# Patient Record
Sex: FEMALE | Race: WHITE | NOT HISPANIC OR LATINO | Employment: OTHER | URBAN - METROPOLITAN AREA
[De-identification: names, ages, dates, MRNs, and addresses within clinical notes are randomized per-mention and may not be internally consistent; named-entity substitution may affect disease eponyms.]

---

## 2017-01-09 RX ORDER — AMLODIPINE BESYLATE 2.5 MG/1
TABLET ORAL
Qty: 90 TABLET | Refills: 2 | Status: SHIPPED | OUTPATIENT
Start: 2017-01-09 | End: 2017-01-19 | Stop reason: SDUPTHER

## 2017-01-19 RX ORDER — AMLODIPINE BESYLATE 2.5 MG/1
TABLET ORAL
Qty: 90 TABLET | Refills: 2 | Status: SHIPPED | OUTPATIENT
Start: 2017-01-19 | End: 2017-03-24 | Stop reason: SDUPTHER

## 2017-02-14 DIAGNOSIS — E78.5 HYPERLIPIDEMIA, UNSPECIFIED HYPERLIPIDEMIA TYPE: Primary | ICD-10-CM

## 2017-02-14 RX ORDER — ATORVASTATIN CALCIUM 40 MG/1
TABLET, FILM COATED ORAL
Qty: 180 TABLET | Refills: 0 | Status: SHIPPED | OUTPATIENT
Start: 2017-02-14 | End: 2017-03-24 | Stop reason: SDUPTHER

## 2017-02-14 NOTE — TELEPHONE ENCOUNTER
Due for lipid panel this month, order in  Also due for prevnar 13, Td, and flu vaccine if not done already

## 2017-02-17 NOTE — TELEPHONE ENCOUNTER
Patient stated she had a lot of lab done by Dr Quiñonez recently including lipid panel. I will request the results.   She did not want the prevnar 13, flu or tetanus shot.

## 2017-02-20 NOTE — TELEPHONE ENCOUNTER
Received labs from Dr Quiñonez.   Cholesterol Total: 105  Trig 62  HDL 51  LDL 44   Report sent to scanning

## 2017-02-27 ENCOUNTER — DOCUMENTATION ONLY (OUTPATIENT)
Dept: FAMILY MEDICINE | Facility: CLINIC | Age: 77
End: 2017-02-27

## 2017-02-27 NOTE — PROGRESS NOTES
Pre-Visit Chart Review  For Appointment Scheduled on 3-24-17    Health Maintenance Due   Topic Date Due    TETANUS VACCINE  03/15/1958    Pneumococcal (65+) (1 of 2 - PCV13) 03/15/2005    Influenza Vaccine  08/01/2016

## 2017-03-21 DIAGNOSIS — M25.562 LEFT KNEE PAIN, UNSPECIFIED CHRONICITY: Primary | ICD-10-CM

## 2017-03-23 ENCOUNTER — HOSPITAL ENCOUNTER (OUTPATIENT)
Dept: RADIOLOGY | Facility: HOSPITAL | Age: 77
Discharge: HOME | End: 2017-03-23
Attending: ORTHOPAEDIC SURGERY
Payer: MEDICARE

## 2017-03-23 ENCOUNTER — OFFICE VISIT (OUTPATIENT)
Dept: ORTHOPEDICS | Facility: CLINIC | Age: 77
End: 2017-03-23
Payer: MEDICARE

## 2017-03-23 VITALS
DIASTOLIC BLOOD PRESSURE: 75 MMHG | SYSTOLIC BLOOD PRESSURE: 164 MMHG | HEART RATE: 74 BPM | WEIGHT: 201 LBS | HEIGHT: 63 IN | BODY MASS INDEX: 35.61 KG/M2

## 2017-03-23 DIAGNOSIS — M25.562 LEFT KNEE PAIN, UNSPECIFIED CHRONICITY: ICD-10-CM

## 2017-03-23 DIAGNOSIS — M17.12 PRIMARY OSTEOARTHRITIS OF LEFT KNEE: Primary | ICD-10-CM

## 2017-03-23 PROCEDURE — 73562 X-RAY EXAM OF KNEE 3: CPT | Mod: 26,59,RT, | Performed by: RADIOLOGY

## 2017-03-23 PROCEDURE — 3078F DIAST BP <80 MM HG: CPT | Mod: S$GLB,,, | Performed by: ORTHOPAEDIC SURGERY

## 2017-03-23 PROCEDURE — 1125F AMNT PAIN NOTED PAIN PRSNT: CPT | Mod: S$GLB,,, | Performed by: ORTHOPAEDIC SURGERY

## 2017-03-23 PROCEDURE — 20610 DRAIN/INJ JOINT/BURSA W/O US: CPT | Mod: LT,S$GLB,, | Performed by: ORTHOPAEDIC SURGERY

## 2017-03-23 PROCEDURE — 1157F ADVNC CARE PLAN IN RCRD: CPT | Mod: S$GLB,,, | Performed by: ORTHOPAEDIC SURGERY

## 2017-03-23 PROCEDURE — 73564 X-RAY EXAM KNEE 4 OR MORE: CPT | Mod: 26,LT,, | Performed by: RADIOLOGY

## 2017-03-23 PROCEDURE — 3077F SYST BP >= 140 MM HG: CPT | Mod: S$GLB,,, | Performed by: ORTHOPAEDIC SURGERY

## 2017-03-23 PROCEDURE — 1160F RVW MEDS BY RX/DR IN RCRD: CPT | Mod: S$GLB,,, | Performed by: ORTHOPAEDIC SURGERY

## 2017-03-23 PROCEDURE — 1159F MED LIST DOCD IN RCRD: CPT | Mod: S$GLB,,, | Performed by: ORTHOPAEDIC SURGERY

## 2017-03-23 PROCEDURE — 99999 PR PBB SHADOW E&M-EST. PATIENT-LVL III: CPT | Mod: PBBFAC,,, | Performed by: ORTHOPAEDIC SURGERY

## 2017-03-23 PROCEDURE — 99213 OFFICE O/P EST LOW 20 MIN: CPT | Mod: 25,S$GLB,, | Performed by: ORTHOPAEDIC SURGERY

## 2017-03-23 PROCEDURE — 73564 X-RAY EXAM KNEE 4 OR MORE: CPT | Mod: TC,PN,LT

## 2017-03-23 RX ORDER — TRIAMCINOLONE ACETONIDE 40 MG/ML
40 INJECTION, SUSPENSION INTRA-ARTICULAR; INTRAMUSCULAR
Status: DISCONTINUED | OUTPATIENT
Start: 2017-03-23 | End: 2017-03-23 | Stop reason: HOSPADM

## 2017-03-23 RX ADMIN — TRIAMCINOLONE ACETONIDE 40 MG: 40 INJECTION, SUSPENSION INTRA-ARTICULAR; INTRAMUSCULAR at 02:03

## 2017-03-23 NOTE — PROCEDURES
Large Joint Aspiration/Injection  Date/Time: 3/23/2017 2:53 PM  Performed by: COLIN HOLGUIN  Authorized by: COLIN HOLGUIN     Consent Done?:  Yes (Verbal)  Indications:  Pain  Procedure site marked: Yes    Timeout: Prior to procedure the correct patient, procedure, and site was verified      Location:  Knee  Site:  L knee  Prep: Patient was prepped and draped in usual sterile fashion    Needle size:  20 G  Approach:  Anterolateral  Medications:  40 mg triamcinolone acetonide 40 mg/mL  Patient tolerance:  Patient tolerated the procedure well with no immediate complications

## 2017-03-23 NOTE — PROGRESS NOTES
Past Medical History:   Diagnosis Date    Blood transfusion     GERD (gastroesophageal reflux disease)     Hepatitis C     treated six months by Dr. Gaspar with interferon    History of hepatitis C     Hyperlipidemia     Hypertension     Hypothyroidism     Obesity     Primary osteoarthritis of left knee 2015    Sleep apnea     Stroke     TIA     TIA (transient ischemic attack)        Past Surgical History:   Procedure Laterality Date    ABDOMINAL SURGERY      SBO from lap band wraping around small bowel, lap untwisted    ADENOIDECTOMY      BREAST BIOPSY  2014    needle biopsy     CATARACT EXTRACTION, BILATERAL Bilateral      SECTION      CHOLECYSTECTOMY      COLONOSCOPY  2011    Dr. Alvares, 5 year recheck    EYE SURGERY      bilateral cataract Dr Carrillo     HEMORRHOID SURGERY      HYSTERECTOMY      INCONTINENCE SURGERY      sling    LAPAROSCOPIC GASTRIC BANDING      LAPAROSCOPIC NISSEN FUNDOPLICATION      Dr. Rutherford    TONSILLECTOMY         Current Outpatient Prescriptions   Medication Sig    amlodipine (NORVASC) 2.5 MG tablet 1 TABLET ONCE DAILY    aspirin (ECOTRIN) 81 MG EC tablet Take 162 mg by mouth once daily. 2 tablets daily    atorvastatin (LIPITOR) 40 MG tablet TAKE 2 TABLETS BY MOUTH EVERY DAY    CALCIUM CARB/VIT D3/MINERALS (CALTRATE PLUS ORAL) Take 1 tablet by mouth 2 (two) times daily.    CALCIUM CARBONATE (TUMS ORAL) Take 4 each by mouth once daily.    levothyroxine (SYNTHROID) 125 MCG tablet TAKE 1 TABLET BY MOUTH EVERY MORNING BEFORE BREAKFAST ON AN EMPTY STOMACH    multivitamin (ONE DAILY MULTIVITAMIN) per tablet Take 1 tablet by mouth once daily.    VESICARE 10 mg tablet TK 1 T PO QD    VIT A/VIT C/VIT E/ZINC/COPPER (PRESERVISION AREDS ORAL) Take 1 capsule by mouth 2 (two) times daily.    hyoscyamine (ANASPAZ,LEVSIN) 0.125 mg Tab Take 1 tablet (125 mcg total) by mouth every 4 (four) hours as needed (bladder spasm).      No current facility-administered medications for this visit.        Review of patient's allergies indicates:   Allergen Reactions    Ace inhibitors Other (See Comments)     cough    Morphine Itching    Keflex [cephalexin] Itching and Rash       Family History   Problem Relation Age of Onset    Diabetes Mother     Arthritis Mother      RA    Heart disease Mother      MI at 79     Diabetes Sister     Diverticulitis Sister     Emphysema Maternal Aunt     Heart disease Maternal Aunt     Cancer Maternal Uncle     Early death Maternal Grandfather 47    Heart disease Maternal Grandfather     Arthritis Paternal Grandmother     Heart disease Paternal Grandfather     Cancer Paternal Grandfather      lung heavy smoker     No Known Problems Father     No Known Problems Son     No Known Problems Paternal Uncle     No Known Problems Maternal Grandmother     No Known Problems Brother     Arthritis Daughter        Social History     Social History    Marital status:      Spouse name: N/A    Number of children: N/A    Years of education: N/A     Occupational History    Not on file.     Social History Main Topics    Smoking status: Former Smoker     Packs/day: 0.50     Years: 5.00    Smokeless tobacco: Never Used    Alcohol use Yes      Comment: occasionally at holidays    Drug use: No    Sexual activity: Yes     Partners: Male     Other Topics Concern    Not on file     Social History Narrative       Chief Complaint:   Chief Complaint   Patient presents with    Knee Pain     left knee-wants injection        History : This is a 77-year-old female seen for left knee pain.  Patient states that started at the beginning of 2015 when he fell out of a truck.  She's had pain since then.  Pain is with walking and bending.  Pain is located above and below the kneecap as well as posteriorly in the knee.  Also has pain along the medial joint line.      Present: Patient comes back in for recurrent left  knee pain.  I injected her back in October.  Injection lasted several months.  Pain hurts with walking.  Pain over the last month.  Pain a 5 out of 10.      Review of Systems:    Constitution: Negative for chills, fever, and sweats.  Negative for unexplained weight loss.    HENT:  Negative for headaches and blurry vision.    Cardiovascular:Negative for chest pain or irregular heart beat. Negative for hypertension.    Respiratory:  Negative for cough and shortness of breath.    Gastrointestinal: Negative for abdominal pain, heartburn, melena, nausea, and vomitting.    Genitourinary:  Negative bladder incontinence and dysuria.  Positive for frequency and urgency.    Musculoskeletal:  See HPI    Neurological: Negative for numbness.    Psychiatric/Behavioral: Negative for depression.  The patient is not nervous/anxious.      Endocrine: Negative for polyuria    Hematologic/Lymphatic: Negative for bleeding problem.  Does not bruise/bleed easily.    Skin: Negative for poor would healing and rash      Physical Examination:    Vital Signs:    Vitals:    03/23/17 1434   BP: (!) 164/75   Pulse: 74       Body mass index is 35.61 kg/(m^2).    This a well-developed, well nourished patient in no acute distress.  They are alert and oriented and cooperative to examination.  Pt. walks without an antalgic gait.      Examination of the left knee shows no rashes or erythema. There are no masses ecchymosis or effusion. Patient has full range of motion from 0-130°. Patient is nontender to palpation over lateral joint line and mildly tender to palpation over the medial joint line. Knee is stable to varus and valgus stress. 5 out of 5 motor strength. Palpable distal pulses. Intact light touch sensation. Negative Patellofemoral crepitus      X-rays: X-rays left knee are ordered and reviewed which show severe medial joint space narrowing of both knees with the left leg slightly worse than the right.     Assessment:: Left varus knee  arthritis    Plan:  I reviewed the findings with the patient today.  We discussed treatment options.  Patient elected for another cortisone injection.  Patient will follow up if the cortisone injections stop working as well.

## 2017-03-24 ENCOUNTER — OFFICE VISIT (OUTPATIENT)
Dept: FAMILY MEDICINE | Facility: CLINIC | Age: 77
End: 2017-03-24
Payer: MEDICARE

## 2017-03-24 VITALS
TEMPERATURE: 98 F | BODY MASS INDEX: 36.35 KG/M2 | HEART RATE: 61 BPM | RESPIRATION RATE: 24 BRPM | DIASTOLIC BLOOD PRESSURE: 68 MMHG | HEIGHT: 62 IN | WEIGHT: 197.56 LBS | SYSTOLIC BLOOD PRESSURE: 146 MMHG | OXYGEN SATURATION: 97 %

## 2017-03-24 DIAGNOSIS — Z00.00 ANNUAL PHYSICAL EXAM: Primary | ICD-10-CM

## 2017-03-24 DIAGNOSIS — K21.9 GASTROESOPHAGEAL REFLUX DISEASE, ESOPHAGITIS PRESENCE NOT SPECIFIED: ICD-10-CM

## 2017-03-24 DIAGNOSIS — E03.9 HYPOTHYROIDISM, UNSPECIFIED TYPE: ICD-10-CM

## 2017-03-24 DIAGNOSIS — Z86.73 HISTORY OF CVA (CEREBROVASCULAR ACCIDENT): ICD-10-CM

## 2017-03-24 DIAGNOSIS — G47.33 OSA (OBSTRUCTIVE SLEEP APNEA): ICD-10-CM

## 2017-03-24 DIAGNOSIS — M17.0 PRIMARY OSTEOARTHRITIS OF BOTH KNEES: ICD-10-CM

## 2017-03-24 DIAGNOSIS — E78.5 HYPERLIPIDEMIA, UNSPECIFIED HYPERLIPIDEMIA TYPE: ICD-10-CM

## 2017-03-24 DIAGNOSIS — I10 HYPERTENSION, POOR CONTROL: ICD-10-CM

## 2017-03-24 PROCEDURE — 3077F SYST BP >= 140 MM HG: CPT | Mod: S$GLB,,, | Performed by: FAMILY MEDICINE

## 2017-03-24 PROCEDURE — 99999 PR PBB SHADOW E&M-EST. PATIENT-LVL III: CPT | Mod: PBBFAC,,, | Performed by: FAMILY MEDICINE

## 2017-03-24 PROCEDURE — 99499 UNLISTED E&M SERVICE: CPT | Mod: S$GLB,,, | Performed by: FAMILY MEDICINE

## 2017-03-24 PROCEDURE — 3078F DIAST BP <80 MM HG: CPT | Mod: S$GLB,,, | Performed by: FAMILY MEDICINE

## 2017-03-24 PROCEDURE — 99397 PER PM REEVAL EST PAT 65+ YR: CPT | Mod: S$GLB,,, | Performed by: FAMILY MEDICINE

## 2017-03-24 RX ORDER — ATORVASTATIN CALCIUM 40 MG/1
80 TABLET, FILM COATED ORAL DAILY
Qty: 180 TABLET | Refills: 3 | Status: SHIPPED | OUTPATIENT
Start: 2017-03-24 | End: 2017-07-07 | Stop reason: SDUPTHER

## 2017-03-24 RX ORDER — ATORVASTATIN CALCIUM 40 MG/1
TABLET, FILM COATED ORAL
Qty: 180 TABLET | Refills: 3 | OUTPATIENT
Start: 2017-03-24

## 2017-03-24 RX ORDER — AMLODIPINE BESYLATE 2.5 MG/1
5 TABLET ORAL DAILY
Qty: 90 TABLET | Refills: 2
Start: 2017-03-24 | End: 2017-04-07 | Stop reason: DRUGHIGH

## 2017-03-24 RX ORDER — OMEGA-3 FATTY ACIDS 1000 MG
1 CAPSULE ORAL 2 TIMES DAILY
COMMUNITY
End: 2017-08-17

## 2017-03-24 NOTE — PROGRESS NOTES
Patient, Brandi Flynn (MRN #[id number]), presented with a recorded BMI of 36.13 kg/m^2 and a documented comorbidity(s):  - Obstructive Sleep Apnea  - Hypertension  - Hyperlipidemia  to which the severe obesity is a contributing factor. This is consistent with the definition of severe obesity (BMI 35.0-35.9) with comorbidity (ICD-10 E66.01, Z68.35). The patient's severe obesity was monitored, evaluated, addressed and/or treated. This addendum to the medical record is made on 03/24/2017.

## 2017-03-24 NOTE — PATIENT INSTRUCTIONS
Weight Management: Getting Started  Healthy bodies come in all shapes and sizes. Not all bodies are made to be thin. For some people, a healthy weight is higher than the average weight listed on weight charts. Your healthcare provider can help you decide on a healthy weight for you.    Reasons to lose weight  Losing weight can help with some health problems, such as high blood pressure, heart disease, diabetes, sleep apnea, and arthritis. You may also feel more energy.  Set your long-term goal  Your goal doesn't even have to be a specific weight. You may decide on a fitness goal (such as being able to walk 10 miles a week), or a health goal (such as lowering your blood pressure). Choose a goal that is measurable and reasonable, so you know when you've reached it. A goal of reaching a BMI of less than 25 is not always reasonable (or possible).   Make an action plan  Habits dont change overnight. Setting your goals too high can leave you feeling discouraged if you cant reach them. Be realistic. Choose one or two small changes you can make now. Set an action plan for how you are going to make these changes. When you can stick to this plan, keep making a few more small changes. Taking small steps will help you stay on the path to success.  Track your progress  Write down your goals. Then, keep a daily record of your progress. Write down what you eat and how active you are. This record lets you look back on how much youve done. It may also help when youre feeling frustrated. Reward yourself for success. Even if you dont reach every goal, give yourself credit for what you do get done.  Get support  Encouragement from others can help make losing weight easier. Ask your family members and friends for support. They may even want to join you. Also look to your healthcare provider, registered dietitian, and  for help. Your local hospital can give you more information about nutrition, exercise, and  weight loss.  Date Last Reviewed: 1/31/2016 © 2000-2016 [employer] [address] [place] [address], [place] [address]. All rights reserved. This information is not intended as a substitute for professional medical care. Always follow your healthcare professional's instructions.        Walking for Fitness  Fitness walking has something for everyone, even people who are already fit. Walking is one of the safest ways to condition your body aerobically. It can boost energy, help you lose weight, and reduce stress.    Physical benefits  · Walking strengthens your heart and lungs, and tones your muscles.  · When walking, your feet land with less impact than in other sports. This reduces chances of muscle, bone, and joint injury.  · Regular walking improves your cholesterol levels and lowers your risk of heart disease. And it helps you control your blood sugar if you have diabetes.  · Walking is a weight-bearing activity, which helps maintain bone density. This can help prevent osteoporosis.  Personal rewards  · Taking walks can help you relax and manage stress. And fitness walking may make you feel better about yourself.  · Walking can help you sleep better at night and make you less likely to be depressed.  · Regular walking may help maintain your memory as you get older.  · Walking is a great way to spend extra time with friends and family members. Be sure to invite your dog along!  Q&A about fitness walking  Q: Will walking keep me fit?  A: Yes. Regular walking at the right pace gives you all the benefits of other aerobic activities, such as jogging and swimming.  Q: Will walking help me lose weight and keep it off?  A: Yes. Per mile, walking can burn as many calories as jogging. Your health care provider can help work walking into your weight-loss plan.  Q: Is walking safe for my health?  A: Yes. Walking is safe if you have high blood pressure, diabetes, heart disease, or other conditions. Talk to your health  care provider before you start.  Date Last Reviewed: 5/9/2015  © 7839-9165 [employer] [address] [place] [address], [place] [address]. All rights reserved. This information is not intended as a substitute for professional medical care. Always follow your healthcare professional's instructions.        Controlling High Blood Pressure  High blood pressure (hypertension) is often called the silent killer. This is because many people who have it dont know it. High blood pressure is defined as 140/90 mm Hg or higher. Know your blood pressure and remember to check it regularly. Doing so can save your life. Here are some things you can do to help control your blood pressure.    Choose heart-healthy foods  · Select low-salt, low-fat foods. Limit sodium intake to 2,400 mg per day or the amount suggested by your healthcare provider.  · Limit canned, dried, cured, packaged, and fast foods. These can contain a lot of salt.  · Eat 8 to 10 servings of fruits and vegetables every day.  · Choose lean meats, fish, or chicken.  · Eat whole-grain pasta, brown rice, and beans.  · Eat 2 to 3 servings of low-fat or fat-free dairy products.  · Ask your doctor about the DASH eating plan. This plan helps reduce blood pressure.  · When you go to a restaurant, ask that your meal be prepared with no added salt.  Maintain a healthy weight  · Ask your healthcare provider how many calories to eat a day. Then stick to that number.  · Ask your healthcare provider what weight range is healthiest for you. If you are overweight, a weight loss of only 3% to 5% of your body weight can help lower blood pressure. Generally, a good weight loss goal is to lose 10% of your body weight in a year.  · Limit snacks and sweets.  · Get regular exercise.  Get up and get active  · Choose activities you enjoy. Find ones you can do with friends or family. This includes bicycling, dancing, walking, and jogging.  · Park farther away from building  entrances.  · Use stairs instead of the elevator.  · When you can, walk or bike instead of driving.  · [place] leaves, garden, or do household repairs.  · Be active at a moderate to vigorous level of physical activity for at least 40 minutes for a minimum of 3 to 4 days a week.   Manage stress  · Make time to relax and enjoy life. Find time to laugh.  · Communicate your concerns with your loved ones and your healthcare provider.  · Visit with family and friends, and keep up with hobbies.  Limit alcohol and quit smoking  · Men should have no more than 2 drinks per day.  · Women should have no more than 1 drink per day.  · Talk with your healthcare provider about quitting smoking. Smoking significantly increases your risk for heart disease and stroke. Ask your healthcare provider about community smoking cessation programs and other options.  Medicines  If lifestyle changes arent enough, your healthcare provider may prescribe high blood pressure medicine. Take all medicines as prescribed. If you have any questions about your medicines, ask your healthcare provider before stopping or changing them.   Date Last Reviewed: 4/27/2016 © 2000-2016 [employer] [address] [place] [address], [place] [address]. All rights reserved. This information is not intended as a substitute for professional medical care. Always follow your healthcare professional's instructions.

## 2017-03-24 NOTE — PROGRESS NOTES
Subjective:       Patient ID: Brandi Flynn is a 77 y.o. female.    Chief Complaint: Annual Exam    HPI Comments: 77 year old female in for an annual exam.  She had a screening at [facility] with ultrasounds of aorta, carotids, abdomen and peripheral arteries and did well.  She also has lab from Dr. Quiñonez which is satisfactory including chem panel, blood count, thyroid, lipids and hormone levels.  She has no major problems but just had a steroid injection in the left knee yesterday.  She is getting them every 5-6 months.      Past Medical History:  1960: Blood transfusion  No date: GERD (gastroesophageal reflux disease)  No date: Hepatitis C      Comment: treated six months by Dr. Gaspar with                interferon  No date: History of hepatitis C  No date: Hyperlipidemia  No date: Hypertension  No date: Hypothyroidism  No date: Obesity  2015: Primary osteoarthritis of left knee  No date: Sleep apnea  No date: Stroke      Comment: TIA   No date: TIA (transient ischemic attack)    Past Surgical History:  2015: ABDOMINAL SURGERY      Comment: SBO from lap band wraping around small bowel,                lap untwisted  No date: ADENOIDECTOMY  2014: BREAST BIOPSY      Comment: needle biopsy   No date: CATARACT EXTRACTION, BILATERAL Bilateral  No date:  SECTION  2001: CHOLECYSTECTOMY  2011: COLONOSCOPY      Comment: Dr. Alvares, 5 year recheck  2014: EYE SURGERY      Comment: bilateral cataract Dr Carrillo   No date: HEMORRHOID SURGERY  1989: HYSTERECTOMY  2008: INCONTINENCE SURGERY      Comment: sling  2004: LAPAROSCOPIC GASTRIC BANDING  No date: LAPAROSCOPIC NISSEN FUNDOPLICATION      Comment: Dr. Rutherford  No date: TONSILLECTOMY    Review of patient's family history indicates:    Diabetes                       Mother                    Arthritis                      Mother                      Comment: RA    Heart disease                  Mother                      Comment: MI at 79     Diabetes                        Sister                    Diverticulitis                 Sister                    Emphysema                      Maternal Aunt             Heart disease                  Maternal Aunt             Cancer                         Maternal Uncle            Early death                    Maternal Grandfather      Heart disease                  Maternal Grandfather      Arthritis                      Paternal Grandmother      Heart disease                  Paternal Grandfather      Cancer                         Paternal Grandfather        Comment: lung heavy smoker     No Known Problems              Father                    No Known Problems              Son                       No Known Problems              Paternal Uncle            No Known Problems              Maternal Grandmother      No Known Problems              Brother                   Arthritis                      Daughter                  Social History    Marital status:              Spouse name:                       Years of education:                 Number of children:               Social History Main Topics    Smoking status: Former Smoker                                                                Packs/day: 0.50      Years: 5.00        Smokeless status: Never Used                        Alcohol use: Yes                Comment: occasionally at holidays    Drug use: No              Sexual activity: Yes               Partners with: Male        Current Outpatient Prescriptions:     amlodipine (NORVASC) 2.5 MG tablet, Take 2 tablets (5 mg total) by mouth once daily., Disp: 90 tablet, Rfl: 2    aspirin (ECOTRIN) 81 MG EC tablet, Take 162 mg by mouth once daily. 2 tablets daily, Disp: , Rfl:     atorvastatin (LIPITOR) 40 MG tablet, Take 2 tablets (80 mg total) by mouth once daily., Disp: 180 tablet, Rfl: 3    B INFANTIS/B ANI/B JODY/B BIFID (PROBIOTIC 4X ORAL), Take 2 capsules by mouth once daily., Disp: , Rfl:     CALCIUM  CARB/VIT D3/MINERALS (CALTRATE PLUS ORAL), Take 1 tablet by mouth 2 (two) times daily., Disp: , Rfl:     IODINE ORAL, Take 12.5 mg by mouth every Mon, Wed, Fri., Disp: , Rfl:     NATURE-THROID 130 mg Tab, Take 0.5 tablets by mouth once daily. , Disp: , Rfl: 3    omega-3 fatty acids 1,000 mg Cap, Take 1 capsule by mouth 2 (two) times daily., Disp: , Rfl:     UNABLE TO FIND, Take 1 capsule by mouth once daily. A-5,000 iu / D -3=10,000 iu /K 2 = 500 mcg, Disp: , Rfl:     UNABLE TO FIND, Take 1 capsule by mouth once daily. BioTE  mg diatary supplement, Disp: , Rfl:     VESICARE 10 mg tablet, TK 1 T PO QD, Disp: , Rfl: 3    VIT A/VIT C/VIT E/ZINC/COPPER (PRESERVISION AREDS ORAL), Take 1 capsule by mouth 2 (two) times daily., Disp: , Rfl:     hyoscyamine (ANASPAZ,LEVSIN) 0.125 mg Tab, Take 1 tablet (125 mcg total) by mouth every 4 (four) hours as needed (bladder spasm)., Disp: 60 tablet, Rfl: 2  No current facility-administered medications for this visit.     TETANUS VACCINE due on 03/15/1958-DECLINES  Zoster Vaccine due on 03/15/2000-DECLINES  Pneumococcal (65+)(1 of 2 - PCV13) due on 03/15/2005-DECLINES  Influenza Vaccine due on 08/01/2016-DECLINES      Review of Systems   Constitutional: Negative for chills, diaphoresis, fatigue, fever and unexpected weight change.   HENT: Negative for congestion, ear pain, hearing loss, postnasal drip and sinus pressure.    Eyes: Negative for itching and visual disturbance.   Respiratory: Negative for cough, chest tightness, shortness of breath and wheezing.    Cardiovascular: Negative for chest pain, palpitations and leg swelling.   Gastrointestinal: Negative for abdominal pain, blood in stool, constipation, diarrhea, nausea and vomiting.   Genitourinary: Negative for dysuria, frequency, hematuria, menstrual problem, pelvic pain, vaginal bleeding and vaginal discharge.   Musculoskeletal: Positive for arthralgias (knees). Negative for back pain, joint swelling and  myalgias.   Skin: Negative for color change, pallor and rash.   Neurological: Positive for dizziness (occasional, no pattern of activity or position change). Negative for headaches.   Hematological: Negative for adenopathy.   Psychiatric/Behavioral: Negative for sleep disturbance. The patient is not nervous/anxious.        Objective:      Physical Exam   Constitutional: She is oriented to person, place, and time. She appears well-developed. No distress.   Elevated systolic blood pressures  Patient is obese with bmi of 36.1.   Weight is decreased by 3.6lb since last visit of 10/13/16.     HENT:   Head: Normocephalic and atraumatic.   Right Ear: External ear normal.   Left Ear: External ear normal.   Nose: Nose normal.   Mouth/Throat: Oropharynx is clear and moist. No oropharyngeal exudate.   Eyes: Conjunctivae and EOM are normal. Pupils are equal, round, and reactive to light. Right eye exhibits no discharge. Left eye exhibits no discharge. No scleral icterus.   Neck: Normal range of motion. Neck supple. No JVD present. No tracheal deviation present. No thyromegaly present.   Cardiovascular: Normal rate, regular rhythm and normal heart sounds.  Exam reveals no gallop and no friction rub.    No murmur heard.  No bruit audible   Pulmonary/Chest: Effort normal and breath sounds normal. No respiratory distress. She has no wheezes. She has no rales. She exhibits no tenderness.   Abdominal: Soft. Bowel sounds are normal. She exhibits no distension and no mass. There is no tenderness. There is no rebound and no guarding. No hernia.   Musculoskeletal: She exhibits no edema or tenderness.        Right knee: She exhibits decreased range of motion, swelling and effusion.        Left knee: She exhibits decreased range of motion, swelling and effusion.   Lymphadenopathy:     She has no cervical adenopathy.   Neurological: She is alert and oriented to person, place, and time. She has normal reflexes. She displays normal reflexes.  No cranial nerve deficit. She exhibits normal muscle tone.   Skin: Skin is warm and dry. No rash noted. She is not diaphoretic. No erythema. No pallor.   Psychiatric: She has a normal mood and affect. Her behavior is normal. Judgment and thought content normal.   Nursing note and vitals reviewed.      Assessment:       1. Annual physical exam    2. Hypertension, poor control    3. Hyperlipidemia, unspecified hyperlipidemia type    4. Hypothyroidism, unspecified type    5. Primary osteoarthritis of both knees    6. REN (obstructive sleep apnea)    7. Gastroesophageal reflux disease, esophagitis presence not specified    8. History of CVA (cerebrovascular accident)    9. BMI 36.0-36.9,adult        Plan:       1. Annual physical exam    2. Hypertension, poor control  Increase amlodipine to two daily (5mg) and call with bp log after 1-2 weeks    3. Hyperlipidemia, unspecified hyperlipidemia type  Good results on Dr. Quiñonez lab-see in media section  - atorvastatin (LIPITOR) 40 MG tablet; Take 2 tablets (80 mg total) by mouth once daily.  Dispense: 180 tablet; Refill: 3    4. Hypothyroidism, unspecified type  controlled    5. Primary osteoarthritis of both knees  Discussed synvisc and other alternatives    6. REN (obstructive sleep apnea)    7. Gastroesophageal reflux disease, esophagitis presence not specified    8. History of CVA (cerebrovascular accident)    9. BMI 36.0-36.9,adult  Discussed aquatic exercise, weight loss benefits on her knee arthritis         Patient readiness: acceptance and barriers:none    During the course of the visit the patient was educated and counseled about the following:     Hypertension:   Medication: increase amlodipine to two daily (5mg) and call bp results to me after 1-2 weeks.  Dietary sodium restriction.  Regular aerobic exercise.  Obesity:   General weight loss/lifestyle modification strategies discussed (elicit support from others; identify saboteurs; non-food rewards,  etc).  Informal exercise measures discussed, e.g. taking stairs instead of elevator.  Regular aerobic exercise program discussed.    Goals: Hypertension: Reduce Blood Pressure and Obesity: Reduce calorie intake and BMI    Did patient meet goals/outcomes: No    The following self management tools provided: blood pressure log  excercise log    Patient Instructions (the written plan) was given to the patient/family.     Time spent with patient: 45 minutes

## 2017-04-07 ENCOUNTER — CLINICAL SUPPORT (OUTPATIENT)
Dept: FAMILY MEDICINE | Facility: CLINIC | Age: 77
End: 2017-04-07
Payer: MEDICARE

## 2017-04-07 VITALS — SYSTOLIC BLOOD PRESSURE: 137 MMHG | DIASTOLIC BLOOD PRESSURE: 70 MMHG | HEART RATE: 55 BPM

## 2017-04-07 DIAGNOSIS — I10 ESSENTIAL HYPERTENSION: Primary | ICD-10-CM

## 2017-04-07 PROCEDURE — 99499 UNLISTED E&M SERVICE: CPT | Mod: S$GLB,,, | Performed by: FAMILY MEDICINE

## 2017-04-07 RX ORDER — AMLODIPINE BESYLATE 5 MG/1
5 TABLET ORAL DAILY
Qty: 90 TABLET | Refills: 3 | Status: SHIPPED | OUTPATIENT
Start: 2017-04-07 | End: 2017-08-17 | Stop reason: DRUGHIGH

## 2017-04-07 NOTE — TELEPHONE ENCOUNTER
Patient came in for bp check - 137/70 p55-needs refill of Amlodipine- was taking 2.5mg two daily. Sheree

## 2017-05-11 DIAGNOSIS — E03.9 HYPOTHYROIDISM, UNSPECIFIED TYPE: Primary | ICD-10-CM

## 2017-05-12 RX ORDER — LEVOTHYROXINE SODIUM 125 UG/1
TABLET ORAL
Qty: 90 TABLET | Refills: 0 | OUTPATIENT
Start: 2017-05-12

## 2017-05-12 NOTE — TELEPHONE ENCOUNTER
Patient stated she is not on levothyroxine. D/C    She did go back to amlodipine 2.5 on her bp med. The 5mg was making her dizzy.

## 2017-06-27 RX ORDER — ATORVASTATIN CALCIUM 40 MG/1
TABLET, FILM COATED ORAL
Qty: 180 TABLET | Refills: 0 | OUTPATIENT
Start: 2017-06-27

## 2017-07-07 DIAGNOSIS — E78.5 HYPERLIPIDEMIA, UNSPECIFIED HYPERLIPIDEMIA TYPE: ICD-10-CM

## 2017-07-07 RX ORDER — ATORVASTATIN CALCIUM 40 MG/1
80 TABLET, FILM COATED ORAL DAILY
Qty: 180 TABLET | Refills: 3 | Status: SHIPPED | OUTPATIENT
Start: 2017-07-07 | End: 2018-07-09 | Stop reason: SDUPTHER

## 2017-07-07 NOTE — TELEPHONE ENCOUNTER
----- Message from Fritz Segovia sent at 7/7/2017  9:24 AM CDT -----  Contact: self  Patient need a refill on atorvastatin please send to [facility], any questions please call back at [phone] (home)     [facility] [address] [place] [address] & [address] [address]  Phone: [phone] Fax: [phone]

## 2017-07-17 ENCOUNTER — TELEPHONE (OUTPATIENT)
Dept: FAMILY MEDICINE | Facility: CLINIC | Age: 77
End: 2017-07-17

## 2017-07-17 NOTE — TELEPHONE ENCOUNTER
----- Message from Celsa Pardo sent at 7/13/2017 10:50 AM CDT -----  Patient calling back concerning request for refill of cholesterol medication: Atorvastatin 80 mg/takes two 40 mg tablets once daily/[facility] on [address] does not have/please call patient back at [phone] to advise. (Patient stated she has been without this medication for a week)

## 2017-08-14 ENCOUNTER — DOCUMENTATION ONLY (OUTPATIENT)
Dept: FAMILY MEDICINE | Facility: CLINIC | Age: 77
End: 2017-08-14

## 2017-08-14 NOTE — PROGRESS NOTES
Pre-Visit Chart Review  For Appointment Scheduled on 08/17/2017    Health Maintenance Due   Topic Date Due    TETANUS VACCINE  03[birth year]15/1958    Zoster Vaccine  03/15/2000    Pneumococcal (65+) (1 of 2 - PCV13) 03/15/2005    Influenza Vaccine  08/01/2017    DEXA SCAN  08/26/2017

## 2017-08-17 ENCOUNTER — OFFICE VISIT (OUTPATIENT)
Dept: FAMILY MEDICINE | Facility: CLINIC | Age: 77
End: 2017-08-17
Payer: MEDICARE

## 2017-08-17 VITALS
HEART RATE: 58 BPM | SYSTOLIC BLOOD PRESSURE: 133 MMHG | DIASTOLIC BLOOD PRESSURE: 70 MMHG | HEIGHT: 62 IN | TEMPERATURE: 98 F | WEIGHT: 207.44 LBS | BODY MASS INDEX: 38.17 KG/M2

## 2017-08-17 DIAGNOSIS — Z00.00 ENCOUNTER FOR PREVENTIVE HEALTH EXAMINATION: Primary | ICD-10-CM

## 2017-08-17 DIAGNOSIS — N32.81 OAB (OVERACTIVE BLADDER): ICD-10-CM

## 2017-08-17 DIAGNOSIS — Z86.19 HISTORY OF HEPATITIS C: ICD-10-CM

## 2017-08-17 DIAGNOSIS — E78.5 HYPERLIPIDEMIA, UNSPECIFIED HYPERLIPIDEMIA TYPE: Chronic | ICD-10-CM

## 2017-08-17 DIAGNOSIS — K21.9 GASTROESOPHAGEAL REFLUX DISEASE WITHOUT ESOPHAGITIS: Chronic | ICD-10-CM

## 2017-08-17 DIAGNOSIS — M17.12 PRIMARY OSTEOARTHRITIS OF LEFT KNEE: ICD-10-CM

## 2017-08-17 DIAGNOSIS — I10 GOOD HYPERTENSION CONTROL: Chronic | ICD-10-CM

## 2017-08-17 DIAGNOSIS — E03.9 HYPOTHYROIDISM, UNSPECIFIED TYPE: Chronic | ICD-10-CM

## 2017-08-17 DIAGNOSIS — I73.9 CLAUDICATION: ICD-10-CM

## 2017-08-17 DIAGNOSIS — M85.80 OSTEOPENIA, UNSPECIFIED LOCATION: Chronic | ICD-10-CM

## 2017-08-17 DIAGNOSIS — Z98.84 HX OF LAPAROSCOPIC GASTRIC BANDING: ICD-10-CM

## 2017-08-17 DIAGNOSIS — E66.9 OBESITY (BMI 35.0-39.9 WITHOUT COMORBIDITY): ICD-10-CM

## 2017-08-17 DIAGNOSIS — I77.9 CAROTID ARTERY DISEASE, UNSPECIFIED LATERALITY: ICD-10-CM

## 2017-08-17 DIAGNOSIS — Z86.73 HISTORY OF TIA (TRANSIENT ISCHEMIC ATTACK): ICD-10-CM

## 2017-08-17 PROCEDURE — 99499 UNLISTED E&M SERVICE: CPT | Mod: S$GLB,,, | Performed by: PHYSICIAN ASSISTANT

## 2017-08-17 PROCEDURE — 99999 PR PBB SHADOW E&M-EST. PATIENT-LVL IV: CPT | Mod: PBBFAC,,, | Performed by: PHYSICIAN ASSISTANT

## 2017-08-17 PROCEDURE — G0439 PPPS, SUBSEQ VISIT: HCPCS | Mod: S$GLB,,, | Performed by: PHYSICIAN ASSISTANT

## 2017-08-17 NOTE — PROGRESS NOTES
Subjective:       Patient ID: Brandi Flynn is here for   Chief Complaint   Patient presents with    Health Risk Assessment       Brandi Flynn was seen today in a face to face encounter for a comprehensive Health Risk Assessment. This visit included a review of her total medical record available at the time of this visit.        Past Medical, Family, and Surgical History:      This information was reviewed and reconciled today during this encounter. Medications were reviewed and reconciled today. The active problem list has been reviewed/updated and reconciled today. These active problems are listed in the diagnosis list below.    **See completed HRA forms/Questionnaires/Flowsheets for ROS information.    Physical Exam   Constitutional: She is oriented to person, place, and time. She appears well-developed.   Obese body habitus.   HENT:   Head: Normocephalic and atraumatic.   Right Ear: Hearing and external ear normal.   Left Ear: Hearing and external ear normal.   Eyes: Conjunctivae and EOM are normal. Pupils are equal, round, and reactive to light.   Cardiovascular: Normal rate, regular rhythm, normal heart sounds and intact distal pulses.    Pulmonary/Chest: Effort normal and breath sounds normal.   Neurological: She is alert and oriented to person, place, and time.   Skin: Skin is warm and dry.   No lower ext ulcers.   Psychiatric: She has a normal mood and affect. Her behavior is normal.   Vitals reviewed.            Diagnoses (identified today) and assoicated plan for each diagnosis:         Encounter for preventive health examination    History of TIA (transient ischemic attack)  Comments:  History of TIA, cholesterol controlled on lipitor 40 mg daily, continue ASA 81 mg daily    Hyperlipidemia, unspecified hyperlipidemia type  Comments:  Controlled, continue lipitor 40 mg daily per PCP    Good hypertension control  Comments:  Well controlled, continue norvasc 2.5 mg daily per PCP    OAB (overactive  bladder)-followed by urology Dr Greg Little   Comments:  Controlled, continue vesicare and follow up with Urology as scheduled    History of hepatitis C  Comments:  History of hep C, s/p treatment with Dr. Gaspar, continue to follow with PCP    Hx of laparoscopic gastric banding  Comments:  Hx of lap band, continue to follow with Dr. Yepez    Hypothyroidism, unspecified type  Comments:  Stable, on nature thyroid, continue to follow with PCP    Gastroesophageal reflux disease without esophagitis  Comments:  Stable, no currently on PPI, continue to follow with PCP    Primary osteoarthritis of left knee  Comments:  Chronic, uncontrolled, continue to follow with Ortho for IACS    Osteopenia, unspecified location  Comments:  Stable, repeat DEXA due 8/2017, continue calcium and Vit D supplementation and low impact weight bearing activity    Claudication  -     US Lower Extremity Arteries Bilateral; Future; Expected date: 08/17/2017    Obesity (BMI 35.0-39.9 without comorbidity)  Comments:  Uncontrolled, encouraged weight loss and increasing physical activity as tolerated    Carotid artery disease, unspecified laterality  Comments:  Mild-moderate disease seen on carotid US (outside study 2017), cholesterol and blood pressure well controlled, continue to follow with PCP    Patient readiness: acceptance and barriers:none    During the course of the visit the patient was educated and counseled about the following:     Hypertension:   Medication: no change.  Obesity:   Regular aerobic exercise program discussed.    Goals: Hypertension: Reduce Blood Pressure and Obesity: Reduce calorie intake and BMI    Did patient meet goals/outcomes: Yes to HTN, No to Obesity    The following self management tools provided: declined    Patient Instructions (the written plan) was given to the patient/family.     Time spent with patient: 55 minutes    With regards to health maintenance: She declines immunizations including influenza, tetanus,  shingles, and pneumonia. She is scheduled for colonoscopy, mammogram, and DEXA scan with outside providers.

## 2017-08-17 NOTE — PATIENT INSTRUCTIONS
Counseling and Referral of Other Preventative  (Italic type indicates deductible and co-insurance are waived)    Patient Name: Brandi Flynn  Today's Date: 8/17/2017      SERVICE LIMITATIONS RECOMMENDATION    Vaccines    · Pneumococcal (once after 65)    · Influenza (annually)    · Hepatitis B (if medium/high risk)    · Prevnar 13      Hepatitis B medium/high risk factors:       - End-stage renal disease       - Hemophiliacs who received Factor VII or         IX concentrates       - Clients of institutions for the mentally             retarded       - Persons who live in the same house as          a HepB carrier       - Homosexual men       - Illicit injectable drug abusers     Pneumococcal: Recommended to patient, declined     Influenza: Recommended to patient, declined     Hepatitis B: N/A     Prevnar 13: Recommended to patient, declined    Mammogram (biennial age 50-74)  Annually (age 40 or over)  Done this year, repeat every year    Pap (up to age 70 and after 70 if unknown history or abnormal study last 10 years)    N/A     The USPSTF recommends against screening for cervical cancer in women who have had a hysterectomy with removal of the cervix and who do not have a history of a high-grade precancerous lesion (cervical intraepithelial neoplasia [LV] grade 2 or 3) or cervical cancer.     Colorectal cancer screening (to age 75)    · Fecal occult blood test (annual)  · Flexible sigmoidoscopy (5y)  · Screening colonoscopy (10y)  · Barium enema   Last done 2011, recommend to repeat every 5  years. Scheduled for next month.    Diabetes self-management training (no USPSTF recommendations)  Requires referral by treating physician for patient with diabetes or renal disease. 10 hours of initial DSMT sessions of no less than 30 minutes each in a continuous 12-month period. 2 hours of follow-up DSMT in subsequent years.  N/A    Bone mass measurements (age 65 & older, biennial)  Requires diagnosis related to osteoporosis  or estrogen deficiency. Biennial benefit unless patient has history of long-term glucocorticoid  Last done 8/2014, recommend to repeat every 3  years    Glaucoma screening (no USPSTF recommendation)  Diabetes mellitus, family history   , age 50 or over    American, age 65 or over  Done this year, repeat every year    Medical nutrition therapy for diabetes or renal disease (no recommended schedule)  Requires referral by treating physician for patient with diabetes or renal disease or kidney transplant within the past 3 years.  Can be provided in same year as diabetes self-management training (DSMT), and CMS recommends medical nutrition therapy take place after DSMT. Up to 3 hours for initial year and 2 hours in subsequent years.  N/A    Cardiovascular screening blood tests (every 5 years)  · Fasting lipid panel  Order as a panel if possible  Done this year, repeat every year    Diabetes screening tests (at least every 3 years, Medicare covers annually or at 6-month intervals for prediabetic patients)  · Fasting blood sugar (FBS) or glucose tolerance test (GTT)  Patient must be diagnosed with one of the following:       - Hypertension       - Dyslipidemia       - Obesity (BMI 30kg/m2)       - Previous elevated impaired FBS or GTT       ... or any two of the following:       - Overweight (BMI 25 but <30)       - Family history of diabetes       - Age 65 or older       - History of gestational diabetes or birth of baby weighing more than 9 pounds  Done this year, repeat every year    HIV screening (annually for increased risk patients)  · HIV-1 and HIV-2 by EIA, or MAIA, rapid antibody test or oral mucosa transudate  Patients must be at increased risk for HIV infection per USPSTF guidelines or pregnant. Tests covered annually for patient at increased risk or as requested by the patient. Pregnant patients may receive up to 3 tests during pregnancy.  Risks discussed, screening is not recommended     Smoking cessation counseling (up to 8 sessions per year)  Patients must be asymptomatic of tobacco-related conditions to receive as a preventative service.  Non-smoker    Subsequent annual wellness visit  At least 12 months since last AWV  Return in one year     The following information is provided to all patients.  This information is to help you find resources for any of the problems found today that may be affecting your health:                Living healthy guide: www.[facility].[place].[facility]      Understanding Diabetes: www.diabetes.org      Eating healthy: www.cdc.gov/healthyweight      CDC home safety checklist: www.cdc.gov/steadi/patient.html      Agency on Aging: www.goea.[place].[facility]      Alcoholics anonymous (AA): www.aa.org      Physical Activity: www.reymundo.nih.gov/iv4abiw      Tobacco use: www.quitwithusla.org     Weight Management: Getting Started  Healthy bodies come in all shapes and sizes. Not all bodies are made to be thin. For some people, a healthy weight is higher than the average weight listed on weight charts. Your healthcare provider can help you decide on a healthy weight for you.    Reasons to lose weight  Losing weight can help with some health problems, such as high blood pressure, heart disease, diabetes, sleep apnea, and arthritis. You may also feel more energy.  Set your long-term goal  Your goal doesn't even have to be a specific weight. You may decide on a fitness goal (such as being able to walk 10 miles a week), or a health goal (such as lowering your blood pressure). Choose a goal that is measurable and reasonable, so you know when you've reached it. A goal of reaching a BMI of less than 25 is not always reasonable (or possible).   Make an action plan  Habits dont change overnight. Setting your goals too high can leave you feeling discouraged if you cant reach them. Be realistic. Choose one or two small changes you can make now. Set an action plan for how you are going to make these  changes. When you can stick to this plan, keep making a few more small changes. Taking small steps will help you stay on the path to success.  Track your progress  Write down your goals. Then, keep a daily record of your progress. Write down what you eat and how active you are. This record lets you look back on how much youve done. It may also help when youre feeling frustrated. Reward yourself for success. Even if you dont reach every goal, give yourself credit for what you do get done.  Get support  Encouragement from others can help make losing weight easier. Ask your family members and friends for support. They may even want to join you. Also look to your healthcare provider, registered dietitian, and  for help. Your local hospital can give you more information about nutrition, exercise, and weight loss.  Date Last Reviewed: 1/31/2016  © 9846-0000 [employer]. [address] [place] [address]. All rights reserved. This information is not intended as a substitute for professional medical care. Always follow your healthcare professional's instructions.        Walking for Fitness  Fitness walking has something for everyone, even people who are already fit. Walking is one of the safest ways to condition your body aerobically. It can boost energy, help you lose weight, and reduce stress.    Physical benefits  · Walking strengthens your heart and lungs, and tones your muscles.  · When walking, your feet land with less impact than in other sports. This reduces chances of muscle, bone, and joint injury.  · Regular walking improves your cholesterol levels and lowers your risk of heart disease. And it helps you control your blood sugar if you have diabetes.  · Walking is a weight-bearing activity, which helps maintain bone density. This can help prevent osteoporosis.  Personal rewards  · Taking walks can help you relax and manage stress. And fitness walking may make you feel  better about yourself.  · Walking can help you sleep better at night and make you less likely to be depressed.  · Regular walking may help maintain your memory as you get older.  · Walking is a great way to spend extra time with friends and family members. Be sure to invite your dog along!  Q&A about fitness walking  Q: Will walking keep me fit?  A: Yes. Regular walking at the right pace gives you all the benefits of other aerobic activities, such as jogging and swimming.  Q: Will walking help me lose weight and keep it off?  A: Yes. Per mile, walking can burn as many calories as jogging. Your health care provider can help work walking into your weight-loss plan.  Q: Is walking safe for my health?  A: Yes. Walking is safe if you have high blood pressure, diabetes, heart disease, or other conditions. Talk to your health care provider before you start.  Date Last Reviewed: 5/9/2015  © 9925-5947 [employer]. [address] [place] [address], [place] [address]. All rights reserved. This information is not intended as a substitute for professional medical care. Always follow your healthcare professional's instructions.

## 2017-08-17 NOTE — Clinical Note
Primary Care Providers: Marcial Kan MD, MD (General)  Your patient was seen today for a HRA visit. Gap(s) in care (HEDIS gaps) have been identified during this visit that require additional testing and possible follow up.  Orders Placed This Encounter     US Lower Extremity Arteries Bilateral         Standing Status: Future         Standing Expiration Date: 8/17/2018         Order Specific Question: Reason for Exam:         Answer: claudication, pt does not want ABIs completed   These orders were placed using [facility] approved protocol and any results will be forwarded to your office for appropriate follow up. I have included a copy of my visit note; please review the note and feel free to contact me with any questions.   Thank you for allowing me to participate in the care of your patients. Sabi Kapadia PA-C

## 2017-09-08 ENCOUNTER — HOSPITAL ENCOUNTER (OUTPATIENT)
Dept: RADIOLOGY | Facility: CLINIC | Age: 77
Discharge: HOME | End: 2017-09-08
Attending: PHYSICIAN ASSISTANT
Payer: MEDICARE

## 2017-09-08 DIAGNOSIS — I73.9 CLAUDICATION: ICD-10-CM

## 2017-09-08 PROCEDURE — 93925 LOWER EXTREMITY STUDY: CPT | Mod: TC,PO

## 2017-09-08 PROCEDURE — 93922 UPR/L XTREMITY ART 2 LEVELS: CPT | Mod: 26,,, | Performed by: RADIOLOGY

## 2017-09-08 PROCEDURE — 93925 LOWER EXTREMITY STUDY: CPT | Mod: 26,,, | Performed by: RADIOLOGY

## 2017-10-05 RX ORDER — AMLODIPINE BESYLATE 2.5 MG/1
TABLET ORAL
Qty: 90 TABLET | Refills: 3 | Status: SHIPPED | OUTPATIENT
Start: 2017-10-05 | End: 2018-09-30 | Stop reason: SDUPTHER

## 2017-11-03 ENCOUNTER — TELEPHONE (OUTPATIENT)
Dept: FAMILY MEDICINE | Facility: CLINIC | Age: 77
End: 2017-11-03

## 2017-11-03 NOTE — TELEPHONE ENCOUNTER
Appt made with Dr. Lucero 11/7/17 at 11:30. Saw Dr. 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 Dr. Eldridge wants patient cleared from cardiac standpoint and have carotid u/s.

## 2017-11-03 NOTE — TELEPHONE ENCOUNTER
----- Message from Inez Johnson sent at 11/3/2017 12:02 PM CDT -----  The patient was seen today by Dr. Lan Eldridge. She was told that she has a blood clot in her right eye and needs to get a referral to a cardiologist. There is a note from the Dr in your box. The note reads Central retinal Artery occlusion right eye. No plaque seen. Please check heart and carotids for source of possible retrobuldar emboli. Please call the patient as soon as possible. [phone].

## 2018-05-25 DIAGNOSIS — Z78.0 ASYMPTOMATIC MENOPAUSAL STATE: Primary | ICD-10-CM

## 2018-05-29 ENCOUNTER — OFFICE VISIT (OUTPATIENT)
Dept: FAMILY MEDICINE | Facility: CLINIC | Age: 78
End: 2018-05-29
Attending: FAMILY MEDICINE
Payer: MEDICARE

## 2018-05-29 VITALS
BODY MASS INDEX: 36.26 KG/M2 | WEIGHT: 197.06 LBS | OXYGEN SATURATION: 96 % | TEMPERATURE: 98 F | HEART RATE: 67 BPM | DIASTOLIC BLOOD PRESSURE: 58 MMHG | HEIGHT: 62 IN | RESPIRATION RATE: 16 BRPM | SYSTOLIC BLOOD PRESSURE: 118 MMHG

## 2018-05-29 DIAGNOSIS — E53.8 DEFICIENCY OF OTHER SPECIFIED B GROUP VITAMINS: ICD-10-CM

## 2018-05-29 DIAGNOSIS — Z00.00 ENCOUNTER FOR PREVENTIVE HEALTH EXAMINATION: Primary | ICD-10-CM

## 2018-05-29 DIAGNOSIS — N32.81 OAB (OVERACTIVE BLADDER): ICD-10-CM

## 2018-05-29 DIAGNOSIS — Z98.84 HISTORY OF BARIATRIC SURGERY: ICD-10-CM

## 2018-05-29 DIAGNOSIS — Z78.0 MENOPAUSE: ICD-10-CM

## 2018-05-29 DIAGNOSIS — E78.5 HYPERLIPIDEMIA, UNSPECIFIED HYPERLIPIDEMIA TYPE: Chronic | ICD-10-CM

## 2018-05-29 DIAGNOSIS — E66.01 SEVERE OBESITY (BMI 35.0-39.9) WITH COMORBIDITY: ICD-10-CM

## 2018-05-29 DIAGNOSIS — R53.83 FATIGUE, UNSPECIFIED TYPE: ICD-10-CM

## 2018-05-29 DIAGNOSIS — Z98.84 HX OF LAPAROSCOPIC GASTRIC BANDING: ICD-10-CM

## 2018-05-29 DIAGNOSIS — I10 GOOD HYPERTENSION CONTROL: Chronic | ICD-10-CM

## 2018-05-29 DIAGNOSIS — Z86.19 HISTORY OF HEPATITIS C: ICD-10-CM

## 2018-05-29 DIAGNOSIS — E03.9 ACQUIRED HYPOTHYROIDISM: ICD-10-CM

## 2018-05-29 PROCEDURE — 3074F SYST BP LT 130 MM HG: CPT | Mod: CPTII,S$GLB,, | Performed by: FAMILY MEDICINE

## 2018-05-29 PROCEDURE — 3078F DIAST BP <80 MM HG: CPT | Mod: CPTII,S$GLB,, | Performed by: FAMILY MEDICINE

## 2018-05-29 PROCEDURE — 99499 UNLISTED E&M SERVICE: CPT | Mod: S$GLB,,, | Performed by: FAMILY MEDICINE

## 2018-05-29 PROCEDURE — 99999 PR PBB SHADOW E&M-EST. PATIENT-LVL IV: CPT | Mod: PBBFAC,,, | Performed by: FAMILY MEDICINE

## 2018-05-29 PROCEDURE — 99397 PER PM REEVAL EST PAT 65+ YR: CPT | Mod: S$GLB,,, | Performed by: FAMILY MEDICINE

## 2018-05-29 NOTE — PROGRESS NOTES
Subjective:       Patient ID: Brandi Flynn is a 78 y.o. female.    Chief Complaint: Annual Exam    78 year old female in for annual exam and is not fasting for lab.  She complains of fatigue and thinks she is undermedicated for her hypothyroidism.  She takes 1/2 of a naturethroid 130mcg daily and has not taken any in the past two weeks.  She has a history of lab band for weight loss and was on CPAP for REN prior to weight loss.  Her  confirms she has no apnea spells currently.      Past Medical History:  : Blood transfusion  No date: GERD (gastroesophageal reflux disease)  No date: Hepatitis C      Comment: treated six months by Dr. Gaspar with                interferon  No date: History of hepatitis C  No date: Hyperlipidemia  No date: Hypertension  No date: Hypothyroidism  No date: Obesity  2015: Primary osteoarthritis of left knee  No date: Sleep apnea  No date: Stroke      Comment: TIA   No date: TIA (transient ischemic attack)    Past Surgical History:  2015: ABDOMINAL SURGERY      Comment: SBO from lap band wraping around small bowel,                lap untwisted  No date: ADENOIDECTOMY  2014: BREAST BIOPSY      Comment: needle biopsy   No date: CATARACT EXTRACTION, BILATERAL Bilateral  No date:  SECTION  2001: CHOLECYSTECTOMY  2011: COLONOSCOPY      Comment: Dr. Alvares, 5 year recheck  2014: EYE SURGERY      Comment: bilateral cataract Dr Carrillo   No date: HEMORRHOID SURGERY  : HYSTERECTOMY  2008: INCONTINENCE SURGERY      Comment: sling  2004: LAPAROSCOPIC GASTRIC BANDING  No date: LAPAROSCOPIC NISSEN FUNDOPLICATION      Comment: Dr. Rutherford  No date: TONSILLECTOMY    Review of patient's family history indicates:  Problem: Diabetes      Relation: Mother       Age of Onset: (Not Specified)   Problem: Arthritis      Relation: Mother       Age of Onset: (Not Specified)       Comment: RA  Problem: Heart disease      Relation: Mother       Age of Onset: (Not Specified)        Comment: MI at 79   Problem: Diabetes      Relation: Sister       Age of Onset: (Not Specified)   Problem: Diverticulitis      Relation: Sister       Age of Onset: (Not Specified)   Problem: No Known Problems      Relation: Daughter       Age of Onset: (Not Specified)   Problem: Emphysema      Relation: Maternal Aunt       Age of Onset: (Not Specified)   Problem: Heart disease      Relation: Maternal Aunt       Age of Onset: (Not Specified)   Problem: Cancer      Relation: Maternal Uncle       Age of Onset: (Not Specified)   Problem: Early death      Relation: Maternal Grandfather       Age of Onset: 47   Problem: Heart disease      Relation: Maternal Grandfather       Age of Onset: (Not Specified)   Problem: Arthritis      Relation: Paternal Grandmother       Age of Onset: (Not Specified)   Problem: Heart disease      Relation: Paternal Grandfather       Age of Onset: (Not Specified)   Problem: Cancer      Relation: Paternal Grandfather       Age of Onset: (Not Specified)       Comment: lung heavy smoker   Problem: No Known Problems      Relation: Father       Age of Onset: (Not Specified)   Problem: No Known Problems      Relation: Son       Age of Onset: (Not Specified)   Problem: No Known Problems      Relation: Paternal Uncle       Age of Onset: (Not Specified)   Problem: No Known Problems      Relation: Maternal Grandmother       Age of Onset: (Not Specified)   Problem: No Known Problems      Relation: Brother       Age of Onset: (Not Specified)   Problem: Arthritis      Relation: Daughter       Age of Onset: (Not Specified)     Social History    Marital status:              Spouse name:                       Years of education:                 Number of children:               Social History Main Topics    Smoking status: Former Smoker                                                                Packs/day: 0.50      Years: 5.00        Smokeless tobacco: Never Used                        Alcohol use:  No              Drug use: No              Sexual activity: Yes               Partners with: Male        Current Outpatient Prescriptions:     amlodipine (NORVASC) 2.5 MG tablet, 1 TABLET ONCE DAILY, Disp: 90 tablet, Rfl: 3    aspirin (ECOTRIN) 81 MG EC tablet, Take 162 mg by mouth once daily. 2 tablets daily, Disp: , Rfl:     atorvastatin (LIPITOR) 40 MG tablet, Take 2 tablets (80 mg total) by mouth once daily., Disp: 180 tablet, Rfl: 3    CALCIUM CARB/VIT D3/MINERALS (CALTRATE PLUS ORAL), Take 1 tablet by mouth 2 (two) times daily., Disp: , Rfl:     NATURE-THROID 130 mg Tab, Take 0.5 tablets by mouth once daily. , Disp: , Rfl: 3    VESICARE 10 mg tablet, TK 1 T PO QD, Disp: , Rfl: 3    VIT A/VIT C/VIT E/ZINC/COPPER (PRESERVISION AREDS ORAL), Take 1 capsule by mouth 2 (two) times daily., Disp: , Rfl:     hyoscyamine (ANASPAZ,LEVSIN) 0.125 mg Tab, Take 1 tablet (125 mcg total) by mouth every 4 (four) hours as needed (bladder spasm)., Disp: 60 tablet, Rfl: 2          Review of Systems   Constitutional: Positive for fatigue. Negative for chills, diaphoresis, fever and unexpected weight change.   HENT: Negative for congestion, ear pain, hearing loss, postnasal drip, sinus pressure, sneezing, sore throat, tinnitus and trouble swallowing.    Eyes: Negative for itching and visual disturbance.   Respiratory: Negative for cough, chest tightness, shortness of breath and wheezing.    Cardiovascular: Negative for chest pain, palpitations and leg swelling.   Gastrointestinal: Negative for abdominal pain, blood in stool, constipation, diarrhea, nausea and vomiting.   Endocrine: Negative for cold intolerance, heat intolerance, polydipsia and polyuria.   Genitourinary: Negative for dysuria, frequency, hematuria, menstrual problem, pelvic pain, vaginal bleeding and vaginal discharge.        Gets up 3-4 times each night to urinate   Musculoskeletal: Negative for arthralgias, back pain, joint swelling and myalgias.   Skin:  Negative for color change and rash.   Neurological: Negative for dizziness and headaches.   Hematological: Negative for adenopathy.   Psychiatric/Behavioral: Negative for sleep disturbance. The patient is not nervous/anxious.        Objective:      Physical Exam   Constitutional: She is oriented to person, place, and time. She appears well-developed. No distress.   Good blood pressure control  Obese with bmi of 36.1. She is down 0.4 lb from her last visit with me March 24, 20-17 but down 10lb from her last visit in August 2017.   HENT:   Head: Normocephalic and atraumatic.   Right Ear: External ear normal.   Left Ear: External ear normal.   Nose: Nose normal.   Mouth/Throat: Oropharynx is clear and moist. No oropharyngeal exudate.   Eyes: Conjunctivae are normal. Pupils are equal, round, and reactive to light. Right eye exhibits no discharge. Left eye exhibits no discharge. No scleral icterus.   Neck: Normal range of motion. Neck supple. No JVD present. No tracheal deviation present. No thyromegaly present.   Cardiovascular: Normal rate, regular rhythm and normal heart sounds.  Exam reveals no gallop and no friction rub.    No murmur heard.  Carotid pulses 2+ no bruit   Pulmonary/Chest: Effort normal and breath sounds normal. No respiratory distress. She has no wheezes. She has no rales. She exhibits no tenderness.   Abdominal: Soft. Bowel sounds are normal. She exhibits no distension and no mass. There is no tenderness. There is no rebound and no guarding.   Musculoskeletal: Normal range of motion. She exhibits no edema or tenderness.   Lymphadenopathy:     She has no cervical adenopathy.   Neurological: She is alert and oriented to person, place, and time. She has normal reflexes. She displays normal reflexes. No cranial nerve deficit or sensory deficit. She exhibits normal muscle tone.   Skin: Skin is warm and dry. No rash noted. She is not diaphoretic. No erythema.   Psychiatric: She has a normal mood and  affect. Her behavior is normal. Judgment and thought content normal.   Nursing note and vitals reviewed.      Assessment:       1. Encounter for preventive health examination    2. Acquired hypothyroidism    3. Fatigue, unspecified type    4. Good hypertension control    5. Hyperlipidemia, unspecified hyperlipidemia type    6. OAB (overactive bladder)-followed by urology Dr Greg Little     7. History of hepatitis C    8. Hx of laparoscopic gastric banding    9. Menopause    10. History of bariatric surgery    11. 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 Severe obesity (BMI 35.0-39.9) with comorbidity        Plan:       1. Encounter for preventive health examination  - Comprehensive metabolic panel; Future  - CBC auto differential; Future  - Lipid panel; Future    2. Acquired hypothyroidism  Most likely hypothyroid now, wants to change meds, await lab  - TSH; Future    3. Fatigue, unspecified type  - Comprehensive metabolic panel; Future  - TSH; Future  - CBC auto differential; Future  - Lipid panel; Future  - Vitamin D; Future  - Vitamin B12; Future    4. Good hypertension control  No changes needed    5. Hyperlipidemia, unspecified hyperlipidemia type  Await lab  - Lipid panel; Future    6. OAB (overactive bladder)-followed by urology Dr Greg Little     7. History of hepatitis C    8. Hx of laparoscopic gastric banding    9. Menopause  - DXA Bone Density Spine And Hip; Future    10. History of bariatric surgery  - Comprehensive metabolic panel; Future  - CBC auto differential; Future  - Lipid panel; Future  - Vitamin D; Future  - Vitamin B12; Future    11. 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 Severe obesity (BMI 35.0-39.9) with comorbidity           Patient readiness: acceptance and barriers:none    During the course of the visit the patient was educated and counseled about the following:     Hypertension:   Medication: no change.  Dietary sodium  restriction.  Regular aerobic exercise.  Obesity:   General weight loss/lifestyle modification strategies discussed (elicit support from others; identify saboteurs; non-food rewards, etc).  Informal exercise measures discussed, e.g. taking stairs instead of elevator.  Regular aerobic exercise program discussed.    Goals: Hypertension: Reduce Blood Pressure and Obesity: Reduce calorie intake and BMI    Did patient meet goals/outcomes: Yes    The following self management tools provided: blood pressure log  excercise log    Patient Instructions (the written plan) was given to the patient/family.     Time spent with patient: 45 minutes

## 2018-05-30 ENCOUNTER — PES CALL (OUTPATIENT)
Dept: ADMINISTRATIVE | Facility: CLINIC | Age: 78
End: 2018-05-30

## 2018-05-31 ENCOUNTER — HOSPITAL ENCOUNTER (OUTPATIENT)
Dept: RADIOLOGY | Facility: CLINIC | Age: 78
Discharge: HOME | End: 2018-05-31
Attending: FAMILY MEDICINE
Payer: MEDICARE

## 2018-05-31 DIAGNOSIS — Z78.0 MENOPAUSE: ICD-10-CM

## 2018-05-31 PROCEDURE — 77080 DXA BONE DENSITY AXIAL: CPT | Mod: TC,PO

## 2018-05-31 PROCEDURE — 77080 DXA BONE DENSITY AXIAL: CPT | Mod: 26,,, | Performed by: RADIOLOGY

## 2018-06-01 ENCOUNTER — TELEPHONE (OUTPATIENT)
Dept: FAMILY MEDICINE | Facility: CLINIC | Age: 78
End: 2018-06-01

## 2018-06-01 DIAGNOSIS — M81.0 OSTEOPOROSIS, UNSPECIFIED OSTEOPOROSIS TYPE, UNSPECIFIED PATHOLOGICAL FRACTURE PRESENCE: Primary | ICD-10-CM

## 2018-06-01 DIAGNOSIS — E03.9 HYPOTHYROIDISM, UNSPECIFIED TYPE: Primary | ICD-10-CM

## 2018-06-01 RX ORDER — LEVOTHYROXINE SODIUM 75 UG/1
75 TABLET ORAL DAILY
Qty: 30 TABLET | Refills: 11 | Status: SHIPPED | OUTPATIENT
Start: 2018-06-01 | End: 2019-01-11 | Stop reason: SDUPTHER

## 2018-06-01 NOTE — TELEPHONE ENCOUNTER
That is an unlikely but potential side effect, usually due to use longer than recommended.  She is free to decline and accept increased risk of fracture

## 2018-06-01 NOTE — TELEPHONE ENCOUNTER
Dexa scan results discussed with patient. Medication recommendations given. She is concerned about side effects of loss of bone to jaw. Friend took med and had to have a lot of dental work.

## 2018-06-01 NOTE — TELEPHONE ENCOUNTER
----- Message from Marcial Kan MD sent at 5/31/2018  7:27 PM CDT -----  Her labs look pretty good but her thyroid is low as expected.  She had been off of it for 2 weeks before taking the blood.  She felt she was undermedicated even before she stopped taking it.  She was taking the equivalent of 65 µg Synthroid daily.  I would suggest we start her on 75 µg and recheck the TSH in about 8 weeks.  We can adjust the dose from that point.  I sent copies of her results in a letter.

## 2018-06-01 NOTE — TELEPHONE ENCOUNTER
----- Message from Moises Jacome sent at 6/1/2018  1:46 PM CDT -----  Contact: self  Type:  Patient Returning Call    Who Called:  Patient   Who Left Message for Patient:  Lissa   Does the patient know what this is regarding?:  NA Best Call Back Number:  [phone]  Additional Information:  Patient returning the nurse phone call.

## 2018-06-01 NOTE — TELEPHONE ENCOUNTER
----- Message from Marcial Kan MD sent at 5/31/2018  7:30 PM CDT -----  The spine bone density is normal but the hip is osteopenic or borderline osteoporosis.  Her risk of a hip fracture is 4% in the next 10 years and it is recommended to go on a bisphosphonate if it's over 3%.  Actonel or Fosamax would be the options.  Boniva is not as helpful for hip.  Actonel is once a month, Fosamax is taken once a week.  Fosamax probably is a little more prone to side effect issues, mainly heartburn or indigestion.  It usually a little cheaper but they are all generic now.

## 2018-06-04 ENCOUNTER — PES CALL (OUTPATIENT)
Dept: ADMINISTRATIVE | Facility: CLINIC | Age: 78
End: 2018-06-04

## 2018-06-04 RX ORDER — ALENDRONATE SODIUM 70 MG/1
70 TABLET ORAL
Qty: 4 TABLET | Refills: 11 | Status: SHIPPED | OUTPATIENT
Start: 2018-06-04 | End: 2019-05-17 | Stop reason: ALTCHOICE

## 2018-07-09 DIAGNOSIS — E78.5 HYPERLIPIDEMIA, UNSPECIFIED HYPERLIPIDEMIA TYPE: ICD-10-CM

## 2018-07-09 RX ORDER — ATORVASTATIN CALCIUM 40 MG/1
TABLET, FILM COATED ORAL
Qty: 180 TABLET | Refills: 0 | Status: SHIPPED | OUTPATIENT
Start: 2018-07-09 | End: 2018-10-09 | Stop reason: SDUPTHER

## 2018-08-02 ENCOUNTER — LAB VISIT (OUTPATIENT)
Dept: LAB | Facility: HOSPITAL | Age: 78
End: 2018-08-02
Attending: FAMILY MEDICINE
Payer: MEDICARE

## 2018-08-02 DIAGNOSIS — E03.9 HYPOTHYROIDISM, UNSPECIFIED TYPE: ICD-10-CM

## 2018-08-02 LAB — TSH SERPL DL<=0.005 MIU/L-ACNC: 3.05 UIU/ML

## 2018-08-02 PROCEDURE — 36415 COLL VENOUS BLD VENIPUNCTURE: CPT | Mod: PO

## 2018-08-02 PROCEDURE — 84443 ASSAY THYROID STIM HORMONE: CPT

## 2018-08-31 ENCOUNTER — PES CALL (OUTPATIENT)
Dept: ADMINISTRATIVE | Facility: CLINIC | Age: 78
End: 2018-08-31

## 2018-10-01 RX ORDER — AMLODIPINE BESYLATE 2.5 MG/1
TABLET ORAL
Qty: 90 TABLET | Refills: 2 | Status: SHIPPED | OUTPATIENT
Start: 2018-10-01 | End: 2019-07-04 | Stop reason: SDUPTHER

## 2018-10-09 DIAGNOSIS — E78.5 HYPERLIPIDEMIA, UNSPECIFIED HYPERLIPIDEMIA TYPE: ICD-10-CM

## 2018-10-09 RX ORDER — ATORVASTATIN CALCIUM 40 MG/1
TABLET, FILM COATED ORAL
Qty: 180 TABLET | Refills: 1 | Status: SHIPPED | OUTPATIENT
Start: 2018-10-09 | End: 2019-05-07 | Stop reason: SDUPTHER

## 2018-11-13 ENCOUNTER — TELEPHONE (OUTPATIENT)
Dept: FAMILY MEDICINE | Facility: CLINIC | Age: 78
End: 2018-11-13

## 2018-11-13 NOTE — TELEPHONE ENCOUNTER
----- Message from Ryan Mcnair sent at 11/13/2018  1:00 PM CST -----            Name of Who is Calling: MICKI SANTOYO [[id number]]      What is the request in detail: Pt needs to speak with the nurse regarding her Atorvastatin medication. Please call to discuss.    Can the clinic reply by [facility]: no      What Number to [facility] Back [facility]: [phone]

## 2018-11-13 NOTE — TELEPHONE ENCOUNTER
Atorvastatin changed to 40mg 2 daily due to patient unable to swallow the 80mg pill. When patient went to [facility] they filled 80mg- called pharmacist and 40mg at 2 daily won't go through-  prior auth obtained - pharmacy and patient notified.

## 2018-12-17 ENCOUNTER — PES CALL (OUTPATIENT)
Dept: ADMINISTRATIVE | Facility: CLINIC | Age: 78
End: 2018-12-17

## 2019-01-11 DIAGNOSIS — E03.9 HYPOTHYROIDISM, UNSPECIFIED TYPE: ICD-10-CM

## 2019-01-11 RX ORDER — LEVOTHYROXINE SODIUM 75 UG/1
TABLET ORAL
Qty: 90 TABLET | Refills: 1 | Status: SHIPPED | OUTPATIENT
Start: 2019-01-11 | End: 2019-06-04 | Stop reason: SDUPTHER

## 2019-01-14 RX ORDER — SOLIFENACIN SUCCINATE 10 MG/1
TABLET, FILM COATED ORAL
Qty: 30 TABLET | Refills: 3 | Status: SHIPPED | OUTPATIENT
Start: 2019-01-14 | End: 2019-02-15 | Stop reason: CLARIF

## 2019-02-15 ENCOUNTER — TELEPHONE (OUTPATIENT)
Dept: FAMILY MEDICINE | Facility: CLINIC | Age: 79
End: 2019-02-15

## 2019-02-15 DIAGNOSIS — N32.81 OAB (OVERACTIVE BLADDER): Primary | ICD-10-CM

## 2019-02-15 RX ORDER — OXYBUTYNIN CHLORIDE 10 MG/1
10 TABLET, EXTENDED RELEASE ORAL DAILY
Qty: 30 TABLET | Refills: 5 | Status: SHIPPED | OUTPATIENT
Start: 2019-02-15 | End: 2020-05-19

## 2019-02-15 NOTE — TELEPHONE ENCOUNTER
----- Message from Alia Mahmood sent at 2/14/2019 12:14 PM CST -----  Contact: patient  Type: Needs Medical Advice    Who Called:  patient  Best Call Back Number: [phone] (home)   Additional Information: patients insurance said they will not pay for [facility] would like a call back need to be changed to something else

## 2019-03-06 DIAGNOSIS — E03.9 HYPOTHYROIDISM, UNSPECIFIED TYPE: ICD-10-CM

## 2019-03-06 RX ORDER — LEVOTHYROXINE SODIUM 75 UG/1
TABLET ORAL
Qty: 30 TABLET | Refills: 0 | OUTPATIENT
Start: 2019-03-06

## 2019-05-07 DIAGNOSIS — E78.5 HYPERLIPIDEMIA, UNSPECIFIED HYPERLIPIDEMIA TYPE: ICD-10-CM

## 2019-05-07 RX ORDER — ATORVASTATIN CALCIUM 40 MG/1
TABLET, FILM COATED ORAL
Qty: 180 TABLET | Refills: 0 | Status: SHIPPED | OUTPATIENT
Start: 2019-05-07 | End: 2019-08-03 | Stop reason: SDUPTHER

## 2019-05-08 ENCOUNTER — TELEPHONE (OUTPATIENT)
Dept: ENDOCRINOLOGY | Facility: CLINIC | Age: 79
End: 2019-05-08

## 2019-05-08 NOTE — TELEPHONE ENCOUNTER
----- Message from Juanis Kang sent at 5/8/2019  9:32 AM CDT -----  Contact: Patient  Type:  Patient Returning Call    Who Called:  Patient  Who Left Message for Patient:  na  Does the patient know what this is regarding?:  Prescription refill  Best Call Back Number:  [phone] (home)    Additional Information:  lauro

## 2019-05-17 ENCOUNTER — OFFICE VISIT (OUTPATIENT)
Dept: FAMILY MEDICINE | Facility: CLINIC | Age: 79
End: 2019-05-17
Payer: MEDICARE

## 2019-05-17 VITALS
SYSTOLIC BLOOD PRESSURE: 139 MMHG | TEMPERATURE: 98 F | HEART RATE: 72 BPM | WEIGHT: 205 LBS | BODY MASS INDEX: 37.73 KG/M2 | HEIGHT: 62 IN | DIASTOLIC BLOOD PRESSURE: 78 MMHG

## 2019-05-17 DIAGNOSIS — H34.231 RETINAL ARTERY BRANCH OCCLUSION OF RIGHT EYE: ICD-10-CM

## 2019-05-17 DIAGNOSIS — Z86.19 HISTORY OF HEPATITIS C: ICD-10-CM

## 2019-05-17 DIAGNOSIS — I65.22 STENOSIS OF LEFT CAROTID ARTERY: ICD-10-CM

## 2019-05-17 DIAGNOSIS — Z79.01 ANTICOAGULANT LONG-TERM USE: ICD-10-CM

## 2019-05-17 DIAGNOSIS — E66.01 SEVERE OBESITY (BMI 35.0-39.9) WITH COMORBIDITY: ICD-10-CM

## 2019-05-17 DIAGNOSIS — Z79.899 HIGH RISK MEDICATION USE: ICD-10-CM

## 2019-05-17 DIAGNOSIS — N32.81 OAB (OVERACTIVE BLADDER): ICD-10-CM

## 2019-05-17 DIAGNOSIS — Z98.84 HISTORY OF LAPAROSCOPIC ADJUSTABLE GASTRIC BANDING: ICD-10-CM

## 2019-05-17 DIAGNOSIS — I77.9 CAROTID ARTERY DISEASE, UNSPECIFIED LATERALITY, UNSPECIFIED TYPE: ICD-10-CM

## 2019-05-17 DIAGNOSIS — I10 GOOD HYPERTENSION CONTROL: Primary | Chronic | ICD-10-CM

## 2019-05-17 DIAGNOSIS — E78.2 MIXED HYPERLIPIDEMIA: Chronic | ICD-10-CM

## 2019-05-17 DIAGNOSIS — M85.89 OSTEOPENIA OF MULTIPLE SITES: Chronic | ICD-10-CM

## 2019-05-17 DIAGNOSIS — E03.9 ACQUIRED HYPOTHYROIDISM: Chronic | ICD-10-CM

## 2019-05-17 PROCEDURE — 1101F PT FALLS ASSESS-DOCD LE1/YR: CPT | Mod: HCNC,CPTII,S$GLB, | Performed by: NURSE PRACTITIONER

## 2019-05-17 PROCEDURE — 99999 PR PBB SHADOW E&M-EST. PATIENT-LVL IV: CPT | Mod: PBBFAC,HCNC,, | Performed by: NURSE PRACTITIONER

## 2019-05-17 PROCEDURE — 99499 UNLISTED E&M SERVICE: CPT | Mod: S$GLB,,, | Performed by: NURSE PRACTITIONER

## 2019-05-17 PROCEDURE — 99499 RISK ADDL DX/OHS AUDIT: ICD-10-PCS | Mod: S$GLB,,, | Performed by: NURSE PRACTITIONER

## 2019-05-17 PROCEDURE — 3075F PR MOST RECENT SYSTOLIC BLOOD PRESS GE 130-139MM HG: ICD-10-PCS | Mod: HCNC,CPTII,S$GLB, | Performed by: NURSE PRACTITIONER

## 2019-05-17 PROCEDURE — 3075F SYST BP GE 130 - 139MM HG: CPT | Mod: HCNC,CPTII,S$GLB, | Performed by: NURSE PRACTITIONER

## 2019-05-17 PROCEDURE — 99999 PR PBB SHADOW E&M-EST. PATIENT-LVL IV: ICD-10-PCS | Mod: PBBFAC,HCNC,, | Performed by: NURSE PRACTITIONER

## 2019-05-17 PROCEDURE — 3078F DIAST BP <80 MM HG: CPT | Mod: HCNC,CPTII,S$GLB, | Performed by: NURSE PRACTITIONER

## 2019-05-17 PROCEDURE — 3078F PR MOST RECENT DIASTOLIC BLOOD PRESSURE < 80 MM HG: ICD-10-PCS | Mod: HCNC,CPTII,S$GLB, | Performed by: NURSE PRACTITIONER

## 2019-05-17 PROCEDURE — 99214 PR OFFICE/OUTPT VISIT, EST, LEVL IV, 30-39 MIN: ICD-10-PCS | Mod: HCNC,S$GLB,, | Performed by: NURSE PRACTITIONER

## 2019-05-17 PROCEDURE — 99214 OFFICE O/P EST MOD 30 MIN: CPT | Mod: HCNC,S$GLB,, | Performed by: NURSE PRACTITIONER

## 2019-05-17 PROCEDURE — 1101F PR PT FALLS ASSESS DOC 0-1 FALLS W/OUT INJ PAST YR: ICD-10-PCS | Mod: HCNC,CPTII,S$GLB, | Performed by: NURSE PRACTITIONER

## 2019-05-17 RX ORDER — CLOPIDOGREL BISULFATE 75 MG/1
75 TABLET ORAL DAILY
COMMUNITY
End: 2021-08-04 | Stop reason: SDUPTHER

## 2019-05-17 NOTE — PATIENT INSTRUCTIONS
Prevention Guidelines, Women Ages 65 and Older  Screening tests and vaccines are an important part of managing your health. Health counseling is essential, too. Below are guidelines for these, for women ages 65 and older. Talk with your healthcare provider to make sure youre up to date on what you need.  Screening Who needs it How often   Type 2 diabetes or prediabetes All adults beginning at age 45 and adults without symptoms at any age who are overweight or obese and have 1 or more additional risk factors for diabetes At least every 3 years   Alcohol misuse All women in this age group At routine exams   Blood pressure All women in this age group Every 2 years if your blood pressure is less than 120/80 mm Hg; yearly if your systolic blood pressure is 120 to 139 mm Hg, or your diastolic blood pressure reading is 80 to 89 mm Hg   Breast cancer All women in this age group Yearly mammogram and clinical breast exam1   Cervical cancer Only women who had abnormal screening results before age 65 Talk with your healthcare provider   Chlamydia Women at increased risk for infection At routine exams   Colorectal cancer All women in this age group1 Flexible sigmoidoscopy every 5 years, or colonoscopy every 10 years, or double-contrast barium enema every 5 years; yearly fecal occult blood test or fecal immunochemical test; or a stool DNA test as often as your healthcare provider advises; talk with your healthcare provider about which tests are best for you   Depression All women in this age group At routine exams   Gonorrhea Sexually active women at increased risk for infection At routine exams   Hepatitis C Anyone at increased risk; 1 time for those born between 1945 and 1965 At routine exams   High cholesterol or triglycerides All women in this age group who are at risk for coronary artery disease At least every 5 years   HIV Women at increased risk for infection - talk with your healthcare provider At routine exams   Lung  cancer Adults age 55 to 80 who have smoked Yearly screening in smokers with 30 pack-year history of smoking or who quit within 15 years   Obesity All women in this age group At routine exams   Osteoporosis All women in this age group Bone density test at age 65, then follow-up as advised by your healthcare provider   Syphilis Women at increased risk for infection - talk with your healthcare provider At routine exams   Thyroid-Stimulating Hormone (TSH) All women in this age group Every 5 years   Tuberculosis Women at increased risk for infection - talk with your healthcare provider Ask your healthcare provider   Vision All women in this age group Every 1 to 2 years; if you have a chronic health condition, ask your healthcare provider if you need exams more often   Vaccine Who needs it How often   Chickenpox (varicella) All women in this age group who have no record of this infection or vaccine 2 doses; second dose should be given at least 4 weeks after the first dose   Hepatitis A Women at increased risk for infection - talk with your healthcare provider 2 doses given 6 months apart   Hepatitis B Women at increased risk for infection - talk with your healthcare provider 3 doses over 6 months; second dose should be given 1 month after the first dose; the third dose should be given at least 2 months after the second dose and at least 4 months after the first dose   Haemophilus influenza Type B (HIB) Women at increased risk for infection - talk with your healthcare provider 1 to 3 doses   Influenza (flu) All women in this age group Once a year   Pneumococcal conjugate vaccine (PCV13) and pneumococcal polysaccharide vaccine (PPSV23) All women in this age group 1 dose of each vaccine   Tetanus/diphtheria/pertussis (Td/Tdap) booster All women in this age group Td every 10 years, or a one-time dose of Tdap instead of a Td booster after age 18, then Td every 10 years   Zoster All women in this age group 1 dose   Counseling  Who needs it How often   Diet and exercise Women who are overweight or obese When diagnosed, and then at routine exams   Fall prevention (exercise and vitamin D supplements) All women in this age group At routine exams   Sexually transmitted infection prevention Women at increased risk for infection - talk with your healthcare provider At routine exams   Use of daily aspirin Women ages 55 and up in this age group who are at risk for cardiovascular health problems such as stroke When your risk is known   Use of tobacco and the health effects it can cause All women in this age group Every exam   1[employer]  Date Last Reviewed: 8/9/2015  © 3055-6050 [email or web address]. [address] [place] [address], [place] [address]. All rights reserved. This information is not intended as a substitute for professional medical care. Always follow your healthcare professional's instructions.

## 2019-05-17 NOTE — PROGRESS NOTES
Subjective:       Patient ID: Brandi Flynn is a 79 y.o. female.    Chief Complaint: Hypertension    Chief Complaint  Chief Complaint   Patient presents with    Hypertension       HPI  Brandi Flynn is a 79 y.o. female with medical diagnoses as listed in the medical history and problem list that presents for regular scheduled check up. Patient is regularly treated for hypertension, hypothyroidism, hyperlipidemia, over active bladder, osteopenia, and anticoagulant use. Patient has a history of hepatitis C treated with interferon in  with cure.  Patient has a history of TIA and blood clot to right eye and takes plavix daily. Patient sees Dr. Summer Wallace who orders mammograms done by [facility]. She is followed by urology Dr. Navarro for OAB and Dr. Lucero, cardiology. Blood pressure today is 139/78. BMI today is 37.49 and patient        PAST MEDICAL HISTORY:  Past Medical History:   Diagnosis Date    Blood transfusion     GERD (gastroesophageal reflux disease)     Hepatitis C     treated six months by Dr. Gaspar with interferon    History of hepatitis C     Hyperlipidemia     Hypertension     Hypothyroidism     Obesity     Primary osteoarthritis of left knee 2015    Sleep apnea     Stroke     TIA     TIA (transient ischemic attack)        PAST SURGICAL HISTORY:  Past Surgical History:   Procedure Laterality Date    ABDOMINAL SURGERY      SBO from lap band wraping around small bowel, lap untwisted    ADENOIDECTOMY      BREAST BIOPSY  2014    needle biopsy     CATARACT EXTRACTION, BILATERAL Bilateral      SECTION      CHOLECYSTECTOMY      COLONOSCOPY  2011    Dr. Alvares, 5 year recheck    EYE SURGERY  2014    bilateral cataract Dr Carrillo     HEMORRHOID SURGERY      HYSTERECTOMY  1989    INCONTINENCE SURGERY  2008    sling    LAPAROSCOPIC GASTRIC BANDING      LAPAROSCOPIC NISSEN FUNDOPLICATION      Dr. Rutherford    TONSILLECTOMY         SOCIAL  HISTORY:  Social History     Socioeconomic History    Marital status:      Spouse name: Not on file    Number of children: Not on file    Years of education: Not on file    Highest education level: Not on file   Occupational History    Not on file   Social Needs    Financial resource strain: Not on file    Food insecurity:     Worry: Not on file     Inability: Not on file    Transportation needs:     Medical: Not on file     Non-medical: Not on file   Tobacco Use    Smoking status: Former Smoker     Packs/day: 0.50     Years: 5.00     Pack years: 2.50    Smokeless tobacco: Never Used   Substance and Sexual Activity    Alcohol use: No    Drug use: No    Sexual activity: Yes     Partners: Male   Lifestyle    Physical activity:     Days per week: Not on file     Minutes per session: Not on file    Stress: Not on file   Relationships    Social connections:     Talks on phone: Not on file     Gets together: Not on file     Attends Buddhist service: Not on file     Active member of club or organization: Not on file     Attends meetings of clubs or organizations: Not on file     Relationship status: Not on file   Other Topics Concern    Not on file   Social History Narrative    Not on file       FAMILY HISTORY:  Family History   Problem Relation Age of Onset    Diabetes Mother     Arthritis Mother         RA    Heart disease Mother         MI at 79     Diabetes Sister     Diverticulitis Sister     No Known Problems Daughter     Emphysema Maternal Aunt     Heart disease Maternal Aunt     Cancer Maternal Uncle     Early death Maternal Grandfather 47    Heart disease Maternal Grandfather     Arthritis Paternal Grandmother     Heart disease Paternal Grandfather     Cancer Paternal Grandfather         lung heavy smoker     No Known Problems Father     No Known Problems Son     No Known Problems Paternal Uncle     No Known Problems Maternal Grandmother     No Known Problems Brother      Arthritis Daughter        ALLERGIES AND MEDICATIONS: updated and reviewed.  Review of patient's allergies indicates:   Allergen Reactions    Ace inhibitors Other (See Comments)     cough    Morphine Itching    Keflex [cephalexin] Itching and Rash     Current Outpatient Medications   Medication Sig Dispense Refill    amLODIPine (NORVASC) 2.5 MG tablet 1 TABLET ONCE DAILY 90 tablet 2    aspirin (ECOTRIN) 81 MG EC tablet Take 162 mg by mouth once daily. 2 tablets daily      atorvastatin (LIPITOR) 40 MG tablet TAKE 2 TABLETS BY MOUTH EVERY  tablet 0    CALCIUM CARB/VIT D3/MINERALS (CALTRATE PLUS ORAL) Take 1 tablet by mouth 2 (two) times daily.      clopidogrel (PLAVIX) 75 mg tablet Take 75 mg by mouth once daily.      levothyroxine (SYNTHROID) 75 MCG tablet TAKE 1 TABLET(75 MCG) BY MOUTH EVERY DAY 90 tablet 1    oxybutynin (DITROPAN-XL) 10 MG 24 hr tablet Take 1 tablet (10 mg total) by mouth once daily. 30 tablet 5    VIT A/VIT C/VIT E/ZINC/COPPER (PRESERVISION AREDS ORAL) Take 1 capsule by mouth 2 (two) times daily.       No current facility-administered medications for this visit.        I have reviewed the patient's medical, family, and social history in detail and updated the computerized patient record.    Review of Systems   Constitutional: Negative for activity change, appetite change, chills, fatigue and fever.        No regular exercise, some activity during the day.    HENT: Negative for congestion, ear pain, postnasal drip, rhinorrhea, sinus pressure, sinus pain, sneezing and sore throat.    Eyes: Positive for visual disturbance.        Blood clot to right eye effects vision, followed by Dr. Eldridge, left lower lid entropion.   Respiratory: Negative for cough and shortness of breath.    Cardiovascular: Positive for leg swelling. Negative for chest pain and palpitations.        Some swelling to ankles and feet if hanging down, resolves overnight.    Gastrointestinal: Positive for  "constipation. Negative for abdominal pain, diarrhea, nausea and vomiting.        Constipation and takes miralax at night with relief.    Genitourinary: Negative for difficulty urinating, dysuria and urgency.        Urge incontinence responds well to medication.   Musculoskeletal: Positive for arthralgias.        Left knee arthritis, followed by Dr. Man   Skin: Negative for rash and wound.   Neurological: Negative for dizziness, numbness and headaches.   Hematological: Bruises/bleeds easily.        Bruises easily with plavix.   Psychiatric/Behavioral: Negative for dysphoric mood and sleep disturbance. The patient is not nervous/anxious.          Objective:      Vitals:    05/17/19 1104   BP: 139/78   BP Location: Right arm   Patient Position: Sitting   BP Method: X-Large (Automatic)   Pulse: 72   Temp: 98.2 °F (36.8 °C)   TempSrc: Oral   Weight: 93 kg (205 lb)   Height: 5' 2" (1.575 m)     Physical Exam   Constitutional: She is oriented to person, place, and time. Vital signs are normal. She appears well-developed. No distress.   Blood pressure 139/78   HENT:   Head: Normocephalic and atraumatic.   Right Ear: Hearing, tympanic membrane, external ear and ear canal normal.   Left Ear: Hearing, tympanic membrane, external ear and ear canal normal.   Nose: Nose normal. No mucosal edema.   Mouth/Throat: Oropharynx is clear and moist and mucous membranes are normal. Normal dentition. No oropharyngeal exudate.   Eyes: Pupils are equal, round, and reactive to light. Conjunctivae, EOM and lids are normal. Right eye exhibits no discharge. Left eye exhibits no discharge. Right conjunctiva is not injected. Left conjunctiva is not injected.   Neck: Normal range of motion. Neck supple. Normal carotid pulses present. Carotid bruit is not present. Normal range of motion present. No thyromegaly present.   Cardiovascular: Normal rate, regular rhythm, S1 normal, S2 normal, normal heart sounds, intact distal pulses and normal " pulses.   No murmur heard.  Pulses:       Carotid pulses are 2+ on the right side, and 2+ on the left side.       Radial pulses are 2+ on the right side, and 2+ on the left side.        Posterior tibial pulses are 2+ on the right side, and 2+ on the left side.   No edema although lower legs are large.   Pulmonary/Chest: Effort normal and breath sounds normal. No respiratory distress. She has no decreased breath sounds. She has no wheezes. She has no rhonchi. She has no rales.   Abdominal: Soft. Normal appearance, normal aorta and bowel sounds are normal. She exhibits no distension, no abdominal bruit, no pulsatile midline mass and no mass. There is no hepatosplenomegaly. There is no tenderness. No hernia.       Musculoskeletal: Normal range of motion. She exhibits no edema, tenderness or deformity.   Lymphadenopathy:        Head (right side): No submental, no submandibular and no tonsillar adenopathy present.        Head (left side): No submental, no submandibular and no tonsillar adenopathy present.     She has no cervical adenopathy.        Right: No supraclavicular adenopathy present.        Left: No supraclavicular adenopathy present.   Neurological: She is alert and oriented to person, place, and time. She has normal strength. Gait normal.   Skin: Skin is warm, dry and intact. Ecchymosis noted. No rash noted. She is not diaphoretic. No erythema. No pallor.   Bruising noted to bilateral forearms.   Psychiatric: She has a normal mood and affect. Her speech is normal and behavior is normal. Judgment and thought content normal. Her mood appears not anxious. Cognition and memory are normal. She does not exhibit a depressed mood.   Nursing note and vitals reviewed.        Assessment:       1. Good hypertension control    2. Mixed hyperlipidemia    3. Carotid artery disease, unspecified laterality, unspecified type    4. Stenosis of left carotid artery    5. OAB (overactive bladder)-followed by urology Dr Garza  Sidney     6. History of hepatitis C    7. Acquired hypothyroidism    8. Osteopenia of multiple sites    9. Anticoagulant long-term use    10. 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    11. History of laparoscopic adjustable gastric banding    12. Severe obesity (BMI 35.0-39.9) with comorbidity    13. High risk medication use          Plan:       Brandi was seen today for hypertension.  Discussed healthy diet, regular exercise, necessary labs, age appropriate cancer screening, and routine vaccinations.  Patient declines pneumonia vaccine today.  Educational handouts provided.  Prevention guidelines women age 65 and over  Diagnoses and all orders for this visit:    Good hypertension control  -     CBC auto differential; Future  -     Comprehensive metabolic panel; Future  - Blood pressure controlled 139/78, continue current medication without change    Mixed hyperlipidemia  -     Lipid panel; Future  -   Lab Results   Component Value Date    CHOL 106 (L) 05/31/2018    CHOL 106 (L) 02/16/2016    CHOL 118 (L) 02/13/2015     Lab Results   Component Value Date    HDL 39 (L) 05/31/2018    HDL 40 02/16/2016    HDL 49 02/13/2015     Lab Results   Component Value Date    LDLCALC 54.8 (L) 05/31/2018    LDLCALC 54.4 (L) 02/16/2016    LDLCALC 59.8 (L) 02/13/2015     Lab Results   Component Value Date    TRIG 61 05/31/2018    TRIG 58 02/16/2016    TRIG 46 02/13/2015     Lab Results   Component Value Date    CHOLHDL 36.8 05/31/2018    CHOLHDL 37.7 02/16/2016    CHOLHDL 41.5 02/13/2015    continue atorvastatin 40 mg daily    Carotid artery disease, unspecified laterality, unspecified type   Followed by Cardiology, Dr. Lucero  Stenosis of left carotid artery  -     Comprehensive metabolic panel; Future  - Followed by Cardiology, Dr. Lucero    OAB (overactive bladder)-followed by urology Dr Greg Little    Patient to continue current medications, Ditropan 10 mg daily, and follow up with Urology as instructed.  History of hepatitis C  -      Comprehensive metabolic panel; Future  -     HEPATITIS C RNA, QUANTITATIVE, PCR; Future    Acquired hypothyroidism  -     TSH; Future  -   Lab Results   Component Value Date    TSH 3.054 08/02/2018   - to continue levothyroxine 75 mcg daily, dose will be adjusted if needed when new TSH results are available    Osteopenia of multiple sites   Dexa bone density scan 5/31/18:   The L1 to L4 vertebral bone mineral density is equal to 1.084 g/cm squared with a T score of 0.3 and a Z score of 2.9.    The left femoral neck bone mineral density is equal to 0.644 g/cm squared with a T score of -2.4 and a Z score of -0.5.      Impression       Osteopenia -- there is a 14% risk of a major osteoporotic fracture and a 4.0% risk of hip fracture in the next 10 years (FRAX).      Patient to continue Fosamax 70 mg weekly and will be due for repeat DEXA scan in one year   Continue over-the-counter calcium with vitamin-D supplement    Anticoagulant long-term use  -     CBC auto differential; Future  - stable on aspirin 81 mg daily and Plavix 75 mg daily, to continue as is    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   Continue current medications and follow up with retinal specialist, Dr. Eldridge, as instructed    History of laparoscopic adjustable gastric banding  -     CBC auto differential; Future  -     Comprehensive metabolic panel; Future    Severe obesity (BMI 35.0-39.9) with comorbidity   BMI is 37.49 and the patient has gained 8 lb since her last visit on 5/29/2018   Weight loss recommended with well balanced diet and portion controlled meals and increased activity.   High risk medication use  -     CBC auto differential; Future      Follow up in about 1 year (around 5/17/2020) for Dr. Kan, 40 minutes, schedule lab fasting.      Patient readiness: acceptance and barriers:none and readiness    During the course of the visit the patient was educated and counseled about the following:     Hypertension:   Medication: no  change.  Dietary sodium restriction.  Regular aerobic exercise.  Follow up: 1 year and as needed.  Obesity:   General weight loss/lifestyle modification strategies discussed (elicit support from others; identify saboteurs; non-food rewards, etc).  Diet interventions: qualitative changes (increase low-fat,  high-fiber foods).  Informal exercise measures discussed, e.g. taking stairs instead of elevator.  Regular aerobic exercise program discussed.    Goals: Hypertension: Reduce Blood Pressure and Obesity: Reduce calorie intake and BMI    Did patient meet goals/outcomes: Yes for hypertension, no for obesity    The following self management tools provided: declined    Patient Instructions (the written plan) was given to the patient/family.     Time spent with patient: 30 minutes    Barriers to medications present (no )    Adverse reactions to current medications (no)    Over the counter medications reviewed (Yes)

## 2019-05-19 PROBLEM — I77.9 CAROTID ARTERY DISEASE: Status: ACTIVE | Noted: 2019-05-19

## 2019-05-19 PROBLEM — I65.22 STENOSIS OF LEFT CAROTID ARTERY: Status: ACTIVE | Noted: 2017-08-17

## 2019-05-22 ENCOUNTER — LAB VISIT (OUTPATIENT)
Dept: LAB | Facility: HOSPITAL | Age: 79
End: 2019-05-22
Attending: NURSE PRACTITIONER
Payer: MEDICARE

## 2019-05-22 DIAGNOSIS — E03.9 ACQUIRED HYPOTHYROIDISM: Chronic | ICD-10-CM

## 2019-05-22 DIAGNOSIS — I10 GOOD HYPERTENSION CONTROL: Chronic | ICD-10-CM

## 2019-05-22 DIAGNOSIS — Z79.899 HIGH RISK MEDICATION USE: ICD-10-CM

## 2019-05-22 DIAGNOSIS — Z86.19 HISTORY OF HEPATITIS C: ICD-10-CM

## 2019-05-22 DIAGNOSIS — I65.22 STENOSIS OF LEFT CAROTID ARTERY: ICD-10-CM

## 2019-05-22 DIAGNOSIS — Z79.01 ANTICOAGULANT LONG-TERM USE: ICD-10-CM

## 2019-05-22 DIAGNOSIS — E78.2 MIXED HYPERLIPIDEMIA: Chronic | ICD-10-CM

## 2019-05-22 DIAGNOSIS — Z98.84 HISTORY OF LAPAROSCOPIC ADJUSTABLE GASTRIC BANDING: ICD-10-CM

## 2019-05-22 LAB
ALBUMIN SERPL BCP-MCNC: 3.6 G/DL (ref 3.5–5.2)
ALP SERPL-CCNC: 83 U/L (ref 55–135)
ALT SERPL W/O P-5'-P-CCNC: 12 U/L (ref 10–44)
ANION GAP SERPL CALC-SCNC: 6 MMOL/L (ref 8–16)
AST SERPL-CCNC: 21 U/L (ref 10–40)
BASOPHILS # BLD AUTO: 0.06 K/UL (ref 0–0.2)
BASOPHILS NFR BLD: 1.3 % (ref 0–1.9)
BILIRUB SERPL-MCNC: 0.7 MG/DL (ref 0.1–1)
BUN SERPL-MCNC: 16 MG/DL (ref 8–23)
CALCIUM SERPL-MCNC: 9.5 MG/DL (ref 8.7–10.5)
CHLORIDE SERPL-SCNC: 107 MMOL/L (ref 95–110)
CHOLEST SERPL-MCNC: 121 MG/DL (ref 120–199)
CHOLEST/HDLC SERPL: 2.5 {RATIO} (ref 2–5)
CO2 SERPL-SCNC: 29 MMOL/L (ref 23–29)
CREAT SERPL-MCNC: 0.7 MG/DL (ref 0.5–1.4)
DIFFERENTIAL METHOD: ABNORMAL
EOSINOPHIL # BLD AUTO: 0.1 K/UL (ref 0–0.5)
EOSINOPHIL NFR BLD: 2.5 % (ref 0–8)
ERYTHROCYTE [DISTWIDTH] IN BLOOD BY AUTOMATED COUNT: 13.6 % (ref 11.5–14.5)
EST. GFR  (AFRICAN AMERICAN): >60 ML/MIN/1.73 M^2
EST. GFR  (NON AFRICAN AMERICAN): >60 ML/MIN/1.73 M^2
GLUCOSE SERPL-MCNC: 104 MG/DL (ref 70–110)
HCT VFR BLD AUTO: 43.2 % (ref 37–48.5)
HDLC SERPL-MCNC: 49 MG/DL (ref 40–75)
HDLC SERPL: 40.5 % (ref 20–50)
HGB BLD-MCNC: 14.1 G/DL (ref 12–16)
IMM GRANULOCYTES # BLD AUTO: 0.01 K/UL (ref 0–0.04)
IMM GRANULOCYTES NFR BLD AUTO: 0.2 % (ref 0–0.5)
LDLC SERPL CALC-MCNC: 58.8 MG/DL (ref 63–159)
LYMPHOCYTES # BLD AUTO: 1.7 K/UL (ref 1–4.8)
LYMPHOCYTES NFR BLD: 35.1 % (ref 18–48)
MCH RBC QN AUTO: 31.1 PG (ref 27–31)
MCHC RBC AUTO-ENTMCNC: 32.6 G/DL (ref 32–36)
MCV RBC AUTO: 95 FL (ref 82–98)
MONOCYTES # BLD AUTO: 0.6 K/UL (ref 0.3–1)
MONOCYTES NFR BLD: 11.6 % (ref 4–15)
NEUTROPHILS # BLD AUTO: 2.4 K/UL (ref 1.8–7.7)
NEUTROPHILS NFR BLD: 49.3 % (ref 38–73)
NONHDLC SERPL-MCNC: 72 MG/DL
NRBC BLD-RTO: 0 /100 WBC
PLATELET # BLD AUTO: 155 K/UL (ref 150–350)
PMV BLD AUTO: 10.7 FL (ref 9.2–12.9)
POTASSIUM SERPL-SCNC: 3.8 MMOL/L (ref 3.5–5.1)
PROT SERPL-MCNC: 7 G/DL (ref 6–8.4)
RBC # BLD AUTO: 4.53 M/UL (ref 4–5.4)
SODIUM SERPL-SCNC: 142 MMOL/L (ref 136–145)
TRIGL SERPL-MCNC: 66 MG/DL (ref 30–150)
TSH SERPL DL<=0.005 MIU/L-ACNC: 1.96 UIU/ML (ref 0.4–4)
WBC # BLD AUTO: 4.76 K/UL (ref 3.9–12.7)

## 2019-05-22 PROCEDURE — 87522 HEPATITIS C REVRS TRNSCRPJ: CPT | Mod: HCNC

## 2019-05-22 PROCEDURE — 80061 LIPID PANEL: CPT | Mod: HCNC

## 2019-05-22 PROCEDURE — 36415 COLL VENOUS BLD VENIPUNCTURE: CPT | Mod: HCNC,PO

## 2019-05-22 PROCEDURE — 80053 COMPREHEN METABOLIC PANEL: CPT | Mod: HCNC

## 2019-05-22 PROCEDURE — 85025 COMPLETE CBC W/AUTO DIFF WBC: CPT | Mod: HCNC

## 2019-05-22 PROCEDURE — 84443 ASSAY THYROID STIM HORMONE: CPT | Mod: HCNC

## 2019-05-28 ENCOUNTER — TELEPHONE (OUTPATIENT)
Dept: FAMILY MEDICINE | Facility: CLINIC | Age: 79
End: 2019-05-28

## 2019-05-28 LAB
HCV RNA SERPL NAA+PROBE-LOG IU: <1.08 LOG (10) IU/ML
HCV RNA SERPL QL NAA+PROBE: NOT DETECTED IU/ML
HCV RNA SPEC NAA+PROBE-ACNC: <12 IU/ML

## 2019-05-28 NOTE — TELEPHONE ENCOUNTER
----- Message from Homero Peterson sent at 5/28/2019  2:23 PM CDT -----  Type:  Patient Call Back    Who Called:  pt  Who Left Message for Patient: nurse  Does the patient know what this is regarding?: returning the phone call  Best Call Back Number: [phone]  Additional Information:  Please call pt and leave a detailed message if there is no answer.

## 2019-06-04 DIAGNOSIS — E03.9 HYPOTHYROIDISM, UNSPECIFIED TYPE: ICD-10-CM

## 2019-06-04 RX ORDER — LEVOTHYROXINE SODIUM 75 UG/1
TABLET ORAL
Qty: 30 TABLET | Refills: 10 | Status: SHIPPED | OUTPATIENT
Start: 2019-06-04 | End: 2020-05-18

## 2019-06-07 DIAGNOSIS — M25.562 LEFT KNEE PAIN, UNSPECIFIED CHRONICITY: Primary | ICD-10-CM

## 2019-06-10 ENCOUNTER — HOSPITAL ENCOUNTER (OUTPATIENT)
Dept: RADIOLOGY | Facility: HOSPITAL | Age: 79
Discharge: HOME | End: 2019-06-10
Attending: ORTHOPAEDIC SURGERY
Payer: MEDICARE

## 2019-06-10 ENCOUNTER — OFFICE VISIT (OUTPATIENT)
Dept: ORTHOPEDICS | Facility: CLINIC | Age: 79
End: 2019-06-10
Payer: MEDICARE

## 2019-06-10 VITALS — HEIGHT: 62 IN | BODY MASS INDEX: 37.73 KG/M2 | WEIGHT: 205 LBS | RESPIRATION RATE: 19 BRPM

## 2019-06-10 DIAGNOSIS — M25.562 LEFT KNEE PAIN, UNSPECIFIED CHRONICITY: ICD-10-CM

## 2019-06-10 DIAGNOSIS — M17.12 PRIMARY OSTEOARTHRITIS OF LEFT KNEE: Primary | ICD-10-CM

## 2019-06-10 PROCEDURE — 20610 LARGE JOINT ASPIRATION/INJECTION: L KNEE: ICD-10-PCS | Mod: HCNC,LT,S$GLB, | Performed by: ORTHOPAEDIC SURGERY

## 2019-06-10 PROCEDURE — 73562 XR KNEE ORTHO LEFT WITH FLEXION: ICD-10-PCS | Mod: 26,HCNC,59,LT | Performed by: RADIOLOGY

## 2019-06-10 PROCEDURE — 99999 PR PBB SHADOW E&M-EST. PATIENT-LVL III: CPT | Mod: PBBFAC,HCNC,, | Performed by: ORTHOPAEDIC SURGERY

## 2019-06-10 PROCEDURE — 99999 PR PBB SHADOW E&M-EST. PATIENT-LVL III: ICD-10-PCS | Mod: PBBFAC,HCNC,, | Performed by: ORTHOPAEDIC SURGERY

## 2019-06-10 PROCEDURE — 1101F PR PT FALLS ASSESS DOC 0-1 FALLS W/OUT INJ PAST YR: ICD-10-PCS | Mod: HCNC,CPTII,S$GLB, | Performed by: ORTHOPAEDIC SURGERY

## 2019-06-10 PROCEDURE — 73564 XR KNEE ORTHO LEFT WITH FLEXION: ICD-10-PCS | Mod: 26,HCNC,LT, | Performed by: RADIOLOGY

## 2019-06-10 PROCEDURE — 1101F PT FALLS ASSESS-DOCD LE1/YR: CPT | Mod: HCNC,CPTII,S$GLB, | Performed by: ORTHOPAEDIC SURGERY

## 2019-06-10 PROCEDURE — 99213 OFFICE O/P EST LOW 20 MIN: CPT | Mod: 25,HCNC,S$GLB, | Performed by: ORTHOPAEDIC SURGERY

## 2019-06-10 PROCEDURE — 20610 DRAIN/INJ JOINT/BURSA W/O US: CPT | Mod: HCNC,LT,S$GLB, | Performed by: ORTHOPAEDIC SURGERY

## 2019-06-10 PROCEDURE — 73562 X-RAY EXAM OF KNEE 3: CPT | Mod: 26,HCNC,59,LT | Performed by: RADIOLOGY

## 2019-06-10 PROCEDURE — 99213 PR OFFICE/OUTPT VISIT, EST, LEVL III, 20-29 MIN: ICD-10-PCS | Mod: 25,HCNC,S$GLB, | Performed by: ORTHOPAEDIC SURGERY

## 2019-06-10 PROCEDURE — 73562 X-RAY EXAM OF KNEE 3: CPT | Mod: TC,HCNC,PN,59,RT

## 2019-06-10 PROCEDURE — 73564 X-RAY EXAM KNEE 4 OR MORE: CPT | Mod: 26,HCNC,LT, | Performed by: RADIOLOGY

## 2019-06-10 RX ORDER — TRIAMCINOLONE ACETONIDE 40 MG/ML
40 INJECTION, SUSPENSION INTRA-ARTICULAR; INTRAMUSCULAR
Status: DISCONTINUED | OUTPATIENT
Start: 2019-06-10 | End: 2019-06-10 | Stop reason: HOSPADM

## 2019-06-10 RX ADMIN — TRIAMCINOLONE ACETONIDE 40 MG: 40 INJECTION, SUSPENSION INTRA-ARTICULAR; INTRAMUSCULAR at 03:06

## 2019-06-10 NOTE — PROCEDURES
Large Joint Aspiration/Injection: L knee  Date/Time: 6/10/2019 3:14 PM  Performed by: Chetan Man MD  Authorized by: Chetan Man MD     Consent Done?:  Yes (Verbal)  Indications:  Pain  Procedure site marked: Yes    Timeout: Prior to procedure the correct patient, procedure, and site was verified      Location:  Knee  Site:  L knee  Prep: Patient was prepped and draped in usual sterile fashion    Needle size:  20 G  Approach:  Anterolateral  Medications:  40 mg triamcinolone acetonide 40 mg/mL  Patient tolerance:  Patient tolerated the procedure well with no immediate complications

## 2019-06-10 NOTE — PROGRESS NOTES
Past Medical History:   Diagnosis Date    Blood transfusion     GERD (gastroesophageal reflux disease)     Hepatitis C     treated six months by Dr. Gaspar with interferon    History of hepatitis C     Hyperlipidemia     Hypertension     Hypothyroidism     Obesity     Primary osteoarthritis of left knee 2015    Sleep apnea     Stroke     TIA     TIA (transient ischemic attack)        Past Surgical History:   Procedure Laterality Date    ABDOMINAL SURGERY      SBO from lap band wraping around small bowel, lap untwisted    ADENOIDECTOMY      BREAST BIOPSY      needle biopsy     CATARACT EXTRACTION, BILATERAL Bilateral      SECTION      CHOLECYSTECTOMY      COLONOSCOPY  2011    Dr. Alvares, 5 year recheck    EYE SURGERY      bilateral cataract Dr Carrillo     HEMORRHOID SURGERY      HYSTERECTOMY      INCONTINENCE SURGERY      sling    LAPAROSCOPIC GASTRIC BANDING      LAPAROSCOPIC NISSEN FUNDOPLICATION      Dr. Rutherford    TONSILLECTOMY         Current Outpatient Medications   Medication Sig    amLODIPine (NORVASC) 2.5 MG tablet 1 TABLET ONCE DAILY    aspirin (ECOTRIN) 81 MG EC tablet Take 162 mg by mouth once daily. 2 tablets daily    atorvastatin (LIPITOR) 40 MG tablet TAKE 2 TABLETS BY MOUTH EVERY DAY    CALCIUM CARB/VIT D3/MINERALS (CALTRATE PLUS ORAL) Take 1 tablet by mouth 2 (two) times daily.    clopidogrel (PLAVIX) 75 mg tablet Take 75 mg by mouth once daily.    levothyroxine (SYNTHROID) 75 MCG tablet TAKE 1 TABLET(75 MCG) BY MOUTH EVERY DAY    oxybutynin (DITROPAN-XL) 10 MG 24 hr tablet Take 1 tablet (10 mg total) by mouth once daily.    VIT A/VIT C/VIT E/ZINC/COPPER (PRESERVISION AREDS ORAL) Take 1 capsule by mouth 2 (two) times daily.     No current facility-administered medications for this visit.        Review of patient's allergies indicates:   Allergen Reactions    Ace inhibitors Other (See Comments)     cough    Morphine  Itching    Keflex [cephalexin] Itching and Rash       Family History   Problem Relation Age of Onset    Diabetes Mother     Arthritis Mother         RA    Heart disease Mother         MI at 79     Diabetes Sister     Diverticulitis Sister     No Known Problems Daughter     Emphysema Maternal Aunt     Heart disease Maternal Aunt     Cancer Maternal Uncle     Early death Maternal Grandfather 47    Heart disease Maternal Grandfather     Arthritis Paternal Grandmother     Heart disease Paternal Grandfather     Cancer Paternal Grandfather         lung heavy smoker     No Known Problems Father     No Known Problems Son     No Known Problems Paternal Uncle     No Known Problems Maternal Grandmother     No Known Problems Brother     Arthritis Daughter        Social History     Socioeconomic History    Marital status:      Spouse name: Not on file    Number of children: Not on file    Years of education: Not on file    Highest education level: Not on file   Occupational History    Not on file   Social Needs    Financial resource strain: Not on file    Food insecurity:     Worry: Not on file     Inability: Not on file    Transportation needs:     Medical: Not on file     Non-medical: Not on file   Tobacco Use    Smoking status: Former Smoker     Packs/day: 0.50     Years: 5.00     Pack years: 2.50    Smokeless tobacco: Never Used   Substance and Sexual Activity    Alcohol use: No    Drug use: No    Sexual activity: Yes     Partners: Male   Lifestyle    Physical activity:     Days per week: Not on file     Minutes per session: Not on file    Stress: Not on file   Relationships    Social connections:     Talks on phone: Not on file     Gets together: Not on file     Attends [employer] service: Not on file     Active member of club or organization: Not on file     Attends meetings of clubs or organizations: Not on file     Relationship status: Not on file   Other Topics Concern    Not  on file   Social History Narrative    Not on file       Chief Complaint:   Chief Complaint   Patient presents with    Knee Pain     left knee pain       History : This is a 79-year-old female seen for left knee pain.  Patient states that started at the beginning of 2015 when he fell out of a truck.  She's had pain since then.  Pain is with walking and bending.  Pain is located above and below the kneecap as well as posteriorly in the knee.  Also has pain along the medial joint line.      Present: Patient comes back in for recurrent left knee pain.  I injected her back in March of 2017.  Injection lasted several months.  Pain hurts with walking.  Pain over the last month.  Pain a 6 out of 10.      Review of Systems:    Constitution: Negative for chills, fever, and sweats.  Negative for unexplained weight loss.    HENT:  Negative for headaches and blurry vision.    Cardiovascular:Negative for chest pain or irregular heart beat. Negative for hypertension.    Respiratory:  Negative for cough and shortness of breath.    Gastrointestinal: Negative for abdominal pain, heartburn, melena, nausea, and vomitting.    Genitourinary:  Negative bladder incontinence and dysuria.  Positive for frequency and urgency.    Musculoskeletal:  See HPI    Neurological: Negative for numbness.    Psychiatric/Behavioral: Negative for depression.  The patient is not nervous/anxious.      Endocrine: Negative for polyuria    Hematologic/Lymphatic: Negative for bleeding problem.  Does not bruise/bleed easily.    Skin: Negative for poor would healing and rash      Physical Examination:    Vital Signs:    Vitals:    06/10/19 1447   Resp: 19       Body mass index is 37.49 kg/m².    This a well-developed, well nourished patient in no acute distress.  They are alert and oriented and cooperative to examination.  Pt. walks without an antalgic gait.      Examination of the left knee shows no rashes or erythema. There are no masses ecchymosis or  effusion. Patient has full range of motion from 0-130°. Patient is nontender to palpation over lateral joint line and mildly tender to palpation over the medial joint line. Knee is stable to varus and valgus stress. 5 out of 5 motor strength. Palpable distal pulses. Intact light touch sensation. Negative Patellofemoral crepitus      X-rays: X-rays left knee are ordered and reviewed which show severe medial joint space narrowing of both knees with the left leg slightly worse than the right.     Assessment:: Left varus knee arthritis    Plan:  I reviewed the findings with the patient today.  We discussed treatment options.  Patient elected for another cortisone injection.  Patient will follow up if the cortisone injections stop working as well.  Also filled out her temporary handicap parking pass.

## 2019-07-05 RX ORDER — AMLODIPINE BESYLATE 2.5 MG/1
TABLET ORAL
Qty: 90 TABLET | Refills: 3 | Status: SHIPPED | OUTPATIENT
Start: 2019-07-05 | End: 2020-04-27

## 2019-08-03 DIAGNOSIS — E78.5 HYPERLIPIDEMIA, UNSPECIFIED HYPERLIPIDEMIA TYPE: ICD-10-CM

## 2019-08-05 RX ORDER — ATORVASTATIN CALCIUM 40 MG/1
TABLET, FILM COATED ORAL
Qty: 180 TABLET | Refills: 1 | Status: SHIPPED | OUTPATIENT
Start: 2019-08-05 | End: 2020-02-27 | Stop reason: SDUPTHER

## 2019-12-23 ENCOUNTER — PES CALL (OUTPATIENT)
Dept: ADMINISTRATIVE | Facility: CLINIC | Age: 79
End: 2019-12-23

## 2020-02-27 DIAGNOSIS — E78.5 HYPERLIPIDEMIA, UNSPECIFIED HYPERLIPIDEMIA TYPE: ICD-10-CM

## 2020-02-27 RX ORDER — ATORVASTATIN CALCIUM 40 MG/1
80 TABLET, FILM COATED ORAL DAILY
Qty: 180 TABLET | Refills: 0 | Status: SHIPPED | OUTPATIENT
Start: 2020-02-27 | End: 2020-05-25

## 2020-02-27 NOTE — TELEPHONE ENCOUNTER
Called patient- she says [facility] [address] told her they lost our fax number and she had to call here to get us to send the Atorvastatin 40mg two daily refill

## 2020-02-27 NOTE — TELEPHONE ENCOUNTER
----- Message from Celsa Hawthorne sent at 2/27/2020 12:17 PM CST -----  Type:  RX Refill Request    Who Called:  Patient   Refill or New Rx:  refill  RX Name and Strength:  atorvastatin (LIPITOR) 40 MG tablet  How is the patient currently taking it? (ex. 1XDay):  1 x day  Is this a 30 day or 90 day RX:  90  Preferred Pharmacy with phone number:    [id number] [address] & [address] [address]  Phone: [phone] Fax: [phone]  Local or Mail Order:  local  Ordering Provider:  Marcial Haas Call Back Number:  [phone] ([place])   Additional Information:  Patient is requesting a return call

## 2020-04-27 RX ORDER — AMLODIPINE BESYLATE 2.5 MG/1
TABLET ORAL
Qty: 30 TABLET | Refills: 0 | Status: SHIPPED | OUTPATIENT
Start: 2020-04-27 | End: 2020-05-19 | Stop reason: SDUPTHER

## 2020-05-15 ENCOUNTER — DOCUMENTATION ONLY (OUTPATIENT)
Dept: FAMILY MEDICINE | Facility: CLINIC | Age: 80
End: 2020-05-15

## 2020-05-15 NOTE — PROGRESS NOTES
Pre-Visit Chart Review  For Appointment Scheduled on 5-19-20    Health Maintenance Due   Topic Date Due    TETANUS VACCINE  03/15/1958    Pneumococcal Vaccine (65+ Low/Medium Risk) (1 of 2 - PCV13) 03/15/2005

## 2020-05-18 DIAGNOSIS — E03.9 HYPOTHYROIDISM, UNSPECIFIED TYPE: ICD-10-CM

## 2020-05-18 RX ORDER — LEVOTHYROXINE SODIUM 75 UG/1
TABLET ORAL
Qty: 30 TABLET | Refills: 0 | Status: SHIPPED | OUTPATIENT
Start: 2020-05-18 | End: 2020-06-10

## 2020-05-18 RX ORDER — LEVOTHYROXINE SODIUM 75 UG/1
TABLET ORAL
Qty: 90 TABLET | OUTPATIENT
Start: 2020-05-18

## 2020-05-19 ENCOUNTER — OFFICE VISIT (OUTPATIENT)
Dept: FAMILY MEDICINE | Facility: CLINIC | Age: 80
End: 2020-05-19
Attending: FAMILY MEDICINE
Payer: MEDICARE

## 2020-05-19 VITALS
SYSTOLIC BLOOD PRESSURE: 122 MMHG | BODY MASS INDEX: 39.96 KG/M2 | HEART RATE: 82 BPM | OXYGEN SATURATION: 96 % | WEIGHT: 217.13 LBS | HEIGHT: 62 IN | RESPIRATION RATE: 28 BRPM | TEMPERATURE: 98 F | DIASTOLIC BLOOD PRESSURE: 60 MMHG

## 2020-05-19 DIAGNOSIS — Z00.00 ENCOUNTER FOR PREVENTIVE HEALTH EXAMINATION: Primary | ICD-10-CM

## 2020-05-19 DIAGNOSIS — N32.81 OAB (OVERACTIVE BLADDER): ICD-10-CM

## 2020-05-19 DIAGNOSIS — Z86.19 HISTORY OF HEPATITIS C: ICD-10-CM

## 2020-05-19 DIAGNOSIS — M17.12 PRIMARY OSTEOARTHRITIS OF LEFT KNEE: ICD-10-CM

## 2020-05-19 DIAGNOSIS — E78.2 MIXED HYPERLIPIDEMIA: Chronic | ICD-10-CM

## 2020-05-19 DIAGNOSIS — Z98.84 HISTORY OF LAPAROSCOPIC ADJUSTABLE GASTRIC BANDING: ICD-10-CM

## 2020-05-19 DIAGNOSIS — I77.9 CAROTID ARTERY DISEASE, UNSPECIFIED LATERALITY, UNSPECIFIED TYPE: ICD-10-CM

## 2020-05-19 DIAGNOSIS — E66.01 SEVERE OBESITY (BMI 35.0-39.9) WITH COMORBIDITY: ICD-10-CM

## 2020-05-19 DIAGNOSIS — E03.9 ACQUIRED HYPOTHYROIDISM: Chronic | ICD-10-CM

## 2020-05-19 DIAGNOSIS — K21.9 GASTROESOPHAGEAL REFLUX DISEASE WITHOUT ESOPHAGITIS: Chronic | ICD-10-CM

## 2020-05-19 DIAGNOSIS — I10 ESSENTIAL HYPERTENSION: ICD-10-CM

## 2020-05-19 PROCEDURE — 99499 RISK ADDL DX/OHS AUDIT: ICD-10-PCS | Mod: HCNC,S$GLB,, | Performed by: FAMILY MEDICINE

## 2020-05-19 PROCEDURE — 3074F PR MOST RECENT SYSTOLIC BLOOD PRESSURE < 130 MM HG: ICD-10-PCS | Mod: HCNC,CPTII,S$GLB, | Performed by: FAMILY MEDICINE

## 2020-05-19 PROCEDURE — 99999 PR PBB SHADOW E&M-EST. PATIENT-LVL IV: CPT | Mod: PBBFAC,HCNC,, | Performed by: FAMILY MEDICINE

## 2020-05-19 PROCEDURE — 99397 PR PREVENTIVE VISIT,EST,65 & OVER: ICD-10-PCS | Mod: HCNC,S$GLB,, | Performed by: FAMILY MEDICINE

## 2020-05-19 PROCEDURE — 3078F DIAST BP <80 MM HG: CPT | Mod: HCNC,CPTII,S$GLB, | Performed by: FAMILY MEDICINE

## 2020-05-19 PROCEDURE — 99499 UNLISTED E&M SERVICE: CPT | Mod: HCNC,S$GLB,, | Performed by: FAMILY MEDICINE

## 2020-05-19 PROCEDURE — 99999 PR PBB SHADOW E&M-EST. PATIENT-LVL IV: ICD-10-PCS | Mod: PBBFAC,HCNC,, | Performed by: FAMILY MEDICINE

## 2020-05-19 PROCEDURE — 3078F PR MOST RECENT DIASTOLIC BLOOD PRESSURE < 80 MM HG: ICD-10-PCS | Mod: HCNC,CPTII,S$GLB, | Performed by: FAMILY MEDICINE

## 2020-05-19 PROCEDURE — 99397 PER PM REEVAL EST PAT 65+ YR: CPT | Mod: HCNC,S$GLB,, | Performed by: FAMILY MEDICINE

## 2020-05-19 PROCEDURE — 3074F SYST BP LT 130 MM HG: CPT | Mod: HCNC,CPTII,S$GLB, | Performed by: FAMILY MEDICINE

## 2020-05-19 RX ORDER — OXYBUTYNIN CHLORIDE 15 MG/1
15 TABLET, EXTENDED RELEASE ORAL DAILY
COMMUNITY
Start: 2020-04-15 | End: 2021-08-04

## 2020-05-19 RX ORDER — AMLODIPINE BESYLATE 2.5 MG/1
TABLET ORAL
Qty: 90 TABLET | Refills: 4 | Status: SHIPPED | OUTPATIENT
Start: 2020-05-19 | End: 2020-05-25

## 2020-05-19 NOTE — PROGRESS NOTES
Subjective:       Patient ID: Brandi Flynn is a 80 y.o. female.    Chief Complaint: Annual Exam    80-year-old female coming in for annual exam.  She is not fasting for lab.  She has a history of TIA/CVA, hypertension, hyperlipidemia, hypothyroidism, hepatitis-C treated 20 years ago and still clear a virus, sleep apnea, osteoarthritis of the knee, reflux and osteoarthritis.  She is status post laparoscopic banding.  She has some recent epigastric distress relieved by antacids not relieved by meals not associated with any bowel changes or blood in stool.  She took some Pepcid AC and her symptoms have resolved.  She is due for a cholesterol recheck and a thyroid check.  She is concerned about potential for chronic kidney disease and is still using some anti inflammatories for her arthritis.    Past Medical History:  1960: Blood transfusion  No date: GERD (gastroesophageal reflux disease)  No date: Hepatitis C      Comment:  treated six months by Dr. 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 Alvares, 5 year recheck  2014: EYE SURGERY      Comment:  bilateral cataract Dr Carrillo   No date: HEMORRHOID SURGERY  1989: HYSTERECTOMY  2008: INCONTINENCE SURGERY      Comment:  sling  2004: LAPAROSCOPIC GASTRIC BANDING  No date: LAPAROSCOPIC  NISSEN FUNDOPLICATION      Comment:  Dr. Rutherford  No date: TONSILLECTOMY    Review of patient's family history indicates:  Problem: Diabetes      Relation: Mother          Age of Onset: (Not Specified)  Problem: Arthritis      Relation: Mother          Age of Onset: (Not Specified)          Comment: RA  Problem: Heart disease      Relation: Mother          Age of Onset: (Not Specified)          Comment: MI at 79   Problem: Diabetes      Relation: Sister          Age of Onset: (Not Specified)  Problem: Diverticulitis      Relation: Sister          Age of Onset: (Not Specified)  Problem: No Known Problems      Relation: Daughter          Age of Onset: (Not Specified)  Problem: Emphysema      Relation: Maternal Aunt          Age of Onset: (Not Specified)  Problem: Heart disease      Relation: Maternal Aunt          Age of Onset: (Not Specified)  Problem: Cancer      Relation: Maternal Uncle          Age of Onset: (Not Specified)  Problem: Early death      Relation: Maternal Grandfather          Age of Onset: 47  Problem: Heart disease      Relation: Maternal Grandfather          Age of Onset: (Not Specified)  Problem: Arthritis      Relation: Paternal Grandmother          Age of Onset: (Not Specified)  Problem: Heart disease      Relation: Paternal Grandfather          Age of Onset: (Not Specified)  Problem: Cancer      Relation: Paternal Grandfather          Age of Onset: (Not Specified)          Comment: lung heavy smoker   Problem: No Known Problems      Relation: Father          Age of Onset: (Not Specified)  Problem: No Known Problems      Relation: Son          Age of Onset: (Not Specified)  Problem: No Known Problems      Relation: Paternal Uncle          Age of Onset: (Not Specified)  Problem: No Known Problems      Relation: Maternal Grandmother          Age of Onset: (Not Specified)  Problem: No Known Problems      Relation: Brother          Age of Onset: (Not Specified)  Problem: Arthritis      Relation:  Daughter          Age of Onset: (Not Specified)    Social History    Tobacco Use      Smoking status: Former Smoker        Packs/day: 0.50        Years: 5.00        Pack years: 2.5      Smokeless tobacco: Never Used    Alcohol use: No    Drug use: No    Current Outpatient Medications on File Prior to Visit:  aspirin (ECOTRIN) 81 MG EC tablet, Take 162 mg by mouth once daily. , Disp: , Rfl:   atorvastatin (LIPITOR) 40 MG tablet, Take 2 tablets (80 mg total) by mouth once daily. (Patient taking differently: Take 80 mg by mouth once daily. Due to lap band - patient request 2 40 mg tabs due to size.), Disp: 180 tablet, Rfl: 0  CALCIUM CARB/VIT D3/MINERALS (CALTRATE PLUS ORAL), Take 1 tablet by mouth once daily. , Disp: , Rfl:   clopidogrel (PLAVIX) 75 mg tablet, Take 75 mg by mouth once daily., Disp: , Rfl:   levothyroxine (SYNTHROID) 75 MCG tablet, TAKE 1 TABLET(75 MCG) BY MOUTH EVERY DAY, Disp: 30 tablet, Rfl: 0  mag hydrox/aluminum hyd/simeth (MAALOX ORAL), Take 1 Dose by mouth as needed., Disp: , Rfl:   oxybutynin (DITROPAN XL) 15 MG TR24, Take 15 mg by mouth once daily. , Disp: , Rfl:   VIT A/VIT C/VIT E/ZINC/COPPER (PRESERVISION AREDS ORAL), Take 1 capsule by mouth 2 (two) times daily., Disp: , Rfl:   (DISCONTINUED) amLODIPine (NORVASC) 2.5 MG tablet, 1 TABLET ONCE DAILY, Disp: 30 tablet, Rfl: 0  (DISCONTINUED) oxybutynin (DITROPAN-XL) 10 MG 24 hr tablet, Take 1 tablet (10 mg total) by mouth once daily., Disp: 30 tablet, Rfl: 5    No current facility-administered medications on file prior to visit.     Current Outpatient Medications on File Prior to Visit:  aspirin (ECOTRIN) 81 MG EC tablet, Take 162 mg by mouth once daily. , Disp: , Rfl:   atorvastatin (LIPITOR) 40 MG tablet, Take 2 tablets (80 mg total) by mouth once daily. (Patient taking differently: Take 80 mg by mouth once daily. Due to lap band - patient request 2 40 mg tabs due to size.), Disp: 180 tablet, Rfl: 0  CALCIUM CARB/VIT D3/MINERALS (CALTRATE  PLUS ORAL), Take 1 tablet by mouth once daily. , Disp: , Rfl:   clopidogrel (PLAVIX) 75 mg tablet, Take 75 mg by mouth once daily., Disp: , Rfl:   levothyroxine (SYNTHROID) 75 MCG tablet, TAKE 1 TABLET(75 MCG) BY MOUTH EVERY DAY, Disp: 30 tablet, Rfl: 0  mag hydrox/aluminum hyd/simeth (MAALOX ORAL), Take 1 Dose by mouth as needed., Disp: , Rfl:   oxybutynin (DITROPAN XL) 15 MG TR24, Take 15 mg by mouth once daily. , Disp: , Rfl:   VIT A/VIT C/VIT E/ZINC/COPPER (PRESERVISION AREDS ORAL), Take 1 capsule by mouth 2 (two) times daily., Disp: , Rfl:   (DISCONTINUED) amLODIPine (NORVASC) 2.5 MG tablet, 1 TABLET ONCE DAILY, Disp: 30 tablet, Rfl: 0  (DISCONTINUED) oxybutynin (DITROPAN-XL) 10 MG 24 hr tablet, Take 1 tablet (10 mg total) by mouth once daily., Disp: 30 tablet, Rfl: 5    No current facility-administered medications on file prior to visit.         Review of Systems   Constitutional: Negative for chills, diaphoresis, fatigue, fever and unexpected weight change.   HENT: Negative for congestion, ear pain, hearing loss, postnasal drip and sinus pressure.    Eyes: Negative for itching and visual disturbance.   Respiratory: Negative for cough, chest tightness, shortness of breath and wheezing.    Cardiovascular: Negative for chest pain, palpitations and leg swelling.   Gastrointestinal: Negative for abdominal pain (Epigastric symptoms now resolved), blood in stool, constipation, diarrhea, nausea and vomiting.   Genitourinary: Negative for dysuria, frequency, hematuria, menstrual problem, pelvic pain, vaginal bleeding and vaginal discharge.   Musculoskeletal: Positive for arthralgias. Negative for back pain, joint swelling and myalgias.   Skin: Negative for color change and rash.   Neurological: Negative for dizziness and headaches.   Hematological: Negative for adenopathy.   Psychiatric/Behavioral: Negative for sleep disturbance. The patient is not nervous/anxious.        Objective:      Physical Exam    Constitutional: She is oriented to person, place, and time. She appears well-developed. No distress.   Good blood pressure control  Severe obesity with a BMI of 39.7 she is up 12.1 lb from her last physical with Uz[facility] May 7, 2019   HENT:   Head: Normocephalic and atraumatic.   Right Ear: External ear normal.   Left Ear: External ear normal.   Nose: Nose normal.   Mouth/Throat: Oropharynx is clear and moist. No oropharyngeal exudate.   Eyes: Pupils are equal, round, and reactive to light. Conjunctivae and EOM are normal. Right eye exhibits no discharge. Left eye exhibits no discharge. No scleral icterus.   Neck: Normal range of motion. Neck supple. No JVD present. No tracheal deviation present. No thyromegaly present.   Cardiovascular: Normal rate, regular rhythm and normal heart sounds. Exam reveals no gallop and no friction rub.   No murmur heard.  Pulmonary/Chest: Effort normal and breath sounds normal. No stridor. No respiratory distress. She has no wheezes. She has no rales. She exhibits no tenderness.   Abdominal: Soft. Bowel sounds are normal. She exhibits no distension and no mass. There is no tenderness. There is no rebound and no guarding. No hernia.   Musculoskeletal: She exhibits no edema.        Left knee: She exhibits decreased range of motion.   Lymphadenopathy:     She has no cervical adenopathy.   Neurological: She is alert and oriented to person, place, and time. She has normal reflexes. She displays normal reflexes. No cranial nerve deficit or sensory deficit. She exhibits normal muscle tone.   Skin: Skin is warm and dry. No rash noted. She is not diaphoretic. No erythema.   Psychiatric: She has a normal mood and affect. Her behavior is normal. Judgment and thought content normal.   Nursing note and vitals reviewed.      Assessment:       1. Encounter for preventive health examination    2. Essential hypertension    3. Mixed hyperlipidemia    4. Acquired hypothyroidism    5. Carotid artery  disease, unspecified laterality, unspecified type    6. History of hepatitis C    7. Primary osteoarthritis of left knee    8. Gastroesophageal reflux disease without esophagitis    9. History of laparoscopic adjustable gastric banding    10. OAB (overactive bladder)-followed by urology Dr Greg Little     11. Severe obesity (BMI 35.0-39.9) with comorbidity        Plan:       1. Encounter for preventive health examination  - Comprehensive metabolic panel; Future  - Lipid Panel; Future  - TSH; Future  - CBC auto differential; Future    2. Essential hypertension  Good control with no changes needed  - amLODIPine (NORVASC) 2.5 MG tablet; 1 TABLET ONCE DAILY  Dispense: 90 tablet; Refill: 4  - Comprehensive metabolic panel; Future  - Lipid Panel; Future  - CBC auto differential; Future    3. Mixed hyperlipidemia  Await lab results for possible medication changes  - Comprehensive metabolic panel; Future  - Lipid Panel; Future    4. Acquired hypothyroidism  Await lab results for possible medication changes, temporary refill sent in yesterday  - TSH; Future    5. Carotid artery disease, unspecified laterality, unspecified type  Asymptomatic    6. History of hepatitis C  Status post treatment, 20 years free of signs of recurrence with negative labs    7. Primary osteoarthritis of left knee    8. Gastroesophageal reflux disease without esophagitisRecommend she use Pepcid AC for 30 days to resolve any residual inflammation    9. History of laparoscopic adjustable gastric banding  Stable    10. OAB (overactive bladder)-followed by urology Dr Greg Guillory    11. Severe obesity (BMI 35.0-39.9) with comorbidity

## 2020-05-19 NOTE — PATIENT INSTRUCTIONS
Weight Management: Getting Started  Healthy bodies come in all shapes and sizes. Not all bodies are made to be thin. For some people, a healthy weight is higher than the average weight listed on weight charts. Your healthcare provider can help you decide on a healthy weight for you.    Reasons to lose weight  Losing weight can help with some health problems, such as high blood pressure, heart disease, diabetes, sleep apnea, and arthritis. You may also feel more energy.  Set your long-term goal  Your goal doesn't even have to be a specific weight. You may decide on a fitness goal (such as being able to walk 10 miles a week), or a health goal (such as lowering your blood pressure). Choose a goal that is measurable and reasonable, so you know when you've reached it. A goal of reaching a BMI of less than 25 is not always reasonable (or possible).   Make an action plan  Habits dont change overnight. Setting your goals too high can leave you feeling discouraged if you cant reach them. Be realistic. Choose one or two small changes you can make now. Set an action plan for how you are going to make these changes. When you can stick to this plan, keep making a few more small changes. Taking small steps will help you stay on the path to success.  Track your progress  Write down your goals. Then, keep a daily record of your progress. Write down what you eat and how active you are. This record lets you look back on how much youve done. It may also help when youre feeling frustrated. Reward yourself for success. Even if you dont reach every goal, give yourself credit for what you do get done.  Get support  Encouragement from others can help make losing weight easier. Ask your family members and friends for support. They may even want to join you. Also look to your healthcare provider, registered dietitian, and  for help. Your local hospital can give you more information about nutrition, exercise, and  weight loss.  Date Last Reviewed: 1/31/2016  © 3427-9214 [employer] [address] [place] [address], [place] [address]. All rights reserved. This information is not intended as a substitute for professional medical care. Always follow your healthcare professional's instructions.

## 2020-05-21 ENCOUNTER — PES CALL (OUTPATIENT)
Dept: ADMINISTRATIVE | Facility: CLINIC | Age: 80
End: 2020-05-21

## 2020-05-25 DIAGNOSIS — E78.5 HYPERLIPIDEMIA, UNSPECIFIED HYPERLIPIDEMIA TYPE: ICD-10-CM

## 2020-05-25 DIAGNOSIS — I10 ESSENTIAL HYPERTENSION: ICD-10-CM

## 2020-05-25 RX ORDER — AMLODIPINE BESYLATE 2.5 MG/1
TABLET ORAL
Qty: 90 TABLET | Refills: 4 | Status: SHIPPED | OUTPATIENT
Start: 2020-05-25 | End: 2021-06-08

## 2020-05-25 RX ORDER — ATORVASTATIN CALCIUM 40 MG/1
TABLET, FILM COATED ORAL
Qty: 180 TABLET | Refills: 0 | Status: SHIPPED | OUTPATIENT
Start: 2020-05-25 | End: 2020-08-25

## 2020-05-26 ENCOUNTER — LAB VISIT (OUTPATIENT)
Dept: LAB | Facility: HOSPITAL | Age: 80
End: 2020-05-26
Attending: FAMILY MEDICINE
Payer: MEDICARE

## 2020-05-26 DIAGNOSIS — Z00.00 ENCOUNTER FOR PREVENTIVE HEALTH EXAMINATION: ICD-10-CM

## 2020-05-26 DIAGNOSIS — E03.9 ACQUIRED HYPOTHYROIDISM: Chronic | ICD-10-CM

## 2020-05-26 DIAGNOSIS — I10 ESSENTIAL HYPERTENSION: ICD-10-CM

## 2020-05-26 DIAGNOSIS — E78.2 MIXED HYPERLIPIDEMIA: Chronic | ICD-10-CM

## 2020-05-26 LAB
ALBUMIN SERPL BCP-MCNC: 3.8 G/DL (ref 3.5–5.2)
ALP SERPL-CCNC: 88 U/L (ref 55–135)
ALT SERPL W/O P-5'-P-CCNC: 11 U/L (ref 10–44)
ANION GAP SERPL CALC-SCNC: 8 MMOL/L (ref 8–16)
AST SERPL-CCNC: 21 U/L (ref 10–40)
BASOPHILS # BLD AUTO: 0.07 K/UL (ref 0–0.2)
BASOPHILS NFR BLD: 1.3 % (ref 0–1.9)
BILIRUB SERPL-MCNC: 0.8 MG/DL (ref 0.1–1)
BUN SERPL-MCNC: 19 MG/DL (ref 8–23)
CALCIUM SERPL-MCNC: 10 MG/DL (ref 8.7–10.5)
CHLORIDE SERPL-SCNC: 105 MMOL/L (ref 95–110)
CHOLEST SERPL-MCNC: 120 MG/DL (ref 120–199)
CHOLEST/HDLC SERPL: 2.6 {RATIO} (ref 2–5)
CO2 SERPL-SCNC: 29 MMOL/L (ref 23–29)
CREAT SERPL-MCNC: 0.8 MG/DL (ref 0.5–1.4)
DIFFERENTIAL METHOD: ABNORMAL
EOSINOPHIL # BLD AUTO: 0.1 K/UL (ref 0–0.5)
EOSINOPHIL NFR BLD: 2.2 % (ref 0–8)
ERYTHROCYTE [DISTWIDTH] IN BLOOD BY AUTOMATED COUNT: 14 % (ref 11.5–14.5)
EST. GFR  (AFRICAN AMERICAN): >60 ML/MIN/1.73 M^2
EST. GFR  (NON AFRICAN AMERICAN): >60 ML/MIN/1.73 M^2
GLUCOSE SERPL-MCNC: 111 MG/DL (ref 70–110)
HCT VFR BLD AUTO: 45.3 % (ref 37–48.5)
HDLC SERPL-MCNC: 47 MG/DL (ref 40–75)
HDLC SERPL: 39.2 % (ref 20–50)
HGB BLD-MCNC: 14.3 G/DL (ref 12–16)
IMM GRANULOCYTES # BLD AUTO: 0.01 K/UL (ref 0–0.04)
IMM GRANULOCYTES NFR BLD AUTO: 0.2 % (ref 0–0.5)
LDLC SERPL CALC-MCNC: 57.4 MG/DL (ref 63–159)
LYMPHOCYTES # BLD AUTO: 1.7 K/UL (ref 1–4.8)
LYMPHOCYTES NFR BLD: 30.2 % (ref 18–48)
MCH RBC QN AUTO: 30.9 PG (ref 27–31)
MCHC RBC AUTO-ENTMCNC: 31.6 G/DL (ref 32–36)
MCV RBC AUTO: 98 FL (ref 82–98)
MONOCYTES # BLD AUTO: 0.6 K/UL (ref 0.3–1)
MONOCYTES NFR BLD: 11.3 % (ref 4–15)
NEUTROPHILS # BLD AUTO: 3 K/UL (ref 1.8–7.7)
NEUTROPHILS NFR BLD: 54.8 % (ref 38–73)
NONHDLC SERPL-MCNC: 73 MG/DL
NRBC BLD-RTO: 0 /100 WBC
PLATELET # BLD AUTO: 169 K/UL (ref 150–350)
PMV BLD AUTO: 11.1 FL (ref 9.2–12.9)
POTASSIUM SERPL-SCNC: 4.2 MMOL/L (ref 3.5–5.1)
PROT SERPL-MCNC: 7.1 G/DL (ref 6–8.4)
RBC # BLD AUTO: 4.63 M/UL (ref 4–5.4)
SODIUM SERPL-SCNC: 142 MMOL/L (ref 136–145)
TRIGL SERPL-MCNC: 78 MG/DL (ref 30–150)
TSH SERPL DL<=0.005 MIU/L-ACNC: 2.52 UIU/ML (ref 0.4–4)
WBC # BLD AUTO: 5.49 K/UL (ref 3.9–12.7)

## 2020-05-26 PROCEDURE — 36415 COLL VENOUS BLD VENIPUNCTURE: CPT | Mod: HCNC,PO

## 2020-05-26 PROCEDURE — 85025 COMPLETE CBC W/AUTO DIFF WBC: CPT | Mod: HCNC

## 2020-05-26 PROCEDURE — 84443 ASSAY THYROID STIM HORMONE: CPT | Mod: HCNC

## 2020-05-26 PROCEDURE — 80053 COMPREHEN METABOLIC PANEL: CPT | Mod: HCNC

## 2020-05-26 PROCEDURE — 80061 LIPID PANEL: CPT | Mod: HCNC

## 2020-06-02 ENCOUNTER — OFFICE VISIT (OUTPATIENT)
Dept: HOME HEALTH SERVICES | Facility: CLINIC | Age: 80
End: 2020-06-02
Payer: MEDICARE

## 2020-06-02 VITALS
SYSTOLIC BLOOD PRESSURE: 138 MMHG | DIASTOLIC BLOOD PRESSURE: 79 MMHG | WEIGHT: 215 LBS | BODY MASS INDEX: 39.56 KG/M2 | HEART RATE: 76 BPM | TEMPERATURE: 97 F | HEIGHT: 62 IN

## 2020-06-02 DIAGNOSIS — I10 ESSENTIAL HYPERTENSION: ICD-10-CM

## 2020-06-02 DIAGNOSIS — E03.9 ACQUIRED HYPOTHYROIDISM: Chronic | ICD-10-CM

## 2020-06-02 DIAGNOSIS — M85.89 OSTEOPENIA OF MULTIPLE SITES: Chronic | ICD-10-CM

## 2020-06-02 DIAGNOSIS — N32.81 OAB (OVERACTIVE BLADDER): ICD-10-CM

## 2020-06-02 DIAGNOSIS — E78.2 MIXED HYPERLIPIDEMIA: Chronic | ICD-10-CM

## 2020-06-02 DIAGNOSIS — E66.01 CLASS 2 SEVERE OBESITY DUE TO EXCESS CALORIES WITH SERIOUS COMORBIDITY AND BODY MASS INDEX (BMI) OF 39.0 TO 39.9 IN ADULT: ICD-10-CM

## 2020-06-02 DIAGNOSIS — I65.22 STENOSIS OF LEFT CAROTID ARTERY: ICD-10-CM

## 2020-06-02 DIAGNOSIS — Z00.00 ENCOUNTER FOR PREVENTIVE HEALTH EXAMINATION: Primary | ICD-10-CM

## 2020-06-02 DIAGNOSIS — I77.9 CAROTID ARTERY DISEASE, UNSPECIFIED LATERALITY, UNSPECIFIED TYPE: ICD-10-CM

## 2020-06-02 DIAGNOSIS — Z79.01 ANTICOAGULANT LONG-TERM USE: ICD-10-CM

## 2020-06-02 PROBLEM — E66.812 CLASS 2 SEVERE OBESITY WITH SERIOUS COMORBIDITY AND BODY MASS INDEX (BMI) OF 39.0 TO 39.9 IN ADULT: Status: ACTIVE | Noted: 2019-05-17

## 2020-06-02 PROCEDURE — G0439 PPPS, SUBSEQ VISIT: HCPCS | Mod: S$GLB,,, | Performed by: NURSE PRACTITIONER

## 2020-06-02 PROCEDURE — G0439 PR MEDICARE ANNUAL WELLNESS SUBSEQUENT VISIT: ICD-10-PCS | Mod: S$GLB,,, | Performed by: NURSE PRACTITIONER

## 2020-06-02 PROCEDURE — 3078F DIAST BP <80 MM HG: CPT | Mod: CPTII,S$GLB,, | Performed by: NURSE PRACTITIONER

## 2020-06-02 PROCEDURE — 3078F PR MOST RECENT DIASTOLIC BLOOD PRESSURE < 80 MM HG: ICD-10-PCS | Mod: CPTII,S$GLB,, | Performed by: NURSE PRACTITIONER

## 2020-06-02 PROCEDURE — 3075F PR MOST RECENT SYSTOLIC BLOOD PRESS GE 130-139MM HG: ICD-10-PCS | Mod: CPTII,S$GLB,, | Performed by: NURSE PRACTITIONER

## 2020-06-02 PROCEDURE — 3075F SYST BP GE 130 - 139MM HG: CPT | Mod: CPTII,S$GLB,, | Performed by: NURSE PRACTITIONER

## 2020-06-02 NOTE — PROGRESS NOTES
"Brandi Flynn presented for a  Medicare AWV and comprehensive Health Risk Assessment today. The following components were reviewed and updated:    · Medical history  · Family History  · Social history  · Allergies and Current Medications  · Health Risk Assessment  · Health Maintenance  · Care Team     ** See Completed Assessments for Annual Wellness Visit within the encounter summary.**       The following assessments were completed:  · Living Situation  · CAGE  · Depression Screening  · Timed Get Up and Go  · Whisper Test  · Cognitive Function Screening  ·   · Nutrition Screening  · ADL Screening  · PAQ Screening    Vitals:    06/02/20 1600   BP: 138/79   Pulse: 76   Temp: 97.4 °F (36.3 °C)   Weight: 97.5 kg (215 lb)   Height: 5' 2" (1.575 m)     Body mass index is 39.32 kg/m².  Physical Exam   Constitutional: She is oriented to person, place, and time.   Obese   HENT:   Head: Normocephalic.   Eyes: Pupils are equal, round, and reactive to light.   Neck: Normal range of motion.   Cardiovascular: Normal rate, regular rhythm, normal heart sounds and intact distal pulses.   Pulmonary/Chest: Effort normal and breath sounds normal.   Abdominal: Soft. Bowel sounds are normal.   Musculoskeletal: Normal range of motion. She exhibits edema (BLE).   Neurological: She is alert and oriented to person, place, and time.   Skin: Skin is warm and dry.   Psychiatric: She has a normal mood and affect. Her behavior is normal.   Vitals reviewed.        Diagnoses and health risks identified today and associated recommendations/orders:    1. Encounter for preventive health examination  AWV Completed and preventative maintenance discussed.    2. Mixed hyperlipidemia  Stable, followed by PCP.    3. Essential hypertension  Stable, followed by PCP.    4. Carotid artery disease, unspecified laterality, unspecified type  Stable, followed by Cardiology.    5. OAB (overactive bladder)-followed by urology Dr Greg Little   Stable, followed by " Urology.    6. Acquired hypothyroidism  Stable, followed by PCP.    7. Anticoagulant long-term use  Stable, followed by Cardiology.    8. Osteopenia of multiple sites  Stable, followed by PCP.    9. Class 2 severe obesity due to excess calories with serious comorbidity and body mass index (BMI) of 39.0 to 39.9 in adult  Uncontrolled, discussion on following heart healthy low cholesterol diet along with calorie reduction and increasing physical activity.      Provided Brandi with a 5-10 year written screening schedule and personal prevention plan. Recommendations were developed using the USPSTF age appropriate recommendations. Education, counseling, and referrals were provided as needed. After Visit Summary printed and given to patient which includes a list of additional screenings\tests needed.    Follow up in about 1 year (around 6/2/2021) for your next annual wellness visit.    Gala Kraft NP  I offered to discuss end of life issues, including information on how to make advance directives that the patient could use to name someone who would make medical decisions on their behalf if they became too ill to make themselves.    ___Patient declined  _X_Patient is interested, I provided paper work and offered to discuss.

## 2020-06-02 NOTE — PATIENT INSTRUCTIONS
Counseling and Referral of Other Preventative  (Italic type indicates deductible and co-insurance are waived)    Patient Name: Brandi Flynn  Today's Date: 6/2/2020    Health Maintenance       Date Due Completion Date    TETANUS VACCINE 12/31/2020 (Originally 3/15/1958) ---    Pneumococcal Vaccine (65+ Low/Medium Risk) (1 of 2 - PCV13) 12/31/2020 (Originally 3/15/2005) ---    Shingles Vaccine (1 of 2) 12/31/2020 (Originally 3/15/1990) ---    Influenza Vaccine (Season Ended) 09/01/2020 ---    Aspirin/Antiplatelet Therapy 05/19/2021 5/19/2020    Lipid Panel 05/26/2021 5/26/2020    Override on 1/25/2017: Done (Dr Quiñonez labs sent to scanning. total 105, triglycerides 62, HDL 51, LDL 44)    DEXA SCAN 05/31/2021 5/31/2018    Override on 8/26/2014: Done ([facility] in media)    Override on 12/20/2012: Done        No orders of the defined types were placed in this encounter.    The following information is provided to all patients.  This information is to help you find resources for any of the problems found today that may be affecting your health:                Living healthy guide: www.[facility].[place].gov      Understanding Diabetes: www.diabetes.org      Eating healthy: www.cdc.gov/healthyweight      CDC home safety checklist: www.cdc.gov/steadi/patient.html      Agency on Aging: www.goea.[place].[facility]      Alcoholics anonymous (AA): www.aa.org      Physical Activity: www.reymundo.nih.gov/vj5jhqz      Tobacco use: www.quitwithusla.org

## 2020-08-25 DIAGNOSIS — E78.5 HYPERLIPIDEMIA, UNSPECIFIED HYPERLIPIDEMIA TYPE: ICD-10-CM

## 2020-08-25 RX ORDER — ATORVASTATIN CALCIUM 40 MG/1
TABLET, FILM COATED ORAL
Qty: 180 TABLET | Refills: 2 | Status: SHIPPED | OUTPATIENT
Start: 2020-08-25 | End: 2021-05-26

## 2020-08-25 NOTE — TELEPHONE ENCOUNTER
No new care gaps identified.  Powered by [employer]. Reference number: [id number]. 8/25/2020 5:40:12 AM CDT

## 2020-08-26 NOTE — PROGRESS NOTES
Refill Authorization Note     is requesting a refill authorization.    Brief assessment and rationale for refill: APPROVE: prr               Medication reconciliation completed: No                         Comments:   Automatic Epic Protocol Generated Data:    Requested Prescriptions   Signed Prescriptions Disp Refills    atorvastatin (LIPITOR) 40 MG tablet 180 tablet 2     Sig: TAKE 2 TABLETS(80 MG) BY MOUTH EVERY DAY       Cardiovascular:  Antilipid - Statins Passed - 8/25/2020  5:39 AM        Passed - Patient is at least 18 years old        Passed - Office visit in past 12 months or future 90 days.     Recent Outpatient Visits            2 months ago Encounter for preventive health examination    [facility] - Home Visits Gala Kraft NP    3 months ago Encounter for preventive health examination    [facility] Marcial Kan MD    1 year ago Primary osteoarthritis of left knee    [place] - Orthopedics Chetan Man MD    1 year ago Good hypertension control    [facility] Uzma Topete NP    2 years ago Encounter for preventive health examination    [facility] Medicine Marcial Kan MD          Future Appointments              In 1 week [facility] ECHO/VAS [facility] Card[facility] CLAIRE Quijano at [facility]                Passed - Lipid Panel completed in last 360 days     Lab Results   Component Value Date    CHOL 120 05/26/2020    HDL 47 05/26/2020    LDLCALC 57.4 (L) 05/26/2020    TRIG 78 05/26/2020             Passed - ALT is 94 or below and within 360 days     ALT   Date Value Ref Range Status   05/26/2020 11 10 - 44 U/L Final   05/22/2019 12 10 - 44 U/L Final   05/31/2018 11 10 - 44 U/L Final              Passed - AST is 54 or below and within 360 days     AST   Date Value Ref Range Status   05/26/2020 21 10 - 40 U/L Final   05/22/2019 21 10 - 40 U/L Final   05/31/2018 21 10 - 40 U/L Final                     Appointments  past 12m or future 3m with PCP    Date Provider   Last Visit   5/19/2020 Marcial Kan MD   Next Visit   Visit date not found Marcial Kan MD   ED visits in past 90 days: 0     Note composed:10:11 PM 08/25/2020

## 2020-09-03 ENCOUNTER — HOSPITAL ENCOUNTER (OUTPATIENT)
Dept: CARDIOLOGY | Facility: CLINIC | Age: 80
Discharge: HOME | End: 2020-09-03
Attending: INTERNAL MEDICINE
Payer: MEDICARE

## 2020-09-03 VITALS — WEIGHT: 215 LBS | HEIGHT: 62 IN | BODY MASS INDEX: 39.56 KG/M2

## 2020-09-03 DIAGNOSIS — R94.31 NONSPECIFIC ABNORMAL ELECTROCARDIOGRAM (ECG) (EKG): ICD-10-CM

## 2020-09-03 PROCEDURE — 93306 ECHO (CUPID ONLY): ICD-10-PCS | Mod: S$GLB,,, | Performed by: INTERNAL MEDICINE

## 2020-09-03 PROCEDURE — 93306 TTE W/DOPPLER COMPLETE: CPT | Mod: S$GLB,,, | Performed by: INTERNAL MEDICINE

## 2020-09-10 ENCOUNTER — OFFICE VISIT (OUTPATIENT)
Dept: CARDIOLOGY | Facility: CLINIC | Age: 80
End: 2020-09-10
Payer: MEDICARE

## 2020-09-10 VITALS
WEIGHT: 215 LBS | BODY MASS INDEX: 38.09 KG/M2 | HEART RATE: 57 BPM | SYSTOLIC BLOOD PRESSURE: 122 MMHG | RESPIRATION RATE: 16 BRPM | OXYGEN SATURATION: 98 % | HEIGHT: 63 IN | DIASTOLIC BLOOD PRESSURE: 82 MMHG

## 2020-09-10 DIAGNOSIS — R94.31 LEFT AXIS DEVIATION: ICD-10-CM

## 2020-09-10 DIAGNOSIS — I65.22 STENOSIS OF LEFT CAROTID ARTERY: ICD-10-CM

## 2020-09-10 DIAGNOSIS — I34.81 MITRAL ANNULAR CALCIFICATION: ICD-10-CM

## 2020-09-10 DIAGNOSIS — R00.1 BRADYCARDIA: Primary | ICD-10-CM

## 2020-09-10 DIAGNOSIS — G45.9 TIA (TRANSIENT ISCHEMIC ATTACK): ICD-10-CM

## 2020-09-10 DIAGNOSIS — I10 HTN (HYPERTENSION): ICD-10-CM

## 2020-09-10 DIAGNOSIS — E03.9 ACQUIRED HYPOTHYROIDISM: Chronic | ICD-10-CM

## 2020-09-10 DIAGNOSIS — E78.2 MIXED HYPERLIPIDEMIA: Chronic | ICD-10-CM

## 2020-09-10 DIAGNOSIS — I10 ESSENTIAL HYPERTENSION: ICD-10-CM

## 2020-09-10 DIAGNOSIS — I44.0 FIRST DEGREE AV BLOCK: ICD-10-CM

## 2020-09-10 LAB
AORTIC VALVE CUSP SEPERATION: 2 CM
AV INDEX (PROSTH): 0.86
AV MEAN GRADIENT: 3 MMHG
AV PEAK GRADIENT: 6 MMHG
AV REGURGITATION PRESSURE HALF TIME: 913 MS
AV VALVE AREA: 2.98 CM2
AV VELOCITY RATIO: 0.83
BSA FOR ECHO PROCEDURE: 2.07 M2
CV ECHO LV RWT: 0.68 CM
DOP CALC AO PEAK VEL: 1.2 M/S
DOP CALC AO VTI: 29 CM
DOP CALC LVOT AREA: 3.5 CM2
DOP CALC LVOT DIAMETER: 2.1 CM
DOP CALC LVOT PEAK VEL: 1 M/S
DOP CALC LVOT STROKE VOLUME: 86.55 CM3
DOP CALCLVOT PEAK VEL VTI: 25 CM
E WAVE DECELERATION TIME: 248 MSEC
E/A RATIO: 0.39
E/E' RATIO: 24 M/S
ECHO LV POSTERIOR WALL: 1.4 CM (ref 0.6–1.1)
FRACTIONAL SHORTENING: 37 % (ref 28–44)
INTERVENTRICULAR SEPTUM: 1.3 CM (ref 0.6–1.1)
IVRT: 79 MSEC
LEFT INTERNAL DIMENSION IN SYSTOLE: 2.6 CM (ref 2.1–4)
LEFT VENTRICLE DIASTOLIC VOLUME INDEX: 38.56 ML/M2
LEFT VENTRICLE DIASTOLIC VOLUME: 76 ML
LEFT VENTRICLE MASS INDEX: 104 G/M2
LEFT VENTRICLE SYSTOLIC VOLUME INDEX: 12.7 ML/M2
LEFT VENTRICLE SYSTOLIC VOLUME: 25 ML
LEFT VENTRICULAR INTERNAL DIMENSION IN DIASTOLE: 4.1 CM (ref 3.5–6)
LEFT VENTRICULAR MASS: 204.87 G
LV LATERAL E/E' RATIO: 20 M/S
LV SEPTAL E/E' RATIO: 30 M/S
MV PEAK A VEL: 3.1 M/S
MV PEAK E VEL: 1.2 M/S
MV STENOSIS PRESSURE HALF TIME: 70 MS
MV VALVE AREA P 1/2 METHOD: 3.14 CM2
RIGHT VENTRICULAR END-DIASTOLIC DIMENSION: 2.5 CM
TDI LATERAL: 0.06 M/S
TDI SEPTAL: 0.04 M/S
TDI: 0.05 M/S

## 2020-09-10 PROCEDURE — 1126F AMNT PAIN NOTED NONE PRSNT: CPT | Mod: S$GLB,,, | Performed by: INTERNAL MEDICINE

## 2020-09-10 PROCEDURE — 3079F PR MOST RECENT DIASTOLIC BLOOD PRESSURE 80-89 MM HG: ICD-10-PCS | Mod: CPTII,S$GLB,, | Performed by: INTERNAL MEDICINE

## 2020-09-10 PROCEDURE — 1159F MED LIST DOCD IN RCRD: CPT | Mod: S$GLB,,, | Performed by: INTERNAL MEDICINE

## 2020-09-10 PROCEDURE — 3079F DIAST BP 80-89 MM HG: CPT | Mod: CPTII,S$GLB,, | Performed by: INTERNAL MEDICINE

## 2020-09-10 PROCEDURE — 99205 PR OFFICE/OUTPT VISIT, NEW, LEVL V, 60-74 MIN: ICD-10-PCS | Mod: S$GLB,,, | Performed by: INTERNAL MEDICINE

## 2020-09-10 PROCEDURE — 1101F PR PT FALLS ASSESS DOC 0-1 FALLS W/OUT INJ PAST YR: ICD-10-PCS | Mod: CPTII,S$GLB,, | Performed by: INTERNAL MEDICINE

## 2020-09-10 PROCEDURE — 93000 EKG 12-LEAD: ICD-10-PCS | Mod: S$GLB,,, | Performed by: INTERNAL MEDICINE

## 2020-09-10 PROCEDURE — 93000 ELECTROCARDIOGRAM COMPLETE: CPT | Mod: S$GLB,,, | Performed by: INTERNAL MEDICINE

## 2020-09-10 PROCEDURE — 3074F SYST BP LT 130 MM HG: CPT | Mod: CPTII,S$GLB,, | Performed by: INTERNAL MEDICINE

## 2020-09-10 PROCEDURE — 1126F PR PAIN SEVERITY QUANTIFIED, NO PAIN PRESENT: ICD-10-PCS | Mod: S$GLB,,, | Performed by: INTERNAL MEDICINE

## 2020-09-10 PROCEDURE — 3074F PR MOST RECENT SYSTOLIC BLOOD PRESSURE < 130 MM HG: ICD-10-PCS | Mod: CPTII,S$GLB,, | Performed by: INTERNAL MEDICINE

## 2020-09-10 PROCEDURE — 1101F PT FALLS ASSESS-DOCD LE1/YR: CPT | Mod: CPTII,S$GLB,, | Performed by: INTERNAL MEDICINE

## 2020-09-10 PROCEDURE — 99205 OFFICE O/P NEW HI 60 MIN: CPT | Mod: S$GLB,,, | Performed by: INTERNAL MEDICINE

## 2020-09-10 PROCEDURE — 1159F PR MEDICATION LIST DOCUMENTED IN MEDICAL RECORD: ICD-10-PCS | Mod: S$GLB,,, | Performed by: INTERNAL MEDICINE

## 2020-09-10 NOTE — PROGRESS NOTES
Subjective:    Patient ID:  Brandi Flynn is a 80 y.o. female patient here for initial evaluation Hypertension and Hyperlipidemia            History of Present Illness  Patient is 80-year-old lady with known history of hypertension and stroke in the past.  Has been doing well she is scheduled for eye surgery.  She also had a 2D echocardiogram done.  Patient denies any chest pain or tightness or heaviness.  Her breathing is good denies any shortness of breath or difficulty in breathing.  Patient denies any dizziness or lightheadedness or loss of consciousness.  Denies any falls or head injury.  She denies any blood in stool blood in the urine or any bleeding issues.      Review of patient's allergies indicates:   Allergen Reactions    Ace inhibitors Other (See Comments)     cough    Morphine Itching    Keflex [cephalexin] Itching and Rash       Past Medical History:   Diagnosis Date    Blood transfusion     GERD (gastroesophageal reflux disease)     Hepatitis C     treated six months by Dr. Gaspar with interferon    History of hepatitis C     Hyperlipidemia     Hypertension     Hypothyroidism     Obesity     Primary osteoarthritis of left knee 2015    Sleep apnea     Stroke     TIA     TIA (transient ischemic attack)     Vision loss of right eye     Dr Eldridge Right eye blood clot      Past Surgical History:   Procedure Laterality Date    ABDOMINAL SURGERY      SBO from lap band wraping around small bowel, lap untwisted    ADENOIDECTOMY      BREAST BIOPSY  2014    needle biopsy     CATARACT EXTRACTION, BILATERAL Bilateral      SECTION      CHOLECYSTECTOMY  2001    COLONOSCOPY  2011    Dr. Alvares, 5 year recheck    EYE SURGERY  2014    bilateral cataract Dr Carrillo     HEMORRHOID SURGERY      HYSTERECTOMY  1989    INCONTINENCE SURGERY  2008    sling    LAPAROSCOPIC GASTRIC BANDING      LAPAROSCOPIC NISSEN FUNDOPLICATION      Dr. Rutherford    TONSILLECTOMY        Social History     Tobacco Use    Smoking status: Former Smoker     Packs/day: 0.50     Years: 5.00     Pack years: 2.50    Smokeless tobacco: Never Used   Substance Use Topics    Alcohol use: No    Drug use: No        Review of Systems     Constitutional: Negative for chills, fatigue and fever.   Eyes: No double vision, No blurred vision  Neuro: No headaches, No dizziness  Respiratory: Negative for cough, shortness of breath and wheezing.    Cardiovascular: Negative for chest pain. Negative for palpitations and leg swelling.   Gastrointestinal: Negative for abdominal pain, No melena, diarrhea, nausea and vomiting.   Genitourinary: Negative for dysuria and frequency, Negative for hematuria  Skin: Negative for bruising, Negative for edema or discoloration noted.  Bruising from Plavix.  Endocrine: Negative for polyphagia, Negative for heat intolerance, Negative for cold intolerance  Psychiatric: Negative for depression, Negative for anxiety, Negative for memory loss  Musculoskeletal: Negative for neck pain, Negative for muscle weakness, Negative for back pain          Objective:        Vitals:    09/10/20 1300   BP: 122/82   Pulse: (!) 57   Resp: 16       Lab Results   Component Value Date    WBC 5.49 05/26/2020    HGB 14.3 05/26/2020    HCT 45.3 05/26/2020     05/26/2020    CHOL 120 05/26/2020    TRIG 78 05/26/2020    HDL 47 05/26/2020    ALT 11 05/26/2020    AST 21 05/26/2020     05/26/2020    K 4.2 05/26/2020     05/26/2020    CREATININE 0.8 05/26/2020    BUN 19 05/26/2020    CO2 29 05/26/2020    TSH 2.516 05/26/2020        ECHOCARDIOGRAM RESULTS  Results for orders placed during the hospital encounter of 09/03/20   Echo Color Flow Doppler? Yes    Narrative · Mild concentric left ventricular hypertrophy.  · Hyperdynamic left ventricular systolic function. The estimated ejection   fraction is 75%.  · Normal right ventricular systolic function.  · Mild left atrial enlargement.             CURRENT/PREVIOUS VISIT EKG  No results found for this or any previous visit.  No procedure found.   No results found for this or any previous visit.  No procedure found.      PHYSICAL EXAM    GENERAL: well built, well nourished, well-developed in no apparent distress alert and oriented.   HEENT: Normocephalic. Pupils normal and conjunctivae normal.  Mucous membranes normal, no cyanosis or icterus, trachea central,no pallor or icterus is noted..  Mild left facial droop.  NECK: No JVD. No bruit..   THYROID: Thyroid not enlarged. No nodules present..   CARDIAC: Regular rate and rhythm. S1 is normal.S2 is normal.No gallops, clicks or murmurs noted at this time.  CHEST ANATOMY: normal.   LUNGS: Clear to auscultation. No wheezing or rhonchi..   ABDOMEN: Soft no masses or organomegaly.  No abdomen pulsations or bruits.  Normal bowel sounds. No pulsations and no masses felt, No guarding or rebound.   EXTREMITIES: No cyanosis, clubbing or edema noted at this time., no calf tenderness bilaterally.   PERIPHERAL VASCULAR SYSTEM: Good palpable distal pulses.   CENTRAL NERVOUS SYSTEM: No focal motor or sensory deficits noted.   SKIN: Skin without lesions, moist, well perfused.   MUSCLE STRENGTH & TONE: No noteable weakness, atrophy or abnormal movement.     I HAVE REVIEWED :    The vital signs, nurses notes, and all the pertinent radiology and labs.      Current Outpatient Medications   Medication Instructions    amLODIPine (NORVASC) 2.5 MG tablet TAKE 1 TABLET BY MOUTH EVERY DAY    aspirin (ECOTRIN) 162 mg, Oral, Daily    atorvastatin (LIPITOR) 40 MG tablet TAKE 2 TABLETS(80 MG) BY MOUTH EVERY DAY    CALCIUM CARB/VIT D3/MINERALS (CALTRATE PLUS ORAL) 1 tablet, Oral, Daily    clopidogreL (PLAVIX) 75 mg, Oral, Daily    levothyroxine (SYNTHROID) 75 MCG tablet TAKE 1 TABLET(75 MCG) BY MOUTH EVERY DAY    mag hydrox/aluminum hyd/simeth (MAALOX ORAL) 1 Dose, Oral, As needed (PRN)    oxybutynin (DITROPAN XL) 15 mg, Oral,  Daily    VIT A/VIT C/VIT E/ZINC/COPPER (PRESERVISION AREDS ORAL) 1 capsule, Oral, 2 times daily                  Assessment:   1.  Moderate mitral annular calcification  2.  Essential hypertension  3.  Carotid artery disease/carotid artery stenosis  4.  Mixed hyperlipidemia  5.  Hypothyroidism  6.  Transient ischemic attack/CVA  7.  Eye surgery on the left side  8.  Sinus bradycardia with first-degree AV block  9.  Left axis deviation          Plan:   1.  Patient had an echocardiogram done which showed normal LV function in fact hyperdynamic LV function with mild left ventricle hypertrophy.  She has moderate mitral annular calcification and trace amount of mitral regurgitation.  She also has minimal aortic sclerosis with mild aortic insufficiency.  2.  Patient's blood pressure is well controlled it is 122/80 she is currently on amlodipine 2.5 mg p.o. q.day continue the medication.  3.  Patient is on Plavix and aspirin and patient is scheduled for eye surgery patient needs to stop her Plavix and aspirin 7 days prior to the surgery.  4.  Patient is on atorvastatin 80 mg p.o. q.day and her primary physician Dr. Jack Kan is checking a cholesterol and her liver function test.  5.  Patient is on levothyroxine 75 mg mcg p.o. q.day continue the same.  6.  Continue current management patient to resume her Plavix and aspirin when okay with a eye doctor  7.  Patient has sinus bradycardia with marked sinus arrhythmia and first-degree AV block and left axis deviation.  At the present time patient is stable has no specific complaints and no specific issues.  Discussed with patient that if she should have any symptoms of lightheadedness dizziness or weakness of 80 that she should immediately report to me.  Discussed with her that in the near future she is a candidate for possible permanent pacemaker implantation.    Patient to proceed with the surgery.        I will see her back in the office in 4months okay

## 2020-11-19 DIAGNOSIS — Z12.31 ENCOUNTER FOR SCREENING MAMMOGRAM FOR BREAST CANCER: Primary | ICD-10-CM

## 2020-12-18 ENCOUNTER — HOSPITAL ENCOUNTER (OUTPATIENT)
Dept: RADIOLOGY | Facility: HOSPITAL | Age: 80
Discharge: HOME | End: 2020-12-18
Attending: SPECIALIST
Payer: MEDICARE

## 2020-12-18 VITALS — BODY MASS INDEX: 38.09 KG/M2 | HEIGHT: 63 IN | WEIGHT: 214.94 LBS

## 2020-12-18 DIAGNOSIS — Z12.31 ENCOUNTER FOR SCREENING MAMMOGRAM FOR BREAST CANCER: ICD-10-CM

## 2020-12-18 PROCEDURE — 77067 SCR MAMMO BI INCL CAD: CPT | Mod: TC,PO

## 2021-02-02 ENCOUNTER — PES CALL (OUTPATIENT)
Dept: ADMINISTRATIVE | Facility: CLINIC | Age: 81
End: 2021-02-02

## 2021-03-08 ENCOUNTER — PES CALL (OUTPATIENT)
Dept: ADMINISTRATIVE | Facility: CLINIC | Age: 81
End: 2021-03-08

## 2021-04-01 ENCOUNTER — PES CALL (OUTPATIENT)
Dept: ADMINISTRATIVE | Facility: CLINIC | Age: 81
End: 2021-04-01

## 2021-04-20 ENCOUNTER — PES CALL (OUTPATIENT)
Dept: ADMINISTRATIVE | Facility: CLINIC | Age: 81
End: 2021-04-20

## 2021-05-22 DIAGNOSIS — I10 ESSENTIAL HYPERTENSION: Primary | ICD-10-CM

## 2021-05-22 DIAGNOSIS — E78.5 HYPERLIPIDEMIA, UNSPECIFIED HYPERLIPIDEMIA TYPE: ICD-10-CM

## 2021-05-22 DIAGNOSIS — Z79.01 ANTICOAGULANT LONG-TERM USE: ICD-10-CM

## 2021-05-22 DIAGNOSIS — E03.9 ACQUIRED HYPOTHYROIDISM: Chronic | ICD-10-CM

## 2021-05-26 RX ORDER — ATORVASTATIN CALCIUM 40 MG/1
TABLET, FILM COATED ORAL
Qty: 180 TABLET | Refills: 0 | Status: SHIPPED | OUTPATIENT
Start: 2021-05-26 | End: 2021-08-04 | Stop reason: SDUPTHER

## 2021-06-06 DIAGNOSIS — I10 ESSENTIAL HYPERTENSION: ICD-10-CM

## 2021-06-08 RX ORDER — AMLODIPINE BESYLATE 2.5 MG/1
TABLET ORAL
Qty: 90 TABLET | Refills: 0 | Status: SHIPPED | OUTPATIENT
Start: 2021-06-08 | End: 2021-08-04 | Stop reason: SDUPTHER

## 2021-06-30 ENCOUNTER — PES CALL (OUTPATIENT)
Dept: ADMINISTRATIVE | Facility: CLINIC | Age: 81
End: 2021-06-30

## 2021-08-02 ENCOUNTER — LAB VISIT (OUTPATIENT)
Dept: LAB | Facility: HOSPITAL | Age: 81
End: 2021-08-02
Attending: FAMILY MEDICINE
Payer: MEDICARE

## 2021-08-02 DIAGNOSIS — E78.5 HYPERLIPIDEMIA, UNSPECIFIED HYPERLIPIDEMIA TYPE: ICD-10-CM

## 2021-08-02 LAB
ALBUMIN SERPL BCP-MCNC: 3.7 G/DL (ref 3.5–5.2)
ALP SERPL-CCNC: 93 U/L (ref 55–135)
ALT SERPL W/O P-5'-P-CCNC: 14 U/L (ref 10–44)
ANION GAP SERPL CALC-SCNC: 11 MMOL/L (ref 8–16)
AST SERPL-CCNC: 26 U/L (ref 10–40)
BILIRUB SERPL-MCNC: 0.7 MG/DL (ref 0.1–1)
BUN SERPL-MCNC: 16 MG/DL (ref 8–23)
CALCIUM SERPL-MCNC: 9.7 MG/DL (ref 8.7–10.5)
CHLORIDE SERPL-SCNC: 106 MMOL/L (ref 95–110)
CHOLEST SERPL-MCNC: 114 MG/DL (ref 120–199)
CHOLEST/HDLC SERPL: 2.6 {RATIO} (ref 2–5)
CO2 SERPL-SCNC: 24 MMOL/L (ref 23–29)
CREAT SERPL-MCNC: 0.7 MG/DL (ref 0.5–1.4)
EST. GFR  (AFRICAN AMERICAN): >60 ML/MIN/1.73 M^2
EST. GFR  (NON AFRICAN AMERICAN): >60 ML/MIN/1.73 M^2
GLUCOSE SERPL-MCNC: 96 MG/DL (ref 70–110)
HDLC SERPL-MCNC: 44 MG/DL (ref 40–75)
HDLC SERPL: 38.6 % (ref 20–50)
LDLC SERPL CALC-MCNC: 56.8 MG/DL (ref 63–159)
NONHDLC SERPL-MCNC: 70 MG/DL
POTASSIUM SERPL-SCNC: 4.2 MMOL/L (ref 3.5–5.1)
PROT SERPL-MCNC: 6.8 G/DL (ref 6–8.4)
SODIUM SERPL-SCNC: 141 MMOL/L (ref 136–145)
TRIGL SERPL-MCNC: 66 MG/DL (ref 30–150)

## 2021-08-02 PROCEDURE — 80053 COMPREHEN METABOLIC PANEL: CPT | Performed by: FAMILY MEDICINE

## 2021-08-02 PROCEDURE — 80061 LIPID PANEL: CPT | Performed by: FAMILY MEDICINE

## 2021-08-02 PROCEDURE — 36415 COLL VENOUS BLD VENIPUNCTURE: CPT | Mod: PO | Performed by: FAMILY MEDICINE

## 2021-08-04 ENCOUNTER — LAB VISIT (OUTPATIENT)
Dept: LAB | Facility: HOSPITAL | Age: 81
End: 2021-08-04
Attending: FAMILY MEDICINE
Payer: MEDICARE

## 2021-08-04 ENCOUNTER — OFFICE VISIT (OUTPATIENT)
Dept: FAMILY MEDICINE | Facility: CLINIC | Age: 81
End: 2021-08-04
Attending: FAMILY MEDICINE
Payer: MEDICARE

## 2021-08-04 VITALS
HEIGHT: 63 IN | DIASTOLIC BLOOD PRESSURE: 64 MMHG | HEART RATE: 66 BPM | BODY MASS INDEX: 33.75 KG/M2 | OXYGEN SATURATION: 97 % | WEIGHT: 190.5 LBS | SYSTOLIC BLOOD PRESSURE: 118 MMHG | TEMPERATURE: 99 F

## 2021-08-04 DIAGNOSIS — Z28.21 COVID-19 VIRUS VACCINATION DECLINED: ICD-10-CM

## 2021-08-04 DIAGNOSIS — G45.9 TIA (TRANSIENT ISCHEMIC ATTACK): ICD-10-CM

## 2021-08-04 DIAGNOSIS — I10 ESSENTIAL HYPERTENSION: ICD-10-CM

## 2021-08-04 DIAGNOSIS — Z98.84 HISTORY OF LAPAROSCOPIC ADJUSTABLE GASTRIC BANDING: ICD-10-CM

## 2021-08-04 DIAGNOSIS — E03.9 ACQUIRED HYPOTHYROIDISM: Chronic | ICD-10-CM

## 2021-08-04 DIAGNOSIS — Z79.01 ANTICOAGULANT LONG-TERM USE: ICD-10-CM

## 2021-08-04 DIAGNOSIS — E78.5 HYPERLIPIDEMIA, UNSPECIFIED HYPERLIPIDEMIA TYPE: ICD-10-CM

## 2021-08-04 DIAGNOSIS — Z00.00 ENCOUNTER FOR PREVENTIVE HEALTH EXAMINATION: Primary | ICD-10-CM

## 2021-08-04 DIAGNOSIS — I65.22 STENOSIS OF LEFT CAROTID ARTERY: ICD-10-CM

## 2021-08-04 DIAGNOSIS — H34.231 RETINAL ARTERY BRANCH OCCLUSION OF RIGHT EYE: ICD-10-CM

## 2021-08-04 LAB
BASOPHILS # BLD AUTO: 0.05 K/UL (ref 0–0.2)
BASOPHILS NFR BLD: 0.8 % (ref 0–1.9)
DIFFERENTIAL METHOD: ABNORMAL
EOSINOPHIL # BLD AUTO: 0.1 K/UL (ref 0–0.5)
EOSINOPHIL NFR BLD: 1.2 % (ref 0–8)
ERYTHROCYTE [DISTWIDTH] IN BLOOD BY AUTOMATED COUNT: 14.3 % (ref 11.5–14.5)
HCT VFR BLD AUTO: 42 % (ref 37–48.5)
HGB BLD-MCNC: 13.6 G/DL (ref 12–16)
IMM GRANULOCYTES # BLD AUTO: 0.01 K/UL (ref 0–0.04)
IMM GRANULOCYTES NFR BLD AUTO: 0.2 % (ref 0–0.5)
LYMPHOCYTES # BLD AUTO: 1.6 K/UL (ref 1–4.8)
LYMPHOCYTES NFR BLD: 26.8 % (ref 18–48)
MCH RBC QN AUTO: 31.3 PG (ref 27–31)
MCHC RBC AUTO-ENTMCNC: 32.4 G/DL (ref 32–36)
MCV RBC AUTO: 97 FL (ref 82–98)
MONOCYTES # BLD AUTO: 0.7 K/UL (ref 0.3–1)
MONOCYTES NFR BLD: 11.1 % (ref 4–15)
NEUTROPHILS # BLD AUTO: 3.6 K/UL (ref 1.8–7.7)
NEUTROPHILS NFR BLD: 59.9 % (ref 38–73)
NRBC BLD-RTO: 0 /100 WBC
PLATELET # BLD AUTO: 193 K/UL (ref 150–450)
PMV BLD AUTO: 10.9 FL (ref 9.2–12.9)
RBC # BLD AUTO: 4.35 M/UL (ref 4–5.4)
TSH SERPL DL<=0.005 MIU/L-ACNC: 2.29 UIU/ML (ref 0.4–4)
WBC # BLD AUTO: 5.94 K/UL (ref 3.9–12.7)

## 2021-08-04 PROCEDURE — 3074F SYST BP LT 130 MM HG: CPT | Mod: CPTII,S$GLB,, | Performed by: FAMILY MEDICINE

## 2021-08-04 PROCEDURE — 99499 UNLISTED E&M SERVICE: CPT | Mod: S$GLB,,, | Performed by: FAMILY MEDICINE

## 2021-08-04 PROCEDURE — 1159F MED LIST DOCD IN RCRD: CPT | Mod: CPTII,S$GLB,, | Performed by: FAMILY MEDICINE

## 2021-08-04 PROCEDURE — 3078F PR MOST RECENT DIASTOLIC BLOOD PRESSURE < 80 MM HG: ICD-10-PCS | Mod: CPTII,S$GLB,, | Performed by: FAMILY MEDICINE

## 2021-08-04 PROCEDURE — 3288F PR FALLS RISK ASSESSMENT DOCUMENTED: ICD-10-PCS | Mod: CPTII,S$GLB,, | Performed by: FAMILY MEDICINE

## 2021-08-04 PROCEDURE — 99397 PER PM REEVAL EST PAT 65+ YR: CPT | Mod: S$GLB,,, | Performed by: FAMILY MEDICINE

## 2021-08-04 PROCEDURE — 99999 PR PBB SHADOW E&M-EST. PATIENT-LVL IV: ICD-10-PCS | Mod: PBBFAC,,, | Performed by: FAMILY MEDICINE

## 2021-08-04 PROCEDURE — 1101F PR PT FALLS ASSESS DOC 0-1 FALLS W/OUT INJ PAST YR: ICD-10-PCS | Mod: CPTII,S$GLB,, | Performed by: FAMILY MEDICINE

## 2021-08-04 PROCEDURE — 3288F FALL RISK ASSESSMENT DOCD: CPT | Mod: CPTII,S$GLB,, | Performed by: FAMILY MEDICINE

## 2021-08-04 PROCEDURE — 99499 RISK ADDL DX/OHS AUDIT: ICD-10-PCS | Mod: S$GLB,,, | Performed by: FAMILY MEDICINE

## 2021-08-04 PROCEDURE — 1101F PT FALLS ASSESS-DOCD LE1/YR: CPT | Mod: CPTII,S$GLB,, | Performed by: FAMILY MEDICINE

## 2021-08-04 PROCEDURE — 3074F PR MOST RECENT SYSTOLIC BLOOD PRESSURE < 130 MM HG: ICD-10-PCS | Mod: CPTII,S$GLB,, | Performed by: FAMILY MEDICINE

## 2021-08-04 PROCEDURE — 84443 ASSAY THYROID STIM HORMONE: CPT | Performed by: FAMILY MEDICINE

## 2021-08-04 PROCEDURE — 1126F AMNT PAIN NOTED NONE PRSNT: CPT | Mod: CPTII,S$GLB,, | Performed by: FAMILY MEDICINE

## 2021-08-04 PROCEDURE — 1160F PR REVIEW ALL MEDS BY PRESCRIBER/CLIN PHARMACIST DOCUMENTED: ICD-10-PCS | Mod: CPTII,S$GLB,, | Performed by: FAMILY MEDICINE

## 2021-08-04 PROCEDURE — 1159F PR MEDICATION LIST DOCUMENTED IN MEDICAL RECORD: ICD-10-PCS | Mod: CPTII,S$GLB,, | Performed by: FAMILY MEDICINE

## 2021-08-04 PROCEDURE — 85025 COMPLETE CBC W/AUTO DIFF WBC: CPT | Performed by: FAMILY MEDICINE

## 2021-08-04 PROCEDURE — 3078F DIAST BP <80 MM HG: CPT | Mod: CPTII,S$GLB,, | Performed by: FAMILY MEDICINE

## 2021-08-04 PROCEDURE — 99999 PR PBB SHADOW E&M-EST. PATIENT-LVL IV: CPT | Mod: PBBFAC,,, | Performed by: FAMILY MEDICINE

## 2021-08-04 PROCEDURE — 1160F RVW MEDS BY RX/DR IN RCRD: CPT | Mod: CPTII,S$GLB,, | Performed by: FAMILY MEDICINE

## 2021-08-04 PROCEDURE — 36415 COLL VENOUS BLD VENIPUNCTURE: CPT | Mod: PO | Performed by: FAMILY MEDICINE

## 2021-08-04 PROCEDURE — 1126F PR PAIN SEVERITY QUANTIFIED, NO PAIN PRESENT: ICD-10-PCS | Mod: CPTII,S$GLB,, | Performed by: FAMILY MEDICINE

## 2021-08-04 PROCEDURE — 99397 PR PREVENTIVE VISIT,EST,65 & OVER: ICD-10-PCS | Mod: S$GLB,,, | Performed by: FAMILY MEDICINE

## 2021-08-04 RX ORDER — CLOPIDOGREL BISULFATE 75 MG/1
75 TABLET ORAL DAILY
Qty: 90 TABLET | Refills: 3 | Status: SHIPPED | OUTPATIENT
Start: 2021-08-04 | End: 2021-08-23 | Stop reason: SDUPTHER

## 2021-08-04 RX ORDER — ATORVASTATIN CALCIUM 40 MG/1
40 TABLET, FILM COATED ORAL DAILY
Qty: 90 TABLET | Refills: 3 | Status: SHIPPED | OUTPATIENT
Start: 2021-08-04 | End: 2021-09-28 | Stop reason: SDUPTHER

## 2021-08-04 RX ORDER — SOLIFENACIN SUCCINATE 5 MG/1
5 TABLET, FILM COATED ORAL DAILY
Status: ON HOLD | COMMUNITY
Start: 2021-06-24 | End: 2024-03-04 | Stop reason: HOSPADM

## 2021-08-04 RX ORDER — ATORVASTATIN CALCIUM 40 MG/1
TABLET, FILM COATED ORAL
Qty: 180 TABLET | Refills: 3 | Status: SHIPPED | OUTPATIENT
Start: 2021-08-04 | End: 2021-08-04

## 2021-08-04 RX ORDER — AMLODIPINE BESYLATE 2.5 MG/1
2.5 TABLET ORAL DAILY
Qty: 90 TABLET | Refills: 3 | Status: SHIPPED | OUTPATIENT
Start: 2021-08-04 | End: 2021-09-10 | Stop reason: SDUPTHER

## 2021-08-23 RX ORDER — CLOPIDOGREL BISULFATE 75 MG/1
75 TABLET ORAL DAILY
Qty: 90 TABLET | Refills: 3 | Status: SHIPPED | OUTPATIENT
Start: 2021-08-23 | End: 2021-09-28 | Stop reason: SDUPTHER

## 2021-09-10 DIAGNOSIS — I10 ESSENTIAL HYPERTENSION: ICD-10-CM

## 2021-09-10 DIAGNOSIS — E03.9 HYPOTHYROIDISM, UNSPECIFIED TYPE: ICD-10-CM

## 2021-09-10 RX ORDER — AMLODIPINE BESYLATE 2.5 MG/1
2.5 TABLET ORAL DAILY
Qty: 90 TABLET | Refills: 3 | Status: SHIPPED | OUTPATIENT
Start: 2021-09-10 | End: 2022-01-13

## 2021-09-10 RX ORDER — LEVOTHYROXINE SODIUM 75 UG/1
75 TABLET ORAL DAILY
Qty: 90 TABLET | Refills: 3 | Status: SHIPPED | OUTPATIENT
Start: 2021-09-10 | End: 2022-06-13

## 2021-09-28 DIAGNOSIS — E78.5 HYPERLIPIDEMIA, UNSPECIFIED HYPERLIPIDEMIA TYPE: ICD-10-CM

## 2021-09-28 RX ORDER — ATORVASTATIN CALCIUM 40 MG/1
40 TABLET, FILM COATED ORAL DAILY
Qty: 90 TABLET | Refills: 3 | Status: SHIPPED | OUTPATIENT
Start: 2021-09-28 | End: 2021-11-22

## 2021-09-28 RX ORDER — CLOPIDOGREL BISULFATE 75 MG/1
75 TABLET ORAL DAILY
Qty: 90 TABLET | Refills: 3 | Status: SHIPPED | OUTPATIENT
Start: 2021-09-28 | End: 2022-08-29

## 2021-11-19 ENCOUNTER — TELEPHONE (OUTPATIENT)
Dept: FAMILY MEDICINE | Facility: CLINIC | Age: 81
End: 2021-11-19
Payer: MEDICARE

## 2021-11-19 DIAGNOSIS — E78.5 HYPERLIPIDEMIA, UNSPECIFIED HYPERLIPIDEMIA TYPE: ICD-10-CM

## 2021-11-22 RX ORDER — ATORVASTATIN CALCIUM 40 MG/1
80 TABLET, FILM COATED ORAL DAILY
Qty: 180 TABLET | Refills: 1 | Status: SHIPPED | OUTPATIENT
Start: 2021-11-22 | End: 2022-04-07

## 2021-12-24 ENCOUNTER — HOSPITAL ENCOUNTER (EMERGENCY)
Facility: HOSPITAL | Age: 81
Discharge: HOME | End: 2021-12-24
Attending: EMERGENCY MEDICINE
Payer: MEDICARE

## 2021-12-24 VITALS
DIASTOLIC BLOOD PRESSURE: 72 MMHG | OXYGEN SATURATION: 99 % | SYSTOLIC BLOOD PRESSURE: 151 MMHG | HEIGHT: 63 IN | TEMPERATURE: 98 F | WEIGHT: 191 LBS | HEART RATE: 76 BPM | RESPIRATION RATE: 23 BRPM | BODY MASS INDEX: 33.84 KG/M2

## 2021-12-24 DIAGNOSIS — I16.0 HYPERTENSIVE URGENCY: Primary | ICD-10-CM

## 2021-12-24 DIAGNOSIS — H11.32 SUBCONJUNCTIVAL HEMORRHAGE OF LEFT EYE: ICD-10-CM

## 2021-12-24 LAB
ALBUMIN SERPL BCP-MCNC: 4.1 G/DL (ref 3.5–5.2)
ALP SERPL-CCNC: 89 U/L (ref 55–135)
ALT SERPL W/O P-5'-P-CCNC: 14 U/L (ref 10–44)
ANION GAP SERPL CALC-SCNC: 10 MMOL/L (ref 8–16)
AST SERPL-CCNC: 24 U/L (ref 10–40)
BASOPHILS # BLD AUTO: 0.06 K/UL (ref 0–0.2)
BASOPHILS NFR BLD: 0.8 % (ref 0–1.9)
BILIRUB SERPL-MCNC: 0.9 MG/DL (ref 0.1–1)
BUN SERPL-MCNC: 25 MG/DL (ref 8–23)
CALCIUM SERPL-MCNC: 8.9 MG/DL (ref 8.7–10.5)
CHLORIDE SERPL-SCNC: 105 MMOL/L (ref 95–110)
CO2 SERPL-SCNC: 22 MMOL/L (ref 23–29)
CREAT SERPL-MCNC: 0.7 MG/DL (ref 0.5–1.4)
DIFFERENTIAL METHOD: ABNORMAL
EOSINOPHIL # BLD AUTO: 0.1 K/UL (ref 0–0.5)
EOSINOPHIL NFR BLD: 0.9 % (ref 0–8)
ERYTHROCYTE [DISTWIDTH] IN BLOOD BY AUTOMATED COUNT: 14.6 % (ref 11.5–14.5)
EST. GFR  (AFRICAN AMERICAN): >60 ML/MIN/1.73 M^2
EST. GFR  (NON AFRICAN AMERICAN): >60 ML/MIN/1.73 M^2
GLUCOSE SERPL-MCNC: 110 MG/DL (ref 70–110)
HCT VFR BLD AUTO: 42.6 % (ref 37–48.5)
HGB BLD-MCNC: 14.3 G/DL (ref 12–16)
IMM GRANULOCYTES # BLD AUTO: 0.03 K/UL (ref 0–0.04)
IMM GRANULOCYTES NFR BLD AUTO: 0.4 % (ref 0–0.5)
LYMPHOCYTES # BLD AUTO: 1.6 K/UL (ref 1–4.8)
LYMPHOCYTES NFR BLD: 20.6 % (ref 18–48)
MCH RBC QN AUTO: 30.8 PG (ref 27–31)
MCHC RBC AUTO-ENTMCNC: 33.6 G/DL (ref 32–36)
MCV RBC AUTO: 92 FL (ref 82–98)
MONOCYTES # BLD AUTO: 0.7 K/UL (ref 0.3–1)
MONOCYTES NFR BLD: 9.7 % (ref 4–15)
NEUTROPHILS # BLD AUTO: 5.2 K/UL (ref 1.8–7.7)
NEUTROPHILS NFR BLD: 67.6 % (ref 38–73)
NRBC BLD-RTO: 0 /100 WBC
PLATELET # BLD AUTO: 188 K/UL (ref 150–450)
PMV BLD AUTO: 10 FL (ref 9.2–12.9)
POTASSIUM SERPL-SCNC: 4 MMOL/L (ref 3.5–5.1)
PROT SERPL-MCNC: 7.2 G/DL (ref 6–8.4)
RBC # BLD AUTO: 4.65 M/UL (ref 4–5.4)
SODIUM SERPL-SCNC: 137 MMOL/L (ref 136–145)
TROPONIN I SERPL DL<=0.01 NG/ML-MCNC: <0.03 NG/ML
WBC # BLD AUTO: 7.63 K/UL (ref 3.9–12.7)

## 2021-12-24 PROCEDURE — 93010 EKG 12-LEAD: ICD-10-PCS | Mod: ,,, | Performed by: INTERNAL MEDICINE

## 2021-12-24 PROCEDURE — 84484 ASSAY OF TROPONIN QUANT: CPT | Performed by: EMERGENCY MEDICINE

## 2021-12-24 PROCEDURE — 80053 COMPREHEN METABOLIC PANEL: CPT | Performed by: EMERGENCY MEDICINE

## 2021-12-24 PROCEDURE — 96376 TX/PRO/DX INJ SAME DRUG ADON: CPT

## 2021-12-24 PROCEDURE — 93010 ELECTROCARDIOGRAM REPORT: CPT | Mod: ,,, | Performed by: INTERNAL MEDICINE

## 2021-12-24 PROCEDURE — 96374 THER/PROPH/DIAG INJ IV PUSH: CPT

## 2021-12-24 PROCEDURE — 93005 ELECTROCARDIOGRAM TRACING: CPT | Performed by: INTERNAL MEDICINE

## 2021-12-24 PROCEDURE — 25000003 PHARM REV CODE 250: Performed by: EMERGENCY MEDICINE

## 2021-12-24 PROCEDURE — 63600175 PHARM REV CODE 636 W HCPCS: Performed by: EMERGENCY MEDICINE

## 2021-12-24 PROCEDURE — 99284 EMERGENCY DEPT VISIT MOD MDM: CPT | Mod: 25

## 2021-12-24 PROCEDURE — 85025 COMPLETE CBC W/AUTO DIFF WBC: CPT | Performed by: EMERGENCY MEDICINE

## 2021-12-24 RX ORDER — HYDRALAZINE HYDROCHLORIDE 20 MG/ML
5 INJECTION INTRAMUSCULAR; INTRAVENOUS
Status: COMPLETED | OUTPATIENT
Start: 2021-12-24 | End: 2021-12-24

## 2021-12-24 RX ORDER — AMLODIPINE BESYLATE 2.5 MG/1
2.5 TABLET ORAL
Status: COMPLETED | OUTPATIENT
Start: 2021-12-24 | End: 2021-12-24

## 2021-12-24 RX ORDER — HYDRALAZINE HYDROCHLORIDE 20 MG/ML
10 INJECTION INTRAMUSCULAR; INTRAVENOUS
Status: COMPLETED | OUTPATIENT
Start: 2021-12-24 | End: 2021-12-24

## 2021-12-24 RX ADMIN — HYDRALAZINE HYDROCHLORIDE 10 MG: 20 INJECTION INTRAMUSCULAR; INTRAVENOUS at 03:12

## 2021-12-24 RX ADMIN — HYDRALAZINE HYDROCHLORIDE 5 MG: 20 INJECTION INTRAMUSCULAR; INTRAVENOUS at 03:12

## 2021-12-24 RX ADMIN — AMLODIPINE BESYLATE 2.5 MG: 2.5 TABLET ORAL at 03:12

## 2021-12-27 ENCOUNTER — CLINICAL SUPPORT (OUTPATIENT)
Dept: FAMILY MEDICINE | Facility: CLINIC | Age: 81
End: 2021-12-27
Payer: MEDICARE

## 2021-12-27 VITALS — DIASTOLIC BLOOD PRESSURE: 90 MMHG | SYSTOLIC BLOOD PRESSURE: 180 MMHG | HEART RATE: 64 BPM

## 2021-12-27 DIAGNOSIS — I10 ESSENTIAL HYPERTENSION: ICD-10-CM

## 2021-12-27 DIAGNOSIS — I10 HYPERTENSION, UNSPECIFIED TYPE: Primary | ICD-10-CM

## 2021-12-27 PROCEDURE — 99999 PR PBB SHADOW E&M-EST. PATIENT-LVL I: CPT | Mod: PBBFAC,HCNC,,

## 2021-12-27 PROCEDURE — 99999 PR PBB SHADOW E&M-EST. PATIENT-LVL I: ICD-10-PCS | Mod: PBBFAC,HCNC,,

## 2022-01-13 ENCOUNTER — OFFICE VISIT (OUTPATIENT)
Dept: FAMILY MEDICINE | Facility: CLINIC | Age: 82
End: 2022-01-13
Attending: FAMILY MEDICINE
Payer: MEDICARE

## 2022-01-13 VITALS
BODY MASS INDEX: 32.93 KG/M2 | WEIGHT: 185.88 LBS | HEIGHT: 63 IN | SYSTOLIC BLOOD PRESSURE: 132 MMHG | OXYGEN SATURATION: 95 % | HEART RATE: 71 BPM | TEMPERATURE: 99 F | DIASTOLIC BLOOD PRESSURE: 70 MMHG

## 2022-01-13 DIAGNOSIS — I10 ESSENTIAL HYPERTENSION: ICD-10-CM

## 2022-01-13 PROCEDURE — 1101F PR PT FALLS ASSESS DOC 0-1 FALLS W/OUT INJ PAST YR: ICD-10-PCS | Mod: HCNC,CPTII,S$GLB, | Performed by: FAMILY MEDICINE

## 2022-01-13 PROCEDURE — 99213 PR OFFICE/OUTPT VISIT, EST, LEVL III, 20-29 MIN: ICD-10-PCS | Mod: HCNC,S$GLB,, | Performed by: FAMILY MEDICINE

## 2022-01-13 PROCEDURE — 1160F PR REVIEW ALL MEDS BY PRESCRIBER/CLIN PHARMACIST DOCUMENTED: ICD-10-PCS | Mod: HCNC,CPTII,S$GLB, | Performed by: FAMILY MEDICINE

## 2022-01-13 PROCEDURE — 1126F AMNT PAIN NOTED NONE PRSNT: CPT | Mod: HCNC,CPTII,S$GLB, | Performed by: FAMILY MEDICINE

## 2022-01-13 PROCEDURE — 99999 PR PBB SHADOW E&M-EST. PATIENT-LVL III: ICD-10-PCS | Mod: PBBFAC,HCNC,, | Performed by: FAMILY MEDICINE

## 2022-01-13 PROCEDURE — 3288F FALL RISK ASSESSMENT DOCD: CPT | Mod: HCNC,CPTII,S$GLB, | Performed by: FAMILY MEDICINE

## 2022-01-13 PROCEDURE — 3078F DIAST BP <80 MM HG: CPT | Mod: HCNC,CPTII,S$GLB, | Performed by: FAMILY MEDICINE

## 2022-01-13 PROCEDURE — 3075F SYST BP GE 130 - 139MM HG: CPT | Mod: HCNC,CPTII,S$GLB, | Performed by: FAMILY MEDICINE

## 2022-01-13 PROCEDURE — 99499 UNLISTED E&M SERVICE: CPT | Mod: S$GLB,,, | Performed by: FAMILY MEDICINE

## 2022-01-13 PROCEDURE — 99213 OFFICE O/P EST LOW 20 MIN: CPT | Mod: HCNC,S$GLB,, | Performed by: FAMILY MEDICINE

## 2022-01-13 PROCEDURE — 3075F PR MOST RECENT SYSTOLIC BLOOD PRESS GE 130-139MM HG: ICD-10-PCS | Mod: HCNC,CPTII,S$GLB, | Performed by: FAMILY MEDICINE

## 2022-01-13 PROCEDURE — 3288F PR FALLS RISK ASSESSMENT DOCUMENTED: ICD-10-PCS | Mod: HCNC,CPTII,S$GLB, | Performed by: FAMILY MEDICINE

## 2022-01-13 PROCEDURE — 3078F PR MOST RECENT DIASTOLIC BLOOD PRESSURE < 80 MM HG: ICD-10-PCS | Mod: HCNC,CPTII,S$GLB, | Performed by: FAMILY MEDICINE

## 2022-01-13 PROCEDURE — 99999 PR PBB SHADOW E&M-EST. PATIENT-LVL III: CPT | Mod: PBBFAC,HCNC,, | Performed by: FAMILY MEDICINE

## 2022-01-13 PROCEDURE — 1101F PT FALLS ASSESS-DOCD LE1/YR: CPT | Mod: HCNC,CPTII,S$GLB, | Performed by: FAMILY MEDICINE

## 2022-01-13 PROCEDURE — 1160F RVW MEDS BY RX/DR IN RCRD: CPT | Mod: HCNC,CPTII,S$GLB, | Performed by: FAMILY MEDICINE

## 2022-01-13 PROCEDURE — 99499 RISK ADDL DX/OHS AUDIT: ICD-10-PCS | Mod: S$GLB,,, | Performed by: FAMILY MEDICINE

## 2022-01-13 PROCEDURE — 1126F PR PAIN SEVERITY QUANTIFIED, NO PAIN PRESENT: ICD-10-PCS | Mod: HCNC,CPTII,S$GLB, | Performed by: FAMILY MEDICINE

## 2022-01-13 PROCEDURE — 1159F PR MEDICATION LIST DOCUMENTED IN MEDICAL RECORD: ICD-10-PCS | Mod: HCNC,CPTII,S$GLB, | Performed by: FAMILY MEDICINE

## 2022-01-13 PROCEDURE — 1159F MED LIST DOCD IN RCRD: CPT | Mod: HCNC,CPTII,S$GLB, | Performed by: FAMILY MEDICINE

## 2022-01-13 RX ORDER — AMLODIPINE BESYLATE 5 MG/1
5 TABLET ORAL DAILY
Qty: 90 TABLET | Refills: 5 | Status: ON HOLD | OUTPATIENT
Start: 2022-01-13 | End: 2022-04-26 | Stop reason: SDUPTHER

## 2022-01-13 RX ORDER — AMLODIPINE BESYLATE 2.5 MG/1
5 TABLET ORAL DAILY
COMMUNITY
End: 2022-01-13 | Stop reason: DRUGHIGH

## 2022-01-13 NOTE — PROGRESS NOTES
Subjective:       Patient ID: Brandi Flynn is a 81 y.o. female.    Chief Complaint: Hypertension    81-year-old female coming in for a hospital follow-up from a visit to the emergency room December 24, 2021 at [facility].  She when in complaining of a left eye that was read and elevated blood pressure and admitted that she had missed her blood pressure medication that day.  She had no complaints of chest pain or shortness of breath no nausea or vomiting and no weakness or diaphoresis.  Blood pressure was 200/91 on presentation and shortly after which she peaked out with a systolic of 239. She was given IV hydralazine which brought her blood pressure down somewhat abruptly and then she had to be monitored for hypotension.  She was given p.o. amlodipine 2.5 mg to replace her medication she had missed and she was told hold the Plavix and the aspirin for five days.  The patient brings in a blood pressure log starting on Monday December 27th and going through today with most of her systolics in the 120s and 130s occasionally as high is a low 150 and rarely 160. She is taking two of the amlodipine 2.5 daily and no other antihypertensives.  She has no complaints of dizziness no complaints of headaches and her hyperemia of the left eye has resolved.  She has no weakness and no orthostatic dizziness.    Past Medical History:  1960: Blood transfusion  No date: GERD (gastroesophageal reflux disease)  No date: Hepatitis C      Comment:  treated six months by Dr. Gaspar with interferon  No date: History of hepatitis C  No date: Hyperlipidemia  No date: Hypertension  No date: Hypothyroidism  No date: Obesity  12/9/2015: Primary osteoarthritis of left knee  No date: Sleep apnea  No date: Stroke      Comment:  TIA 2012  No date: TIA (transient ischemic attack)  2018: Vision loss of right eye      Comment:  Dr Eldridge Right eye blood clot     Past Surgical History:  2015: ABDOMINAL SURGERY      Comment:  SBO from lap band  wraping around small bowel, lap                untwisted  No date: ADENOIDECTOMY  2014: BREAST BIOPSY      Comment:  needle biopsy   No date: CATARACT EXTRACTION, BILATERAL; Bilateral  No date:  SECTION  : CHOLECYSTECTOMY  2011: COLONOSCOPY      Comment:  Dr. Alvares, 5 year recheck  2014: EYE SURGERY      Comment:  bilateral cataract Dr Carrillo   10/2020: EYE SURGERY      Comment:  bottom lid  No date: HEMORRHOID SURGERY  : HYSTERECTOMY  : INCONTINENCE SURGERY      Comment:  sling  : LAPAROSCOPIC GASTRIC BANDING  No date: LAPAROSCOPIC NISSEN FUNDOPLICATION      Comment:  Dr. Rutherford  No date: TONSILLECTOMY    Current Outpatient Medications on File Prior to Visit:  aspirin (ECOTRIN) 81 MG EC tablet, Take 162 mg by mouth once daily. , Disp: , Rfl:   atorvastatin (LIPITOR) 40 MG tablet, Take 2 tablets (80 mg total) by mouth once daily., Disp: 180 tablet, Rfl: 1  clopidogreL (PLAVIX) 75 mg tablet, Take 1 tablet (75 mg total) by mouth once daily., Disp: 90 tablet, Rfl: 3  levothyroxine (SYNTHROID) 75 MCG tablet, Take 1 tablet (75 mcg total) by mouth once daily., Disp: 90 tablet, Rfl: 3  mag hydrox/aluminum hyd/simeth (MAALOX ORAL), Take 1 Dose by mouth as needed., Disp: , Rfl:   solifenacin (VESICARE) 5 MG tablet, Take 5 mg by mouth once daily., Disp: , Rfl:   VIT A/VIT C/VIT E/ZINC/COPPER (PRESERVISION AREDS ORAL), Take 1 capsule by mouth 2 (two) times daily., Disp: , Rfl:   (DISCONTINUED) amLODIPine (NORVASC) 2.5 MG tablet, Take 1 tablet (2.5 mg total) by mouth once daily. (Patient taking differently: Take 5 mg by mouth once daily.), Disp: 90 tablet, Rfl: 3  (DISCONTINUED) amLODIPine (NORVASC) 2.5 MG tablet, Take 5 mg by mouth once daily., Disp: , Rfl:   CALCIUM CARB/VIT D3/MINERALS (CALTRATE PLUS ORAL), Take 1 tablet by mouth once daily. , Disp: , Rfl:     No current facility-administered medications on file prior to visit.        Review of Systems   Constitutional: Negative for chills,  diaphoresis and fever.   Respiratory: Negative for cough, chest tightness and shortness of breath.    Cardiovascular: Negative for chest pain and palpitations.   Gastrointestinal: Negative for nausea and vomiting.   Neurological: Negative for dizziness, light-headedness and headaches.       Objective:      Physical Exam  Vitals and nursing note reviewed.   Cardiovascular:      Rate and Rhythm: Normal rate and regular rhythm.      Heart sounds: Normal heart sounds.   Pulmonary:      Effort: Pulmonary effort is normal.      Breath sounds: Normal breath sounds.   Neurological:      General: No focal deficit present.      Mental Status: She is oriented to person, place, and time. Mental status is at baseline.   Psychiatric:         Mood and Affect: Mood normal.         Behavior: Behavior normal.         Thought Content: Thought content normal.         Judgment: Judgment normal.         Assessment:       1. Essential hypertension        Plan:       1. Essential hypertension  Continue 5 mg amlodipine daily.  She may have an occasional elevation above 140 but she needs to avoid dropping the diastolic much below 60 or she could have an injury.  Maintain good hydration and be where processed foods that would be high in salt  - amLODIPine (NORVASC) 5 MG tablet; Take 1 tablet (5 mg total) by mouth once daily.  Dispense: 90 tablet; Refill: 5

## 2022-04-06 DIAGNOSIS — E78.5 HYPERLIPIDEMIA, UNSPECIFIED HYPERLIPIDEMIA TYPE: ICD-10-CM

## 2022-04-06 NOTE — TELEPHONE ENCOUNTER
No new care gaps identified.  Powered by [employer]. Reference number: [id number].   4/06/2022 10:41:38 AM CDT

## 2022-04-07 RX ORDER — ATORVASTATIN CALCIUM 40 MG/1
TABLET, FILM COATED ORAL
Qty: 180 TABLET | Refills: 1 | Status: SHIPPED | OUTPATIENT
Start: 2022-04-07 | End: 2022-12-28

## 2022-04-07 NOTE — TELEPHONE ENCOUNTER
Refill Authorization Note   Brandi Flynn  is requesting a refill authorization.  Brief Assessment and Rationale for Refill:  Approve          Medication Reconciliation Completed: No   Comments:     No Care Gaps recommended.     Note composed:2:14 PM 04/07/2022

## 2022-04-25 ENCOUNTER — HOSPITAL ENCOUNTER (INPATIENT)
Facility: HOSPITAL | Age: 82
LOS: 1 days | Discharge: HOME | DRG: 072 | End: 2022-04-26
Attending: EMERGENCY MEDICINE | Admitting: FAMILY MEDICINE
Payer: MEDICARE

## 2022-04-25 DIAGNOSIS — G45.9 TIA (TRANSIENT ISCHEMIC ATTACK): Primary | ICD-10-CM

## 2022-04-25 DIAGNOSIS — I63.9 STROKE: ICD-10-CM

## 2022-04-25 DIAGNOSIS — G93.40 ENCEPHALOPATHY ACUTE: ICD-10-CM

## 2022-04-25 DIAGNOSIS — I10 ESSENTIAL HYPERTENSION: ICD-10-CM

## 2022-04-25 PROBLEM — G45.4 TGA (TRANSIENT GLOBAL AMNESIA): Status: ACTIVE | Noted: 2022-04-25

## 2022-04-25 LAB
ALBUMIN SERPL BCP-MCNC: 4 G/DL (ref 3.5–5.2)
ALP SERPL-CCNC: 83 U/L (ref 55–135)
ALT SERPL W/O P-5'-P-CCNC: 15 U/L (ref 10–44)
AMPHET+METHAMPHET UR QL: NEGATIVE
ANION GAP SERPL CALC-SCNC: 11 MMOL/L (ref 8–16)
AST SERPL-CCNC: 23 U/L (ref 10–40)
BARBITURATES UR QL SCN>200 NG/ML: NEGATIVE
BASOPHILS # BLD AUTO: 0.05 K/UL (ref 0–0.2)
BASOPHILS NFR BLD: 0.8 % (ref 0–1.9)
BENZODIAZ UR QL SCN>200 NG/ML: NEGATIVE
BILIRUB SERPL-MCNC: 0.8 MG/DL (ref 0.1–1)
BILIRUB UR QL STRIP: NEGATIVE
BILIRUB UR QL STRIP: NEGATIVE
BNP SERPL-MCNC: 230 PG/ML (ref 0–99)
BUN SERPL-MCNC: 16 MG/DL (ref 8–23)
BZE UR QL SCN: NEGATIVE
CALCIUM SERPL-MCNC: 8.7 MG/DL (ref 8.7–10.5)
CANNABINOIDS UR QL SCN: NEGATIVE
CHLORIDE SERPL-SCNC: 104 MMOL/L (ref 95–110)
CHOLEST SERPL-MCNC: 115 MG/DL (ref 120–199)
CHOLEST/HDLC SERPL: 2.3 {RATIO} (ref 2–5)
CLARITY UR: CLEAR
CLARITY UR: CLEAR
CO2 SERPL-SCNC: 24 MMOL/L (ref 23–29)
COLOR UR: COLORLESS
COLOR UR: COLORLESS
CREAT SERPL-MCNC: 0.6 MG/DL (ref 0.5–1.4)
CREAT SERPL-MCNC: 0.7 MG/DL (ref 0.5–1.4)
CREAT UR-MCNC: 13 MG/DL (ref 15–325)
DIFFERENTIAL METHOD: ABNORMAL
EOSINOPHIL # BLD AUTO: 0.1 K/UL (ref 0–0.5)
EOSINOPHIL NFR BLD: 1.9 % (ref 0–8)
ERYTHROCYTE [DISTWIDTH] IN BLOOD BY AUTOMATED COUNT: 14.9 % (ref 11.5–14.5)
EST. GFR  (AFRICAN AMERICAN): >60 ML/MIN/1.73 M^2
EST. GFR  (NON AFRICAN AMERICAN): >60 ML/MIN/1.73 M^2
GLUCOSE SERPL-MCNC: 120 MG/DL (ref 70–110)
GLUCOSE SERPL-MCNC: 130 MG/DL (ref 70–110)
GLUCOSE UR QL STRIP: NEGATIVE
GLUCOSE UR QL STRIP: NEGATIVE
HCT VFR BLD AUTO: 43.5 % (ref 37–48.5)
HDLC SERPL-MCNC: 50 MG/DL (ref 40–75)
HDLC SERPL: 43.5 % (ref 20–50)
HGB BLD-MCNC: 13.9 G/DL (ref 12–16)
HGB UR QL STRIP: NEGATIVE
HGB UR QL STRIP: NEGATIVE
IMM GRANULOCYTES # BLD AUTO: 0.02 K/UL (ref 0–0.04)
IMM GRANULOCYTES NFR BLD AUTO: 0.3 % (ref 0–0.5)
INR PPP: 1.1
KETONES UR QL STRIP: NEGATIVE
KETONES UR QL STRIP: NEGATIVE
LDLC SERPL CALC-MCNC: 53.4 MG/DL (ref 63–159)
LEUKOCYTE ESTERASE UR QL STRIP: NEGATIVE
LEUKOCYTE ESTERASE UR QL STRIP: NEGATIVE
LYMPHOCYTES # BLD AUTO: 1.8 K/UL (ref 1–4.8)
LYMPHOCYTES NFR BLD: 29.3 % (ref 18–48)
MCH RBC QN AUTO: 30.3 PG (ref 27–31)
MCHC RBC AUTO-ENTMCNC: 32 G/DL (ref 32–36)
MCV RBC AUTO: 95 FL (ref 82–98)
MONOCYTES # BLD AUTO: 0.8 K/UL (ref 0.3–1)
MONOCYTES NFR BLD: 12.8 % (ref 4–15)
NEUTROPHILS # BLD AUTO: 3.4 K/UL (ref 1.8–7.7)
NEUTROPHILS NFR BLD: 54.9 % (ref 38–73)
NITRITE UR QL STRIP: NEGATIVE
NITRITE UR QL STRIP: NEGATIVE
NONHDLC SERPL-MCNC: 65 MG/DL
NRBC BLD-RTO: 0 /100 WBC
OPIATES UR QL SCN: NEGATIVE
PCP UR QL SCN>25 NG/ML: NEGATIVE
PH UR STRIP: 8 [PH] (ref 5–8)
PH UR STRIP: 8 [PH] (ref 5–8)
PLATELET # BLD AUTO: 192 K/UL (ref 150–450)
PMV BLD AUTO: 10 FL (ref 9.2–12.9)
POTASSIUM SERPL-SCNC: 3.5 MMOL/L (ref 3.5–5.1)
PROT SERPL-MCNC: 6.8 G/DL (ref 6–8.4)
PROT UR QL STRIP: NEGATIVE
PROT UR QL STRIP: NEGATIVE
PROTHROMBIN TIME: 13.1 SEC (ref 11.4–13.7)
RBC # BLD AUTO: 4.59 M/UL (ref 4–5.4)
SAMPLE: NORMAL
SARS-COV-2 RDRP RESP QL NAA+PROBE: NEGATIVE
SODIUM SERPL-SCNC: 139 MMOL/L (ref 136–145)
SP GR UR STRIP: 1.01 (ref 1–1.03)
SP GR UR STRIP: 1.01 (ref 1–1.03)
TOXICOLOGY INFORMATION: ABNORMAL
TRIGL SERPL-MCNC: 58 MG/DL (ref 30–150)
TROPONIN I SERPL DL<=0.01 NG/ML-MCNC: <0.03 NG/ML
TSH SERPL DL<=0.005 MIU/L-ACNC: 2.19 UIU/ML (ref 0.34–5.6)
URN SPEC COLLECT METH UR: ABNORMAL
URN SPEC COLLECT METH UR: ABNORMAL
UROBILINOGEN UR STRIP-ACNC: NEGATIVE EU/DL
UROBILINOGEN UR STRIP-ACNC: NEGATIVE EU/DL
WBC # BLD AUTO: 6.17 K/UL (ref 3.9–12.7)

## 2022-04-25 PROCEDURE — 93005 ELECTROCARDIOGRAM TRACING: CPT | Performed by: INTERNAL MEDICINE

## 2022-04-25 PROCEDURE — 81003 URINALYSIS AUTO W/O SCOPE: CPT | Mod: 59 | Performed by: EMERGENCY MEDICINE

## 2022-04-25 PROCEDURE — 85025 COMPLETE CBC W/AUTO DIFF WBC: CPT | Performed by: EMERGENCY MEDICINE

## 2022-04-25 PROCEDURE — G0425 PR INPT TELEHEALTH CONSULT 30M: ICD-10-PCS | Mod: 95,,, | Performed by: STUDENT IN AN ORGANIZED HEALTH CARE EDUCATION/TRAINING PROGRAM

## 2022-04-25 PROCEDURE — 80061 LIPID PANEL: CPT | Performed by: EMERGENCY MEDICINE

## 2022-04-25 PROCEDURE — 93010 ELECTROCARDIOGRAM REPORT: CPT | Mod: ,,, | Performed by: INTERNAL MEDICINE

## 2022-04-25 PROCEDURE — 83880 ASSAY OF NATRIURETIC PEPTIDE: CPT | Performed by: EMERGENCY MEDICINE

## 2022-04-25 PROCEDURE — U0002 COVID-19 LAB TEST NON-CDC: HCPCS | Performed by: EMERGENCY MEDICINE

## 2022-04-25 PROCEDURE — 82962 GLUCOSE BLOOD TEST: CPT

## 2022-04-25 PROCEDURE — 85610 PROTHROMBIN TIME: CPT | Performed by: EMERGENCY MEDICINE

## 2022-04-25 PROCEDURE — 80053 COMPREHEN METABOLIC PANEL: CPT | Performed by: EMERGENCY MEDICINE

## 2022-04-25 PROCEDURE — 21400001 HC TELEMETRY ROOM

## 2022-04-25 PROCEDURE — 99285 EMERGENCY DEPT VISIT HI MDM: CPT | Mod: 25

## 2022-04-25 PROCEDURE — 80307 DRUG TEST PRSMV CHEM ANLYZR: CPT | Performed by: NURSE PRACTITIONER

## 2022-04-25 PROCEDURE — 25500020 PHARM REV CODE 255: Performed by: EMERGENCY MEDICINE

## 2022-04-25 PROCEDURE — 93010 EKG 12-LEAD: ICD-10-PCS | Mod: ,,, | Performed by: INTERNAL MEDICINE

## 2022-04-25 PROCEDURE — 84484 ASSAY OF TROPONIN QUANT: CPT | Performed by: EMERGENCY MEDICINE

## 2022-04-25 PROCEDURE — 84443 ASSAY THYROID STIM HORMONE: CPT | Performed by: EMERGENCY MEDICINE

## 2022-04-25 PROCEDURE — G0425 INPT/ED TELECONSULT30: HCPCS | Mod: 95,,, | Performed by: STUDENT IN AN ORGANIZED HEALTH CARE EDUCATION/TRAINING PROGRAM

## 2022-04-25 RX ORDER — ASPIRIN 81 MG/1
81 TABLET ORAL DAILY
Status: DISCONTINUED | OUTPATIENT
Start: 2022-04-26 | End: 2022-04-26

## 2022-04-25 RX ORDER — ATORVASTATIN CALCIUM 20 MG/1
40 TABLET, FILM COATED ORAL NIGHTLY
Status: DISCONTINUED | OUTPATIENT
Start: 2022-04-25 | End: 2022-04-26 | Stop reason: HOSPADM

## 2022-04-25 RX ORDER — LEVOTHYROXINE SODIUM 25 UG/1
75 TABLET ORAL DAILY
Status: DISCONTINUED | OUTPATIENT
Start: 2022-04-26 | End: 2022-04-26 | Stop reason: HOSPADM

## 2022-04-25 RX ORDER — OXYBUTYNIN CHLORIDE 5 MG/1
5 TABLET, EXTENDED RELEASE ORAL DAILY
Status: DISCONTINUED | OUTPATIENT
Start: 2022-04-26 | End: 2022-04-26 | Stop reason: HOSPADM

## 2022-04-25 RX ORDER — CLOPIDOGREL BISULFATE 75 MG/1
75 TABLET ORAL DAILY
Status: DISCONTINUED | OUTPATIENT
Start: 2022-04-26 | End: 2022-04-26 | Stop reason: HOSPADM

## 2022-04-25 RX ORDER — LABETALOL HYDROCHLORIDE 5 MG/ML
10 INJECTION, SOLUTION INTRAVENOUS
Status: DISCONTINUED | OUTPATIENT
Start: 2022-04-25 | End: 2022-04-26 | Stop reason: HOSPADM

## 2022-04-25 RX ORDER — ATORVASTATIN CALCIUM 40 MG/1
40 TABLET, FILM COATED ORAL DAILY
Status: DISCONTINUED | OUTPATIENT
Start: 2022-04-26 | End: 2022-04-26

## 2022-04-25 RX ORDER — ONDANSETRON 4 MG/1
8 TABLET, ORALLY DISINTEGRATING ORAL EVERY 8 HOURS PRN
Status: DISCONTINUED | OUTPATIENT
Start: 2022-04-25 | End: 2022-04-26 | Stop reason: HOSPADM

## 2022-04-25 RX ORDER — ONDANSETRON 2 MG/ML
4 INJECTION INTRAMUSCULAR; INTRAVENOUS EVERY 8 HOURS PRN
Status: DISCONTINUED | OUTPATIENT
Start: 2022-04-25 | End: 2022-04-26 | Stop reason: HOSPADM

## 2022-04-25 RX ORDER — ASPIRIN 81 MG/1
81 TABLET ORAL DAILY
Status: DISCONTINUED | OUTPATIENT
Start: 2022-04-26 | End: 2022-04-26 | Stop reason: HOSPADM

## 2022-04-25 RX ORDER — SODIUM CHLORIDE 0.9 % (FLUSH) 0.9 %
10 SYRINGE (ML) INJECTION
Status: DISCONTINUED | OUTPATIENT
Start: 2022-04-25 | End: 2022-04-26 | Stop reason: HOSPADM

## 2022-04-25 RX ORDER — BISACODYL 10 MG
10 SUPPOSITORY, RECTAL RECTAL DAILY PRN
Status: DISCONTINUED | OUTPATIENT
Start: 2022-04-25 | End: 2022-04-26 | Stop reason: HOSPADM

## 2022-04-25 RX ADMIN — IOHEXOL 70 ML: 350 INJECTION, SOLUTION INTRAVENOUS at 06:04

## 2022-04-25 NOTE — Clinical Note
Diagnosis: Stroke [[id number]]   Admitting Provider:: YASMEEN ROJAS [[id number]]   Future Attending Provider: YASMEEN ROJAS [[id number]]   Reason for IP Medical Treatment  (Clinical interventions that can only be accomplished in the IP setting? ) :: oscar   Estimated Length of Stay:: 2 midnights   I certify that Inpatient services for greater than or equal to 2 midnights are medically necessary:: Yes   Plans for Post-Acute care--if anticipated (pick the single best option):: A. No post acute care anticipated at this time

## 2022-04-25 NOTE — ED PROVIDER NOTES
Encounter Date: 2022       History     Chief Complaint   Patient presents with    Altered Mental Status     States became confused today at 4pm , better now     82-year-old female presents with acute onset confusion.  Family reports that patient was in her normal state of health until approximately 430pm patient became acutely confused and could not remember the day of the week or information that she should know.  This concerned them greatly and they have brought patient to the emergency department for evaluation.  Patient is returning from a recent trip from [place] but returned 2 days ago and has been in her normal state of health.  Patient noted to be confused patient confusion has been improving according to family they report that she is much more coherent than she was earlier.        Review of patient's allergies indicates:   Allergen Reactions    Ace inhibitors Other (See Comments)     cough    Morphine Itching    Keflex [cephalexin] Itching and Rash     Past Medical History:   Diagnosis Date    Blood transfusion     GERD (gastroesophageal reflux disease)     Hepatitis C     treated six months by Dr. Gaspar with interferon    History of hepatitis C     Hyperlipidemia     Hypertension     Hypothyroidism     Obesity     Primary osteoarthritis of left knee 2015    Sleep apnea     Stroke     TIA     TIA (transient ischemic attack)     Vision loss of right eye     Dr Eldridge Right eye blood clot      Past Surgical History:   Procedure Laterality Date    ABDOMINAL SURGERY      SBO from lap band wraping around small bowel, lap untwisted    ADENOIDECTOMY      BREAST BIOPSY  2014    needle biopsy     CATARACT EXTRACTION, BILATERAL Bilateral      SECTION      CHOLECYSTECTOMY  2001    COLONOSCOPY  2011    Dr. Alvares, 5 year recheck    EYE SURGERY  2014    bilateral cataract Dr Carrillo     EYE SURGERY  10/2020    bottom lid    HEMORRHOID SURGERY       HYSTERECTOMY  1989    INCONTINENCE SURGERY  2008    sling    LAPAROSCOPIC GASTRIC BANDING  2004    LAPAROSCOPIC NISSEN FUNDOPLICATION      Dr. Rutherford    TONSILLECTOMY       Family History   Problem Relation Age of Onset    Diabetes Mother     Arthritis Mother         RA    Heart disease Mother         MI at 79     Diabetes Sister     Diverticulitis Sister     No Known Problems Daughter     Emphysema Maternal Aunt     Heart disease Maternal Aunt     Cancer Maternal Uncle     Early death Maternal Grandfather 47    Heart disease Maternal Grandfather     Arthritis Paternal Grandmother     Heart disease Paternal Grandfather     Cancer Paternal Grandfather         lung heavy smoker     No Known Problems Father     No Known Problems Son     No Known Problems Paternal Uncle     No Known Problems Maternal Grandmother     No Known Problems Brother     Arthritis Daughter      Social History     Tobacco Use    Smoking status: Former Smoker     Packs/day: 0.50     Years: 5.00     Pack years: 2.50    Smokeless tobacco: Never Used   Substance Use Topics    Alcohol use: No    Drug use: No     Review of Systems   Constitutional: Negative for fever.   HENT: Negative for congestion, rhinorrhea, sore throat and trouble swallowing.    Eyes: Negative for visual disturbance.   Respiratory: Negative for cough, chest tightness, shortness of breath and wheezing.    Cardiovascular: Negative for chest pain, palpitations and leg swelling.   Gastrointestinal: Negative for abdominal distention, abdominal pain, constipation, diarrhea, nausea and vomiting.   Genitourinary: Negative for difficulty urinating, dysuria, flank pain and frequency.   Musculoskeletal: Negative for arthralgias, back pain, joint swelling and neck pain.   Skin: Negative for color change and rash.   Neurological: Negative for dizziness, syncope, speech difficulty, weakness, numbness and headaches.   Psychiatric/Behavioral: Positive for confusion.    All other systems reviewed and are negative.      Physical Exam     Initial Vitals   BP Pulse Resp Temp SpO2   04/25/22 1821 04/25/22 1821 04/25/22 1821 04/25/22 1824 04/25/22 1821   (!) 209/91 74 20 97.8 °F (36.6 °C) 97 %      MAP       --                Physical Exam    Nursing note and vitals reviewed.  Constitutional: She appears well-developed and well-nourished. She is not diaphoretic. No distress.   HENT:   Head: Normocephalic and atraumatic.   Right Ear: External ear normal.   Left Ear: External ear normal.   Nose: Nose normal.   Mouth/Throat: Oropharynx is clear and moist. No oropharyngeal exudate.   Eyes: Conjunctivae and EOM are normal. Pupils are equal, round, and reactive to light. Right eye exhibits no discharge. Left eye exhibits no discharge. No scleral icterus.   Neck: Neck supple. No thyromegaly present. No tracheal deviation present. No JVD present.   Normal range of motion.  Cardiovascular: Normal rate, regular rhythm, normal heart sounds and intact distal pulses. Exam reveals no gallop and no friction rub.    No murmur heard.  Pulmonary/Chest: Breath sounds normal. No stridor. No respiratory distress. She has no wheezes. She has no rhonchi. She has no rales. She exhibits no tenderness.   Abdominal: Abdomen is soft. Bowel sounds are normal. She exhibits no distension and no mass. There is no abdominal tenderness. There is no rebound and no guarding.   Musculoskeletal:         General: No tenderness or edema. Normal range of motion.      Cervical back: Normal range of motion and neck supple.     Lymphadenopathy:     She has no cervical adenopathy.   Neurological: She is alert and oriented to person, place, and time. She has normal strength. No cranial nerve deficit or sensory deficit.   No pronator drift, no dysmetria, no dyskinesia, patient can march in place with eyes closed, gait stable, strength 5/5 x 4, no gross neurosensory deficits, CN II-XII grossly intact.     Skin: Skin is warm and  dry. No rash and no abscess noted. No erythema. No pallor.         ED Course   Procedures  Labs Reviewed   CBC W/ AUTO DIFFERENTIAL   COMPREHENSIVE METABOLIC PANEL   PROTIME-INR   TSH   LIPID PANEL   B-TYPE NATRIURETIC PEPTIDE   TROPONIN I   POCT GLUCOSE MONITORING CONTINUOUS   POCT CREATININE          Imaging Results    None          Medications - No data to display  Medical Decision Making:   History:   Old Medical Records: I decided to obtain old medical records.  Initial Assessment:   Emergent evaluation of an 82-year-old female presenting with altered mental status patient stroke activated will monitor patient closely and consult neurologic telemedicine services for further evaluation and management patient has improved markedly according to family and has a normal neurologic exam except for mild confusion patient alert and oriented x3, I do not suspect patient is a tPA candidate at this time given rapid improvement.            Attending Attestation:             Attending ED Notes:   Patient seen and evaluated by stroke telemedicine services they do not feel that patient is a tPA candidate at this time patient advised admission to hospital with Neurology to follow, I spoke to our neurology services they are in agreement with this assessment patient will be admitted to Internal Medicine and Neurology will follow               Clinical Impression:   Final diagnoses:  [I63.9] Stroke                 Kaleb Sevilla MD  04/25/22 1924

## 2022-04-26 ENCOUNTER — CLINICAL SUPPORT (OUTPATIENT)
Dept: CARDIOLOGY | Facility: HOSPITAL | Age: 82
DRG: 072 | End: 2022-04-26
Attending: EMERGENCY MEDICINE
Payer: MEDICARE

## 2022-04-26 VITALS — WEIGHT: 187 LBS | BODY MASS INDEX: 33.13 KG/M2 | HEIGHT: 63 IN

## 2022-04-26 VITALS
OXYGEN SATURATION: 96 % | SYSTOLIC BLOOD PRESSURE: 136 MMHG | HEART RATE: 67 BPM | WEIGHT: 187.38 LBS | DIASTOLIC BLOOD PRESSURE: 65 MMHG | TEMPERATURE: 98 F | RESPIRATION RATE: 18 BRPM | HEIGHT: 63 IN | BODY MASS INDEX: 33.2 KG/M2

## 2022-04-26 PROBLEM — G45.4 TGA (TRANSIENT GLOBAL AMNESIA): Status: RESOLVED | Noted: 2022-04-25 | Resolved: 2022-04-26

## 2022-04-26 PROBLEM — G93.40 ACUTE ENCEPHALOPATHY: Status: RESOLVED | Noted: 2022-04-25 | Resolved: 2022-04-26

## 2022-04-26 LAB
ALBUMIN SERPL BCP-MCNC: 3.7 G/DL (ref 3.5–5.2)
ALP SERPL-CCNC: 75 U/L (ref 55–135)
ALT SERPL W/O P-5'-P-CCNC: 13 U/L (ref 10–44)
ANION GAP SERPL CALC-SCNC: 9 MMOL/L (ref 8–16)
AST SERPL-CCNC: 21 U/L (ref 10–40)
BASOPHILS # BLD AUTO: 0.06 K/UL (ref 0–0.2)
BASOPHILS NFR BLD: 1.1 % (ref 0–1.9)
BILIRUB SERPL-MCNC: 1.1 MG/DL (ref 0.1–1)
BUN SERPL-MCNC: 12 MG/DL (ref 8–23)
CALCIUM SERPL-MCNC: 8.9 MG/DL (ref 8.7–10.5)
CHLORIDE SERPL-SCNC: 106 MMOL/L (ref 95–110)
CO2 SERPL-SCNC: 27 MMOL/L (ref 23–29)
CREAT SERPL-MCNC: 0.7 MG/DL (ref 0.5–1.4)
DIFFERENTIAL METHOD: ABNORMAL
EOSINOPHIL # BLD AUTO: 0.1 K/UL (ref 0–0.5)
EOSINOPHIL NFR BLD: 2.3 % (ref 0–8)
ERYTHROCYTE [DISTWIDTH] IN BLOOD BY AUTOMATED COUNT: 14.7 % (ref 11.5–14.5)
EST. GFR  (AFRICAN AMERICAN): >60 ML/MIN/1.73 M^2
EST. GFR  (NON AFRICAN AMERICAN): >60 ML/MIN/1.73 M^2
ESTIMATED AVG GLUCOSE: 114 MG/DL (ref 68–131)
GLUCOSE SERPL-MCNC: 103 MG/DL (ref 70–110)
HBA1C MFR BLD: 5.6 % (ref 4.5–6.2)
HCT VFR BLD AUTO: 40.1 % (ref 37–48.5)
HGB BLD-MCNC: 13.2 G/DL (ref 12–16)
IMM GRANULOCYTES # BLD AUTO: 0.02 K/UL (ref 0–0.04)
IMM GRANULOCYTES NFR BLD AUTO: 0.4 % (ref 0–0.5)
LYMPHOCYTES # BLD AUTO: 1.3 K/UL (ref 1–4.8)
LYMPHOCYTES NFR BLD: 24.9 % (ref 18–48)
MAGNESIUM SERPL-MCNC: 2 MG/DL (ref 1.6–2.6)
MCH RBC QN AUTO: 30.1 PG (ref 27–31)
MCHC RBC AUTO-ENTMCNC: 32.9 G/DL (ref 32–36)
MCV RBC AUTO: 92 FL (ref 82–98)
MONOCYTES # BLD AUTO: 0.8 K/UL (ref 0.3–1)
MONOCYTES NFR BLD: 14.7 % (ref 4–15)
NEUTROPHILS # BLD AUTO: 3 K/UL (ref 1.8–7.7)
NEUTROPHILS NFR BLD: 56.6 % (ref 38–73)
NRBC BLD-RTO: 0 /100 WBC
PHOSPHATE SERPL-MCNC: 4 MG/DL (ref 2.7–4.5)
PLATELET # BLD AUTO: 174 K/UL (ref 150–450)
PMV BLD AUTO: 9.8 FL (ref 9.2–12.9)
POTASSIUM SERPL-SCNC: 3.4 MMOL/L (ref 3.5–5.1)
PROT SERPL-MCNC: 6.3 G/DL (ref 6–8.4)
RBC # BLD AUTO: 4.38 M/UL (ref 4–5.4)
SODIUM SERPL-SCNC: 142 MMOL/L (ref 136–145)
WBC # BLD AUTO: 5.3 K/UL (ref 3.9–12.7)

## 2022-04-26 PROCEDURE — 83036 HEMOGLOBIN GLYCOSYLATED A1C: CPT | Performed by: NURSE PRACTITIONER

## 2022-04-26 PROCEDURE — 93306 TTE W/DOPPLER COMPLETE: CPT | Mod: 26,,, | Performed by: INTERNAL MEDICINE

## 2022-04-26 PROCEDURE — 84100 ASSAY OF PHOSPHORUS: CPT | Performed by: NURSE PRACTITIONER

## 2022-04-26 PROCEDURE — 94799 UNLISTED PULMONARY SVC/PX: CPT

## 2022-04-26 PROCEDURE — 25000003 PHARM REV CODE 250: Performed by: NURSE PRACTITIONER

## 2022-04-26 PROCEDURE — 99900035 HC TECH TIME PER 15 MIN (STAT)

## 2022-04-26 PROCEDURE — 99900031 HC PATIENT EDUCATION (STAT)

## 2022-04-26 PROCEDURE — 92610 EVALUATE SWALLOWING FUNCTION: CPT

## 2022-04-26 PROCEDURE — 97165 OT EVAL LOW COMPLEX 30 MIN: CPT

## 2022-04-26 PROCEDURE — 80053 COMPREHEN METABOLIC PANEL: CPT | Performed by: NURSE PRACTITIONER

## 2022-04-26 PROCEDURE — 93306 ECHO (CUPID ONLY): ICD-10-PCS | Mod: 26,,, | Performed by: INTERNAL MEDICINE

## 2022-04-26 PROCEDURE — 85025 COMPLETE CBC W/AUTO DIFF WBC: CPT | Performed by: NURSE PRACTITIONER

## 2022-04-26 PROCEDURE — 93306 TTE W/DOPPLER COMPLETE: CPT

## 2022-04-26 PROCEDURE — 97116 GAIT TRAINING THERAPY: CPT

## 2022-04-26 PROCEDURE — 97161 PT EVAL LOW COMPLEX 20 MIN: CPT

## 2022-04-26 PROCEDURE — 97535 SELF CARE MNGMENT TRAINING: CPT

## 2022-04-26 PROCEDURE — 94761 N-INVAS EAR/PLS OXIMETRY MLT: CPT

## 2022-04-26 PROCEDURE — 36415 COLL VENOUS BLD VENIPUNCTURE: CPT | Performed by: NURSE PRACTITIONER

## 2022-04-26 PROCEDURE — 83735 ASSAY OF MAGNESIUM: CPT | Performed by: NURSE PRACTITIONER

## 2022-04-26 PROCEDURE — 92523 SPEECH SOUND LANG COMPREHEN: CPT

## 2022-04-26 RX ORDER — AMLODIPINE BESYLATE 5 MG/1
10 TABLET ORAL DAILY
Qty: 90 TABLET | Refills: 0
Start: 2022-04-26 | End: 2022-04-26 | Stop reason: SDUPTHER

## 2022-04-26 RX ORDER — AMLODIPINE BESYLATE 5 MG/1
10 TABLET ORAL DAILY
Qty: 90 TABLET | Refills: 0 | Status: SHIPPED | OUTPATIENT
Start: 2022-04-26 | End: 2022-10-14 | Stop reason: SDUPTHER

## 2022-04-26 RX ADMIN — CLOPIDOGREL BISULFATE 75 MG: 75 TABLET, FILM COATED ORAL at 08:04

## 2022-04-26 RX ADMIN — ATORVASTATIN CALCIUM 40 MG: 20 TABLET, FILM COATED ORAL at 12:04

## 2022-04-26 RX ADMIN — ASPIRIN 81 MG: 81 TABLET, DELAYED RELEASE ORAL at 08:04

## 2022-04-26 RX ADMIN — OXYBUTYNIN CHLORIDE 5 MG: 5 TABLET, EXTENDED RELEASE ORAL at 08:04

## 2022-04-26 RX ADMIN — LEVOTHYROXINE SODIUM 75 MCG: 25 TABLET ORAL at 06:04

## 2022-04-26 NOTE — PT/OT/SLP EVAL
Speech Language Pathology Evaluation  Cognitive/Bedside Swallow    Patient Name:  Brandi Flynn   MRN:  [id number]  Admitting Diagnosis: Acute encephalopathy    Recommendations:                  General Recommendations:  Follow-up not indicated  Diet recommendations:  Regular, Thin   Aspiration Precautions: Standard aspiration precautions   General Precautions: Standard,    Communication strategies:  none    History:     Past Medical History:   Diagnosis Date    Blood transfusion     GERD (gastroesophageal reflux disease)     Hepatitis C     treated six months by Dr. Gaspar with interferon    History of hepatitis C     Hyperlipidemia     Hypertension     Hypothyroidism     Obesity     Primary osteoarthritis of left knee 2015    Sleep apnea     Stroke     TIA     TIA (transient ischemic attack)     Vision loss of right eye     Dr Eldridge Right eye blood clot        Past Surgical History:   Procedure Laterality Date    ABDOMINAL SURGERY      SBO from lap band wraping around small bowel, lap untwisted    ADENOIDECTOMY      BREAST BIOPSY  2014    needle biopsy     CATARACT EXTRACTION, BILATERAL Bilateral      SECTION      CHOLECYSTECTOMY      COLONOSCOPY  2011    Dr. Alvares, 5 year recheck    EYE SURGERY      bilateral cataract Dr Carrillo     EYE SURGERY  10/2020    bottom lid    HEMORRHOID SURGERY      HYSTERECTOMY  1989    INCONTINENCE SURGERY      sling    LAPAROSCOPIC GASTRIC BANDING      LAPAROSCOPIC NISSEN FUNDOPLICATION      Dr. Rutherford    TONSILLECTOMY         Social History: Patient lives with spouse.    Prior Intubation HX:  None this admmission    Modified Barium Swallow: none in Epic    Chest X-Rays: Mild scarring or atelectasis at the right lung base    CT Head - There is no evidence of intracranial mass, hemorrhage, or midline shift. Ventricles and sulci are within normal limits. There are no pathologic extra-axial fluid collections.   There is no evidence of cortical ischemic change. Changes of mild chronic microvascular white matter disease are noted. There is a small chronic lacunar infarct at the right caudate nucleus. The cerebellum and brainstem are unremarkable.    MRI Brain    - No acute findings in the head/brain.    Prior diet: regular textures & liquids.    Occupation/hobbies/homemaking: indep.    Subjective     I don't feel there is anything wrong.  Patient goals: Am I doing all right       Objective:     Cognitive Status:  Alert, cooperative, oriented x4. Intact immediate, delayed and long term memory.  No apparent attention deficits.  Intact problem solving/ time/money calculation skills.  Edward Cognitive Assessment (MoCA) score (adjusted for education level) - 28 out of 30  indicating intact cognitive-linguistic status      Receptive Language/Comprehension: intact to moderately complex information    Pragmatics:  intact    Expressive Language: mild divergent naming deficit      Motor Speech: no deficits    Voice: WNL quality and volume    Visual-Spatial: reported right eye vision loss.  No apparent field cuts or neglect.      Reading: WNL comprehension for complex paragraph     Written Expression: WFL    Oral Musculature Evaluation  · Oral Musculature: WNL  · Dentition: present and adequate  · Secretion Management: adequate  · Mucosal Quality: good  · Mandibular Strength and Mobility: WNL  · Oral Labial Strength and Mobility: WNL  · Lingual Strength and Mobility: WNL  · Velar Elevation: WNL  · Buccal Strength and Mobility: WNL  · Voice Prior to PO Intake: clear, no dysarthria    Bedside Swallow Eval:   Consistencies Assessed:  · Thin liquids via cup sip & 3 oz water challenge  · Puree ]via spoon  · Solids nomi crrackers     Oral Phase:   · WNL    Pharyngeal Phase:   · no overt clinical signs/symptoms of aspiration  · no overt clinical signs/symptoms of pharyngeal dysphagia    Compensatory Strategies  · None    Treatment:  Education to pt & family re impressions & recommendations.    Assessment:     Brandi Flynn is a 82 y.o. female with an SLP diagnosis of no significant speech, language or cognitive deficits.  .  She displayed no dysphagia.  Recommend continue regular textures & liquids.  N tx.    Goals:   Multidisciplinary Problems     SLP Goals     Not on file                Plan:     · Patient to be seen:      · Plan of Care expires:     · Plan of Care reviewed with:  patient, family   · SLP Follow-Up:  No       Discharge recommendations:      Barriers to Discharge:  None    Time Tracking:     SLP Treatment Date:   04/26/22  Speech Start Time:  1548  Speech Stop Time:  1619     Speech Total Time (min):  31 min    Billable Minutes: Eval 27  and Eval Swallow and Oral Function 4    04/26/2022

## 2022-04-26 NOTE — ASSESSMENT & PLAN NOTE
Etiology unclear.  Concern for TIA/stroke  History of TIA  CTA head neck showed focal atherosclerotic calcification at the M1 segment of the left middle cerebral artery, probable hemodynamically significant stenosis.  The ED physician had discussed case with Neurology on-call.  She recommended admission and stroke workup.  Stroke protocol  Neuro checks q 4 hours  Pulse oximetry q.4 hours  Cardiac monitor for arrhythmia  Aspiration precautions  Fall precautions  NPO til passing nursing swallow assessment  Permissive hypertension  Labetalol  IV p.r.n. for systolic blood pressure above 220 or diastolic blood pressure above 100  Continue aspirin, atorvastatin, clopidogrel  Labs:  CMP, magnesium, CBC daily  Consult and treat:  PT, OT, ST, dietitian, case management  Imaging: MRI brain, echocardiogram with bubble study, EEG  MRA brain to evaluate left middle cerebral artery.

## 2022-04-26 NOTE — H&P
Count includes the [facility] - Emergency [facility]  [facility]  History & Physical  Face to face encounter: 4/25/2022    Patient Name: Brandi Flynn  MRN: [id number]  Patient Class: IP- Inpatient  Admission Date: 4/25/2022  Attending Physician: Izabella dyson MD  Primary Care Provider: Marcial Kan MD         Patient information was obtained from patient, spouse/SO, relative(s), past medical records and ER records.     Subjective:     Principal Problem:Acute encephalopathy    Chief Complaint:   Chief Complaint   Patient presents with    Altered Mental Status     States became confused today at 4pm , better now        HPI: 82-year-old female with history of hypertension, hyperlipidemia, GERD, sleep apnea  Presented to the emergency department with sudden onset of confusion around 430 PM today.  Was in her usual state of health earlier. Per her family, she was confused and did not remembering about things that they previously discussed.  No focal deficits noted.  Family reports that she is now back to normal.  Denies recent illness, fever, chills, cough, shortness of breath, chest pain, abdominal pain, nausea, vomiting, diarrhea, urinary symptoms.    Laboratory studies were unremarkable.  EKG:  Sinus rhythm, left axis deviation, left bundle branch block (old)  CT head negative.  CTA head neck:  1. Focal atherosclerotic calcification at the M1 segment of the left middle cerebral artery. While difficult to quantify because of vessel size, there is probable hemodynamically significant stenosis at this level. 2. Atherosclerotic calcification of the extracranial carotid systems as discussed, without high-grade stenosis. 3. Atherosclerotic calcification and stenosis of less than 50% involving the intracranial ICA on the right.       Past Medical History:   Diagnosis Date    Blood transfusion 1960    GERD (gastroesophageal reflux disease)     Hepatitis C     treated six months by Dr. Gaspar with interferon    History of  hepatitis C     Hyperlipidemia     Hypertension     Hypothyroidism     Obesity     Primary osteoarthritis of left knee 2015    Sleep apnea     Stroke     TIA     TIA (transient ischemic attack)     Vision loss of right eye     Dr Eldridge Right eye blood clot        Past Surgical History:   Procedure Laterality Date    ABDOMINAL SURGERY      SBO from lap band wraping around small bowel, lap untwisted    ADENOIDECTOMY      BREAST BIOPSY  2014    needle biopsy     CATARACT EXTRACTION, BILATERAL Bilateral      SECTION      CHOLECYSTECTOMY  2001    COLONOSCOPY  2011    Dr. Alvares, 5 year recheck    EYE SURGERY      bilateral cataract Dr Carrillo     EYE SURGERY  10/2020    bottom lid    HEMORRHOID SURGERY      HYSTERECTOMY  1989    INCONTINENCE SURGERY  2008    sling    LAPAROSCOPIC GASTRIC BANDING      LAPAROSCOPIC NISSEN FUNDOPLICATION      Dr. Rutherford    TONSILLECTOMY         Review of patient's allergies indicates:   Allergen Reactions    Ace inhibitors Other (See Comments)     cough    Morphine Itching    Keflex [cephalexin] Itching and Rash       No current facility-administered medications on file prior to encounter.     Current Outpatient Medications on File Prior to Encounter   Medication Sig    amLODIPine (NORVASC) 5 MG tablet Take 1 tablet (5 mg total) by mouth once daily.    aspirin (ECOTRIN) 81 MG EC tablet Take 81 mg by mouth once daily.    atorvastatin (LIPITOR) 40 MG tablet TAKE 2 TABLETS BY MOUTH ONCE A DAY (Patient taking differently: Take 40 mg by mouth every evening.)    clopidogreL (PLAVIX) 75 mg tablet Take 1 tablet (75 mg total) by mouth once daily.    levothyroxine (SYNTHROID) 75 MCG tablet Take 1 tablet (75 mcg total) by mouth once daily.    solifenacin (VESICARE) 5 MG tablet Take 5 mg by mouth once daily.    VIT A/VIT C/VIT E/ZINC/COPPER (PRESERVISION AREDS ORAL) Take 1 capsule by mouth 2 (two) times daily.    mag  hydrox/aluminum hyd/simeth (MAALOX ORAL) Take 1 Dose by mouth as needed.     Family History       Problem Relation (Age of Onset)    Arthritis Mother, Paternal Grandmother, Daughter    Cancer Maternal Uncle, Paternal Grandfather    Diabetes Mother, Sister    Diverticulitis Sister    Early death Maternal Grandfather (47)    Emphysema Maternal Aunt    Heart disease Mother, Maternal Aunt, Maternal Grandfather, Paternal Grandfather    No Known Problems Daughter, Father, Son, Paternal Uncle, Maternal Grandmother, Brother          Tobacco Use    Smoking status: Former Smoker     Packs/day: 0.50     Years: 5.00     Pack years: 2.50    Smokeless tobacco: Never Used   Substance and Sexual Activity    Alcohol use: No    Drug use: No    Sexual activity: Yes     Partners: Male     Review of Systems   All other systems reviewed and are negative.  Objective:     Vital Signs (Most Recent):  Temp: 97.8 °F (36.6 °C) (04/25/22 1824)  Pulse: 65 (04/25/22 2130)  Resp: 15 (04/25/22 2130)  BP: (!) 195/86 (04/25/22 2130)  SpO2: 98 % (04/25/22 2130)   Vital Signs (24h Range):  Temp:  [97.8 °F (36.6 °C)] 97.8 °F (36.6 °C)  Pulse:  [65-76] 65  Resp:  [15-20] 15  SpO2:  [97 %-99 %] 98 %  BP: (176-209)/(82-91) 195/86        Body mass index is 32.92 kg/m².    Physical Exam  Constitutional:       Appearance: She is obese. She is not toxic-appearing or diaphoretic.   HENT:      Head: Normocephalic and atraumatic.      Right Ear: External ear normal.      Left Ear: External ear normal.      Nose: Nose normal.      Mouth/Throat:      Mouth: Mucous membranes are moist.   Eyes:      Conjunctiva/sclera: Conjunctivae normal.   Cardiovascular:      Rate and Rhythm: Normal rate and regular rhythm.      Pulses: Normal pulses.      Heart sounds: Normal heart sounds.   Pulmonary:      Effort: Pulmonary effort is normal.      Breath sounds: Normal breath sounds.   Abdominal:      General: Bowel sounds are normal.      Palpations: Abdomen is soft.    Genitourinary:     General: Normal vulva.   Musculoskeletal:      Cervical back: Normal range of motion and neck supple.      Right lower leg: No edema.      Left lower leg: No edema.   Skin:     General: Skin is warm and dry.      Capillary Refill: Capillary refill takes less than 2 seconds.   Neurological:      General: No focal deficit present.      Mental Status: She is alert.   Psychiatric:         Mood and Affect: Mood normal.         Behavior: Behavior normal.           Significant Labs: All pertinent labs within the past 24 hours have been reviewed.  CBC:   Recent Labs   Lab 04/25/22 1836   WBC 6.17   HGB 13.9   HCT 43.5        CMP:   Recent Labs   Lab 04/25/22 1836      K 3.5      CO2 24   *   BUN 16   CREATININE 0.7   CALCIUM 8.7   PROT 6.8   ALBUMIN 4.0   BILITOT 0.8   ALKPHOS 83   AST 23   ALT 15   ANIONGAP 11   EGFRNONAA >60.0     Cardiac Markers:   Recent Labs   Lab 04/25/22 1836   *     Coagulation:   Recent Labs   Lab 04/25/22 1836   INR 1.1     Lipid Panel:   Recent Labs   Lab 04/25/22 1836   CHOL 115*   HDL 50   LDLCALC 53.4*   TRIG 58   CHOLHDL 43.5     Troponin:   Recent Labs   Lab 04/25/22 1836   TROPONINI <0.030     TSH:   Recent Labs   Lab 04/25/22 1836   TSH 2.190       Significant Imaging: I have reviewed all pertinent imaging results/findings within the past 24 hours.  CT Head Without Contrast    Result Date: 4/25/2022  CT head without contrast CLINICAL DATA: Neurological deficit, stroke suspected CMS MANDATED QUALITY DATA - CT RADIATION  436 All CT scans at this facility utilize dose modulation, iterative reconstruction, and/or weight based dosing when appropriate to reduce radiation dose to as low as reasonably achievable. Findings: Thin section axial noncontrast images were obtained. There is no evidence of intracranial mass, hemorrhage, or midline shift. Ventricles and sulci are within normal limits. There are no pathologic extra-axial fluid  collections. There is no evidence of cortical ischemic change. Changes of mild chronic microvascular white matter disease are noted. There is a small chronic lacunar infarct at the right caudate nucleus. The cerebellum and brainstem are unremarkable. IMPRESSION: 1. No acute intracranial abnormalities. Chronic white matter ischemic changes as above. Findings were called to Dr. Sevilla at 6:56 PM on April 25, 2022 Electronically signed by:  Mj John MD  4/25/2022 6:57 PM CDT Workstation: [id number]    X-Ray Chest AP Portable    Result Date: 4/25/2022  Chest single view CLINICAL DATA: Altered mental status FINDINGS: Comparison to December 24. Heart size is upper normal. Aortic arch is calcified. There is mild scarring or atelectasis at the right lung base. Left lung is clear. There are no pleural effusions. IMPRESSION: 1. Mild scarring or atelectasis at the right lung base. 2. Aortic atherosclerosis. Electronically signed by:  Mj John MD  4/25/2022 8:04 PM CDT Workstation: [id number]    CTA Head and Neck (xpd)    Result Date: 4/25/2022  CTA HEAD AND NECK CLINICAL DATA: CVA CMS MANDATED QUALITY DATA - CT RADIATION  436 All CT scans at this facility utilize dose modulation, iterative reconstruction, and/or weight based dosing when appropriate to reduce radiation dose to as low as reasonably achievable. CMS MANDATED QUALITY DATA - CAROTID - 195 All measurements and percent stenosis described below were determined using NASCET criteria or criteria similar to NASCET, as defined by the Society of Radiologists in Ultrasound Consensus Conference, Radiology, 2003 CT angiography of the head and neck was performed. 100 mL nonionic contrast was administered. 3-D multiplanar reconstruction images were obtained and stored as part of the patient's permanent medical record. CTA NECK: Aortic arch is calcified. There is mild atherosclerotic calcification at the great vessel origins. The right common carotid artery is  normal in course. There is moderate calcification at the carotid bulb and ICA origin, where there is mild stenosis of up to 20%. The cervical ICA distal to that level is within normal limits. On the left, the common carotid artery is normal in course. There is dense calcification at the carotid bulb, with mild luminal diameter narrowing of up to 40%. The internal carotid artery is calcified proximally but normal in caliber. The bilateral vertebral arteries are patent. There is atherosclerotic calcification at the left vertebral artery origin where there is evidence of mild stenosis. CTA BRAIN: The intracranial portions of the internal carotid arteries are patent. The left intracranial ICA is normal in caliber. Luminal diameter narrowing on the right of less than 50% is noted. The basilar artery is a And within normal limits. The A1 segment of the right anterior cerebral artery is absent, felt to reflect a developmental variant, with both anterior cerebral arteries normally opacified and filling on the right via a patent anterior indicating arteries. There is no hemodynamically significant stenosis, major branch occlusion, or aneurysm. The middle cerebral arteries are patent. There is atherosclerotic calcification focally at the M1 segment on the left, where there is probable moderate luminal diameter narrowing. The posterior cerebral arteries are patent and within normal limits. IMPRESSION: 1. Focal atherosclerotic calcification at the M1 segment of the left middle cerebral artery. While difficult to quantify because of vessel size, there is probable hemodynamically significant stenosis at this level. 2. Atherosclerotic calcification of the extracranial carotid systems as discussed, without high-grade stenosis. 3. Atherosclerotic calcification and stenosis of less than 50% involving the intracranial ICA on the right. 4. Additional findings as above. Electronically signed by:  Mj John MD  4/25/2022 7:24 PM  CDT Workstation: [id number]       Assessment/Plan:     * Acute encephalopathy  Etiology unclear.  Concern for TIA/stroke  History of TIA  CTA head neck showed focal atherosclerotic calcification at the M1 segment of the left middle cerebral artery, probable hemodynamically significant stenosis.  The ED physician had discussed case with Neurology on-call.  She recommended admission and stroke workup.  Stroke protocol  Neuro checks q 4 hours  Pulse oximetry q.4 hours  Cardiac monitor for arrhythmia  Aspiration precautions  Fall precautions  NPO til passing nursing swallow assessment  Permissive hypertension  Labetalol  IV p.r.n. for systolic blood pressure above 220 or diastolic blood pressure above 100  Continue aspirin, atorvastatin, clopidogrel  Labs:  CMP, magnesium, CBC daily  Consult and treat:  PT, OT, ST, dietitian, case management  Imaging: MRI brain, echocardiogram with bubble study, EEG  MRA brain to evaluate left middle cerebral artery.     Essential hypertension  Chronic medical condition  Hold antihypertensive medication for permissive hypertension      Hypothyroidism  Chronic medical condition  Continue home medication        VTE Risk Mitigation (From admission, onward)         Ordered     IP VTE HIGH RISK PATIENT  Once         04/25/22 2101     Place sequential compression device  Until discontinued         04/25/22 2101                   DOUG Rivera  Department of Hospital Medicine   [facility] hospital - Emergency Dept

## 2022-04-26 NOTE — CARE UPDATE
04/26/22 0121   Patient Assessment/Suction   Level of Consciousness (AVPU) alert   Respiratory Effort Normal;Unlabored   Expansion/Accessory Muscles/Retractions no use of accessory muscles;no retractions;expansion symmetric   All Lung Fields Breath Sounds Anterior:;diminished;clear   Rhythm/Pattern, Respiratory unlabored;pattern regular;depth regular   PRE-TX-O2   O2 Device (Oxygen Therapy) room air   SpO2 97 %   Pulse Oximetry Type Intermittent   $ Pulse Oximetry - Multiple Charge Pulse Oximetry - Multiple   Education   $ Education 15 min   Respiratory Evaluation   $ Care Plan Tech Time 15 min   $ Eval/Re-eval Charges Evaluation

## 2022-04-26 NOTE — ASSESSMENT & PLAN NOTE
- No focal symptoms   - Oriented to time, place and person now   - rec MRI brain wo to r/o stroke   - No tPA with NIHSS - 0

## 2022-04-26 NOTE — HPI
Pt was brought in to the hospital by family member after whe was noted to have difficulty remembering events this evening. Per family, symptoms lasted for 30 mins and she is significantly getting better.

## 2022-04-26 NOTE — PLAN OF CARE
[facility]  Initial Discharge Assessment       Primary Care Provider: Marcial Kan MD    Admission Diagnosis: Stroke [I63.9]    Admission Date: 4/25/2022  Expected Discharge Date: 4/28/2022    Discharge Barriers Identified: (P) None    Payor: [employer] MANAGED MEDICARE / Plan: [employer] MEDICARE HMO / Product Type: Capitation /     Extended Emergency Contact Information  Primary Emergency Contact: GeeElías moyer  Address: [address] [address]           [place] [address] [address]  Home Phone: [phone]  Mobile Phone: [phone]  Relation: Spouse  Preferred language: English   needed? No    Discharge Plan A: (P) Home with family  Discharge Plan B: (P) Home with family      [facility] [place] - [address]  [address]  [place] [address]  Phone: [phone] Fax: [phone]      Initial Assessment (most recent)       Adult Discharge Assessment - 04/26/22 1154          Discharge Assessment    Assessment Type Discharge Planning Assessment (P)      Confirmed/corrected address, phone number and insurance Yes (P)      Confirmed Demographics Correct on Facesheet (P)      Source of Information patient;family (P)    Assessment completed at bedside    Lives With spouse (P)      Facility Arrived From: Home (P)      Do you expect to return to your current living situation? Yes (P)      Do you have help at home or someone to help you manage your care at home? Yes (P)      Who are your caregiver(s) and their phone number(s)? Elías Flynn (Spouse)   [phone] (P)      Prior to hospitilization cognitive status: Alert/Oriented (P)      Current cognitive status: Alert/Oriented (P)      Walking or Climbing Stairs Difficulty none (P)      Dressing/Bathing Difficulty none (P)      Equipment Currently Used at Home none (P)      Readmission within 30 days? No (P)      Patient currently being followed by outpatient case management? No (P)      Do you currently have  service(s) that help you manage your care at home? No (P)      Do you take prescription medications? Yes (P)      Do you have prescription coverage? Yes (P)      Coverage [employer] (P)      Do you have any problems affording any of your prescribed medications? No (P)      Is the patient taking medications as prescribed? yes (P)      Who is going to help you get home at discharge? Elías Flynn (Spouse)   [phone] (P)      How do you get to doctors appointments? family or friend will provide (P)      Are you on dialysis? No (P)      Do you take coumadin? No (P)      Discharge Plan A Home with family (P)      Discharge Plan B Home with family (P)      DME Needed Upon Discharge  none (P)      Discharge Plan discussed with: Patient (P)      Discharge Barriers Identified None (P)         Relationship/Environment    Name(s) of Who Lives With Patient GeeElías sanderson (Spouse)   [phone] (P)

## 2022-04-26 NOTE — CONSULTS
[facility]  Vascular Neurology  Comprehensive Stroke Center  TeleVascular Neurology Acute Consultation Note      Consult to Telemedicine - Acute Stroke  Consult performed by: Dago Gu MD  Consult ordered by: Andra Sevilla MD          Consulting Provider: ANDRA SEVILLA  Current Providers  No providers found    Patient Location:  [facility] EMERGENCY DEPARTMENT Emergency Department  Spoke hospital nurse at bedside with patient assisting consultant.     Patient information was obtained from patient, spouse/SO and relative(s).         Assessment/Plan:       Diagnoses:   TGA (transient global amnesia)  - No focal symptoms   - Oriented to time, place and person now   - rec MRI brain wo to r/o stroke   - No tPA with NIHSS - 0         STROKE DOCUMENTATION     Acute Stroke Times:   Acute Stroke Times   Last Known Normal Date: 04/25/22  Last Known Normal Time: 1600  Symptom Onset Date: 04/25/22  Unknown Symptom Onset Time: Unknown Time  Stroke Team Called Date: 04/25/22  Stroke Team Called Time: 1839  Stroke Team Arrival Date: 04/25/22  Stroke Team Arrival Time: 1845  CT Interpretation Time: 1845  Alteplase Recommended: No  Thrombectomy Recommended: No    NIH Scale:  Interval: baseline  1a. Level of Consciousness: 0-->Alert, keenly responsive  1b. LOC Questions: 0-->Answers both questions correctly  1c. LOC Commands: 0-->Performs both tasks correctly  2. Best Gaze: 0-->Normal  3. Visual: 0-->No visual loss  4. Facial Palsy: 0-->Normal symmetrical movements  5a. Motor Arm, Left: 0-->No drift, limb holds 90 (or 45) degrees for full 10 secs  5b. Motor Arm, Right: 0-->No drift, limb holds 90 (or 45) degrees for full 10 secs  6a. Motor Leg, Left: 0-->No drift, leg holds 30 degree position for full 5 secs  6b. Motor Leg, Right: 0-->No drift, leg holds 30 degree position for full 5 secs  7. Limb Ataxia: 0-->Absent  8. Sensory: 0-->Normal, no sensory loss  9. Best Language: 0-->No  "aphasia, normal  10. Dysarthria: 0-->Normal  11. Extinction and Inattention (formerly Neglect): 0-->No abnormality  Total (NIH Stroke Scale): 0     Modified Jamie    Tamela Coma Scale:    ABCD2 Score:    [id number] Score:   HAS -BLED Score:   ICH Score:   Hunt & Slater Classification:       Blood pressure (!) 195/86, pulse 65, temperature 97.8 °F (36.6 °C), resp. rate 15, height 5' 3" (1.6 m), SpO2 98 %.  Alteplase Eligible?: No  Alteplase Recommendation: Alteplase not recommended due to Suspected stroke mimic  and Patient back to neurological baseline  Possible Interventional Revascularization Candidate? No; at this time symptoms not suggestive of large vessel occlusion    Disposition Recommendation: do not transfer    Subjective:     History of Present Illness:  Pt was brought in to the hospital by family member after whe was noted to have difficulty remembering events this evening. Per family, symptoms lasted for 30 mins and she is significantly getting better.         Woke up with symptoms?: no    Recent bleeding noted: no  Does the patient take any Blood Thinners? no  Medications: Unknown      Past Medical History: hypertension    Past Surgical History: no major surgeries within the last 2 weeks    Family History: hypertension    Social History: unable to obtain    Allergies: Ace Inhibitors  Morphine  Keflex [Cephalexin] No relevant allergies    Review of Systems  Objective:   Vitals: Blood pressure (!) 195/86, pulse 65, temperature 97.8 °F (36.6 °C), resp. rate 15, height 5' 3" (1.6 m), SpO2 98 %. BP: chart reviewed    CT READ: Yes  No hemmorhage. No mass effect. No early infarct signs.     Physical Exam    Oriented to time, place and person   No aphasia   No weakness   No numbness   No ataxia         Recommended the emergency room physician to have a brief discussion with the patient and/or family if available regarding the  risks and benefits of treatment, and to briefly document the occurrence of that " discussion in his clinical encounter note.     The attending portion of this evaluation, treatment, and documentation was performed per Dago Gu MD via audiovisual.    Billing code:  (non-intervention mild to moderate stroke, TIA, some mimics)    · This patient has a critical neurological condition/illness, with some potential for high morbidity and mortality.  · There is a moderate probability for acute neurological change leading to clinical and possibly life-threatening deterioration requiring highest level of physician preparedness for urgent intervention.  · Care was coordinated with other physicians involved in the patient's care.  · Radiologic studies and laboratory data were reviewed and interpreted, and plan of care was re-assessed based on the results.  · Diagnosis, treatment options and prognosis may have been discussed with the patient and/or family members or caregiver.      In your opinion, this was a: Tier 1 Van Negative    Consult End Time: 10:31 PM     Dago Gu MD  Comprehensive Stroke Center  Vascular Neurology   [facility]

## 2022-04-26 NOTE — PT/OT/SLP EVAL
Occupational Therapy   Evaluation and Discharge Note    Name: Brandi Flynn  MRN: [id number]  Admitting Diagnosis:  Acute encephalopathy   Recent Surgery: * No surgery found *      Recommendations:     Discharge Recommendations: home  Discharge Equipment Recommendations:  none  Barriers to discharge:  None    Assessment:     Brandi Flynn is a 82 y.o. female with a medical diagnosis of Acute encephalopathy. At this time, patient is functioning at their prior level of function and does not require further acute OT services.     Plan:     During this hospitalization, patient does not require further acute OT services.  Please re-consult if situation changes.    · Plan of Care Reviewed with: patient, spouse, daughter    Subjective     Chief Complaint: None  Patient/Family Comments/goals: To go home.    Occupational Profile:  Living Environment: lives with spouse in a 1 story home with 1 step to enter.  Previous level of function: independent with ADLs, IADLs and driving.  Roles and Routines: primary homemaker  Equipment Used at home:  none  Assistance upon Discharge: Spouse and Daughter    Pain/Comfort:  · Pain Rating 1: 0/10  · Pain Rating Post-Intervention 1: 0/10    Patients cultural, spiritual, Voodoo conflicts given the current situation: no    Objective:     Communicated with: nurse prior to session.  Patient found HOB elevated with telemetry, peripheral IV upon OT entry to room.    General Precautions: Standard,  (None)   Orthopedic Precautions:N/A   Braces: N/A  Respiratory Status: Room air     Occupational Performance:    Bed Mobility:    · Patient completed Scooting/Bridging with independence  · Patient completed Supine to Sit with independence  · Patient completed Sit to Supine with independence    Functional Mobility/Transfers:  · Patient completed Sit <> Stand Transfer with independence  with  no assistive device   · Patient completed Toilet Transfer Step Transfer technique with independence with  no  AD  · Functional Mobility: ambulated 25 feet in the hospital room and bathroom with no AD.    Activities of Daily Living:  · Grooming: independence to wash hands standing at sink.  · Lower Body Dressing: independence to don/doff socks sitting EOB.  · Toileting: independence to perform toilet hygiene from commode.    Cognitive/Visual Perceptual:  Cognitive/Psychosocial Skills:     -       Oriented to: Person, Place, Time and Situation   -       Follows Commands/attention:Follows multistep  commands  -       Communication: clear/fluent  -       Memory: No Deficits noted  -       Safety awareness/insight to disability: intact   -       Mood/Affect/Coping skills/emotional control: Cooperative and Pleasant  Visual/Perceptual:      -Intact Acuity    Physical Exam:  Balance:    -       Sitting/Standing: WFL  Upper Extremity Range of Motion:     -       Right Upper Extremity: WFL  -       Left Upper Extremity: WFL  Upper Extremity Strength:    -       Right Upper Extremity: WFL  -       Left Upper Extremity: WFL   Strength:    -       Right Upper Extremity: WFL  -       Left Upper Extremity: WFL  Fine Motor Coordination:    -       Intact    AMPAC 6 Click ADL:  AMPAC Total Score: 24    Treatment & Education:  Patient is currently independent with sitting/standing ADL activity with no safety concerns.   Education:    Patient left HOB elevated with all lines intact, call button in reach and family  present    GOALS:   Multidisciplinary Problems     Occupational Therapy Goals     Not on file                History:     Past Medical History:   Diagnosis Date    Blood transfusion 1960    GERD (gastroesophageal reflux disease)     Hepatitis C     treated six months by Dr. Gaspar with interferon    History of hepatitis C     Hyperlipidemia     Hypertension     Hypothyroidism     Obesity     Primary osteoarthritis of left knee 12/9/2015    Sleep apnea     Stroke     TIA 2012    TIA (transient ischemic attack)      Vision loss of right eye     Dr Eldridge Right eye blood clot        Past Surgical History:   Procedure Laterality Date    ABDOMINAL SURGERY  2015    SBO from lap band wraping around small bowel, lap untwisted    ADENOIDECTOMY      BREAST BIOPSY  2014    needle biopsy     CATARACT EXTRACTION, BILATERAL Bilateral      SECTION      CHOLECYSTECTOMY  2001    COLONOSCOPY  2011    Dr. Alvares, 5 year recheck    EYE SURGERY  2014    bilateral cataract Dr Carrillo     EYE SURGERY  10/2020    bottom lid    HEMORRHOID SURGERY      HYSTERECTOMY  1989    INCONTINENCE SURGERY      sling    LAPAROSCOPIC GASTRIC BANDING      LAPAROSCOPIC NISSEN FUNDOPLICATION      Dr. Rutherford    TONSILLECTOMY         Time Tracking:     OT Date of Treatment: 22  OT Start Time: 1024  OT Stop Time: 1039  OT Total Time (min): 15 min    Billable Minutes:Evaluation 3  Self Care/Home Management 12    2022

## 2022-04-26 NOTE — SUBJECTIVE & OBJECTIVE
Past Medical History:   Diagnosis Date    Blood transfusion     GERD (gastroesophageal reflux disease)     Hepatitis C     treated six months by Dr. Gaspar with interferon    History of hepatitis C     Hyperlipidemia     Hypertension     Hypothyroidism     Obesity     Primary osteoarthritis of left knee 2015    Sleep apnea     Stroke     TIA     TIA (transient ischemic attack)     Vision loss of right eye     Dr Eldridge Right eye blood clot        Past Surgical History:   Procedure Laterality Date    ABDOMINAL SURGERY      SBO from lap band wraping around small bowel, lap untwisted    ADENOIDECTOMY      BREAST BIOPSY  2014    needle biopsy     CATARACT EXTRACTION, BILATERAL Bilateral      SECTION      CHOLECYSTECTOMY  2001    COLONOSCOPY  2011    Dr. Alvares, 5 year recheck    EYE SURGERY      bilateral cataract Dr Carrillo     EYE SURGERY  10/2020    bottom lid    HEMORRHOID SURGERY      HYSTERECTOMY      INCONTINENCE SURGERY      sling    LAPAROSCOPIC GASTRIC BANDING      LAPAROSCOPIC NISSEN FUNDOPLICATION      Dr. Rutherford    TONSILLECTOMY         Review of patient's allergies indicates:   Allergen Reactions    Ace inhibitors Other (See Comments)     cough    Morphine Itching    Keflex [cephalexin] Itching and Rash       No current facility-administered medications on file prior to encounter.     Current Outpatient Medications on File Prior to Encounter   Medication Sig    amLODIPine (NORVASC) 5 MG tablet Take 1 tablet (5 mg total) by mouth once daily.    aspirin (ECOTRIN) 81 MG EC tablet Take 81 mg by mouth once daily.    atorvastatin (LIPITOR) 40 MG tablet TAKE 2 TABLETS BY MOUTH ONCE A DAY (Patient taking differently: Take 40 mg by mouth every evening.)    clopidogreL (PLAVIX) 75 mg tablet Take 1 tablet (75 mg total) by mouth once daily.    levothyroxine (SYNTHROID) 75 MCG tablet Take 1 tablet (75 mcg total) by mouth once daily.    solifenacin (VESICARE) 5 MG tablet  Take 5 mg by mouth once daily.    VIT A/VIT C/VIT E/ZINC/COPPER (PRESERVISION AREDS ORAL) Take 1 capsule by mouth 2 (two) times daily.    mag hydrox/aluminum hyd/simeth (MAALOX ORAL) Take 1 Dose by mouth as needed.     Family History       Problem Relation (Age of Onset)    Arthritis Mother, Paternal Grandmother, Daughter    Cancer Maternal Uncle, Paternal Grandfather    Diabetes Mother, Sister    Diverticulitis Sister    Early death Maternal Grandfather (47)    Emphysema Maternal Aunt    Heart disease Mother, Maternal Aunt, Maternal Grandfather, Paternal Grandfather    No Known Problems Daughter, Father, Son, Paternal Uncle, Maternal Grandmother, Brother          Tobacco Use    Smoking status: Former Smoker     Packs/day: 0.50     Years: 5.00     Pack years: 2.50    Smokeless tobacco: Never Used   Substance and Sexual Activity    Alcohol use: No    Drug use: No    Sexual activity: Yes     Partners: Male     Review of Systems   All other systems reviewed and are negative.  Objective:     Vital Signs (Most Recent):  Temp: 97.8 °F (36.6 °C) (04/25/22 1824)  Pulse: 65 (04/25/22 2130)  Resp: 15 (04/25/22 2130)  BP: (!) 195/86 (04/25/22 2130)  SpO2: 98 % (04/25/22 2130)   Vital Signs (24h Range):  Temp:  [97.8 °F (36.6 °C)] 97.8 °F (36.6 °C)  Pulse:  [65-76] 65  Resp:  [15-20] 15  SpO2:  [97 %-99 %] 98 %  BP: (176-209)/(82-91) 195/86        Body mass index is 32.92 kg/m².    Physical Exam  Constitutional:       Appearance: She is obese. She is not toxic-appearing or diaphoretic.   HENT:      Head: Normocephalic and atraumatic.      Right Ear: External ear normal.      Left Ear: External ear normal.      Nose: Nose normal.      Mouth/Throat:      Mouth: Mucous membranes are moist.   Eyes:      Conjunctiva/sclera: Conjunctivae normal.   Cardiovascular:      Rate and Rhythm: Normal rate and regular rhythm.      Pulses: Normal pulses.      Heart sounds: Normal heart sounds.   Pulmonary:      Effort: Pulmonary effort is  normal.      Breath sounds: Normal breath sounds.   Abdominal:      General: Bowel sounds are normal.      Palpations: Abdomen is soft.   Genitourinary:     General: Normal vulva.   Musculoskeletal:      Cervical back: Normal range of motion and neck supple.      Right lower leg: No edema.      Left lower leg: No edema.   Skin:     General: Skin is warm and dry.      Capillary Refill: Capillary refill takes less than 2 seconds.   Neurological:      General: No focal deficit present.      Mental Status: She is alert.   Psychiatric:         Mood and Affect: Mood normal.         Behavior: Behavior normal.           Significant Labs: All pertinent labs within the past 24 hours have been reviewed.  CBC:   Recent Labs   Lab 04/25/22 1836   WBC 6.17   HGB 13.9   HCT 43.5        CMP:   Recent Labs   Lab 04/25/22 1836      K 3.5      CO2 24   *   BUN 16   CREATININE 0.7   CALCIUM 8.7   PROT 6.8   ALBUMIN 4.0   BILITOT 0.8   ALKPHOS 83   AST 23   ALT 15   ANIONGAP 11   EGFRNONAA >60.0     Cardiac Markers:   Recent Labs   Lab 04/25/22 1836   *     Coagulation:   Recent Labs   Lab 04/25/22  1836   INR 1.1     Lipid Panel:   Recent Labs   Lab 04/25/22 1836   CHOL 115*   HDL 50   LDLCALC 53.4*   TRIG 58   CHOLHDL 43.5     Troponin:   Recent Labs   Lab 04/25/22 1836   TROPONINI <0.030     TSH:   Recent Labs   Lab 04/25/22 1836   TSH 2.190       Significant Imaging: I have reviewed all pertinent imaging results/findings within the past 24 hours.  CT Head Without Contrast    Result Date: 4/25/2022  CT head without contrast CLINICAL DATA: Neurological deficit, stroke suspected CMS MANDATED QUALITY DATA - CT RADIATION  436 All CT scans at this facility utilize dose modulation, iterative reconstruction, and/or weight based dosing when appropriate to reduce radiation dose to as low as reasonably achievable. Findings: Thin section axial noncontrast images were obtained. There is no evidence of  intracranial mass, hemorrhage, or midline shift. Ventricles and sulci are within normal limits. There are no pathologic extra-axial fluid collections. There is no evidence of cortical ischemic change. Changes of mild chronic microvascular white matter disease are noted. There is a small chronic lacunar infarct at the right caudate nucleus. The cerebellum and brainstem are unremarkable. IMPRESSION: 1. No acute intracranial abnormalities. Chronic white matter ischemic changes as above. Findings were called to Dr. Sevilla at 6:56 PM on April 25, 2022 Electronically signed by:  Mj John MD  4/25/2022 6:57 PM CDT Workstation: [id number]    X-Ray Chest AP Portable    Result Date: 4/25/2022  Chest single view CLINICAL DATA: Altered mental status FINDINGS: Comparison to December 24. Heart size is upper normal. Aortic arch is calcified. There is mild scarring or atelectasis at the right lung base. Left lung is clear. There are no pleural effusions. IMPRESSION: 1. Mild scarring or atelectasis at the right lung base. 2. Aortic atherosclerosis. Electronically signed by:  Mj John MD  4/25/2022 8:04 PM CDT Workstation: [id number]    CTA Head and Neck (xpd)    Result Date: 4/25/2022  CTA HEAD AND NECK CLINICAL DATA: CVA CMS MANDATED QUALITY DATA - CT RADIATION  436 All CT scans at this facility utilize dose modulation, iterative reconstruction, and/or weight based dosing when appropriate to reduce radiation dose to as low as reasonably achievable. CMS MANDATED QUALITY DATA - CAROTID - 195 All measurements and percent stenosis described below were determined using NASCET criteria or criteria similar to NASCET, as defined by the Society of Radiologists in Ultrasound Consensus Conference, Radiology, 2003 CT angiography of the head and neck was performed. 100 mL nonionic contrast was administered. 3-D multiplanar reconstruction images were obtained and stored as part of the patient's permanent medical record. CTA  NECK: Aortic arch is calcified. There is mild atherosclerotic calcification at the great vessel origins. The right common carotid artery is normal in course. There is moderate calcification at the carotid bulb and ICA origin, where there is mild stenosis of up to 20%. The cervical ICA distal to that level is within normal limits. On the left, the common carotid artery is normal in course. There is dense calcification at the carotid bulb, with mild luminal diameter narrowing of up to 40%. The internal carotid artery is calcified proximally but normal in caliber. The bilateral vertebral arteries are patent. There is atherosclerotic calcification at the left vertebral artery origin where there is evidence of mild stenosis. CTA BRAIN: The intracranial portions of the internal carotid arteries are patent. The left intracranial ICA is normal in caliber. Luminal diameter narrowing on the right of less than 50% is noted. The basilar artery is a And within normal limits. The A1 segment of the right anterior cerebral artery is absent, felt to reflect a developmental variant, with both anterior cerebral arteries normally opacified and filling on the right via a patent anterior indicating arteries. There is no hemodynamically significant stenosis, major branch occlusion, or aneurysm. The middle cerebral arteries are patent. There is atherosclerotic calcification focally at the M1 segment on the left, where there is probable moderate luminal diameter narrowing. The posterior cerebral arteries are patent and within normal limits. IMPRESSION: 1. Focal atherosclerotic calcification at the M1 segment of the left middle cerebral artery. While difficult to quantify because of vessel size, there is probable hemodynamically significant stenosis at this level. 2. Atherosclerotic calcification of the extracranial carotid systems as discussed, without high-grade stenosis. 3. Atherosclerotic calcification and stenosis of less than 50%  involving the intracranial ICA on the right. 4. Additional findings as above. Electronically signed by:  Mj John MD  4/25/2022 7:24 PM CDT Workstation: [id number]

## 2022-04-26 NOTE — PT/OT/SLP PROGRESS
Physical Therapy      Patient Name:  Brandi Flynn   MRN:  [id number]    Patient not seen today secondary to  (with OT). Will follow-up in PM..

## 2022-04-26 NOTE — PT/OT/SLP EVAL
Physical Therapy Evaluation and Discharge Note    Patient Name:  Brandi Flynn   MRN:  [id number]    Recommendations:     Discharge Recommendations:  home   Discharge Equipment Recommendations: none   Barriers to discharge: None    Assessment:     Brandi Flynn is a 82 y.o. female admitted with a medical diagnosis of Acute encephalopathy. .  At this time, patient is functioning at her  prior level of function and does not require further acute PT services.     Recent Surgery: * No surgery found *      Plan:     During this hospitalization, patient does not require further acute PT services.  Please re-consult if situation changes.      Subjective     Chief Complaint:  Non recovery  of 45 minutes memory loss PTA  Patient/Family Comments/goals: D/C home with spouse   ·      Patients cultural, spiritual, Adventist conflicts given the current situation:      Living Environment:  Pt lives with her  in a [facility]  with use of  garage entrance.  Prior to admission, patients level of function was Mod I  at times requiring HHA of her  during gait   Equipment used at home: none.  DME owned (not currently used): none.  Upon discharge, patient will have assistance from her .    Objective:     Communicated with nurse prior to session.  Patient found supine with   upon PT entry to room.    General Precautions: Standard,     Orthopedic Precautions:    Braces:     Respiratory Status: Room air    Exams:  · Cognitive Exam:  Patient is oriented to Person, Place, Time and Situation  · RLE ROM: WFL  · RLE Strength: WFL  · LLE ROM: WFL  · LLE Strength: WFL    Functional Mobility:  · Bed Mobility:     · Supine to Sit: stand by assistance  · Sit to Supine: stand by assistance  · Transfers:     · Sit to Stand:  stand by assistance with no AD  · Gait: 150 ft HHA/SBA    AM-PAC 6 CLICK MOBILITY  Total Score:        Therapeutic Activities and Exercises:  PT eval and D/C    AM-PAC 6 CLICK MOBILITY  Total Score:      Patient left  supine with all lines intact, call button in reach, bed alarm on and her daughters present.    GOALS:   Multidisciplinary Problems     Physical Therapy Goals     Not on file                History:     Past Medical History:   Diagnosis Date    Blood transfusion     GERD (gastroesophageal reflux disease)     Hepatitis C     treated six months by Dr. Gaspar with interferon    History of hepatitis C     Hyperlipidemia     Hypertension     Hypothyroidism     Obesity     Primary osteoarthritis of left knee 2015    Sleep apnea     Stroke     TIA     TIA (transient ischemic attack)     Vision loss of right eye     Dr Eldridge Right eye blood clot        Past Surgical History:   Procedure Laterality Date    ABDOMINAL SURGERY      SBO from lap band wraping around small bowel, lap untwisted    ADENOIDECTOMY      BREAST BIOPSY  2014    needle biopsy     CATARACT EXTRACTION, BILATERAL Bilateral      SECTION      CHOLECYSTECTOMY  2001    COLONOSCOPY  2011    Dr. Alvares, 5 year recheck    EYE SURGERY      bilateral cataract Dr Carrillo     EYE SURGERY  10/2020    bottom lid    HEMORRHOID SURGERY      HYSTERECTOMY  1989    INCONTINENCE SURGERY  2008    sling    LAPAROSCOPIC GASTRIC BANDING      LAPAROSCOPIC NISSEN FUNDOPLICATION      Dr. Rutherford    TONSILLECTOMY         Time Tracking:     PT Received On: 22  PT Start Time: 1430     PT Stop Time: 1448  PT Total Time (min): 18 min     Billable Minutes: Evaluation 9 minutes and Gait Training 9 minutes      2022

## 2022-04-26 NOTE — HPI
82-year-old female with history of hypertension, hyperlipidemia, GERD, sleep apnea  Presented to the emergency department with sudden onset of confusion around 430 PM today.  Was in her usual state of health earlier. Per her family, she was confused and did not remembering about things that they previously discussed.  No focal deficits noted.  Family reports that she is now back to normal.  Denies recent illness, fever, chills, cough, shortness of breath, chest pain, abdominal pain, nausea, vomiting, diarrhea, urinary symptoms.    Laboratory studies were unremarkable.  EKG:  Sinus rhythm, left axis deviation, left bundle branch block (old)  CT head negative.  CTA head neck:  1. Focal atherosclerotic calcification at the M1 segment of the left middle cerebral artery. While difficult to quantify because of vessel size, there is probable hemodynamically significant stenosis at this level. 2. Atherosclerotic calcification of the extracranial carotid systems as discussed, without high-grade stenosis. 3. Atherosclerotic calcification and stenosis of less than 50% involving the intracranial ICA on the right.

## 2022-04-26 NOTE — SUBJECTIVE & OBJECTIVE
"  Woke up with symptoms?: no    Recent bleeding noted: no  Does the patient take any Blood Thinners? no  Medications: Unknown      Past Medical History: hypertension    Past Surgical History: no major surgeries within the last 2 weeks    Family History: hypertension    Social History: unable to obtain    Allergies: Ace Inhibitors  Morphine  Keflex [Cephalexin] No relevant allergies    Review of Systems  Objective:   Vitals: Blood pressure (!) 195/86, pulse 65, temperature 97.8 °F (36.6 °C), resp. rate 15, height 5' 3" (1.6 m), SpO2 98 %. BP: chart reviewed    CT READ: Yes  No hemmorhage. No mass effect. No early infarct signs.     Physical Exam    Oriented to time, place and person   No aphasia   No weakness   No numbness   No ataxia     "

## 2022-04-27 LAB
AORTIC ROOT ANNULUS: 3.95 CM
AV INDEX (PROSTH): 0.86
AV MEAN GRADIENT: 3 MMHG
AV VALVE AREA: 4.23 CM2
BSA FOR ECHO PROCEDURE: 1.94 M2
CV ECHO LV RWT: 0.8 CM
DOP CALC AO VTI: 25.18 CM
DOP CALC LVOT AREA: 4.9 CM2
DOP CALC LVOT DIAMETER: 2.51 CM
DOP CALC LVOT PEAK VEL: 105.72 M/S
DOP CALC LVOT STROKE VOLUME: 106.53 CM3
DOP CALCLVOT PEAK VEL VTI: 21.54 CM
E WAVE DECELERATION TIME: 310.79 MSEC
E/A RATIO: 0.61
E/E' RATIO: 9.29 M/S
ECHO LV POSTERIOR WALL: 1.55 CM (ref 0.6–1.1)
EJECTION FRACTION: 65 %
FRACTIONAL SHORTENING: 43 % (ref 28–44)
INTERVENTRICULAR SEPTUM: 1.28 CM (ref 0.6–1.1)
LEFT ATRIUM SIZE: 3.82 CM
LEFT INTERNAL DIMENSION IN SYSTOLE: 2.23 CM (ref 2.1–4)
LEFT VENTRICLE MASS INDEX: 109 G/M2
LEFT VENTRICULAR INTERNAL DIMENSION IN DIASTOLE: 3.89 CM (ref 3.5–6)
LEFT VENTRICULAR MASS: 204.11 G
LV LATERAL E/E' RATIO: 9.29 M/S
LV SEPTAL E/E' RATIO: 9.29 M/S
MV PEAK A VEL: 1.06 M/S
MV PEAK E VEL: 0.65 M/S
PISA TR MAX VEL: 2.21 M/S
RIGHT VENTRICULAR END-DIASTOLIC DIMENSION: 295 CM
TDI LATERAL: 0.07 M/S
TDI SEPTAL: 0.07 M/S
TDI: 0.07 M/S
TR MAX PG: 20 MMHG
TRICUSPID ANNULAR PLANE SYSTOLIC EXCURSION: 235 CM

## 2022-04-27 NOTE — DISCHARGE SUMMARY
[facility]  Discharge Summary      Patient Name: Brandi Flynn  MRN: [id number]  Patient Class: IP- Inpatient  Admission Date: 4/25/2022  Hospital Length of Stay: 1 days  Discharge Date and Time:  04/26/2022 8:52 PM  Attending Physician: No att. providers found   Discharging Provider: Ihsan Johnson MD  Primary Care Provider: Marcial Kan MD      HPI:   82-year-old female with history of hypertension, hyperlipidemia, GERD, sleep apnea  Presented to the emergency department with sudden onset of confusion around 430 PM today.  Was in her usual state of health earlier. Per her family, she was confused and did not remembering about things that they previously discussed.  No focal deficits noted.  Family reports that she is now back to normal.  Denies recent illness, fever, chills, cough, shortness of breath, chest pain, abdominal pain, nausea, vomiting, diarrhea, urinary symptoms.    Laboratory studies were unremarkable.  EKG:  Sinus rhythm, left axis deviation, left bundle branch block (old)  CT head negative.  CTA head neck:  1. Focal atherosclerotic calcification at the M1 segment of the left middle cerebral artery. While difficult to quantify because of vessel size, there is probable hemodynamically significant stenosis at this level. 2. Atherosclerotic calcification of the extracranial carotid systems as discussed, without high-grade stenosis. 3. Atherosclerotic calcification and stenosis of less than 50% involving the intracranial ICA on the right.       * No surgery found *      Hospital Course:   Patient admitted with transient global amnesia and HTN urgency  Here was no evidence of CVA/TIA  Later pt was discharged to home       Goals of Care Treatment Preferences:  Code Status: Full Code      Consults:   Consults (From admission, onward)        Status Ordering Provider     IP consult to case management/social work  Once        Provider:  (Not yet assigned)    Completed SHU  JONI TRINIDAD     Consult to Telemedicine - Acute Stroke  Once        Provider:  (Not yet assigned)    ANDRA Rocha          No new Assessment & Plan notes have been filed under this hospital service since the last note was generated.  Service: Hospital Medicine    Final Active Diagnoses:    Diagnosis Date Noted POA    Essential hypertension [I10] 06/12/2015 Yes    Hypothyroidism [E03.9]  Yes     Chronic      Problems Resolved During this Admission:    Diagnosis Date Noted Date Resolved POA    PRINCIPAL PROBLEM:  Acute encephalopathy [G93.40] 04/25/2022 04/26/2022 Yes    TGA (transient global amnesia) [G45.4] 04/25/2022 04/26/2022 Unknown       Discharged Condition: good    Disposition: Home or Self Care    Follow Up:   Follow-up Information     Deja Travis MD Follow up in 1 week(s).    Specialty: Neurology  Contact information:  [address] [place] [address]  [facility] [id number]  [phone]                       Patient Instructions:   No discharge procedures on file.    Significant Diagnostic Studies: Labs:   CMP   Recent Labs   Lab 04/25/22 1836 04/26/22 0433    142   K 3.5 3.4*    106   CO2 24 27   * 103   BUN 16 12   CREATININE 0.7 0.7   CALCIUM 8.7 8.9   PROT 6.8 6.3   ALBUMIN 4.0 3.7   BILITOT 0.8 1.1*   ALKPHOS 83 75   AST 23 21   ALT 15 13   ANIONGAP 11 9   ESTGFRAFRICA >60.0 >60.0   EGFRNONAA >60.0 >60.0    and CBC   Recent Labs   Lab 04/25/22 1836 04/26/22  0433   WBC 6.17 5.30   HGB 13.9 13.2   HCT 43.5 40.1    174       Pending Diagnostic Studies:     Procedure Component Value Units Date/Time    Echo Saline Bubble? Yes [[id number]]  (Abnormal) Resulted: 04/26/22 1211    Order Status: Sent Lab Status: In process Updated: 04/26/22 1212     BSA 1.94 m2      TDI SEPTAL 0.07 m/s      LV LATERAL E/E' RATIO 9.29 m/s      LV SEPTAL E/E' RATIO 9.29 m/s      TDI LATERAL 0.07 m/s      LVIDd 3.89 cm      IVS 1.28 cm      Posterior Wall 1.55 cm      Ao root  annulus 3.95 cm      LVIDs 2.23 cm      FS 43 %      LV mass 204.11 g      LA size 3.82 cm      RVDD 295.00 cm      TAPSE 235.00 cm      Left Ventricle Relative Wall Thickness 0.80 cm      AV mean gradient 3 mmHg      AV valve area 4.23 cm2      AV index (prosthetic) 0.86     E/A ratio 0.61     Mean e' 0.07 m/s      E wave deceleration time 310.79 msec      LVOT diameter 2.51 cm      LVOT area 4.9 cm2      LVOT peak spencer 105.72 m/s      LVOT peak VTI 21.54 cm      Ao VTI 25.18 cm      LVOT stroke volume 106.53 cm3      E/E' ratio 9.29 m/s      MV Peak E Spencer 0.65 m/s      TR Max Spencer 2.21 m/s      MV Peak A Spencer 1.06 m/s      LV Mass Index 109 g/m2      Triscuspid Valve Regurgitation Peak Gradient 20 mmHg     Narrative:      · Concentric hypertrophy and       Impression:               Medications:  Reconciled Home Medications:      Medication List      CHANGE how you take these medications    amLODIPine 5 MG tablet  Commonly known as: NORVASC  Take 2 tablets (10 mg total) by mouth once daily.  What changed: how much to take     atorvastatin 40 MG tablet  Commonly known as: LIPITOR  TAKE 2 TABLETS BY MOUTH ONCE A DAY  What changed:   · how much to take  · when to take this        CONTINUE taking these medications    aspirin 81 MG EC tablet  Commonly known as: ECOTRIN  Take 81 mg by mouth once daily.     clopidogreL 75 mg tablet  Commonly known as: PLAVIX  Take 1 tablet (75 mg total) by mouth once daily.     levothyroxine 75 MCG tablet  Commonly known as: SYNTHROID  Take 1 tablet (75 mcg total) by mouth once daily.     MAALOX ORAL  Take 1 Dose by mouth as needed.     PRESERVISION AREDS ORAL  Take 1 capsule by mouth 2 (two) times daily.     solifenacin 5 MG tablet  Commonly known as: VESICARE  Take 5 mg by mouth once daily.            Indwelling Lines/Drains at time of discharge:   Lines/Drains/Airways     None               Physical Exam  Cardiovascular:      Rate and Rhythm: Normal rate.   Neurological:      Mental  Status: She is alert and oriented to person, place, and time.       Time spent on the discharge of patient: 44 minutes         Ihsan Johnson MD  Department of Hospital Medicine  [facility]

## 2022-04-27 NOTE — HOSPITAL COURSE
Patient admitted with transient global amnesia and HTN urgency  Here was no evidence of CVA/TIA  Later pt was discharged to home

## 2022-04-28 ENCOUNTER — TELEPHONE (OUTPATIENT)
Dept: CARDIOLOGY | Facility: CLINIC | Age: 82
End: 2022-04-28

## 2022-04-28 NOTE — TELEPHONE ENCOUNTER
----- Message from Lizandro Valadez sent at 4/28/2022 10:57 AM CDT -----  Contact: pt  Type: Needs Medical Advice    Who Called:pt  Best Call Back Number:[phone]    Additional Information: Requesting callback regarding appt schedule asking for the second week of May if possible please call back pt, to monitor BP make appt for pt and leave message for call back if need be also needs result of echo US  from 04/26     Please Advise-Thank you

## 2022-05-05 ENCOUNTER — TELEPHONE (OUTPATIENT)
Dept: FAMILY MEDICINE | Facility: CLINIC | Age: 82
End: 2022-05-05
Payer: MEDICARE

## 2022-05-05 NOTE — TELEPHONE ENCOUNTER
I called the patient to schedule their free one hour Enhanced Annual Wellness Visit with   Ama Ricks NP.

## 2022-05-16 ENCOUNTER — TELEPHONE (OUTPATIENT)
Dept: CARDIOLOGY | Facility: CLINIC | Age: 82
End: 2022-05-16
Payer: MEDICARE

## 2022-05-17 ENCOUNTER — TELEPHONE (OUTPATIENT)
Dept: CARDIOLOGY | Facility: CLINIC | Age: 82
End: 2022-05-17
Payer: MEDICARE

## 2022-05-17 ENCOUNTER — PATIENT MESSAGE (OUTPATIENT)
Dept: CARDIOLOGY | Facility: CLINIC | Age: 82
End: 2022-05-17
Payer: MEDICARE

## 2022-05-18 ENCOUNTER — TELEPHONE (OUTPATIENT)
Dept: CARDIOLOGY | Facility: CLINIC | Age: 82
End: 2022-05-18
Payer: MEDICARE

## 2022-06-13 DIAGNOSIS — E03.9 HYPOTHYROIDISM, UNSPECIFIED TYPE: ICD-10-CM

## 2022-06-13 RX ORDER — LEVOTHYROXINE SODIUM 75 UG/1
TABLET ORAL
Qty: 90 TABLET | Refills: 3 | Status: SHIPPED | OUTPATIENT
Start: 2022-06-13 | End: 2023-05-31

## 2022-06-13 NOTE — TELEPHONE ENCOUNTER
Refill Routing Note   Medication(s) are not appropriate for processing by [facility] for the following reason(s):      - Patient has been seen in the ED/Hospital since the last PCP visit    [facility] action(s):  Defer          Medication reconciliation completed: No     Appointments  past 12m or future 3m with PCP    Date Provider   Last Visit   1/13/2022 Marcial Kan MD   Next Visit   Visit date not found Marcial Kan MD   ED visits in past 90 days: 0        Note composed:9:24 AM 06/13/2022

## 2022-06-13 NOTE — TELEPHONE ENCOUNTER
No new care gaps identified.  [facility] Embedded Care Gaps. Reference number: [id number]. 6/13/2022   9:17:02 AM JOSELINET

## 2022-08-31 DIAGNOSIS — Z78.0 MENOPAUSE: ICD-10-CM

## 2022-09-07 ENCOUNTER — TELEPHONE (OUTPATIENT)
Dept: CARDIOLOGY | Facility: CLINIC | Age: 82
End: 2022-09-07
Payer: MEDICARE

## 2022-09-07 NOTE — TELEPHONE ENCOUNTER
----- Message from Flora Asif MA sent at 9/7/2022  8:40 AM CDT -----  Contact: MICKI SANTOYO [[id number]]  Type: Needs Medical Advice    Who Called: MICKI SANTOYO [[id number]]  Best Call Back Number: [phone]  Inquiry/Question: Please call MICKI SANTOYO [[id number]] regarding medication concerns and check up appt    Thank you~

## 2022-10-14 ENCOUNTER — PATIENT MESSAGE (OUTPATIENT)
Dept: FAMILY MEDICINE | Facility: CLINIC | Age: 82
End: 2022-10-14
Payer: MEDICARE

## 2022-10-14 ENCOUNTER — PATIENT MESSAGE (OUTPATIENT)
Dept: CARDIOLOGY | Facility: CLINIC | Age: 82
End: 2022-10-14
Payer: MEDICARE

## 2022-10-14 DIAGNOSIS — I10 ESSENTIAL HYPERTENSION: ICD-10-CM

## 2022-10-14 RX ORDER — AMLODIPINE BESYLATE 5 MG/1
10 TABLET ORAL DAILY
Qty: 90 TABLET | Refills: 1 | Status: SHIPPED | OUTPATIENT
Start: 2022-10-14 | End: 2022-12-10

## 2022-10-14 NOTE — TELEPHONE ENCOUNTER
Care Due:                  Date            Visit Type   Department     Provider  --------------------------------------------------------------------------------                                [facility] FAM[facility]  Last Visit: 01-      FOLLOW UP    PRACTICE       Marcial Kan  Next Visit: None Scheduled  None         None Found                                                            Last  Test          Frequency    Reason                     Performed    Due Date  --------------------------------------------------------------------------------    Office Visit  12 months..  atorvastatin.............  01- 01-    [facility] Embedded Care Gaps. Reference number: [id number]. 10/14/2022   2:25:34 PM CDT

## 2022-10-19 DIAGNOSIS — Z12.31 ENCOUNTER FOR SCREENING MAMMOGRAM FOR MALIGNANT NEOPLASM OF BREAST: Primary | ICD-10-CM

## 2022-11-09 ENCOUNTER — HOSPITAL ENCOUNTER (OUTPATIENT)
Dept: RADIOLOGY | Facility: HOSPITAL | Age: 82
Discharge: HOME | End: 2022-11-09
Attending: FAMILY MEDICINE
Payer: MEDICARE

## 2022-11-09 ENCOUNTER — HOSPITAL ENCOUNTER (OUTPATIENT)
Dept: RADIOLOGY | Facility: HOSPITAL | Age: 82
Discharge: HOME | End: 2022-11-09
Attending: SPECIALIST
Payer: MEDICARE

## 2022-11-09 DIAGNOSIS — Z12.31 ENCOUNTER FOR SCREENING MAMMOGRAM FOR MALIGNANT NEOPLASM OF BREAST: ICD-10-CM

## 2022-11-09 DIAGNOSIS — Z78.0 MENOPAUSE: ICD-10-CM

## 2022-11-09 PROCEDURE — 77080 DXA BONE DENSITY AXIAL: CPT | Mod: TC,PO

## 2022-11-09 PROCEDURE — 77063 BREAST TOMOSYNTHESIS BI: CPT | Mod: TC,PO

## 2022-11-09 PROCEDURE — 77067 SCR MAMMO BI INCL CAD: CPT | Mod: TC,PO

## 2022-11-10 ENCOUNTER — PATIENT MESSAGE (OUTPATIENT)
Dept: FAMILY MEDICINE | Facility: CLINIC | Age: 82
End: 2022-11-10
Payer: MEDICARE

## 2022-11-14 NOTE — TELEPHONE ENCOUNTER
Call placed to patient for notification. Patient verbalized understanding. Patient states she has an existing appointment scheduled with Dr. Kan in February 2023. States she prefers to wait and discuss this with Dr. Kan at upcoming appointment. Will send follow up message to Dr. Kan for notification.

## 2022-11-14 NOTE — TELEPHONE ENCOUNTER
There are three other bisphosphonates, one of them is IV the other two are oral.  Actonel has fewer side effects than the Fosamax but that mainly applies to the GI side effects which are the most common among all of them.  The bone necrosis of the jaw and the hip are the most severe, are actually fairly rare, and are pretty evenly distributed among the other drugs as well.    Prolia is a different family of medication and has less of the common GI side effects than the bisphosphonates but also can cause osteonecrosis at approximately the same rate as the bisphosphonates.    As far as the severe side effects go, they are rare among all of the drugs but all of them cause them about equally as often.

## 2023-02-07 DIAGNOSIS — Z00.00 ENCOUNTER FOR MEDICARE ANNUAL WELLNESS EXAM: ICD-10-CM

## 2023-02-09 DIAGNOSIS — Z00.00 ENCOUNTER FOR MEDICARE ANNUAL WELLNESS EXAM: ICD-10-CM

## 2023-02-15 ENCOUNTER — TELEPHONE (OUTPATIENT)
Dept: FAMILY MEDICINE | Facility: CLINIC | Age: 83
End: 2023-02-15
Payer: MEDICARE

## 2023-02-15 NOTE — TELEPHONE ENCOUNTER
..I called the patient to confirm her appointment that she has with Dr. Kan on 02/15/2023 at 2:20pm, no answer left voicemail to return our call. I will send the patient a portal message as well.

## 2023-02-16 ENCOUNTER — OFFICE VISIT (OUTPATIENT)
Dept: FAMILY MEDICINE | Facility: CLINIC | Age: 83
End: 2023-02-16
Attending: FAMILY MEDICINE
Payer: MEDICARE

## 2023-02-16 VITALS
RESPIRATION RATE: 17 BRPM | HEIGHT: 63 IN | OXYGEN SATURATION: 96 % | SYSTOLIC BLOOD PRESSURE: 120 MMHG | BODY MASS INDEX: 34.73 KG/M2 | WEIGHT: 196 LBS | HEART RATE: 60 BPM | DIASTOLIC BLOOD PRESSURE: 64 MMHG | TEMPERATURE: 98 F

## 2023-02-16 DIAGNOSIS — M17.12 PRIMARY OSTEOARTHRITIS OF LEFT KNEE: ICD-10-CM

## 2023-02-16 DIAGNOSIS — I10 ESSENTIAL HYPERTENSION: ICD-10-CM

## 2023-02-16 DIAGNOSIS — E03.9 ACQUIRED HYPOTHYROIDISM: Chronic | ICD-10-CM

## 2023-02-16 DIAGNOSIS — Z79.01 ANTICOAGULANT LONG-TERM USE: ICD-10-CM

## 2023-02-16 DIAGNOSIS — I65.23 BILATERAL CAROTID ARTERY STENOSIS: ICD-10-CM

## 2023-02-16 DIAGNOSIS — K21.9 GASTROESOPHAGEAL REFLUX DISEASE WITHOUT ESOPHAGITIS: Chronic | ICD-10-CM

## 2023-02-16 DIAGNOSIS — Z00.00 ENCOUNTER FOR PREVENTIVE HEALTH EXAMINATION: Primary | ICD-10-CM

## 2023-02-16 DIAGNOSIS — E78.2 MIXED HYPERLIPIDEMIA: Chronic | ICD-10-CM

## 2023-02-16 DIAGNOSIS — Z98.84 HISTORY OF LAPAROSCOPIC ADJUSTABLE GASTRIC BANDING: ICD-10-CM

## 2023-02-16 PROBLEM — E66.01 CLASS 2 SEVERE OBESITY WITH SERIOUS COMORBIDITY AND BODY MASS INDEX (BMI) OF 39.0 TO 39.9 IN ADULT: Status: RESOLVED | Noted: 2019-05-17 | Resolved: 2023-02-16

## 2023-02-16 PROBLEM — E66.812 CLASS 2 SEVERE OBESITY WITH SERIOUS COMORBIDITY AND BODY MASS INDEX (BMI) OF 39.0 TO 39.9 IN ADULT: Status: RESOLVED | Noted: 2019-05-17 | Resolved: 2023-02-16

## 2023-02-16 PROCEDURE — 99999 PR PBB SHADOW E&M-EST. PATIENT-LVL IV: CPT | Mod: PBBFAC,HCNC,, | Performed by: FAMILY MEDICINE

## 2023-02-16 PROCEDURE — 1159F MED LIST DOCD IN RCRD: CPT | Mod: HCNC,CPTII,S$GLB, | Performed by: FAMILY MEDICINE

## 2023-02-16 PROCEDURE — 1160F RVW MEDS BY RX/DR IN RCRD: CPT | Mod: HCNC,CPTII,S$GLB, | Performed by: FAMILY MEDICINE

## 2023-02-16 PROCEDURE — 99397 PR PREVENTIVE VISIT,EST,65 & OVER: ICD-10-PCS | Mod: HCNC,S$GLB,, | Performed by: FAMILY MEDICINE

## 2023-02-16 PROCEDURE — 1159F PR MEDICATION LIST DOCUMENTED IN MEDICAL RECORD: ICD-10-PCS | Mod: HCNC,CPTII,S$GLB, | Performed by: FAMILY MEDICINE

## 2023-02-16 PROCEDURE — 99999 PR PBB SHADOW E&M-EST. PATIENT-LVL IV: ICD-10-PCS | Mod: PBBFAC,HCNC,, | Performed by: FAMILY MEDICINE

## 2023-02-16 PROCEDURE — 1126F AMNT PAIN NOTED NONE PRSNT: CPT | Mod: HCNC,CPTII,S$GLB, | Performed by: FAMILY MEDICINE

## 2023-02-16 PROCEDURE — 1126F PR PAIN SEVERITY QUANTIFIED, NO PAIN PRESENT: ICD-10-PCS | Mod: HCNC,CPTII,S$GLB, | Performed by: FAMILY MEDICINE

## 2023-02-16 PROCEDURE — 3078F PR MOST RECENT DIASTOLIC BLOOD PRESSURE < 80 MM HG: ICD-10-PCS | Mod: HCNC,CPTII,S$GLB, | Performed by: FAMILY MEDICINE

## 2023-02-16 PROCEDURE — 3288F FALL RISK ASSESSMENT DOCD: CPT | Mod: HCNC,CPTII,S$GLB, | Performed by: FAMILY MEDICINE

## 2023-02-16 PROCEDURE — 1101F PT FALLS ASSESS-DOCD LE1/YR: CPT | Mod: HCNC,CPTII,S$GLB, | Performed by: FAMILY MEDICINE

## 2023-02-16 PROCEDURE — 3288F PR FALLS RISK ASSESSMENT DOCUMENTED: ICD-10-PCS | Mod: HCNC,CPTII,S$GLB, | Performed by: FAMILY MEDICINE

## 2023-02-16 PROCEDURE — 99499 RISK ADDL DX/OHS AUDIT: ICD-10-PCS | Mod: S$GLB,,, | Performed by: FAMILY MEDICINE

## 2023-02-16 PROCEDURE — 3078F DIAST BP <80 MM HG: CPT | Mod: HCNC,CPTII,S$GLB, | Performed by: FAMILY MEDICINE

## 2023-02-16 PROCEDURE — 3074F PR MOST RECENT SYSTOLIC BLOOD PRESSURE < 130 MM HG: ICD-10-PCS | Mod: HCNC,CPTII,S$GLB, | Performed by: FAMILY MEDICINE

## 2023-02-16 PROCEDURE — 1160F PR REVIEW ALL MEDS BY PRESCRIBER/CLIN PHARMACIST DOCUMENTED: ICD-10-PCS | Mod: HCNC,CPTII,S$GLB, | Performed by: FAMILY MEDICINE

## 2023-02-16 PROCEDURE — 1101F PR PT FALLS ASSESS DOC 0-1 FALLS W/OUT INJ PAST YR: ICD-10-PCS | Mod: HCNC,CPTII,S$GLB, | Performed by: FAMILY MEDICINE

## 2023-02-16 PROCEDURE — 99397 PER PM REEVAL EST PAT 65+ YR: CPT | Mod: HCNC,S$GLB,, | Performed by: FAMILY MEDICINE

## 2023-02-16 PROCEDURE — 3074F SYST BP LT 130 MM HG: CPT | Mod: HCNC,CPTII,S$GLB, | Performed by: FAMILY MEDICINE

## 2023-02-16 PROCEDURE — 99499 UNLISTED E&M SERVICE: CPT | Mod: S$GLB,,, | Performed by: FAMILY MEDICINE

## 2023-02-16 RX ORDER — AMLODIPINE BESYLATE 10 MG/1
10 TABLET ORAL DAILY
Qty: 90 TABLET | Refills: 3 | Status: SHIPPED | OUTPATIENT
Start: 2023-02-16 | End: 2024-01-16

## 2023-02-16 RX ORDER — CLOPIDOGREL BISULFATE 75 MG/1
75 TABLET ORAL DAILY
Qty: 90 TABLET | Refills: 3 | Status: SHIPPED | OUTPATIENT
Start: 2023-02-16 | End: 2024-02-19 | Stop reason: SDUPTHER

## 2023-02-16 RX ORDER — ESTRADIOL 10 UG/1
INSERT VAGINAL
COMMUNITY
Start: 2023-01-18 | End: 2024-02-19

## 2023-02-16 NOTE — PROGRESS NOTES
Subjective:       Patient ID: Brandi Flynn is a 82 y.o. female.    Chief Complaint: Annual Exam (Pt states that she is here for her annual exam )    82-year-old female coming in for annual exam and follow-up of multiple medical problems.  She recently had a DEXA scan with the hip showing osteoporosis with an increased fracture risk.  The patient was offered Fosamax and Actonel but declined because a neighbor had had Fosamax and wound up with some jaw necrosis.  We had an extensive discussion of options including Prolia what she was informed that most likely her insurance would not cover the Prolia if she had not tried and failed one of the oral agents.  She is going to work on her calcium and vitamin-D supplementation and get as much weight-bearing exercise as she can tolerate since she does have osteoarthritis of the left knee.  She has a history of a TIA in 2012 that involved a blood clot in her eye and she is on Plavix.  She does not like the bruising but she does not want to risk losing her vision.  She has hypertension, hyperlipidemia, hypothyroidism, left carotid stenosis of 40% with a right side stenosis of 20% on a recent CTA.  She has overactive bladder, hepatitis-C treated, reflux, osteoporosis, osteoarthritis and sleep apnea.  She is not fasting today but will do her lab tomorrow    Past Medical History:  1960: Blood transfusion  No date: GERD (gastroesophageal reflux disease)  No date: Hepatitis C      Comment:  treated six months by Dr. Gaspar with interferon  No date: History of hepatitis C  No date: Hyperlipidemia  No date: Hypertension  No date: Hypothyroidism  No date: Obesity  12/9/2015: Primary osteoarthritis of left knee  No date: Sleep apnea  No date: Stroke      Comment:  TIA 2012  No date: TIA (transient ischemic attack)  2018: Vision loss of right eye      Comment:  Dr Eldridge Right eye blood clot     Past Surgical History:  2015: ABDOMINAL SURGERY      Comment:  SBO from lap band wraping  around small bowel, lap                untwisted  No date: ADENOIDECTOMY  2014: BREAST BIOPSY      Comment:  needle biopsy   No date: CATARACT EXTRACTION, BILATERAL; Bilateral  No date:  SECTION  : CHOLECYSTECTOMY  2011: COLONOSCOPY      Comment:  Dr. Alvares, 5 year recheck  2014: EYE SURGERY      Comment:  bilateral cataract Dr Carrillo   10/2020: EYE SURGERY      Comment:  bottom lid  No date: HEMORRHOID SURGERY  : HYSTERECTOMY  2008: INCONTINENCE SURGERY      Comment:  sling  : LAPAROSCOPIC GASTRIC BANDING  No date: LAPAROSCOPIC NISSEN FUNDOPLICATION      Comment:  Dr. Rutherford  No date: TONSILLECTOMY    Review of patient's family history indicates:    Social History    Tobacco Use      Smoking status: Former        Packs/day: 0.50        Years: 5.00        Pack years: 2.5        Types: Cigarettes      Smokeless tobacco: Never    Alcohol use: No    Drug use: No    Current Outpatient Medications on File Prior to Visit:  aspirin (ECOTRIN) 81 MG EC tablet, Take 81 mg by mouth once daily., Disp: , Rfl:   atorvastatin (LIPITOR) 40 MG tablet, Take 1 tablet (40 mg total) by mouth every evening., Disp: 90 tablet, Rfl: 0  estradioL (VAGIFEM) 10 mcg Tab, Place vaginally., Disp: , Rfl:   levothyroxine (SYNTHROID) 75 MCG tablet, TAKE ONE TABLET BY MOUTH ONCE DAILY, Disp: 90 tablet, Rfl: 3  mag hydrox/aluminum hyd/simeth (MAALOX ORAL), Take 1 Dose by mouth as needed., Disp: , Rfl:   solifenacin (VESICARE) 5 MG tablet, Take 5 mg by mouth once daily., Disp: , Rfl:   UNABLE TO FIND, Daily. Folate 800 mcg  b12 1 mg, Disp: , Rfl:   UNABLE TO FIND, once daily. Vitamin A, D3, and K2, Disp: , Rfl:   UNABLE TO FIND, Take by mouth once daily. Dhea SR 5 mg takes 2 in am, Disp: , Rfl:   VIT A/VIT C/VIT E/ZINC/COPPER (PRESERVISION AREDS ORAL), Take 1 capsule by mouth 2 (two) times daily., Disp: , Rfl:   [DISCONTINUED] amLODIPine (NORVASC) 5 MG tablet, TAKE 2 TABLETS (10 MG TOTAL) BY MOUTH ONCE DAILY., Disp: 90 tablet,  Rfl: 0  [DISCONTINUED] clopidogreL (PLAVIX) 75 mg tablet, TAKE ONE TABLET BY MOUTH ONCE DAILY, Disp: 90 tablet, Rfl: 1    No current facility-administered medications on file prior to visit.        Review of Systems   Constitutional:  Negative for chills, diaphoresis, fatigue, fever and unexpected weight change.   HENT:  Positive for dental problem (Pain in the right jaw that tends to occur at night). Negative for congestion, ear pain, hearing loss, postnasal drip, sinus pressure and tinnitus.    Eyes:  Negative for itching and visual disturbance.   Respiratory:  Negative for cough, chest tightness, shortness of breath and wheezing.    Cardiovascular:  Negative for chest pain, palpitations and leg swelling.   Gastrointestinal:  Positive for abdominal pain (Occasional burning epigastric pain). Negative for blood in stool, constipation, diarrhea, nausea and vomiting.   Genitourinary:  Negative for dysuria, frequency and hematuria.   Musculoskeletal:  Negative for arthralgias, back pain, joint swelling and myalgias.   Neurological:  Negative for dizziness and headaches.   Hematological:  Negative for adenopathy.   Psychiatric/Behavioral:  Negative for sleep disturbance. The patient is not nervous/anxious.      Objective:      Physical Exam  Vitals and nursing note reviewed.   Constitutional:       General: She is not in acute distress.     Appearance: Normal appearance. She is well-developed. She is obese. She is not ill-appearing, toxic-appearing or diaphoretic.      Comments: Good blood pressure control  Obese with a BMI of 34.7 she is up 10.1 lb since her January 13, 2022 checkup   HENT:      Head: Normocephalic and atraumatic.      Right Ear: Tympanic membrane, ear canal and external ear normal. There is no impacted cerumen.      Left Ear: Tympanic membrane, ear canal and external ear normal. There is no impacted cerumen.      Nose: Nose normal. No congestion or rhinorrhea.      Mouth/Throat:      Mouth: Mucous  membranes are moist.      Pharynx: Oropharynx is clear. No oropharyngeal exudate or posterior oropharyngeal erythema.      Comments: No TMJ tenderness or asymmetric opening and closing.  No click palpable or audible  Eyes:      General: No scleral icterus.        Right eye: No discharge.         Left eye: No discharge.      Conjunctiva/sclera: Conjunctivae normal.      Pupils: Pupils are equal, round, and reactive to light.   Neck:      Thyroid: No thyromegaly.      Vascular: No carotid bruit (No audible carotid bruit, good pulses bilaterally) or JVD.   Cardiovascular:      Rate and Rhythm: Normal rate and regular rhythm.      Heart sounds: Normal heart sounds. No murmur heard.    No friction rub. No gallop.   Pulmonary:      Effort: Pulmonary effort is normal. No respiratory distress.      Breath sounds: Normal breath sounds. No stridor. No wheezing, rhonchi or rales.   Chest:      Chest wall: No tenderness.   Abdominal:      General: Bowel sounds are normal. There is no distension.      Palpations: Abdomen is soft. There is no mass.      Tenderness: There is no abdominal tenderness. There is no guarding or rebound.      Hernia: No hernia is present.   Musculoskeletal:         General: No swelling, tenderness, deformity or signs of injury. Normal range of motion.      Cervical back: Normal range of motion and neck supple. No rigidity or tenderness.      Right lower leg: No edema.   Lymphadenopathy:      Cervical: No cervical adenopathy.   Skin:     General: Skin is warm and dry.      Coloration: Skin is not jaundiced or pale.      Findings: No bruising, erythema, lesion or rash.   Neurological:      General: No focal deficit present.      Mental Status: She is alert and oriented to person, place, and time. Mental status is at baseline.      Cranial Nerves: No cranial nerve deficit.      Sensory: No sensory deficit.      Motor: No weakness.      Coordination: Coordination normal.      Gait: Gait normal.      Deep  Tendon Reflexes: Reflexes are normal and symmetric. Reflexes normal.   Psychiatric:         Mood and Affect: Mood normal.         Behavior: Behavior normal.         Thought Content: Thought content normal.       Assessment:       1. Encounter for preventive health examination    2. Essential hypertension    3. Mixed hyperlipidemia    4. Acquired hypothyroidism    5. History of laparoscopic adjustable gastric banding    6. Gastroesophageal reflux disease without esophagitis    7. Primary osteoarthritis of left knee    8. Anticoagulant long-term use    9. Bilateral carotid artery stenosis    10. BMI 34.0-34.9,adult          Plan:       1. Encounter for preventive health examination  - Comprehensive Metabolic Panel; Future  - Lipid Panel; Future  - CBC Auto Differential; Future    2. Essential hypertension  Good control, no changes needed on the amlodipine.  She is taking two 5 mg daily and we are switching her to a 10 mg once daily  - Comprehensive Metabolic Panel; Future  - Lipid Panel; Future  - CBC Auto Differential; Future  - amLODIPine (NORVASC) 10 MG tablet; Take 1 tablet (10 mg total) by mouth once daily.  Dispense: 90 tablet; Refill: 3    3. Mixed hyperlipidemia  Await lipid panel results for possible change in Lipitor  - Comprehensive Metabolic Panel; Future  - Lipid Panel; Future    4. Acquired hypothyroidism  Await thyroid results for possible change in Synthroid 75 mcg  - TSH; Future    5. History of laparoscopic adjustable gastric banding  Still functioning, followed by Dr. Boston and Dr. Rutherford    6. Gastroesophageal reflux disease without esophagitis  Mildly symptomatic.  Discussed reflux control measures    7. Primary osteoarthritis of left knee  Stable, discussed joint friendly exercise including aquatics    8. Anticoagulant long-term use  No major side effect issues with the Plavix, she does have some bruising but not excessive.  Discussed going to every other day dosing but she does not want to take  a chance on having any loss of vision  - clopidogreL (PLAVIX) 75 mg tablet; Take 1 tablet (75 mg total) by mouth once daily.  Dispense: 90 tablet; Refill: 3    9. Bilateral carotid artery stenosis  Continue to monitor    10. BMI 34.0-34.9,adult  Reinforced exercise and weight control

## 2023-02-17 ENCOUNTER — LAB VISIT (OUTPATIENT)
Dept: LAB | Facility: HOSPITAL | Age: 83
End: 2023-02-17
Attending: FAMILY MEDICINE
Payer: MEDICARE

## 2023-02-17 DIAGNOSIS — E78.2 MIXED HYPERLIPIDEMIA: Chronic | ICD-10-CM

## 2023-02-17 DIAGNOSIS — I10 ESSENTIAL HYPERTENSION: ICD-10-CM

## 2023-02-17 DIAGNOSIS — Z00.00 ENCOUNTER FOR PREVENTIVE HEALTH EXAMINATION: ICD-10-CM

## 2023-02-17 DIAGNOSIS — E03.9 ACQUIRED HYPOTHYROIDISM: Chronic | ICD-10-CM

## 2023-02-17 LAB
ALBUMIN SERPL BCP-MCNC: 3.6 G/DL (ref 3.5–5.2)
ALP SERPL-CCNC: 88 U/L (ref 55–135)
ALT SERPL W/O P-5'-P-CCNC: 13 U/L (ref 10–44)
ANION GAP SERPL CALC-SCNC: 14 MMOL/L (ref 8–16)
AST SERPL-CCNC: 19 U/L (ref 10–40)
BASOPHILS # BLD AUTO: 0.04 K/UL (ref 0–0.2)
BASOPHILS NFR BLD: 0.8 % (ref 0–1.9)
BILIRUB SERPL-MCNC: 0.6 MG/DL (ref 0.1–1)
BUN SERPL-MCNC: 24 MG/DL (ref 8–23)
CALCIUM SERPL-MCNC: 9.5 MG/DL (ref 8.7–10.5)
CHLORIDE SERPL-SCNC: 106 MMOL/L (ref 95–110)
CHOLEST SERPL-MCNC: 110 MG/DL (ref 120–199)
CHOLEST/HDLC SERPL: 2.6 {RATIO} (ref 2–5)
CO2 SERPL-SCNC: 24 MMOL/L (ref 23–29)
CREAT SERPL-MCNC: 0.8 MG/DL (ref 0.5–1.4)
DIFFERENTIAL METHOD: ABNORMAL
EOSINOPHIL # BLD AUTO: 0.1 K/UL (ref 0–0.5)
EOSINOPHIL NFR BLD: 2.1 % (ref 0–8)
ERYTHROCYTE [DISTWIDTH] IN BLOOD BY AUTOMATED COUNT: 13.7 % (ref 11.5–14.5)
EST. GFR  (NO RACE VARIABLE): >60 ML/MIN/1.73 M^2
GLUCOSE SERPL-MCNC: 109 MG/DL (ref 70–110)
HCT VFR BLD AUTO: 42.1 % (ref 37–48.5)
HDLC SERPL-MCNC: 43 MG/DL (ref 40–75)
HDLC SERPL: 39.1 % (ref 20–50)
HGB BLD-MCNC: 13.2 G/DL (ref 12–16)
IMM GRANULOCYTES # BLD AUTO: 0.01 K/UL (ref 0–0.04)
IMM GRANULOCYTES NFR BLD AUTO: 0.2 % (ref 0–0.5)
LDLC SERPL CALC-MCNC: 52 MG/DL (ref 63–159)
LYMPHOCYTES # BLD AUTO: 1.2 K/UL (ref 1–4.8)
LYMPHOCYTES NFR BLD: 25.2 % (ref 18–48)
MCH RBC QN AUTO: 29.8 PG (ref 27–31)
MCHC RBC AUTO-ENTMCNC: 31.4 G/DL (ref 32–36)
MCV RBC AUTO: 95 FL (ref 82–98)
MONOCYTES # BLD AUTO: 0.6 K/UL (ref 0.3–1)
MONOCYTES NFR BLD: 12.7 % (ref 4–15)
NEUTROPHILS # BLD AUTO: 2.8 K/UL (ref 1.8–7.7)
NEUTROPHILS NFR BLD: 59 % (ref 38–73)
NONHDLC SERPL-MCNC: 67 MG/DL
NRBC BLD-RTO: 0 /100 WBC
PLATELET # BLD AUTO: 182 K/UL (ref 150–450)
PMV BLD AUTO: 10.6 FL (ref 9.2–12.9)
POTASSIUM SERPL-SCNC: 3.9 MMOL/L (ref 3.5–5.1)
PROT SERPL-MCNC: 6.6 G/DL (ref 6–8.4)
RBC # BLD AUTO: 4.43 M/UL (ref 4–5.4)
SODIUM SERPL-SCNC: 144 MMOL/L (ref 136–145)
TRIGL SERPL-MCNC: 75 MG/DL (ref 30–150)
TSH SERPL DL<=0.005 MIU/L-ACNC: 2.69 UIU/ML (ref 0.4–4)
WBC # BLD AUTO: 4.81 K/UL (ref 3.9–12.7)

## 2023-02-17 PROCEDURE — 84443 ASSAY THYROID STIM HORMONE: CPT | Mod: HCNC | Performed by: FAMILY MEDICINE

## 2023-02-17 PROCEDURE — 36415 COLL VENOUS BLD VENIPUNCTURE: CPT | Mod: HCNC,PO | Performed by: FAMILY MEDICINE

## 2023-02-17 PROCEDURE — 80061 LIPID PANEL: CPT | Mod: HCNC | Performed by: FAMILY MEDICINE

## 2023-02-17 PROCEDURE — 85025 COMPLETE CBC W/AUTO DIFF WBC: CPT | Mod: HCNC | Performed by: FAMILY MEDICINE

## 2023-02-17 PROCEDURE — 80053 COMPREHEN METABOLIC PANEL: CPT | Mod: HCNC | Performed by: FAMILY MEDICINE

## 2023-02-20 DIAGNOSIS — Z79.01 ANTICOAGULANT LONG-TERM USE: ICD-10-CM

## 2023-02-20 NOTE — TELEPHONE ENCOUNTER
No new care gaps identified.  [facility] Embedded Care Gaps. Reference number: [id number]. 2/20/2023   11:49:54 AM CST

## 2023-02-21 RX ORDER — CLOPIDOGREL BISULFATE 75 MG/1
TABLET ORAL
Qty: 90 TABLET | Refills: 1 | OUTPATIENT
Start: 2023-02-21

## 2023-02-21 NOTE — TELEPHONE ENCOUNTER
Refill Decision Note   Brandi Flynn  is requesting a refill authorization.  Brief Assessment and Rationale for Refill:  Quick Discontinue     Medication Therapy Plan:  Receipt confirmed by pharmacy (2/16/2023  4:12 PM CST)    Medication Reconciliation Completed: No   Comments:     No Care Gaps recommended.     Note composed:11:54 AM 02/21/2023

## 2023-02-27 ENCOUNTER — TELEPHONE (OUTPATIENT)
Dept: FAMILY MEDICINE | Facility: CLINIC | Age: 83
End: 2023-02-27
Payer: MEDICARE

## 2023-02-27 DIAGNOSIS — E78.5 HYPERLIPIDEMIA, UNSPECIFIED HYPERLIPIDEMIA TYPE: ICD-10-CM

## 2023-02-27 NOTE — TELEPHONE ENCOUNTER
----- Message from Peter Rawls sent at 2/27/2023  1:32 PM CST -----  Contact: pt  Type: Needs Medical Advice  Who Called:  pt       Best Call Back Number: [phone]    Additional Information: pt states she needs clarification on atorvastatin (LIPITOR) 40 MG tablet she states is provider told her to take two a day and not one a day no more she states she runs out quick when she takes two a day and would like a different rx. Please advise.

## 2023-02-27 NOTE — TELEPHONE ENCOUNTER
"Patient states she takes Atorvastatin 40 mg (two) tablets daily to equal 80 mg daily dosage. States she is unable to swallow the 80 mg tablet, and that is why she takes two of the 40 mg tablets. Patient states last prescription sent to pharmacy states take one 40 mg tablet. Patient states this is incorrect, and as a result she has run out of her medication to early. Patient is requesting refill to [facility] on file. Patient states " Please be sure to send the 40 mg take 2 tablets so I will have enough to last."   Please advise. Thank you.    "

## 2023-02-28 RX ORDER — ATORVASTATIN CALCIUM 40 MG/1
80 TABLET, FILM COATED ORAL NIGHTLY
Qty: 180 TABLET | Refills: 3 | Status: SHIPPED | OUTPATIENT
Start: 2023-02-28 | End: 2023-12-31

## 2023-02-28 NOTE — TELEPHONE ENCOUNTER
Call placed to patient for notification.  Patient confirms she is taking Atorvastatin 40 mg (two) tablets daily to equal 80 mg daily dosage. Patient states she is completely out of medication. Requesting for prescription to be sent to [facility] today. Will send follow up message to Dr. Kan for notification.

## 2023-02-28 NOTE — TELEPHONE ENCOUNTER
The order was correct up until December 28, 2022.  Yesi refilled it in my absence and was told that the patient said she was only taking one a day so she changed the order to reflect that.

## 2023-03-03 ENCOUNTER — PES CALL (OUTPATIENT)
Dept: ADMINISTRATIVE | Facility: CLINIC | Age: 83
End: 2023-03-03
Payer: MEDICARE

## 2023-03-21 ENCOUNTER — PES CALL (OUTPATIENT)
Dept: ADMINISTRATIVE | Facility: CLINIC | Age: 83
End: 2023-03-21
Payer: MEDICARE

## 2023-04-03 NOTE — PLAN OF CARE
04/27/22 0838   Final Note   Assessment Type Final Discharge Note   Anticipated Discharge Disposition Home   Post-Acute Status   Post-Acute Authorization Other   Other Status No Post-Acute Service Needs   Discharge Delays None known at this time      1.55

## 2023-05-31 DIAGNOSIS — E03.9 HYPOTHYROIDISM, UNSPECIFIED TYPE: ICD-10-CM

## 2023-05-31 RX ORDER — LEVOTHYROXINE SODIUM 75 UG/1
TABLET ORAL
Qty: 90 TABLET | Refills: 2 | Status: SHIPPED | OUTPATIENT
Start: 2023-05-31

## 2023-12-30 DIAGNOSIS — E78.5 HYPERLIPIDEMIA, UNSPECIFIED HYPERLIPIDEMIA TYPE: ICD-10-CM

## 2023-12-30 NOTE — TELEPHONE ENCOUNTER
Care Due:                  Date            Visit Type   Department     Provider  --------------------------------------------------------------------------------                                EP -                              PRIMARY      [facility]  Last Visit: 02-      CARE ([facility])   PRACTICE       Marcial Kan                              EP -                              PRIMARY      [facility]  Next Visit: 02-      CARE ([facility])   PRACTICE       Marcial Kan                                                            Last  Test          Frequency    Reason                     Performed    Due Date  --------------------------------------------------------------------------------    CBC.........  12 months..  clopidogreL..............  02- 02-    CMP.........  12 months..  atorvastatin.............  02- 02-    Lipid Panel.  12 months..  atorvastatin.............  02- 02-    [facility] Embedded Care Due Messages. Reference number: [id number].   12/30/2023 8:49:00 AM CST

## 2023-12-31 RX ORDER — ATORVASTATIN CALCIUM 40 MG/1
80 TABLET, FILM COATED ORAL
Qty: 180 TABLET | Refills: 0 | Status: SHIPPED | OUTPATIENT
Start: 2023-12-31

## 2024-01-01 NOTE — TELEPHONE ENCOUNTER
Provider Staff:  Action required for this patient     Please see care gap opportunities below in Care Due Message.    Thanks!  [facility]     Appointments      Date Provider   Last Visit   2/16/2023 Marcial Kan MD   Next Visit   2/19/2024 Marcial Kan MD      Refill Decision Note   Brandi Flynn  is requesting a refill authorization.  Brief Assessment and Rationale for Refill:  Approve     Medication Therapy Plan:         Comments:     Note composed:10:36 PM 12/31/2023

## 2024-01-08 DIAGNOSIS — Z12.31 ENCOUNTER FOR SCREENING MAMMOGRAM FOR MALIGNANT NEOPLASM OF BREAST: Primary | ICD-10-CM

## 2024-01-15 DIAGNOSIS — I10 ESSENTIAL HYPERTENSION: ICD-10-CM

## 2024-01-15 NOTE — TELEPHONE ENCOUNTER
No care due was identified.  [facility] Embedded Care Due Messages. Reference number: [id number].   1/15/2024 12:24:32 PM CST

## 2024-01-16 RX ORDER — AMLODIPINE BESYLATE 10 MG/1
10 TABLET ORAL
Qty: 90 TABLET | Refills: 0 | Status: SHIPPED | OUTPATIENT
Start: 2024-01-16

## 2024-01-16 NOTE — TELEPHONE ENCOUNTER
Brandi Flynn  is requesting a refill authorization.  Brief Assessment and Rationale for Refill:  Approve     Medication Therapy Plan:         Comments:     Note composed:5:31 AM 01/16/2024

## 2024-01-25 ENCOUNTER — HOSPITAL ENCOUNTER (OUTPATIENT)
Dept: RADIOLOGY | Facility: HOSPITAL | Age: 84
Discharge: HOME | End: 2024-01-25
Attending: SPECIALIST
Payer: MEDICARE

## 2024-01-25 VITALS — WEIGHT: 195 LBS | HEIGHT: 63 IN | BODY MASS INDEX: 34.55 KG/M2

## 2024-01-25 DIAGNOSIS — Z12.31 ENCOUNTER FOR SCREENING MAMMOGRAM FOR MALIGNANT NEOPLASM OF BREAST: ICD-10-CM

## 2024-01-25 PROCEDURE — 77067 SCR MAMMO BI INCL CAD: CPT | Mod: TC,PO

## 2024-02-19 ENCOUNTER — OFFICE VISIT (OUTPATIENT)
Dept: FAMILY MEDICINE | Facility: CLINIC | Age: 84
End: 2024-02-19
Attending: FAMILY MEDICINE
Payer: MEDICARE

## 2024-02-19 VITALS
HEART RATE: 66 BPM | TEMPERATURE: 99 F | SYSTOLIC BLOOD PRESSURE: 128 MMHG | OXYGEN SATURATION: 97 % | WEIGHT: 196 LBS | BODY MASS INDEX: 34.73 KG/M2 | DIASTOLIC BLOOD PRESSURE: 68 MMHG | HEIGHT: 63 IN

## 2024-02-19 DIAGNOSIS — N32.81 OAB (OVERACTIVE BLADDER): ICD-10-CM

## 2024-02-19 DIAGNOSIS — Z00.00 ENCOUNTER FOR PREVENTIVE HEALTH EXAMINATION: Primary | ICD-10-CM

## 2024-02-19 DIAGNOSIS — I77.9 CAROTID ARTERY DISEASE, UNSPECIFIED LATERALITY, UNSPECIFIED TYPE: ICD-10-CM

## 2024-02-19 DIAGNOSIS — M17.12 PRIMARY OSTEOARTHRITIS OF LEFT KNEE: ICD-10-CM

## 2024-02-19 DIAGNOSIS — M81.0 OSTEOPOROSIS, UNSPECIFIED OSTEOPOROSIS TYPE, UNSPECIFIED PATHOLOGICAL FRACTURE PRESENCE: ICD-10-CM

## 2024-02-19 DIAGNOSIS — E03.9 ACQUIRED HYPOTHYROIDISM: Chronic | ICD-10-CM

## 2024-02-19 DIAGNOSIS — Z79.01 ANTICOAGULANT LONG-TERM USE: ICD-10-CM

## 2024-02-19 DIAGNOSIS — R42 DISEQUILIBRIUM: ICD-10-CM

## 2024-02-19 DIAGNOSIS — Z98.84 HISTORY OF LAPAROSCOPIC ADJUSTABLE GASTRIC BANDING: ICD-10-CM

## 2024-02-19 DIAGNOSIS — E78.2 MIXED HYPERLIPIDEMIA: Chronic | ICD-10-CM

## 2024-02-19 DIAGNOSIS — I10 ESSENTIAL HYPERTENSION: ICD-10-CM

## 2024-02-19 PROCEDURE — 1125F AMNT PAIN NOTED PAIN PRSNT: CPT | Mod: HCNC,CPTII,S$GLB, | Performed by: FAMILY MEDICINE

## 2024-02-19 PROCEDURE — 3078F DIAST BP <80 MM HG: CPT | Mod: HCNC,CPTII,S$GLB, | Performed by: FAMILY MEDICINE

## 2024-02-19 PROCEDURE — 1160F RVW MEDS BY RX/DR IN RCRD: CPT | Mod: HCNC,CPTII,S$GLB, | Performed by: FAMILY MEDICINE

## 2024-02-19 PROCEDURE — 3288F FALL RISK ASSESSMENT DOCD: CPT | Mod: HCNC,CPTII,S$GLB, | Performed by: FAMILY MEDICINE

## 2024-02-19 PROCEDURE — 99999 PR PBB SHADOW E&M-EST. PATIENT-LVL III: CPT | Mod: PBBFAC,HCNC,, | Performed by: FAMILY MEDICINE

## 2024-02-19 PROCEDURE — 1159F MED LIST DOCD IN RCRD: CPT | Mod: HCNC,CPTII,S$GLB, | Performed by: FAMILY MEDICINE

## 2024-02-19 PROCEDURE — 99397 PER PM REEVAL EST PAT 65+ YR: CPT | Mod: HCNC,S$GLB,, | Performed by: FAMILY MEDICINE

## 2024-02-19 PROCEDURE — 3074F SYST BP LT 130 MM HG: CPT | Mod: HCNC,CPTII,S$GLB, | Performed by: FAMILY MEDICINE

## 2024-02-19 PROCEDURE — 1101F PT FALLS ASSESS-DOCD LE1/YR: CPT | Mod: HCNC,CPTII,S$GLB, | Performed by: FAMILY MEDICINE

## 2024-02-19 RX ORDER — CLOPIDOGREL BISULFATE 75 MG/1
75 TABLET ORAL DAILY
Qty: 90 TABLET | Refills: 3 | Status: ON HOLD | OUTPATIENT
Start: 2024-02-19 | End: 2024-03-04

## 2024-02-19 NOTE — PROGRESS NOTES
Subjective:       Patient ID: Brandi Flynn is a 83 y.o. female.    Chief Complaint: Annual Exam    83-year-old female coming in for annual exam and follow-up of multiple medical problems.  She has been having problems with recurrent dizziness some of which appears to be benign positional vertigo where in she will turned to the right in bed and experience sudden onset of vertigo where turning to the left does not cause any problems or moving her head while sitting upright will sometimes cause symptoms.  In addition she gets some dizziness with or without vertigo when she gets up from a chair but that is only occasional and may be related to some dehydration.  She does not recall whether it was mostly in the morning or not.  She has a history of a TIA in 2012, retinal artery branch occlusion in the right eye, hypertension, hyperlipidemia, left carotid partial stenosis, overactive bladder, chronic anticoagulation with Plavix and aspirin, hypothyroidism, osteoporosis, reflux, hepatitis-c, small-bowel obstruction, osteoarthritis of the left knee in particular and sleep apnea.    Past Medical History:  1960: Blood transfusion  No date: GERD (gastroesophageal reflux disease)  No date: Hepatitis C      Comment:  treated six months by Dr. Gaspar with interferon  No date: History of hepatitis C  No date: Hyperlipidemia  No date: Hypertension  No date: Hypothyroidism  No date: Obesity  2015: Primary osteoarthritis of left knee  No date: Sleep apnea  No date: Stroke      Comment:  TIA   No date: TIA (transient ischemic attack)  2018: Vision loss of right eye      Comment:  Dr Eldridge Right eye blood clot     Past Surgical History:  2015: ABDOMINAL SURGERY      Comment:  SBO from lap band wraping around small bowel, lap                untwisted  No date: ADENOIDECTOMY  2014: BREAST BIOPSY      Comment:  needle biopsy   No date: CATARACT EXTRACTION, BILATERAL; Bilateral  No date:  SECTION  2001:  CHOLECYSTECTOMY  9/1/2011: COLONOSCOPY      Comment:  Dr. Alvares, 5 year recheck  2014: EYE SURGERY      Comment:  bilateral cataract Dr Carrillo   10/2020: EYE SURGERY      Comment:  bottom lid  No date: HEMORRHOID SURGERY  1989: HYSTERECTOMY  2008: INCONTINENCE SURGERY      Comment:  fei  2004: LAPAROSCOPIC GASTRIC BANDING  No date: LAPAROSCOPIC NISSEN FUNDOPLICATION      Comment:  Dr. Rutherford  No date: TONSILLECTOMY    Review of patient's family history indicates:  Problem: Diabetes      Relation: Mother          Age of Onset: (Not Specified)  Problem: Arthritis      Relation: Mother          Age of Onset: (Not Specified)          Comment: RA  Problem: Heart disease      Relation: Mother          Age of Onset: (Not Specified)          Comment: MI at 79   Problem: Diabetes      Relation: Sister          Age of Onset: (Not Specified)  Problem: Diverticulitis      Relation: Sister          Age of Onset: (Not Specified)  Problem: No Known Problems      Relation: Daughter          Age of Onset: (Not Specified)  Problem: Emphysema      Relation: Maternal Aunt          Age of Onset: (Not Specified)  Problem: Heart disease      Relation: Maternal Aunt          Age of Onset: (Not Specified)  Problem: Cancer      Relation: Maternal Uncle          Age of Onset: (Not Specified)  Problem: Early death      Relation: Maternal Grandfather          Age of Onset: 47  Problem: Heart disease      Relation: Maternal Grandfather          Age of Onset: (Not Specified)  Problem: Arthritis      Relation: Paternal Grandmother          Age of Onset: (Not Specified)  Problem: Heart disease      Relation: Paternal Grandfather          Age of Onset: (Not Specified)  Problem: Cancer      Relation: Paternal Grandfather          Age of Onset: (Not Specified)          Comment: lung heavy smoker   Problem: No Known Problems      Relation: Father          Age of Onset: (Not Specified)  Problem: No Known Problems      Relation: Son          Age of  Onset: (Not Specified)  Problem: No Known Problems      Relation: Paternal Uncle          Age of Onset: (Not Specified)  Problem: No Known Problems      Relation: Maternal Grandmother          Age of Onset: (Not Specified)  Problem: No Known Problems      Relation: Brother          Age of Onset: (Not Specified)  Problem: Arthritis      Relation: Daughter          Age of Onset: (Not Specified)    Social History    Tobacco Use      Smoking status: Former        Packs/day: 0.50        Years: 0.5 packs/day for 5.0 years (2.5 ttl pk-yrs)        Types: Cigarettes      Smokeless tobacco: Never    Alcohol use: No    Drug use: No    Current Outpatient Medications on File Prior to Visit:  amLODIPine (NORVASC) 10 MG tablet, TAKE 1 TABLET BY MOUTH ONCE DAILY., Disp: 90 tablet, Rfl: 0  aspirin (ECOTRIN) 81 MG EC tablet, Take 81 mg by mouth once daily., Disp: , Rfl:   atorvastatin (LIPITOR) 40 MG tablet, TAKE 2 TABLETS BY MOUTH IN THE EVENING, Disp: 180 tablet, Rfl: 0  levothyroxine (SYNTHROID) 75 MCG tablet, TAKE 1 TABLET BY MOUTH ONCE A DAY, Disp: 90 tablet, Rfl: 2  mag hydrox/aluminum hyd/simeth (MAALOX ORAL), Take 1 Dose by mouth as needed., Disp: , Rfl:   solifenacin (VESICARE) 5 MG tablet, Take 5 mg by mouth once daily., Disp: , Rfl:   UNABLE TO FIND, Daily. Folate 800 mcg  b12 1 mg, Disp: , Rfl:   vit C/E/Zn/coppr/lutein/zeaxan (PRESERVISION AREDS-2 ORAL), as directed Orally, Disp: , Rfl:   [DISCONTINUED] clopidogreL (PLAVIX) 75 mg tablet, Take 1 tablet (75 mg total) by mouth once daily., Disp: 90 tablet, Rfl: 3  [DISCONTINUED] estradioL (VAGIFEM) 10 mcg Tab, Place vaginally., Disp: , Rfl:   [DISCONTINUED] UNABLE TO FIND, once daily. Vitamin A, D3, and K2, Disp: , Rfl:   [DISCONTINUED] UNABLE TO FIND, Take by mouth once daily. Dhea SR 5 mg takes 2 in am, Disp: , Rfl:   [DISCONTINUED] VIT A/VIT C/VIT E/ZINC/COPPER (PRESERVISION AREDS ORAL), Take 1 capsule by mouth 2 (two) times daily., Disp: , Rfl:     No current  facility-administered medications on file prior to visit.          Review of Systems   Constitutional:  Negative for chills, diaphoresis, fatigue, fever and unexpected weight change.   HENT:  Negative for congestion, ear pain, hearing loss, postnasal drip, sinus pressure and sneezing.    Eyes:  Negative for itching and visual disturbance.   Respiratory:  Negative for cough, chest tightness, shortness of breath and wheezing.    Cardiovascular:  Negative for chest pain, palpitations and leg swelling.   Gastrointestinal:  Negative for abdominal pain, blood in stool, constipation, diarrhea, nausea and vomiting.   Genitourinary:  Negative for dysuria, frequency and hematuria.   Musculoskeletal:  Negative for arthralgias, back pain, joint swelling and myalgias.   Neurological:  Positive for dizziness and light-headedness. Negative for headaches.   Hematological:  Negative for adenopathy.   Psychiatric/Behavioral:  Negative for sleep disturbance. The patient is not nervous/anxious.        Objective:      Physical Exam  Vitals and nursing note reviewed.   Constitutional:       General: She is not in acute distress.     Appearance: Normal appearance. She is well-developed. She is obese. She is not ill-appearing, toxic-appearing or diaphoretic.      Comments: Good blood pressure control   Normal pulse with regular rhythm   Obese with a BMI of 34.7 she has not changed her weight since last year February 16, 2023   HENT:      Head: Normocephalic and atraumatic.      Right Ear: Tympanic membrane, ear canal and external ear normal. There is no impacted cerumen.      Left Ear: Tympanic membrane, ear canal and external ear normal. There is no impacted cerumen.      Nose: Nose normal. No congestion or rhinorrhea.      Mouth/Throat:      Mouth: Mucous membranes are moist.      Pharynx: Oropharynx is clear. No oropharyngeal exudate or posterior oropharyngeal erythema.   Eyes:      General: No scleral icterus.        Right eye: No  discharge.         Left eye: No discharge.      Extraocular Movements: Extraocular movements intact.      Conjunctiva/sclera: Conjunctivae normal.      Pupils: Pupils are equal, round, and reactive to light.   Neck:      Thyroid: No thyromegaly.      Vascular: No carotid bruit or JVD.   Cardiovascular:      Rate and Rhythm: Normal rate and regular rhythm.      Pulses: Normal pulses.      Heart sounds: Normal heart sounds. No murmur heard.     No friction rub. No gallop.   Pulmonary:      Effort: Pulmonary effort is normal. No respiratory distress.      Breath sounds: Normal breath sounds. No stridor. No wheezing, rhonchi or rales.   Chest:      Chest wall: No tenderness.   Abdominal:      General: Abdomen is flat. Bowel sounds are normal. There is no distension.      Palpations: Abdomen is soft. There is no mass.      Tenderness: There is no abdominal tenderness. There is no guarding or rebound.      Hernia: No hernia is present.   Genitourinary:     Vagina: Normal. No vaginal discharge.   Musculoskeletal:         General: No tenderness. Normal range of motion.      Cervical back: Normal range of motion and neck supple. No rigidity or tenderness.      Right lower leg: No edema (No significant pitting edema).      Left lower leg: No edema (No significant pitting edema).   Lymphadenopathy:      Cervical: No cervical adenopathy.   Skin:     General: Skin is warm and dry.      Coloration: Skin is not jaundiced or pale.      Findings: No bruising, erythema, lesion or rash.   Neurological:      General: No focal deficit present.      Mental Status: She is alert and oriented to person, place, and time. Mental status is at baseline.      Cranial Nerves: No cranial nerve deficit.      Sensory: No sensory deficit.      Motor: No weakness.      Coordination: Coordination normal.      Gait: Gait normal.      Deep Tendon Reflexes: Reflexes are normal and symmetric. Reflexes normal.   Psychiatric:         Mood and Affect: Mood  normal.         Behavior: Behavior normal.         Thought Content: Thought content normal.         Judgment: Judgment normal.         Assessment:       1. Encounter for preventive health examination    2. Anticoagulant long-term use    3. Essential hypertension    4. Mixed hyperlipidemia    5. Carotid artery disease, unspecified laterality, unspecified type    6. OAB (overactive bladder)-followed by urology Dr Greg Little     7. Osteoporosis, unspecified osteoporosis type, unspecified pathological fracture presence    8. Acquired hypothyroidism    9. History of laparoscopic adjustable gastric banding    10. Primary osteoarthritis of left knee    11. Disequilibrium    12. BMI 34.0-34.9,adult        Plan:       1. Encounter for preventive health examination  - Lipid Panel; Future  - CBC Auto Differential; Future  - Comprehensive Metabolic Panel; Future    2. Anticoagulant long-term use  No significant bruising or bleeding complications, check CBC  - clopidogreL (PLAVIX) 75 mg tablet; Take 1 tablet (75 mg total) by mouth once daily.  Dispense: 90 tablet; Refill: 3    3. Essential hypertension  Well controlled, she might consider cutting her amlodipine in half and doing 5 mg b.i.d. and see if it helps with some of her dizziness  - Lipid Panel; Future  - CBC Auto Differential; Future  - Comprehensive Metabolic Panel; Future    4. Mixed hyperlipidemia  Await lipid panel results  - Lipid Panel; Future  - Comprehensive Metabolic Panel; Future    5. Carotid artery disease, unspecified laterality, unspecified type  No carotid bruit present    6. OAB (overactive bladder)-followed by urology Dr Greg Little   On 10 mg VESIcare which may be causing some of her dizziness.  She could try holding it for a few days and see if the dizziness improves, it will then be a judgment call for her whether it is doing her more good than harm.    7. Osteoporosis, unspecified osteoporosis type, unspecified pathological fracture  presence  Stable    8. Acquired hypothyroidism  Await TSH for possible change to Synthroid 75 mcg daily  - TSH; Future    9. History of laparoscopic adjustable gastric banding  Hardware still in place in left upper quadrant.  Stable and no changes needed    10. Primary osteoarthritis of left knee  Patient would like to try 7.5 mg meloxicam for her arthritis if renal function and CBC status allows    11. Disequilibrium  Physical therapy consult for vestibular training  - Ambulatory referral/consult to Physical/Occupational Therapy; Future    12. BMI 34.0-34.9,adult  Stable weight

## 2024-02-20 ENCOUNTER — LAB VISIT (OUTPATIENT)
Dept: LAB | Facility: HOSPITAL | Age: 84
End: 2024-02-20
Attending: FAMILY MEDICINE
Payer: MEDICARE

## 2024-02-20 DIAGNOSIS — Z00.00 ENCOUNTER FOR PREVENTIVE HEALTH EXAMINATION: ICD-10-CM

## 2024-02-20 DIAGNOSIS — E78.2 MIXED HYPERLIPIDEMIA: Chronic | ICD-10-CM

## 2024-02-20 DIAGNOSIS — I10 ESSENTIAL HYPERTENSION: ICD-10-CM

## 2024-02-20 DIAGNOSIS — E03.9 ACQUIRED HYPOTHYROIDISM: Chronic | ICD-10-CM

## 2024-02-20 LAB
ALBUMIN SERPL BCP-MCNC: 3.6 G/DL (ref 3.5–5.2)
ALP SERPL-CCNC: 96 U/L (ref 55–135)
ALT SERPL W/O P-5'-P-CCNC: 14 U/L (ref 10–44)
ANION GAP SERPL CALC-SCNC: 10 MMOL/L (ref 8–16)
AST SERPL-CCNC: 20 U/L (ref 10–40)
BASOPHILS # BLD AUTO: 0.06 K/UL (ref 0–0.2)
BASOPHILS NFR BLD: 0.9 % (ref 0–1.9)
BILIRUB SERPL-MCNC: 0.8 MG/DL (ref 0.1–1)
BUN SERPL-MCNC: 19 MG/DL (ref 8–23)
CALCIUM SERPL-MCNC: 9.6 MG/DL (ref 8.7–10.5)
CHLORIDE SERPL-SCNC: 106 MMOL/L (ref 95–110)
CHOLEST SERPL-MCNC: 130 MG/DL (ref 120–199)
CHOLEST/HDLC SERPL: 3 {RATIO} (ref 2–5)
CO2 SERPL-SCNC: 26 MMOL/L (ref 23–29)
CREAT SERPL-MCNC: 0.7 MG/DL (ref 0.5–1.4)
DIFFERENTIAL METHOD BLD: NORMAL
EOSINOPHIL # BLD AUTO: 0.1 K/UL (ref 0–0.5)
EOSINOPHIL NFR BLD: 1.6 % (ref 0–8)
ERYTHROCYTE [DISTWIDTH] IN BLOOD BY AUTOMATED COUNT: 14.2 % (ref 11.5–14.5)
EST. GFR  (NO RACE VARIABLE): >60 ML/MIN/1.73 M^2
GLUCOSE SERPL-MCNC: 100 MG/DL (ref 70–110)
HCT VFR BLD AUTO: 43.8 % (ref 37–48.5)
HDLC SERPL-MCNC: 44 MG/DL (ref 40–75)
HDLC SERPL: 33.8 % (ref 20–50)
HGB BLD-MCNC: 14.1 G/DL (ref 12–16)
IMM GRANULOCYTES # BLD AUTO: 0.01 K/UL (ref 0–0.04)
IMM GRANULOCYTES NFR BLD AUTO: 0.1 % (ref 0–0.5)
LDLC SERPL CALC-MCNC: 70.6 MG/DL (ref 63–159)
LYMPHOCYTES # BLD AUTO: 1.6 K/UL (ref 1–4.8)
LYMPHOCYTES NFR BLD: 24 % (ref 18–48)
MCH RBC QN AUTO: 30.9 PG (ref 27–31)
MCHC RBC AUTO-ENTMCNC: 32.2 G/DL (ref 32–36)
MCV RBC AUTO: 96 FL (ref 82–98)
MONOCYTES # BLD AUTO: 0.7 K/UL (ref 0.3–1)
MONOCYTES NFR BLD: 10.3 % (ref 4–15)
NEUTROPHILS # BLD AUTO: 4.2 K/UL (ref 1.8–7.7)
NEUTROPHILS NFR BLD: 63.1 % (ref 38–73)
NONHDLC SERPL-MCNC: 86 MG/DL
NRBC BLD-RTO: 0 /100 WBC
PLATELET # BLD AUTO: 204 K/UL (ref 150–450)
PMV BLD AUTO: 10.6 FL (ref 9.2–12.9)
POTASSIUM SERPL-SCNC: 3.8 MMOL/L (ref 3.5–5.1)
PROT SERPL-MCNC: 6.8 G/DL (ref 6–8.4)
RBC # BLD AUTO: 4.57 M/UL (ref 4–5.4)
SODIUM SERPL-SCNC: 142 MMOL/L (ref 136–145)
TRIGL SERPL-MCNC: 77 MG/DL (ref 30–150)
TSH SERPL DL<=0.005 MIU/L-ACNC: 3.64 UIU/ML (ref 0.4–4)
WBC # BLD AUTO: 6.67 K/UL (ref 3.9–12.7)

## 2024-02-20 PROCEDURE — 84443 ASSAY THYROID STIM HORMONE: CPT | Mod: HCNC | Performed by: FAMILY MEDICINE

## 2024-02-20 PROCEDURE — 80061 LIPID PANEL: CPT | Mod: HCNC | Performed by: FAMILY MEDICINE

## 2024-02-20 PROCEDURE — 85025 COMPLETE CBC W/AUTO DIFF WBC: CPT | Mod: HCNC | Performed by: FAMILY MEDICINE

## 2024-02-20 PROCEDURE — 36415 COLL VENOUS BLD VENIPUNCTURE: CPT | Mod: HCNC,PO | Performed by: FAMILY MEDICINE

## 2024-02-20 PROCEDURE — 80053 COMPREHEN METABOLIC PANEL: CPT | Mod: HCNC | Performed by: FAMILY MEDICINE

## 2024-02-21 ENCOUNTER — PATIENT MESSAGE (OUTPATIENT)
Dept: FAMILY MEDICINE | Facility: CLINIC | Age: 84
End: 2024-02-21
Payer: MEDICARE

## 2024-02-21 DIAGNOSIS — M17.12 PRIMARY OSTEOARTHRITIS OF LEFT KNEE: Primary | ICD-10-CM

## 2024-02-21 RX ORDER — MELOXICAM 7.5 MG/1
7.5 TABLET ORAL DAILY
Qty: 90 TABLET | Refills: 1 | Status: SHIPPED | OUTPATIENT
Start: 2024-02-21

## 2024-03-01 ENCOUNTER — HOSPITAL ENCOUNTER (INPATIENT)
Facility: HOSPITAL | Age: 84
LOS: 3 days | Discharge: HOME | DRG: 085 | End: 2024-03-04
Attending: PSYCHIATRY & NEUROLOGY | Admitting: PSYCHIATRY & NEUROLOGY
Payer: MEDICARE

## 2024-03-01 ENCOUNTER — HOSPITAL ENCOUNTER (EMERGENCY)
Facility: HOSPITAL | Age: 84
Discharge: NURSING, REHAB OR LONG-TERM CARE | End: 2024-03-01
Attending: EMERGENCY MEDICINE
Payer: MEDICARE

## 2024-03-01 VITALS
SYSTOLIC BLOOD PRESSURE: 167 MMHG | HEIGHT: 63 IN | DIASTOLIC BLOOD PRESSURE: 80 MMHG | HEART RATE: 82 BPM | RESPIRATION RATE: 17 BRPM | OXYGEN SATURATION: 98 % | TEMPERATURE: 98 F | BODY MASS INDEX: 34.55 KG/M2 | WEIGHT: 195 LBS

## 2024-03-01 DIAGNOSIS — I62.9 INTRACRANIAL HEMORRHAGE: ICD-10-CM

## 2024-03-01 DIAGNOSIS — S12.9XXA CLOSED FRACTURE OF CERVICAL VERTEBRA, UNSPECIFIED CERVICAL VERTEBRAL LEVEL, INITIAL ENCOUNTER: Primary | ICD-10-CM

## 2024-03-01 DIAGNOSIS — S06.6XAA TRAUMATIC SUBARACHNOID HEMORRHAGE: ICD-10-CM

## 2024-03-01 DIAGNOSIS — S06.5XAA SDH (SUBDURAL HEMATOMA): ICD-10-CM

## 2024-03-01 DIAGNOSIS — Z79.01 ANTICOAGULANT LONG-TERM USE: ICD-10-CM

## 2024-03-01 PROBLEM — G93.6 VASOGENIC BRAIN EDEMA: Status: ACTIVE | Noted: 2024-03-01

## 2024-03-01 PROBLEM — G93.5 BRAIN COMPRESSION: Status: ACTIVE | Noted: 2024-03-01

## 2024-03-01 PROBLEM — I60.9 SAH (SUBARACHNOID HEMORRHAGE): Status: ACTIVE | Noted: 2024-03-01

## 2024-03-01 LAB
ABO + RH BLD: NORMAL
ALBUMIN SERPL BCP-MCNC: 4.2 G/DL (ref 3.5–5.2)
ALP SERPL-CCNC: 96 U/L (ref 55–135)
ALT SERPL W/O P-5'-P-CCNC: 10 U/L (ref 10–44)
ANION GAP SERPL CALC-SCNC: 9 MMOL/L (ref 8–16)
APTT PPP: 22.7 SEC (ref 21–32)
AST SERPL-CCNC: 22 U/L (ref 10–40)
BASOPHILS # BLD AUTO: 0.06 K/UL (ref 0–0.2)
BASOPHILS NFR BLD: 0.9 % (ref 0–1.9)
BILIRUB SERPL-MCNC: 0.7 MG/DL (ref 0.1–1)
BLD GP AB SCN CELLS X3 SERPL QL: NORMAL
BUN SERPL-MCNC: 26 MG/DL (ref 8–23)
CALCIUM SERPL-MCNC: 9.3 MG/DL (ref 8.7–10.5)
CHLORIDE SERPL-SCNC: 104 MMOL/L (ref 95–110)
CHOLEST SERPL-MCNC: 109 MG/DL (ref 120–199)
CHOLEST/HDLC SERPL: 2.5 {RATIO} (ref 2–5)
CO2 SERPL-SCNC: 26 MMOL/L (ref 23–29)
CREAT SERPL-MCNC: 0.7 MG/DL (ref 0.5–1.4)
CREAT SERPL-MCNC: 0.7 MG/DL (ref 0.5–1.4)
DIFFERENTIAL METHOD BLD: NORMAL
EOSINOPHIL # BLD AUTO: 0.1 K/UL (ref 0–0.5)
EOSINOPHIL NFR BLD: 0.9 % (ref 0–8)
ERYTHROCYTE [DISTWIDTH] IN BLOOD BY AUTOMATED COUNT: 14 % (ref 11.5–14.5)
EST. GFR  (NO RACE VARIABLE): >60 ML/MIN/1.73 M^2
GLUCOSE SERPL-MCNC: 122 MG/DL (ref 70–110)
HCT VFR BLD AUTO: 42.4 % (ref 37–48.5)
HDLC SERPL-MCNC: 43 MG/DL (ref 40–75)
HDLC SERPL: 39.4 % (ref 20–50)
HGB BLD-MCNC: 14 G/DL (ref 12–16)
IMM GRANULOCYTES # BLD AUTO: 0.03 K/UL (ref 0–0.04)
IMM GRANULOCYTES NFR BLD AUTO: 0.4 % (ref 0–0.5)
INR PPP: 1 (ref 0.8–1.2)
LDLC SERPL CALC-MCNC: 52 MG/DL (ref 63–159)
LYMPHOCYTES # BLD AUTO: 1.3 K/UL (ref 1–4.8)
LYMPHOCYTES NFR BLD: 19.1 % (ref 18–48)
MAGNESIUM SERPL-MCNC: 2 MG/DL (ref 1.6–2.6)
MCH RBC QN AUTO: 31 PG (ref 27–31)
MCHC RBC AUTO-ENTMCNC: 33 G/DL (ref 32–36)
MCV RBC AUTO: 94 FL (ref 82–98)
MONOCYTES # BLD AUTO: 0.8 K/UL (ref 0.3–1)
MONOCYTES NFR BLD: 10.8 % (ref 4–15)
NEUTROPHILS # BLD AUTO: 4.7 K/UL (ref 1.8–7.7)
NEUTROPHILS NFR BLD: 67.9 % (ref 38–73)
NONHDLC SERPL-MCNC: 66 MG/DL
NRBC BLD-RTO: 0 /100 WBC
PLATELET # BLD AUTO: 185 K/UL (ref 150–450)
PMV BLD AUTO: 9.9 FL (ref 9.2–12.9)
POTASSIUM SERPL-SCNC: 3.9 MMOL/L (ref 3.5–5.1)
PROT SERPL-MCNC: 7.2 G/DL (ref 6–8.4)
PROTHROMBIN TIME: 11.4 SEC (ref 9–12.5)
RBC # BLD AUTO: 4.51 M/UL (ref 4–5.4)
SAMPLE: NORMAL
SODIUM SERPL-SCNC: 139 MMOL/L (ref 136–145)
SPECIMEN OUTDATE: NORMAL
TRIGL SERPL-MCNC: 70 MG/DL (ref 30–150)
TSH SERPL DL<=0.005 MIU/L-ACNC: 2.65 UIU/ML (ref 0.34–5.6)
WBC # BLD AUTO: 6.96 K/UL (ref 3.9–12.7)

## 2024-03-01 PROCEDURE — 25500020 PHARM REV CODE 255: Performed by: EMERGENCY MEDICINE

## 2024-03-01 PROCEDURE — 85610 PROTHROMBIN TIME: CPT | Performed by: EMERGENCY MEDICINE

## 2024-03-01 PROCEDURE — 80053 COMPREHEN METABOLIC PANEL: CPT | Performed by: EMERGENCY MEDICINE

## 2024-03-01 PROCEDURE — 93010 ELECTROCARDIOGRAM REPORT: CPT | Mod: HCNC,,, | Performed by: INTERNAL MEDICINE

## 2024-03-01 PROCEDURE — 93005 ELECTROCARDIOGRAM TRACING: CPT | Performed by: GENERAL PRACTICE

## 2024-03-01 PROCEDURE — 20000000 HC ICU ROOM: Mod: HCNC

## 2024-03-01 PROCEDURE — 84443 ASSAY THYROID STIM HORMONE: CPT | Performed by: EMERGENCY MEDICINE

## 2024-03-01 PROCEDURE — 82565 ASSAY OF CREATININE: CPT

## 2024-03-01 PROCEDURE — 96374 THER/PROPH/DIAG INJ IV PUSH: CPT | Mod: 59

## 2024-03-01 PROCEDURE — 99291 CRITICAL CARE FIRST HOUR: CPT | Mod: HCNC,,,

## 2024-03-01 PROCEDURE — 93010 ELECTROCARDIOGRAM REPORT: CPT | Mod: ,,, | Performed by: GENERAL PRACTICE

## 2024-03-01 PROCEDURE — 85730 THROMBOPLASTIN TIME PARTIAL: CPT | Performed by: EMERGENCY MEDICINE

## 2024-03-01 PROCEDURE — 25000003 PHARM REV CODE 250: Performed by: EMERGENCY MEDICINE

## 2024-03-01 PROCEDURE — 83735 ASSAY OF MAGNESIUM: CPT | Performed by: EMERGENCY MEDICINE

## 2024-03-01 PROCEDURE — 96375 TX/PRO/DX INJ NEW DRUG ADDON: CPT | Mod: 59

## 2024-03-01 PROCEDURE — 63600175 PHARM REV CODE 636 W HCPCS: Mod: HCNC

## 2024-03-01 PROCEDURE — 80061 LIPID PANEL: CPT | Performed by: EMERGENCY MEDICINE

## 2024-03-01 PROCEDURE — 63600175 PHARM REV CODE 636 W HCPCS: Performed by: EMERGENCY MEDICINE

## 2024-03-01 PROCEDURE — 86850 RBC ANTIBODY SCREEN: CPT | Performed by: EMERGENCY MEDICINE

## 2024-03-01 PROCEDURE — 85025 COMPLETE CBC W/AUTO DIFF WBC: CPT | Performed by: EMERGENCY MEDICINE

## 2024-03-01 PROCEDURE — 99291 CRITICAL CARE FIRST HOUR: CPT

## 2024-03-01 PROCEDURE — 96365 THER/PROPH/DIAG IV INF INIT: CPT | Mod: 59

## 2024-03-01 PROCEDURE — 93005 ELECTROCARDIOGRAM TRACING: CPT | Mod: HCNC

## 2024-03-01 PROCEDURE — 25000003 PHARM REV CODE 250: Mod: HCNC

## 2024-03-01 RX ORDER — AMLODIPINE BESYLATE 10 MG/1
10 TABLET ORAL DAILY
Status: DISCONTINUED | OUTPATIENT
Start: 2024-03-02 | End: 2024-03-04 | Stop reason: HOSPADM

## 2024-03-01 RX ORDER — BISACODYL 10 MG/1
10 SUPPOSITORY RECTAL DAILY PRN
Status: DISCONTINUED | OUTPATIENT
Start: 2024-03-01 | End: 2024-03-04 | Stop reason: HOSPADM

## 2024-03-01 RX ORDER — LEVETIRACETAM 500 MG/5ML
500 INJECTION, SOLUTION, CONCENTRATE INTRAVENOUS EVERY 12 HOURS
Status: DISCONTINUED | OUTPATIENT
Start: 2024-03-02 | End: 2024-03-04 | Stop reason: HOSPADM

## 2024-03-01 RX ORDER — LEVOTHYROXINE SODIUM 75 UG/1
75 TABLET ORAL DAILY
Status: DISCONTINUED | OUTPATIENT
Start: 2024-03-02 | End: 2024-03-04 | Stop reason: HOSPADM

## 2024-03-01 RX ORDER — LEVETIRACETAM 500 MG/5ML
500 INJECTION, SOLUTION, CONCENTRATE INTRAVENOUS EVERY 12 HOURS
Status: DISCONTINUED | OUTPATIENT
Start: 2024-03-01 | End: 2024-03-01

## 2024-03-01 RX ORDER — SODIUM CHLORIDE 0.9 % (FLUSH) 0.9 %
10 SYRINGE (ML) INJECTION
Status: DISCONTINUED | OUTPATIENT
Start: 2024-03-01 | End: 2024-03-02

## 2024-03-01 RX ORDER — LEVETIRACETAM 5 MG/ML
500 INJECTION INTRAVASCULAR
Status: COMPLETED | OUTPATIENT
Start: 2024-03-01 | End: 2024-03-01

## 2024-03-01 RX ORDER — HYDROCODONE BITARTRATE AND ACETAMINOPHEN 500; 5 MG/1; MG/1
TABLET ORAL
Status: DISCONTINUED | OUTPATIENT
Start: 2024-03-01 | End: 2024-03-01 | Stop reason: HOSPADM

## 2024-03-01 RX ORDER — HYDRALAZINE HYDROCHLORIDE 20 MG/ML
10 INJECTION INTRAMUSCULAR; INTRAVENOUS EVERY 6 HOURS PRN
Status: DISCONTINUED | OUTPATIENT
Start: 2024-03-01 | End: 2024-03-01

## 2024-03-01 RX ORDER — ATORVASTATIN CALCIUM 40 MG/1
80 TABLET, FILM COATED ORAL DAILY
Status: DISCONTINUED | OUTPATIENT
Start: 2024-03-02 | End: 2024-03-02

## 2024-03-01 RX ORDER — ACETAMINOPHEN 325 MG/1
650 TABLET ORAL EVERY 6 HOURS PRN
Status: DISCONTINUED | OUTPATIENT
Start: 2024-03-01 | End: 2024-03-04 | Stop reason: HOSPADM

## 2024-03-01 RX ORDER — HYDRALAZINE HYDROCHLORIDE 20 MG/ML
10 INJECTION INTRAMUSCULAR; INTRAVENOUS
Status: COMPLETED | OUTPATIENT
Start: 2024-03-01 | End: 2024-03-01

## 2024-03-01 RX ORDER — ONDANSETRON HYDROCHLORIDE 2 MG/ML
4 INJECTION, SOLUTION INTRAVENOUS EVERY 6 HOURS PRN
Status: DISCONTINUED | OUTPATIENT
Start: 2024-03-01 | End: 2024-03-04 | Stop reason: HOSPADM

## 2024-03-01 RX ORDER — POLYETHYLENE GLYCOL 3350 17 G/17G
17 POWDER, FOR SOLUTION ORAL DAILY
Status: DISCONTINUED | OUTPATIENT
Start: 2024-03-02 | End: 2024-03-04 | Stop reason: HOSPADM

## 2024-03-01 RX ORDER — LABETALOL HCL 20 MG/4 ML
10 SYRINGE (ML) INTRAVENOUS
Status: DISCONTINUED | OUTPATIENT
Start: 2024-03-01 | End: 2024-03-01

## 2024-03-01 RX ADMIN — LEVETIRACETAM 500 MG: 5 INJECTION INTRAVENOUS at 07:03

## 2024-03-01 RX ADMIN — ONDANSETRON 4 MG: 2 INJECTION INTRAMUSCULAR; INTRAVENOUS at 10:03

## 2024-03-01 RX ADMIN — ACETAMINOPHEN 650 MG: 325 TABLET ORAL at 10:03

## 2024-03-01 RX ADMIN — HYDRALAZINE HYDROCHLORIDE 10 MG: 20 INJECTION INTRAMUSCULAR; INTRAVENOUS at 08:03

## 2024-03-01 RX ADMIN — IOHEXOL 100 ML: 350 INJECTION, SOLUTION INTRAVENOUS at 05:03

## 2024-03-01 RX ADMIN — DESMOPRESSIN ACETATE 35.4 MCG: 4 SOLUTION INTRAVENOUS at 08:03

## 2024-03-01 NOTE — ED PROVIDER NOTES
Encounter Date: 3/1/2024       History     Chief Complaint   Patient presents with    Fall     Trip and fall hit post, facial pain, swelling and bruising noted, also c/o left shoulder and neck pain     Patient presents complaining of fall.  Patient was in her normal state of health when she experienced a trip and fall into a column into the hospital.  Patient states she went face 1st into the column any children her head back.  She complains of facial pain and mild neck pain.  She complains of left scapular pain and right arm pain.  At the worst symptoms are moderate to severe.  Patient states she had an episode where she felt some tingling in her right arm which has now resolved.  Patient denies headache.  She denies nausea or vomiting.  She denies unilateral numbness tingling or weakness at this time.  Patient is on Plavix.      Review of patient's allergies indicates:   Allergen Reactions    Ace inhibitors Other (See Comments)     cough    Morphine Itching    Keflex [cephalexin] Itching and Rash     Past Medical History:   Diagnosis Date    Blood transfusion     GERD (gastroesophageal reflux disease)     Hepatitis C     treated six months by Dr. Gaspar with interferon    History of hepatitis C     Hyperlipidemia     Hypertension     Hypothyroidism     Obesity     Primary osteoarthritis of left knee 2015    Sleep apnea     Stroke     TIA     TIA (transient ischemic attack)     Vision loss of right eye     Dr Eldridge Right eye blood clot      Past Surgical History:   Procedure Laterality Date    ABDOMINAL SURGERY      SBO from lap band wraping around small bowel, lap untwisted    ADENOIDECTOMY      BREAST BIOPSY  2014    needle biopsy     CATARACT EXTRACTION, BILATERAL Bilateral      SECTION      CHOLECYSTECTOMY  2001    COLONOSCOPY  2011    Dr. Alvares, 5 year recheck    EYE SURGERY  2014    bilateral cataract Dr Carrillo     EYE SURGERY  10/2020    bottom lid    HEMORRHOID SURGERY       HYSTERECTOMY  1989    INCONTINENCE SURGERY  2008    sling    LAPAROSCOPIC GASTRIC BANDING  2004    LAPAROSCOPIC NISSEN FUNDOPLICATION      Dr. Rutherford    TONSILLECTOMY       Family History   Problem Relation Age of Onset    Diabetes Mother     Arthritis Mother         RA    Heart disease Mother         MI at 79     Diabetes Sister     Diverticulitis Sister     No Known Problems Daughter     Emphysema Maternal Aunt     Heart disease Maternal Aunt     Cancer Maternal Uncle     Early death Maternal Grandfather 47    Heart disease Maternal Grandfather     Arthritis Paternal Grandmother     Heart disease Paternal Grandfather     Cancer Paternal Grandfather         lung heavy smoker     No Known Problems Father     No Known Problems Son     No Known Problems Paternal Uncle     No Known Problems Maternal Grandmother     No Known Problems Brother     Arthritis Daughter      Social History     Tobacco Use    Smoking status: Former     Current packs/day: 0.50     Average packs/day: 0.5 packs/day for 5.0 years (2.5 ttl pk-yrs)     Types: Cigarettes    Smokeless tobacco: Never   Substance Use Topics    Alcohol use: No    Drug use: No     Review of Systems   All other systems reviewed and are negative.      Physical Exam     Initial Vitals [03/01/24 1507]   BP Pulse Resp Temp SpO2   (!) 160/84 90 18 98.2 °F (36.8 °C) 96 %      MAP       --         Physical Exam    Nursing note and vitals reviewed.  Constitutional: She appears well-developed and well-nourished.   Pleasant, polite   HENT:   Head: Normocephalic.   To the lateral part of the left eye there is a contusion with an abrasion there is an additional contusion to the anterior neck to the left of the midline.  There is full range of motion of the neck.  There is no midline pain.   Eyes: EOM are normal. Pupils are equal, round, and reactive to light.   Neck: Neck supple.   C-collar applied   Normal range of motion.  Cardiovascular:  Normal rate, regular rhythm, normal heart  sounds and intact distal pulses.           Pulmonary/Chest: Breath sounds normal. No respiratory distress.   Abdominal: Abdomen is soft.   Musculoskeletal:      Cervical back: Normal range of motion and neck supple.     Neurological: She is alert and oriented to person, place, and time. She has normal strength. No cranial nerve deficit.   Vision - Normal  Aphasia - Normal  Neglect - Normal  Pronator drift - Normal  Cerebellum - Normal  RUE strength - Normal  RLE strength - Normal  LUE strength - Normal  LLE strength - Normal   Skin: Skin is warm and dry. Capillary refill takes less than 2 seconds.   Psychiatric: She has a normal mood and affect. Her behavior is normal. Judgment and thought content normal.         ED Course   Procedures  Labs Reviewed   COMPREHENSIVE METABOLIC PANEL - Abnormal; Notable for the following components:       Result Value    Glucose 122 (*)     BUN 26 (*)     All other components within normal limits   LIPID PANEL - Abnormal; Notable for the following components:    Cholesterol 109 (*)     LDL Cholesterol 52.0 (*)     All other components within normal limits   MAGNESIUM   CBC W/ AUTO DIFFERENTIAL   PROTIME-INR   APTT   TSH   URINALYSIS, REFLEX TO URINE CULTURE   TYPE & SCREEN   ISTAT CREATININE   POCT GLUCOSE MONITORING CONTINUOUS   POCT CREATININE   PREPARE PLATELETS (DOSE) SOFT          Imaging Results              CTA Head and Neck (xpd) (Final result)  Result time 03/01/24 18:45:06      Final result by Albaro Boston MD (03/01/24 18:45:06)                   Narrative:    CMS MANDATED QUALITY DATA - CT RADIATION - 436    One or more of the following dose-optimizing techniques was utilized for this exam: automated exposure control, adjustment of the mA and/or kV according to patient size, and/or use of iterative reconstruction technique.    CMS MANDATED QUALITY [id number]    NASCET CRITERIA WAS UTILIZED FOR ESTIMATION OF STENOSIS SEVERITY WITH THE NARROWEST SEGMENT BEING  COMPARED TO THE DISTAL LUMINAL DIAMETER.        HISTORY:Neurologic deficit. Acute stroke suspected.    TECHNIQUE: Serial axial images were obtained from aortic arch to the vertex with Omnipaque 350 intravenous contrast utilizing a CT angiography protocol. Volume of IV contrast administered is not available for reporting. Coronal and sagittal MIP CT angiography reconstructions were performed. Patient received approximately 296 mGy-cm of radiation exposure from this exam.    COMPARISON: Comparison to previous CTA dated April 25, 2022.    FINDINGS:Atherosclerotic changes of the aortic arch and left subclavian great vessel origin is noted. Great vessel origins appear widely patent. Cervical carotid arteries appear patent bilaterally with significant calcified plaques at the carotid bifurcation bilaterally. Approximately 20% stenosis is noted at the origin internal carotid artery bilaterally. Patent codominant bilateral cervical vertebral arteries are visualized. Calcified plaque is noted at the origin of the left vertebral artery.    Internal carotid arteries appear patent at skull base bilaterally. Atherosclerotic changes of the cavernous portion of the ICA is present bilaterally. Intracranial vertebral arteries and basilar artery appears widely patent. Bilateral anterior, middle, and posterior cerebral arteries appear patent. Right A1 segment is absent. Both anterior cerebral arteries fill via the left A1 segment. Calcified plaque is noted in the M1 segment of the left middle cerebral artery which appears to cause at least mild stenosis. No evidence of large vessel stenosis is seen. No large vessel occlusion is identified. Incidental note of subcutaneous edema and skin thickening in the left anterior aspect of the mid at the level of the hyoid bone.    IMPRESSION:  1. Atherosclerotic changes of cervical carotid artery with approximately 20% stenosis of the ICA bilaterally.  2. Atherosclerotic changes of the left  middle cerebral artery as described above.  3. No evidence of large vessel intracranial arterial occlusion is identified.  4. Incidental note of subcutaneous edema and skin thickening in the left anterior neck.          Electronically signed by:  Albaro Boston MD  03/01/2024 06:45 PM CST Workstation: [id number]                                     CT Cervical Spine Without Contrast (Edited Result - FINAL)  Result time 03/01/24 17:27:17      Edited Result - FINAL by Inga Calvin DO (03/01/24 17:27:17)                   Narrative:         ADDENDUM #1       There is extensive artifact noted through the cervical spine. There is a questionable fracture through the osteophyte of C6/C7/anterior inferior aspect at C7.    Electronically signed by:  Inga Calvin DO  03/01/2024 05:27 PM CST Workstation: [id number]       ORIGINAL REPORT       CT CERVICAL SPINE: 3/1/2024 4:43 PM CST    TECHNIQUE: Axial contiguous images were obtained through the cervical spine without intravenous contrast. Sagittal and coronal reconstructions were also reviewed. This exam was performed according to our departmental dose-optimization program, which includes automated exposure control, adjustment of the mA and/or KV according to the patient's size and/or use of iterative reconstruction technique.    COMPARISON: None available    HISTORY: 83 years  old Female with Neck trauma, mechanically unstable spine (Age >= 16y).    FINDINGS: The vertebral bodies appear well aligned.    The vertebral body heights appear well maintained.    No significant pre-vertebral soft tissue swelling is noted.    No acute fracture or subluxation is noted.    Mild multilevel intervertebral disc space narrowing is seen.    There are multilevel anterior osteophytes seen at the cervical spine.    The visualized brain parenchyma appears unremarkable.    The craniocervical junction is unremarkable.    IMPRESSION: There are no findings to suggest an acute fracture or  subluxation within the cervical spine.    Electronically signed by:  Inga Calvin DO  03/01/2024 04:45 PM CST Workstation: [id number]                      Final result by Inga Calvin DO (03/01/24 16:45:38)                   Narrative:    CT CERVICAL SPINE: 3/1/2024 4:43 PM CST    TECHNIQUE: Axial contiguous images were obtained through the cervical spine without intravenous contrast. Sagittal and coronal reconstructions were also reviewed. This exam was performed according to our departmental dose-optimization program, which includes automated exposure control, adjustment of the mA and/or KV according to the patient's size and/or use of iterative reconstruction technique.    COMPARISON: None available    HISTORY: 83 years  old Female with Neck trauma, mechanically unstable spine (Age >= 16y).    FINDINGS: The vertebral bodies appear well aligned.    The vertebral body heights appear well maintained.    No significant pre-vertebral soft tissue swelling is noted.    No acute fracture or subluxation is noted.    Mild multilevel intervertebral disc space narrowing is seen.    There are multilevel anterior osteophytes seen at the cervical spine.    The visualized brain parenchyma appears unremarkable.    The craniocervical junction is unremarkable.    IMPRESSION: There are no findings to suggest an acute fracture or subluxation within the cervical spine.    Electronically signed by:  Inga Calvin DO  03/01/2024 04:45 PM CST Workstation: [id number]                                     CT Maxillofacial Without Contrast (Final result)  Result time 03/01/24 16:41:19      Final result by Inga Calvin DO (03/01/24 16:41:19)                   Narrative:    CT OF THE FACE: 3/1/2024 4:38 PM CST    TECHNIQUE: Axial helical images were obtained through the face without intravenous contrast administered. Coronal and sagittal reformatted images were also obtained and examined. This exam was performed according to  our departmental dose-optimization program, which includes automated exposure control, adjustment of the mA and/or KV according to the patient's size and/or use of iterative reconstruction technique.    COMPARISON: None available    HISTORY: 83 years  old Female with Facial trauma, blunt.    FINDINGS:  No acute, displaced facial bone fracture is seen.    The mandible is intact.    The visualized portions of the cervical spine appear unremarkable.    There is subarachnoid hemorrhage partially visualized on the right side, better described on the CT of the head from the same day.    The globes appear symmetric.    No gross radiopaque foreign body is readily apparent.    The visualized mastoid air cells appear clear.    There is subcutaneous soft tissue contusion seen at the left side of the face.    The visualized paranasal sinuses appear clear.    IMPRESSION:  1.  No acute, displaced facial bone fracture is seen.  2.  There is subarachnoid hemorrhage partially visualized on the right side, better described on the CT of the head from the same day.    Electronically signed by:  Inga Calvin DO  03/01/2024 04:41 PM CST Workstation: [id number]                                     CT Head Without Contrast (Final result)  Result time 03/01/24 16:29:46      Final result by Junior Gee Jr., MD (03/01/24 16:29:46)                   Narrative:    CLINICAL HISTORY:  83 years (1940) Female Head trauma, moderate-severe Fall (Trip and fall hit post, facial pain, swelling and bruising noted, also c/o left shoulder and neck pain)    TECHNIQUE:  CT HEAD WITHOUT IV CONTRAST. 58 images obtained.    COMPARISON:  No prior comparison study is made available.    FINDINGS:    Acute subarachnoid blood tracks in the right sylvian fissure with continued posterior migration of the subarachnoid blood into the right temporal lobe sulci with continued posterior and superior extent of blood involving the region central sulcus. On the  contralateral side, there is evidence of a small contrecoup focus of subdural hemorrhage of subacute status measuring at least 6 mm in thickness. There is evidence of midline shift. No evidence of ventriculomegaly. No evidence of intra-articular bleed. Orbits and retro-orbital compartments are well-maintained. There are atheromatous calcifications of the proximal left middle cerebral artery.    IMPRESSION:  1.  Acute subarachnoid blood tracks in the right Sylvian fissure with continued posterior migration of the subarachnoid blood into the right temporal lobe sulci with continued posterior and superior extent of blood involving the region of the central sulcus.  2.  Small contrecoup focus of subdural hemorrhage of subacute status measuring at least 6 mm in thickness.    Above findings were conveyed to Dr. Keyur Perkins on 3/1/2024 at 1630 hours with the verification.      Electronically signed by:  Junior Gee MD  03/01/2024 04:29 PM CST Workstation: [id number]                                     Medications   0.9%  NaCl infusion (for blood administration) (has no administration in time range)   desmopressin (DDAVP) 35.4 mcg in sodium chloride 0.9% 50 mL IVPB (has no administration in time range)   levETIRAcetam in NaCl (iso-os) IVPB 500 mg (has no administration in time range)   iohexoL (OMNIPAQUE 350) injection 100 mL (100 mLs Intravenous Given 3/1/24 1746)     Medical Decision Making  Patient appears in no acute distress     Considerations include but are not limited to facial fracture, cervical fracture, cervical strain, hematoma, intracranial hemorrhage, skull fracture    MDM    Patient presents for emergent evaluation of acute facial injury, fall that poses a possible threat to life and/or bodily function.    In the ED patient found to have acute intracranial hemorrhage, subdural.   I ordered labs   I ordered X-rays   I ordered EKG and personally reviewed it.  EKG significant for regular rate and rhythm,  left bundle-branch block, no ST elevation.    I ordered CT scan and personally reviewed it and reviewed the radiologist interpretation.  CT significant for findings consistent with intracranial hemorrhage and subdural, concerning findings for possible cervical fracture.      Transfer MDM  I discussed the patient presentation labs, ekg, X-rays, CT findings with the consultant(s) Dr. Irizarry who recommended transfer for neurovascular and neuro critical Care   Patient was managed in the ED with IV platelet therapy secondary to Plavix.    The response to treatment was unchanged.    Patient required emergent consultation to transfer center for transfer.    Attending Critical Care:   Critical Care Times:   Direct Patient Care (initial evaluation, reassessments, and time considering the case)................................................................10 minutes.   Additional History from reviewing old medical records or taking additional history from the family, EMS, PCP, etc.......................10 minutes.   Ordering, Reviewing, and Interpreting Diagnostic Studies...............................................................................................................10 minutes.   Documentation..................................................................................................................................................................................5 minutes.   ==============================================================  · Total Critical Care Time - exclusive of procedural time: 35 minutes.  ==============================================================  Critical care was necessary to treat or prevent imminent or life-threatening deterioration of the following conditions:  Acute intracranial hemorrhage.   Critical care was time spent personally by me on the following activities: obtaining history from patient or relative, examination of patient, review of x-rays / CT sent with the patient,  ordering lab, x-rays, and/or EKG, development of treatment plan with patient or relative, ordering and performing treatments and interventions, interpretation of cardiac measurements and re-evaluation of patient's conition.   Critical Care Condition: potentially life-threatening     Patient remains neurologically intact    Received a call from Dr. Harris with neuro critical care who preferred to cancel the platelets and give DDAVP instead.  Platelets were discontinued and DDAVP was ordered.      Amount and/or Complexity of Data Reviewed  Labs: ordered.  Radiology: ordered.    Risk  Prescription drug management.               ED Course as of 03/01/24 1902   Fri Mar 01, 2024   1829 1646 transfer request was ordered.  Follow up phone call was also made.  Awaiting callback from Transfer center [AP]   1839 Discussed with Stacy Lemus who has accepting physician level 1 transfer [AP]   1844 Accepting physician is Dr. NGOC Lomax [AP]   1902 Received a call from Dr. Harris with neuro critical care who preferred to cancel the platelets and give DDAVP instead.  Platelets were discontinued and DDAVP was ordered. [AP]      ED Course User Index  [AP] Kaleb Varela MD                           Clinical Impression:  Final diagnoses:  [I62.9] Intracranial hemorrhage  [S12.9XXA] Closed fracture of cervical vertebra, unspecified cervical vertebral level, initial encounter (Primary)          ED Disposition Condition    Transfer to Another Facility Stable                Kaleb Varela MD  03/01/24 1804       Kaleb Varela MD  03/01/24 1850       Kaleb Varela MD  03/01/24 1902       Kaleb Varela MD  03/01/24 1902

## 2024-03-02 LAB
ABO + RH BLD: NORMAL
ALBUMIN SERPL BCP-MCNC: 3.2 G/DL (ref 3.5–5.2)
ALP SERPL-CCNC: 85 U/L (ref 55–135)
ALT SERPL W/O P-5'-P-CCNC: 11 U/L (ref 10–44)
AMORPH CRY UR QL COMP ASSIST: ABNORMAL
ANION GAP SERPL CALC-SCNC: 8 MMOL/L (ref 8–16)
APTT PPP: 24 SEC (ref 21–32)
ASCENDING AORTA: 3.52 CM
AST SERPL-CCNC: 23 U/L (ref 10–40)
AV INDEX (PROSTH): 0.91
AV MEAN GRADIENT: 8 MMHG
AV PEAK GRADIENT: 11 MMHG
AV VALVE AREA BY VELOCITY RATIO: 3.58 CM²
AV VALVE AREA: 3.53 CM²
AV VELOCITY RATIO: 0.92
BACTERIA #/AREA URNS AUTO: ABNORMAL /HPF
BASOPHILS # BLD AUTO: 0.04 K/UL (ref 0–0.2)
BASOPHILS NFR BLD: 0.5 % (ref 0–1.9)
BILIRUB SERPL-MCNC: 0.7 MG/DL (ref 0.1–1)
BILIRUB UR QL STRIP: NEGATIVE
BLD GP AB SCN CELLS X3 SERPL QL: NORMAL
BSA FOR ECHO PROCEDURE: 1.98 M2
BUN SERPL-MCNC: 24 MG/DL (ref 8–23)
CALCIUM SERPL-MCNC: 8.6 MG/DL (ref 8.7–10.5)
CHLORIDE SERPL-SCNC: 108 MMOL/L (ref 95–110)
CHOLEST SERPL-MCNC: 99 MG/DL (ref 120–199)
CHOLEST/HDLC SERPL: 2.8 {RATIO} (ref 2–5)
CLARITY UR REFRACT.AUTO: ABNORMAL
CO2 SERPL-SCNC: 24 MMOL/L (ref 23–29)
COLOR UR AUTO: YELLOW
CREAT SERPL-MCNC: 0.6 MG/DL (ref 0.5–1.4)
CV ECHO LV RWT: 0.5 CM
DIFFERENTIAL METHOD BLD: ABNORMAL
DOP CALC AO PEAK VEL: 1.67 M/S
DOP CALC AO VTI: 35.42 CM
DOP CALC LVOT AREA: 3.9 CM2
DOP CALC LVOT DIAMETER: 2.23 CM
DOP CALC LVOT PEAK VEL: 1.53 M/S
DOP CALC LVOT STROKE VOLUME: 125.15 CM3
DOP CALC MV VTI: 52.23 CM
DOP CALCLVOT PEAK VEL VTI: 32.06 CM
E WAVE DECELERATION TIME: 229.69 MSEC
E/A RATIO: 0.89
E/E' RATIO: 17.13 M/S
ECHO LV POSTERIOR WALL: 1 CM (ref 0.6–1.1)
EOSINOPHIL # BLD AUTO: 0.1 K/UL (ref 0–0.5)
EOSINOPHIL NFR BLD: 0.7 % (ref 0–8)
ERYTHROCYTE [DISTWIDTH] IN BLOOD BY AUTOMATED COUNT: 14.1 % (ref 11.5–14.5)
EST. GFR  (NO RACE VARIABLE): >60 ML/MIN/1.73 M^2
ESTIMATED AVG GLUCOSE: 108 MG/DL (ref 68–131)
FRACTIONAL SHORTENING: 34 % (ref 28–44)
GLUCOSE SERPL-MCNC: 105 MG/DL (ref 70–110)
GLUCOSE UR QL STRIP: NEGATIVE
HBA1C MFR BLD: 5.4 % (ref 4–5.6)
HCT VFR BLD AUTO: 35.4 % (ref 37–48.5)
HDLC SERPL-MCNC: 36 MG/DL (ref 40–75)
HDLC SERPL: 36.4 % (ref 20–50)
HGB BLD-MCNC: 11.6 G/DL (ref 12–16)
HGB UR QL STRIP: NEGATIVE
IMM GRANULOCYTES # BLD AUTO: 0.02 K/UL (ref 0–0.04)
IMM GRANULOCYTES NFR BLD AUTO: 0.3 % (ref 0–0.5)
INR PPP: 1.1 (ref 0.8–1.2)
INTERVENTRICULAR SEPTUM: 1 CM (ref 0.6–1.1)
IVRT: 117.03 MSEC
KETONES UR QL STRIP: ABNORMAL
LA MAJOR: 6 CM
LA MINOR: 6.12 CM
LA WIDTH: 4.15 CM
LDLC SERPL CALC-MCNC: 55.6 MG/DL (ref 63–159)
LEFT ATRIUM SIZE: 3.5 CM
LEFT ATRIUM VOLUME INDEX MOD: 35.2 ML/M2
LEFT ATRIUM VOLUME INDEX: 39.2 ML/M2
LEFT ATRIUM VOLUME MOD: 67.24 CM3
LEFT ATRIUM VOLUME: 74.81 CM3
LEFT INTERNAL DIMENSION IN SYSTOLE: 2.66 CM (ref 2.1–4)
LEFT VENTRICLE DIASTOLIC VOLUME INDEX: 37.3 ML/M2
LEFT VENTRICLE DIASTOLIC VOLUME: 71.24 ML
LEFT VENTRICLE MASS INDEX: 67 G/M2
LEFT VENTRICLE SYSTOLIC VOLUME INDEX: 13.7 ML/M2
LEFT VENTRICLE SYSTOLIC VOLUME: 26.15 ML
LEFT VENTRICULAR INTERNAL DIMENSION IN DIASTOLE: 4.03 CM (ref 3.5–6)
LEFT VENTRICULAR MASS: 128.57 G
LEUKOCYTE ESTERASE UR QL STRIP: ABNORMAL
LV LATERAL E/E' RATIO: 17.13 M/S
LV SEPTAL E/E' RATIO: 17.13 M/S
LYMPHOCYTES # BLD AUTO: 1.1 K/UL (ref 1–4.8)
LYMPHOCYTES NFR BLD: 14.5 % (ref 18–48)
MAGNESIUM SERPL-MCNC: 2 MG/DL (ref 1.6–2.6)
MCH RBC QN AUTO: 30.7 PG (ref 27–31)
MCHC RBC AUTO-ENTMCNC: 32.8 G/DL (ref 32–36)
MCV RBC AUTO: 94 FL (ref 82–98)
MICROSCOPIC COMMENT: ABNORMAL
MONOCYTES # BLD AUTO: 1 K/UL (ref 0.3–1)
MONOCYTES NFR BLD: 12.6 % (ref 4–15)
MV MEAN GRADIENT: 5 MMHG
MV PEAK A VEL: 1.54 M/S
MV PEAK E VEL: 1.37 M/S
MV PEAK GRADIENT: 10 MMHG
MV STENOSIS PRESSURE HALF TIME: 66.61 MS
MV VALVE AREA BY CONTINUITY EQUATION: 2.4 CM2
MV VALVE AREA P 1/2 METHOD: 3.3 CM2
NEUTROPHILS # BLD AUTO: 5.5 K/UL (ref 1.8–7.7)
NEUTROPHILS NFR BLD: 71.4 % (ref 38–73)
NITRITE UR QL STRIP: NEGATIVE
NONHDLC SERPL-MCNC: 63 MG/DL
NRBC BLD-RTO: 0 /100 WBC
OHS QRS DURATION: 142 MS
OHS QTC CALCULATION: 515 MS
PH UR STRIP: 5 [PH] (ref 5–8)
PHOSPHATE SERPL-MCNC: 4.4 MG/DL (ref 2.7–4.5)
PISA TR MAX VEL: 2.91 M/S
PLATELET # BLD AUTO: 178 K/UL (ref 150–450)
PMV BLD AUTO: 10 FL (ref 9.2–12.9)
POTASSIUM SERPL-SCNC: 3.5 MMOL/L (ref 3.5–5.1)
PROT SERPL-MCNC: 5.7 G/DL (ref 6–8.4)
PROT UR QL STRIP: NEGATIVE
PROTHROMBIN TIME: 11.5 SEC (ref 9–12.5)
RA MAJOR: 4.81 CM
RA PRESSURE ESTIMATED: 8 MMHG
RA WIDTH: 3.51 CM
RBC # BLD AUTO: 3.78 M/UL (ref 4–5.4)
RBC #/AREA URNS AUTO: 5 /HPF (ref 0–4)
RIGHT VENTRICULAR END-DIASTOLIC DIMENSION: 3.19 CM
RV TB RVSP: 11 MMHG
SINUS: 3.47 CM
SODIUM SERPL-SCNC: 140 MMOL/L (ref 136–145)
SP GR UR STRIP: 1.01 (ref 1–1.03)
SPECIMEN OUTDATE: NORMAL
SQUAMOUS #/AREA URNS AUTO: 1 /HPF
STJ: 3.19 CM
TDI LATERAL: 0.08 M/S
TDI SEPTAL: 0.08 M/S
TDI: 0.08 M/S
TR MAX PG: 34 MMHG
TRICUSPID ANNULAR PLANE SYSTOLIC EXCURSION: 2.39 CM
TRIGL SERPL-MCNC: 37 MG/DL (ref 30–150)
TSH SERPL DL<=0.005 MIU/L-ACNC: 1.73 UIU/ML (ref 0.4–4)
TV REST PULMONARY ARTERY PRESSURE: 42 MMHG
URN SPEC COLLECT METH UR: ABNORMAL
WBC # BLD AUTO: 7.68 K/UL (ref 3.9–12.7)
WBC #/AREA URNS AUTO: 8 /HPF (ref 0–5)
Z-SCORE OF LEFT VENTRICULAR DIMENSION IN END DIASTOLE: -2.88
Z-SCORE OF LEFT VENTRICULAR DIMENSION IN END SYSTOLE: -1.73

## 2024-03-02 PROCEDURE — 94761 N-INVAS EAR/PLS OXIMETRY MLT: CPT | Mod: HCNC

## 2024-03-02 PROCEDURE — 83036 HEMOGLOBIN GLYCOSYLATED A1C: CPT

## 2024-03-02 PROCEDURE — 20000000 HC ICU ROOM

## 2024-03-02 PROCEDURE — 86850 RBC ANTIBODY SCREEN: CPT

## 2024-03-02 PROCEDURE — 80061 LIPID PANEL: CPT

## 2024-03-02 PROCEDURE — 85025 COMPLETE CBC W/AUTO DIFF WBC: CPT

## 2024-03-02 PROCEDURE — 97162 PT EVAL MOD COMPLEX 30 MIN: CPT

## 2024-03-02 PROCEDURE — 86901 BLOOD TYPING SEROLOGIC RH(D): CPT

## 2024-03-02 PROCEDURE — 63600175 PHARM REV CODE 636 W HCPCS: Mod: HCNC

## 2024-03-02 PROCEDURE — 83735 ASSAY OF MAGNESIUM: CPT

## 2024-03-02 PROCEDURE — 97165 OT EVAL LOW COMPLEX 30 MIN: CPT

## 2024-03-02 PROCEDURE — 25000003 PHARM REV CODE 250

## 2024-03-02 PROCEDURE — 81001 URINALYSIS AUTO W/SCOPE: CPT

## 2024-03-02 PROCEDURE — 85730 THROMBOPLASTIN TIME PARTIAL: CPT

## 2024-03-02 PROCEDURE — 84443 ASSAY THYROID STIM HORMONE: CPT

## 2024-03-02 PROCEDURE — 97535 SELF CARE MNGMENT TRAINING: CPT

## 2024-03-02 PROCEDURE — 80053 COMPREHEN METABOLIC PANEL: CPT

## 2024-03-02 PROCEDURE — 85610 PROTHROMBIN TIME: CPT

## 2024-03-02 PROCEDURE — 97116 GAIT TRAINING THERAPY: CPT

## 2024-03-02 PROCEDURE — 99291 CRITICAL CARE FIRST HOUR: CPT | Mod: GC,,, | Performed by: PSYCHIATRY & NEUROLOGY

## 2024-03-02 PROCEDURE — 25000003 PHARM REV CODE 250: Performed by: STUDENT IN AN ORGANIZED HEALTH CARE EDUCATION/TRAINING PROGRAM

## 2024-03-02 PROCEDURE — 84100 ASSAY OF PHOSPHORUS: CPT

## 2024-03-02 RX ORDER — ATORVASTATIN CALCIUM 40 MG/1
40 TABLET, FILM COATED ORAL DAILY
Status: DISCONTINUED | OUTPATIENT
Start: 2024-03-03 | End: 2024-03-02

## 2024-03-02 RX ORDER — SOLIFENACIN SUCCINATE 10 MG/1
10 TABLET, FILM COATED ORAL NIGHTLY
COMMUNITY
Start: 2024-02-06

## 2024-03-02 RX ORDER — HYDRALAZINE HYDROCHLORIDE 20 MG/ML
10 INJECTION INTRAMUSCULAR; INTRAVENOUS EVERY 4 HOURS PRN
Status: DISCONTINUED | OUTPATIENT
Start: 2024-03-02 | End: 2024-03-04 | Stop reason: HOSPADM

## 2024-03-02 RX ORDER — ATORVASTATIN CALCIUM 40 MG/1
80 TABLET, FILM COATED ORAL DAILY
Status: DISCONTINUED | OUTPATIENT
Start: 2024-03-03 | End: 2024-03-04 | Stop reason: HOSPADM

## 2024-03-02 RX ORDER — HYDRALAZINE HYDROCHLORIDE 20 MG/ML
INJECTION INTRAMUSCULAR; INTRAVENOUS
Status: COMPLETED
Start: 2024-03-02 | End: 2024-03-02

## 2024-03-02 RX ORDER — DIAZEPAM 2 MG/1
2 TABLET ORAL ONCE
Status: COMPLETED | OUTPATIENT
Start: 2024-03-02 | End: 2024-03-02

## 2024-03-02 RX ADMIN — LEVETIRACETAM 500 MG: 100 INJECTION, SOLUTION INTRAVENOUS at 08:03

## 2024-03-02 RX ADMIN — ACETAMINOPHEN 650 MG: 325 TABLET ORAL at 08:03

## 2024-03-02 RX ADMIN — HYDRALAZINE HYDROCHLORIDE 10 MG: 20 INJECTION, SOLUTION INTRAMUSCULAR; INTRAVENOUS at 11:03

## 2024-03-02 RX ADMIN — LEVOTHYROXINE SODIUM 75 MCG: 75 TABLET ORAL at 08:03

## 2024-03-02 RX ADMIN — POLYETHYLENE GLYCOL 3350 17 G: 17 POWDER, FOR SOLUTION ORAL at 08:03

## 2024-03-02 RX ADMIN — AMLODIPINE BESYLATE 10 MG: 10 TABLET ORAL at 08:03

## 2024-03-02 RX ADMIN — ATORVASTATIN CALCIUM 80 MG: 40 TABLET, FILM COATED ORAL at 08:03

## 2024-03-02 RX ADMIN — DIAZEPAM 2 MG: 2 TABLET ORAL at 04:03

## 2024-03-02 RX ADMIN — ONDANSETRON 4 MG: 2 INJECTION INTRAMUSCULAR; INTRAVENOUS at 11:03

## 2024-03-02 NOTE — CONSULTS
Inpatient consult to Physical Medicine Rehab  Consult performed by: Emy Fleming NP  Consult ordered by: Priyanka Whyte PA-C  Reason for consult: Rehab      Consult received. PM&R following.     DOUG Castellanos, FNP-C  Physical Medicine & Rehabilitation   03/02/2024

## 2024-03-02 NOTE — ASSESSMENT & PLAN NOTE
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 She was visiting her  at the hospital 3/1 when she tripped and fell forward with her head hitting a cement pole. Intact on initial exam other than mild R pronator drift.    CTH 4 PM 3/1: R sylvian and sulcal SAH, L parietal aSDH 9mm thickness on coronal; hyperostosis   CT C sp/maxface 3/1: no acute fx or displacement, addendum read as possible fracture through anterior C6-7 osteophyte   CTA 6 PM 3/1: no LVO, no aneurysm, absent R A1   CTH 3/2 4AM: stable     - admitted to [facility], q1h neuro checks and vitals  - continue C collar for now, please obtain MRI C-spine  - on ASA/plavix at home, s/p DDAVP. Hold AC/AP, coags wnl  - keppra ppx  - SBP <160  - PT/OT   - no indication for operative intervention at this time, NSGY will continue to follow     Discussed with Dr. Greenwood

## 2024-03-02 NOTE — NURSING
Patient arrived to [facility] from [facility] by [facility] unit # [id number]     Report received from: ED RN     Type of stroke/diagnosis: ICH post fall     Current symptoms: Aox4, spontaneous movement x4, intermittent headache, pt complains of nausea, c collar in place,     Skin Assessment done: Yes   Wounds noted: two abrasions on forehead     Skin Assessment Verified by: Connor Cortez Completed? Yes     Patient Belongings on Admit: belongings with family     [facility] notified: name of person notified

## 2024-03-02 NOTE — CONSULTS
"  [facility] - Neuro Critical Care  Adult Nutrition  Consult Note    SUMMARY     Recommendations    1. Continue Regular diet.   2. RD to monitor & follow-up.    Goals: Meet % EEN, EPN by RD f/u date  Nutrition Goal Status: new  Communication of RD Recs: reviewed with RN    Assessment and Plan    No nutrition dx at this time.     Reason for Assessment    Reason For Assessment: consult  Diagnosis: other (see comments) (SDH)  Relevant Medical History: HTN, HLD  Interdisciplinary Rounds: did not attend    General Information Comments: Diet advanced this AM. Pt appears nourished w/ UBW of 195# - no indicators of malnutrition noted.  Nutrition Discharge Planning: Adequate PO intake    Nutrition/Diet History    Factors Affecting Nutritional Intake: None identified at this time    Anthropometrics    Temp: 98 °F (36.7 °C)  Height: 5' 3" (160 cm)  Height (inches): 63 in  Weight: 88.5 kg (195 lb 1.7 oz)  Weight (lb): 195.11 lb  Ideal Body Weight (IBW), Female: 115 lb  % Ideal Body Weight, Female (lb): 169.66 %  BMI (Calculated): 34.6  BMI Grade: 30 - 34.9- obesity - grade I    Lab/Procedures/Meds    Pertinent Labs Reviewed: reviewed  Pertinent Medications Reviewed: reviewed    Estimated/Assessed Needs    Weight Used For Calorie Calculations: 88.5 kg (195 lb 1.7 oz)    Energy Calorie Requirements (kcal): 1309 kcal/d  Energy Need Method: [place]oin-St Jeor (1.0 PAL)    Protein Requirements: 80 g/d (.9 g/kg)  Weight Used For Protein Calculations: 88.5 kg (195 lb 1.7 oz)    Estimated Fluid Requirement Method: other (see comments) (Per MD)  RDA Method (mL): 1309    Nutrition Prescription Ordered    Current Diet Order: Regular    Evaluation of Received Nutrient/Fluid Intake    I/O: +2L since admit    Comments: LBM: 3/2    Tolerance: tolerating    Nutrition Risk    Level of Risk/Frequency of Follow-up:  (1x/week)     Monitor and Evaluation    Food and Nutrient Intake: food and beverage intake, energy intake  Food and Nutrient " Adminstration: diet order  Physical Activity and Function: nutrition-related ADLs and IADLs  Anthropometric Measurements: weight, weight change  Biochemical Data, Medical Tests and Procedures: glucose/endocrine profile, lipid profile, inflammatory profile, gastrointestinal profile, electrolyte and renal panel  Nutrition-Focused Physical Findings: overall appearance     Nutrition Follow-Up    RD Follow-up?: Yes

## 2024-03-02 NOTE — CONSULTS
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 She was visiting her  at the hospital 3/1 when she tripped and fell forward with her head hitting a cement pole. CTH showed R sylvian fissure and sulcal SAH as well as L parietal convexity SDH up to 9 mm thickness. She was able to walk after her fall, and at time of assessment has mild HA, denies any neck pain, weakness, numbness, speech difficulty. S/p DDAVP, DAPT held.     Medications Prior to Admission   Medication Sig Dispense Refill Last Dose    amLODIPine (NORVASC) 10 MG tablet TAKE 1 TABLET BY MOUTH ONCE DAILY. 90 tablet 0     aspirin (ECOTRIN) 81 MG EC tablet Take 81 mg by mouth once daily.       atorvastatin (LIPITOR) 40 MG tablet TAKE 2 TABLETS BY MOUTH IN THE EVENING 180 tablet 0     clopidogreL (PLAVIX) 75 mg tablet Take 1 tablet (75 mg total) by mouth once daily. 90 tablet 3     levothyroxine (SYNTHROID) 75 MCG tablet TAKE 1 TABLET BY MOUTH ONCE A DAY 90 tablet 2     mag hydrox/aluminum hyd/simeth (MAALOX ORAL) Take 1 Dose by mouth as needed.       meloxicam (MOBIC) 7.5 MG tablet Take 1 tablet (7.5 mg total) by mouth once daily. 90 tablet 1     solifenacin (VESICARE) 5 MG tablet Take 5 mg by mouth once daily.       UNABLE TO FIND Daily. Folate 800 mcg  b12 1 mg       vit C/E/Zn/coppr/lutein/zeaxan (PRESERVISION AREDS-2 ORAL) as directed Orally          Review of patient's allergies indicates:   Allergen Reactions    Ace inhibitors Other (See Comments)     cough    Morphine Itching    Keflex [cephalexin] Itching and Rash       Past Medical History:   Diagnosis Date     Blood transfusion     GERD (gastroesophageal reflux disease)     Hepatitis C     treated six months by Dr. Gaspar with interferon    History of hepatitis C     Hyperlipidemia     Hypertension     Hypothyroidism     Obesity     Primary osteoarthritis of left knee 2015    Sleep apnea     Stroke     TIA     TIA (transient ischemic attack)     Vision loss of right eye     Dr Eldridge Right eye blood clot      Past Surgical History:   Procedure Laterality Date    ABDOMINAL SURGERY      SBO from lap band wraping around small bowel, lap untwisted    ADENOIDECTOMY      BREAST BIOPSY      needle biopsy     CATARACT EXTRACTION, BILATERAL Bilateral      SECTION      CHOLECYSTECTOMY      COLONOSCOPY  2011    Dr. Alvares, 5 year recheck    EYE SURGERY      bilateral cataract Dr Carrillo     EYE SURGERY  10/2020    bottom lid    HEMORRHOID SURGERY      HYSTERECTOMY      INCONTINENCE SURGERY      sling    LAPAROSCOPIC GASTRIC BANDING      LAPAROSCOPIC NISSEN FUNDOPLICATION      Dr. Rutherford    TONSILLECTOMY       Family History       Problem Relation (Age of Onset)    Arthritis Mother, Paternal Grandmother, Daughter    Cancer Maternal Uncle, Paternal Grandfather    Diabetes Mother, Sister    Diverticulitis Sister    Early death Maternal Grandfather (47)    Emphysema Maternal Aunt    Heart disease Mother, Maternal Aunt, Maternal Grandfather, Paternal Grandfather    No Known Problems Daughter, Father, Son, Paternal Uncle, Maternal Grandmother, Brother          Tobacco Use    Smoking status: Former     Current packs/day: 0.50     Average packs/day: 0.5 packs/day for 5.0 years (2.5 ttl pk-yrs)     Types: Cigarettes    Smokeless tobacco: Never   Substance and Sexual Activity    Alcohol use: No    Drug use: No    Sexual activity: Yes     Partners: Male     Review of Systems   Constitutional:  Negative for chills, fever and unexpected weight change.   HENT:  Negative for sore throat.     Eyes:  Negative for visual disturbance.   Respiratory:  Negative for cough and shortness of breath.    Cardiovascular:  Negative for chest pain and palpitations.   Gastrointestinal:  Negative for abdominal pain, blood in stool, constipation, diarrhea, nausea and vomiting.   Genitourinary:  Negative for difficulty urinating, dysuria and hematuria.   Musculoskeletal:  Negative for back pain, myalgias and neck pain.   Skin:  Negative for pallor and rash.   Neurological:  Negative for seizures, facial asymmetry, speech difficulty, weakness, numbness and headaches.   Psychiatric/Behavioral:  Negative for confusion.      Objective:        There is no height or weight on file to calculate BMI.  Vital Signs (Most Recent):  Temp: 98 °F (36.7 °C) (03/02/24 0701)  Pulse: 66 (03/02/24 0701)  Resp: 17 (03/02/24 0701)  BP: 131/61 (03/02/24 0701)  SpO2: 96 % (03/02/24 0701) Vital Signs (24h Range):  Temp:  [98 °F (36.7 °C)-98.4 °F (36.9 °C)] 98 °F (36.7 °C)  Pulse:  [64-90] 66  Resp:  [0-26] 17  SpO2:  [95 %-98 %] 96 %  BP: (113-167)/(53-84) 131/61                                 Physical Exam         Neurosurgery Physical Exam    Neurosurgery Physical Exam    General: well developed, well nourished, no distress.   HEENT: normocephalic, atraumatic  CV: regular rate   Pulmonary: normal respirations, no signs of respiratory distress  Abdomen: soft, non-distended, not tender to palpation  Skin: Skin is warm, dry and intact  Heme: no bruising    Neuro:   Mental Status: AO x4, no aphasia, no dysarthria   CN: PERRL, EOMI, VF intact to confrontation, sensation intact bilaterally, eyebrow raise and grimace symmetric, tongue midline  Motor: moves all extremities spontaneously, full strength throughout, subtle R pronator drift  Sensory: intact to light touch throughout  Cerebellar: finger to nose intact bilaterally  Reflexes: -Florez's, -Babinski, no clonus. Patellar: 2+ bilaterally   Gait: deferred    Significant Labs:  Recent Labs    Lab 03/01/24  1720 03/02/24  0522   * 105    140   K 3.9 3.5    108   CO2 26 24   BUN 26* 24*   CREATININE 0.7 0.6   CALCIUM 9.3 8.6*   MG 2.0 2.0     Recent Labs   Lab 03/01/24  1720 03/02/24  0522   WBC 6.96 7.68   HGB 14.0 11.6*   HCT 42.4 35.4*    178     Recent Labs   Lab 03/01/24  1720 03/02/24  0522   INR 1.0 1.1   APTT 22.7 24.0     Microbiology Results (last 7 days)       ** No results found for the last 168 hours. **          All pertinent labs from the last 24 hours have been reviewed.    Significant Diagnostics:  CT: CT Head Without Contrast    Result Date: 3/2/2024  Evolving scattered extra-axial hemorrhages with subdural hemorrhage overlying the left posterior temporoparietal convexity and evolving subarachnoid hemorrhage most pronounced along the right sylvian fissure and adjacent sulci There is no significant increased hemorrhage. Hypoattenuation within the right frontal lobe and insula which may be related to associated edema a component of parenchymal contusion or infarction not excluded.  This could be further evaluated with MRI as warranted if patient compatible Electronically signed by: Nick Emmanuel DO Date:    03/02/2024 Time:    07:40   MRI: No results found in the last 24 hours.  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 She was visiting her  at the hospital 3/1 when she tripped and fell forward with her head hitting a cement pole. Intact on initial exam other than mild R pronator drift.    CTH 4 PM 3/1: R sylvian and sulcal SAH, L parietal aSDH 9mm thickness on coronal; hyperostosis   CT C sp/maxface 3/1: no acute fx or displacement, addendum read as possible fracture through anterior C6-7 osteophyte   CTA 6 PM 3/1: no LVO, no aneurysm, absent R A1   CTH 3/2 4AM: stable     - admitted to [facility], q1h neuro  checks and vitals  - continue C collar for now, please obtain MRI C-spine  - on ASA/plavix at home, s/p DDAVP. Hold AC/AP, coags wnl  - keppra ppx  - SBP <160  - PT/OT   - no indication for operative intervention at this time, NSGY will continue to follow     Discussed with Dr. Greenwood        Thank you for your consult. I will follow-up with patient. Please contact us if you have any additional questions.    Stacy Meza MD  Neurosurgery  [facility]

## 2024-03-02 NOTE — PROGRESS NOTES
Pt transported to MRI on transport monitor and room air by RN x1. Pt tolerated well. Pt assessed mid MRI d/t tech stating she was confused when checking on her. Upon assessment pt was AO x4 and at her baseline. Pt states she had fallen asleep. VSS stable throughout.

## 2024-03-02 NOTE — ASSESSMENT & PLAN NOTE
On ASA and plavix  -holding in setting of tSAH and SDH  -Plavix reversed w/ DDAVP prior to transfer

## 2024-03-02 NOTE — PLAN OF CARE
"   [facility] Care Plan    POC reviewed with Brandi Flynn and family at 1400. Pt verbalized understanding. Questions and concerns addressed. No acute events today. Pt progressing toward goals. Will continue to monitor. See below and flowsheets for full assessment and VS info.       MRI complete  Zofran dnd valium x1 for nausea  Echo done  BLE ultrasounds ordered  Diet ordered  Ambulated to bathroom with PT/OT  *CCOLLAR WHEN OOB*    Is this a stroke patient? yes- Stroke booklet reviewed with patient and family, risk factors identified for patient and stroke booklet remains at bedside for ongoing education.     Care individualization: Nausea control  Restraints:        Neuro:  Tamela Coma Scale  Best Eye Response: 4-->(E4) spontaneous  Best Motor Response: 6-->(M6) obeys commands  Best Verbal Response: 5-->(V5) oriented  [place] Coma Scale Score: 15  Assessment Qualifiers: patient not sedated/intubated  Pupil PERRLA: yes     24 hr Temp:  [98 °F (36.7 °C)-98.4 °F (36.9 °C)]     CV:   Rhythm: normal sinus rhythm  BP goals:   SBP < 160  MAP > 65    Resp:           Plan: N/A    GI/:     Diet/Nutrition Received: NPO  Last Bowel Movement: 03/02/24  Voiding Characteristics: external catheter    Intake/Output Summary (Last 24 hours) at 3/2/2024 1643  Last data filed at 3/2/2024 1501  Gross per 24 hour   Intake 238 ml   Output 100 ml   Net 138 ml     Unmeasured Output  Urine Occurrence: 2  Stool Occurrence: 1  Pad Count: 3    Labs/Accuchecks:  Recent Labs   Lab 03/02/24  0522   WBC 7.68   RBC 3.78*   HGB 11.6*   HCT 35.4*         Recent Labs   Lab 03/02/24  0522      K 3.5   CO2 24      BUN 24*   CREATININE 0.6   ALKPHOS 85   ALT 11   AST 23   BILITOT 0.7      Recent Labs   Lab 03/02/24  0522   INR 1.1   APTT 24.0    No results for input(s): "CPK", "CPKMB", "TROPONINI", "MB" in the last 168 hours.    Electrolytes: N/A - electrolytes WDL  Accuchecks: none    Gtts:      LDA/Wounds:  Lines/Drains/Airways       " Peripheral Intravenous Line  Duration                  Peripheral IV - Single Lumen 03/01/24 0000 20 G Anterior;Left;Proximal Forearm 1 day         Peripheral IV - Single Lumen 03/01/24 1800 20 G Right Antecubital <1 day                  Wounds: No  Wound care consulted: No

## 2024-03-02 NOTE — SUBJECTIVE & OBJECTIVE
Past Medical History:   Diagnosis Date    Blood transfusion     GERD (gastroesophageal reflux disease)     Hepatitis C     treated six months by Dr. Gaspar with interferon    History of hepatitis C     Hyperlipidemia     Hypertension     Hypothyroidism     Obesity     Primary osteoarthritis of left knee 2015    Sleep apnea     Stroke     TIA     TIA (transient ischemic attack)     Vision loss of right eye     Dr Eldridge Right eye blood clot      Past Surgical History:   Procedure Laterality Date    ABDOMINAL SURGERY      SBO from lap band wraping around small bowel, lap untwisted    ADENOIDECTOMY      BREAST BIOPSY  2014    needle biopsy     CATARACT EXTRACTION, BILATERAL Bilateral      SECTION      CHOLECYSTECTOMY  2001    COLONOSCOPY  2011    Dr. Alvares, 5 year recheck    EYE SURGERY      bilateral cataract Dr Carrillo     EYE SURGERY  10/2020    bottom lid    HEMORRHOID SURGERY      HYSTERECTOMY      INCONTINENCE SURGERY      sling    LAPAROSCOPIC GASTRIC BANDING      LAPAROSCOPIC NISSEN FUNDOPLICATION      Dr. Rutherford    TONSILLECTOMY        Current Facility-Administered Medications on File Prior to Encounter   Medication Dose Route Frequency Provider Last Rate Last Admin    [COMPLETED] desmopressin (DDAVP) 35.4 mcg in sodium chloride 0.9% 50 mL IVPB  0.4 mcg/kg Intravenous Once Kaleb Varela  mL/hr at 24 35.4 mcg at 24    [COMPLETED] hydrALAZINE injection 10 mg  10 mg Intravenous ED 1 Time Kaleb Varela MD   10 mg at 24    [COMPLETED] iohexoL (OMNIPAQUE 350) injection 100 mL  100 mL Intravenous ONCE PRN Kaleb Varela MD   100 mL at 24    [COMPLETED] levETIRAcetam in NaCl (iso-os) IVPB 500 mg  500 mg Intravenous ED 1 Time Kaleb Varela MD   Stopped at 24    [DISCONTINUED] 0.9%  NaCl infusion (for blood administration)   Intravenous Q24H PRN Kaleb Varela MD         Current Outpatient  Medications on File Prior to Encounter   Medication Sig Dispense Refill    amLODIPine (NORVASC) 10 MG tablet TAKE 1 TABLET BY MOUTH ONCE DAILY. 90 tablet 0    aspirin (ECOTRIN) 81 MG EC tablet Take 81 mg by mouth once daily.      atorvastatin (LIPITOR) 40 MG tablet TAKE 2 TABLETS BY MOUTH IN THE EVENING 180 tablet 0    clopidogreL (PLAVIX) 75 mg tablet Take 1 tablet (75 mg total) by mouth once daily. 90 tablet 3    levothyroxine (SYNTHROID) 75 MCG tablet TAKE 1 TABLET BY MOUTH ONCE A DAY 90 tablet 2    mag hydrox/aluminum hyd/simeth (MAALOX ORAL) Take 1 Dose by mouth as needed.      meloxicam (MOBIC) 7.5 MG tablet Take 1 tablet (7.5 mg total) by mouth once daily. 90 tablet 1    solifenacin (VESICARE) 5 MG tablet Take 5 mg by mouth once daily.      UNABLE TO FIND Daily. Folate 800 mcg  b12 1 mg      vit C/E/Zn/coppr/lutein/zeaxan (PRESERVISION AREDS-2 ORAL) as directed Orally        Allergies: Ace inhibitors, Morphine, and Keflex [cephalexin]  Family History   Problem Relation Age of Onset    Diabetes Mother     Arthritis Mother         RA    Heart disease Mother         MI at 79     Diabetes Sister     Diverticulitis Sister     No Known Problems Daughter     Emphysema Maternal Aunt     Heart disease Maternal Aunt     Cancer Maternal Uncle     Early death Maternal Grandfather 47    Heart disease Maternal Grandfather     Arthritis Paternal Grandmother     Heart disease Paternal Grandfather     Cancer Paternal Grandfather         lung heavy smoker     No Known Problems Father     No Known Problems Son     No Known Problems Paternal Uncle     No Known Problems Maternal Grandmother     No Known Problems Brother     Arthritis Daughter      Social History     Tobacco Use    Smoking status: Former     Current packs/day: 0.50     Average packs/day: 0.5 packs/day for 5.0 years (2.5 ttl pk-yrs)     Types: Cigarettes    Smokeless tobacco: Never   Substance Use Topics    Alcohol use: No    Drug use: No     Review of Systems    Constitutional:  Negative for fatigue and fever.   HENT:  Negative for dental problem and nosebleeds.    Respiratory:  Negative for cough and shortness of breath.    Cardiovascular:  Negative for chest pain and palpitations.   Gastrointestinal:  Positive for nausea. Negative for abdominal pain and vomiting.   Genitourinary:  Negative for difficulty urinating and dysuria.   Musculoskeletal:  Negative for neck pain and neck stiffness.   Skin:  Positive for color change (L periorbital area) and wound (L periorbital area).   Neurological:  Positive for headaches. Negative for dizziness, tremors, seizures, syncope, facial asymmetry, speech difficulty, weakness, light-headedness and numbness.   Psychiatric/Behavioral:  Negative for agitation and confusion.      Objective:     Vitals:         Temp  Min: 98.2 °F (36.8 °C)  Max: 98.3 °F (36.8 °C)  Pulse  Min: 82  Max: 90  BP  Min: 159/74  Max: 167/80  MAP (mmHg)  Min: 107  Max: 107  Resp  Min: 17  Max: 18  SpO2  Min: 96 %  Max: 98 %    No intake/output data recorded.            Physical Exam    Constitutional: Overweight female. Appears stated age. No obvious distress.   Patient lying in bed comfortably c-collar in place, fixed to appropriate position during exam.     Eyes: Clear conjunctiva. Anicteric. No discharge. No subconjunctival hemorrhage.   Lids without lesions. L lateral periorbital area swelling and ecchymosis, mildly tender to palpation.     HEENT: C-collar. Ecchymosis and swelling to L lateral periorbital area.   Nose and external ears atraumatic.   Mucus membranes are moist. Denies change in dentition.     Cardio: Regular rate and rhythm on telemetry monitor. Pulses intact in lower extremities.    Respiratory: Regular effort, no respiratory distress, on RA.    GI:Soft, non-distended, non-tender.    Skin: ecchymosis to R great toe. Telangiectasias on bilateral LE to knee. No associate pitting edema. Superficial triangular laceration over L, frontal skull (5cm  and 4cm) and additional 4cm superficial laceration to left of triangular laceration.     Neuro: Sedation: None     E4 V5 M6    Awake, alert, and oriented to self, time, place, and situation.   Patient is answering all questions appropriately and following all commands briskly.   No facial droop. EOMI. PERRL.     Motor:   No pronator drift  THOMPSON spontaneously and against gravity   RUE: 5/5  LUE: 5/5  RLE: 4/5  LLE: 4/5    Sensory:  Sensation grossly intact    Gait deferred.     Today I personally reviewed pertinent medications, lines/drains/airways, imaging, laboratory results, notably: EKG, CTH, CTA, CT Max face and cervical spine, history, medications, VS

## 2024-03-02 NOTE — PLAN OF CARE
Problem: Adult Inpatient Plan of Care  Goal: Plan of Care Review  Outcome: Ongoing, Progressing  Goal: Patient-Specific Goal (Individualized)  Outcome: Ongoing, Progressing  Goal: Absence of Hospital-Acquired Illness or Injury  Outcome: Ongoing, Progressing  Goal: Optimal Comfort and Wellbeing  Outcome: Ongoing, Progressing  Goal: Readiness for Transition of Care  Outcome: Ongoing, Progressing     Problem: Adjustment to Illness (Stroke, Hemorrhagic)  Goal: Optimal Coping  Outcome: Ongoing, Progressing     Problem: Bowel Elimination Impaired (Stroke, Hemorrhagic)  Goal: Effective Bowel Elimination  Outcome: Ongoing, Progressing     Problem: Cerebral Tissue Perfusion (Stroke, Hemorrhagic)  Goal: Optimal Cerebral Tissue Perfusion  Outcome: Ongoing, Progressing     Problem: Cognitive Impairment (Stroke, Hemorrhagic)  Goal: Optimal Cognitive Function  Outcome: Ongoing, Progressing     Problem: Communication Impairment (Stroke, Hemorrhagic)  Goal: Effective Communication Skills  Outcome: Ongoing, Progressing     Problem: Functional Ability Impaired (Stroke, Hemorrhagic)  Goal: Optimal Functional Ability  Outcome: Ongoing, Progressing     Problem: Pain (Stroke, Hemorrhagic)  Goal: Acceptable Pain Control  Outcome: Ongoing, Progressing     Problem: Respiratory Compromise (Stroke, Hemorrhagic)  Goal: Effective Oxygenation and Ventilation  Outcome: Ongoing, Progressing     Problem: Sensorimotor Impairment (Stroke, Hemorrhagic)  Goal: Improved Sensorimotor Function  Outcome: Ongoing, Progressing     Problem: Swallowing Impairment (Stroke, Hemorrhagic)  Goal: Optimal Eating and Swallowing Without Aspiration  Outcome: Ongoing, Progressing     Problem: Urinary Elimination Impaired (Stroke, Hemorrhagic)  Goal: Effective Urinary Elimination  Outcome: Ongoing, Progressing     Problem: Fall Injury Risk  Goal: Absence of Fall and Fall-Related Injury  Outcome: Ongoing, Progressing

## 2024-03-02 NOTE — PT/OT/SLP EVAL
Occupational Therapy   Co-Evaluation and Treatment with PT  Co-treatment and co-eval performed due to patient's multiple deficits requiring two skilled therapists to appropriately and safely assess patient's strength and endurance while facilitating functional tasks in addition to accommodating for patient's activity tolerance.        Name: Brandi Flynn  MRN: [id number]  Admitting Diagnosis: SDH (subdural hematoma)  Recent Surgery: * No surgery found *      Recommendations:     Discharge Recommendations: Moderate Intensity Therapy  Discharge Equipment Recommendations:  walker, rolling  Barriers to discharge:  Other (Comment) (decreased functional abilities compared to PLOF)    Assessment:     Brandi Flynn is a 83 y.o. female with a medical diagnosis of SDH (subdural hematoma). Patient presents with decreased functional abilities in relation to PLOF and will benefit from skilled therapy services to return to PLOF. Patient with good prognosis for accelerated therapy outcome to reach set goals and discharge to next level of care.  Patient to receive therapy to combat weakness and worsening of functional abilities to care for self. Pt will work on adaptations to ADLs, strength and endurance training, and overall mobility training to directly aid safety at hospital and when going through daily life. Performance deficits affecting function: weakness, impaired endurance, impaired sensation, impaired self care skills, impaired functional mobility, gait instability, impaired balance, decreased safety awareness, pain, decreased ROM, impaired coordination, edema, impaired cardiopulmonary response to activity.      Rehab Prognosis: Good; patient would benefit from acute skilled OT services to address these deficits and reach maximum level of function.       Plan:     Patient to be seen 3 x/week to address the above listed problems via self-care/home management, therapeutic activities, therapeutic exercises, neuromuscular  "re-education  Plan of Care Expires:    Plan of Care Reviewed with: patient, daughter, son    Subjective     Chief Complaint: "I'm so nauseous"  Patient/Family Comments/goals: Get better    Occupational Profile:  Living Environment: Patient lives with  in [facility] with [address]; Home is equipped with a WIS with no grab bars and a built in shower bench.   Previous level of function: Patient was fully independent with no AD  Roles and Routines: drives, primary homemaker  Equipment Used at Home: shower chair  Assistance upon Discharge: Assistance from family upon d/c     Pain/Comfort:  Pain Rating 1: 0/10    Patients cultural, spiritual, Jew conflicts given the current situation:      Objective:     Communicated with: RN prior to session.  Patient found supine with cervical collar, pulse ox (continuous), blood pressure cuff, peripheral IV upon OT entry to room.    General Precautions: Standard, fall  Orthopedic Precautions:    Braces: Cervical collar  Respiratory Status: Room air    Occupational Performance:    Bed Mobility:    Patient completed Rolling/Turning to Left with  stand by assistance  Patient completed Rolling/Turning to Right with stand by assistance  Patient completed Scooting/Bridging with stand by assistance  Patient completed Supine to Sit with stand by assistance  Patient completed Sit to Supine with stand by assistance    Functional Mobility/Transfers:  Patient completed Sit <> Stand Transfer with contact guard assistance  with  hand-held assist   Patient completed Toilet Transfer Step Transfer technique with contact guard assistance with  hand-held assist  Functional Mobility: Patient impulsive and hasty when getting up and transferring to toilet. Assistance ranging from CGA to Min A due to instability on feet on this day.     Activities of Daily Living:  Upper Body Dressing: minimum assistance donning of gown  Lower Body Dressing: maximal assistance donning of footwear  Toileting: moderate " assistance    Grooming: SBA standing at sink    Cognitive/Visual Perceptual:  Cognitive/Psychosocial Skills:     -       Oriented to: Person, Place, Time, and Situation   -       Follows Commands/attention:Follows multistep  commands and impulsive on this day  -       Safety awareness/insight to disability: impaired   -       Mood/Affect/Coping skills/emotional control: Appropriate to situation, Anxious, Pleasant, and Guarded  Visual/Perceptual:      -Intact      Physical Exam:  Balance:    -       Patient was unstable when completing FM in room on this day; CGA-- min A needed to steady with HHA for transfer to bathroom and out  Dominant hand:    -       Right  Upper Extremity Range of Motion:     -       Right Upper Extremity: WFL  -       Left Upper Extremity: WFL  Upper Extremity Strength:    -       Right Upper Extremity: Deficits: weakness  -       Left Upper Extremity: Deficits: weakness   Strength:    -       Right Upper Extremity: Deficits: weakness  -       Left Upper Extremity: Deficits: weakness    AMPAC 6 Click ADL:  AMPAC Total Score: 19    Treatment & Education:  Co-eval and treatment as stated above. Recommending continued therapy services in acute setting and moderate intensity therapy after discharge (pending progress made).  -Education on energy conservation and task modification to maximize safety and (I) during ADLs and mobility  -Education on importance of OOB activity to improve overall activity tolerance and promote recovery  -Pt educated to call for assistance and to transfer with hospital staff only  -Provided education regarding role of OT, POC, & discharge recommendations with pt and children verbalizing understanding.  Pt had no further questions & when asked whether there were any concerns pt reported none.      Patient left supine with all lines intact, call button in reach, nursing notified, and nurse and family present    GOALS:   Multidisciplinary Problems       Occupational  Therapy Goals          Problem: Occupational Therapy    Goal Priority Disciplines Outcome Interventions   Occupational Therapy Goal     OT, PT/OT     Description: Goals to be met by: 3/16/24     Patient will increase functional independence with ADLs by performing:    UE Dressing with [place].  LE Dressing with Minimal Assistance.  Grooming while standing at sink with [place].  Toileting from toilet with [place] for hygiene and clothing management.   Supine to sit with [place].  Step transfer with [place]  Toilet transfer to toilet with [place].  Increased functional strength to 4/5 for BUEs.                         History:     Past Medical History:   Diagnosis Date    Blood transfusion     GERD (gastroesophageal reflux disease)     Hepatitis C     treated six months by Dr. Gaspar with interferon    History of hepatitis C     Hyperlipidemia     Hypertension     Hypothyroidism     Obesity     Primary osteoarthritis of left knee 2015    Sleep apnea     Stroke     TIA     TIA (transient ischemic attack)     Vision loss of right eye     Dr Eldridge Right eye blood clot          Past Surgical History:   Procedure Laterality Date    ABDOMINAL SURGERY      SBO from lap band wraping around small bowel, lap untwisted    ADENOIDECTOMY      BREAST BIOPSY  2014    needle biopsy     CATARACT EXTRACTION, BILATERAL Bilateral      SECTION      CHOLECYSTECTOMY  2001    COLONOSCOPY  2011    Dr. Alvares, 5 year recheck    EYE SURGERY  2014    bilateral cataract Dr Carrillo     EYE SURGERY  10/2020    bottom lid    HEMORRHOID SURGERY      HYSTERECTOMY  1989    INCONTINENCE SURGERY  2008    sling    LAPAROSCOPIC GASTRIC BANDING  2004    LAPAROSCOPIC NISSEN FUNDOPLICATION      Dr. Rutherford    TONSILLECTOMY         Time Tracking:     OT Date of Treatment: 24  OT Start Time: 1119  OT Stop Time: 1142  OT Total Time (min): 23 min    Billable Minutes:Evaluation 10  Self Care/Home  Management 13    3/2/2024

## 2024-03-02 NOTE — HOSPITAL COURSE
03/02/2024 Overnight CTH relatively stable. AF, HDS on RA. MRI C spine pending, repeat CTH tomorrow. LE US pending. Resuming diet.   03/03/2024 No issues overnight. C-collar when OOB and sitting up, but needs smaller size. BLE US negative for DVT. Stable for stepdown to NSGY vs discharge home.  03/04/2024 NAEO. C-collar when OOB. Hypertensive, otherwise stable on RA. Discharging home today.

## 2024-03-02 NOTE — PT/OT/SLP EVAL
Physical Therapy Evaluation  Co-evaluation with OT due to acuity of condition, level of skilled assist needed for assessment of safety with mobility.   Patient Name:  Brandi Flynn   MRN:  [id number]    Recommendations:     Discharge Recommendations: Moderate Intensity Therapy   Discharge Equipment Recommendations: walker, rolling     Patient demonstrates a mobility limitation that significantly impairs their ability to participate in one or more mobility related activities of daily living. Patient's mobility limitation cannot be sufficiently resolved with the use of a cane, but can be sufficiently resolved with the use of a rolling walker.The use of a rolling walker will considerably improve their ability to participate in MRADLs. Patient will use the walker on a regular basis at home.     Barriers to discharge:  patient below functional baseline    Assessment:     Brandi Flynn is a 83 y.o. female admitted with a medical diagnosis of SDH (subdural hematoma).  She presents with the following impairments/functional limitations: weakness, impaired balance, impaired endurance, impaired self care skills, impaired functional mobility, gait instability, decreased coordination, decreased upper extremity function, decreased lower extremity function, decreased safety awareness, impaired skin, orthopedic precautions, impaired coordination. The patient demo'd good functional strength, however patient moving quickly and impulsively. She remains in a c-collar pending neurosurgery clearance, facial bruising. Initially upon sitting, patient c/o wooziness, BP taken in sitting 203/86; RN alerted, second BP reading 182/76. RN alerted MD and patient cleared to continue with mobility. Patient reported symptoms resolved She required minimum assistance for stability with gait with no AD, ambulated to bathroom and back to bed. At end of session, /79. Prior to admission, the patient was independent with mobility. Patient is not  "currently safe to return home due to increased fall risk. Patient currently demonstrates a need for low intensity therapy on a scheduled basis secondary to a decline in functional status due to injury     Rehab Prognosis: Good; patient would benefit from acute skilled PT services to address these deficits and reach maximum level of function.    Recent Surgery: * No surgery found *      Plan:     During this hospitalization, patient to be seen 4 x/week to address the identified rehab impairments via gait training, therapeutic activities, therapeutic exercises, neuromuscular re-education and progress toward the following goals:    Plan of Care Expires:  04/01/24    Subjective     Chief Complaint: "I feel ok, I need to get to the bathroom"  Patient/Family Comments/goals: return to PLOF  Pain/Comfort:  Pain Rating 1: 0/10    Patients cultural, spiritual, Anabaptist conflicts given the current situation: no    Living Environment:  The patient lives with her  in a [facility], [facility] vs a [facility] with built in bench. R handed.   Prior to admission, patients level of function was independent .  Equipment used at home: shower chair.  DME owned (not currently used): none.  Upon discharge, patient will have assistance from family.    Objective:     Communicated with RN prior to session.  Patient found HOB elevated with cervical collar, pulse ox (continuous), peripheral IV, PureWick, telemetry, blood pressure cuff  upon PT entry to room.    General Precautions: Standard, fall  Orthopedic Precautions:N/A   Braces: Cervical collar  Respiratory Status: Room air    Exams:    Cognitive Exam  Patient is A&O x4 and follows 100% of one -step commands    Fine Motor Coordination   -       WNL     Postural Exam Patient presented with the following abnormalities:    -       Rounded shoulders  -       Forward head  -       Kyphosis  -       Posterior pelvic tilt  -       Sensation    -       Light touch intact ADALGISA LE   Skin " Integrity/Edema     -       Skin integrity: L periorbital bruising, neck bruising  -       Edema: facial swelling   R LE ROM WNL   R LE Strength 3-/5 hip flexion, 4-/5 knee ext/flex, and ankle DF/PF   L LE ROM WNL   L LE Strength  3-/5 hip flexion, 4-/5 knee ext/flex, and ankle DF/PF            Functional Mobility:    Bed Mobility  Supine to Sit on the L side:  stand by assistance, HOB elevated, use of HR  Sit to supine: minimum assistance at LE   Transfers Sit to Stand:  contact guard assist from EOB, minimum assistance from low height toilet   Gait  Gait Distance: 15 x2 ft with R HHA  Assistance Level: minimum assistance   Description: wide LIANA, uneven steps, kyphotic posture, impaired lateral weight shift, patient moving quickly and impulsively           AM-PAC 6 CLICK MOBILITY  Total Score:19       Treatment & Education:  Patient and family educated on:  -role of therapy  -goals of session  -PT POC  -benefits of out of bed mobility and consequences of immobility  -calling for staff assist to mobilize safely  Patient agreeable to mobilize with therapy.      Gait training: cued for upright posture, reciprocal strides, facilitation for weight shift, pacing for energy conservation    Patient assisted with pericare, donned new gown seated on toilet.     Patient encouraged to ambulate, sit up in chair 3x/day to prevent deconditioning during hospitalization. Patient verbalized understanding and agreement to mobilize only with RN assist for safety.     Patient safe to ambulate with RN and 1 person assist.     Patient left HOB elevated with all lines intact, call button in reach, and RN notified.    GOALS:   Multidisciplinary Problems       Physical Therapy Goals          Problem: Physical Therapy    Goal Priority Disciplines Outcome Goal Variances Interventions   Physical Therapy Goal     PT, PT/OT Ongoing, Progressing     Description: Goals to be met by: 3/12     Patient will increase functional independence with  mobility by performin. Supine to sit with Modified [place]  2. Sit to supine with Modified [place]  3. Sit to stand transfer with Modified [place]  4. Gait  x 200 feet with Supervision using LRAD.   5. Ascend/descend 3 stair with unilateral Handrails Stand-by Assistance using No Assistive Device.                          History:     Past Medical History:   Diagnosis Date    Blood transfusion     GERD (gastroesophageal reflux disease)     Hepatitis C     treated six months by Dr. Gaspar with interferon    History of hepatitis C     Hyperlipidemia     Hypertension     Hypothyroidism     Obesity     Primary osteoarthritis of left knee 2015    Sleep apnea     Stroke     TIA     TIA (transient ischemic attack)     Vision loss of right eye     Dr Eldridge Right eye blood clot        Past Surgical History:   Procedure Laterality Date    ABDOMINAL SURGERY      SBO from lap band wraping around small bowel, lap untwisted    ADENOIDECTOMY      BREAST BIOPSY  2014    needle biopsy     CATARACT EXTRACTION, BILATERAL Bilateral      SECTION      CHOLECYSTECTOMY  2001    COLONOSCOPY  2011    Dr. Alvares, 5 year recheck    EYE SURGERY      bilateral cataract Dr Carrillo     EYE SURGERY  10/2020    bottom lid    HEMORRHOID SURGERY      HYSTERECTOMY  1989    INCONTINENCE SURGERY  2008    sling    LAPAROSCOPIC GASTRIC BANDING      LAPAROSCOPIC NISSEN FUNDOPLICATION      Dr. Rutherford    TONSILLECTOMY         Time Tracking:     PT Received On: 24  PT Start Time: 1119     PT Stop Time: 1146  PT Total Time (min): 27 min     Billable Minutes: Evaluation 12 and Gait Training 12      2024

## 2024-03-02 NOTE — ASSESSMENT & PLAN NOTE
-Admit to NCC   -Hourly neurochecks and vital signs  -CTH initially revealed acute SAH in R sylvian fissue w/ conitnued posterior migration of SAH blood into the R temporal lobe sulci w/ conitnued posterior and superior extent of blood invovling the region of central sulcus. Small contrecoup focus of SDH of measuring 6 mm in max thickness.    -Repeat CTH ordered and pending for 0000 3/2.   -CTA H&N at OSH revealed 1. Atherosclerotic changes of cervical carotid artery with approximately 20% stenosis of the ICA bilaterally.  2. Atherosclerotic changes of the left middle cerebral artery as described above.  3. No evidence of large vessel intracranial arterial occlusion is identified.  4. Incidental note of subcutaneous edema and skin thickening in the left anterior neck.  -CT C spine at OSH revealed no findings to suggest an acute fracture or subluxation within the cervical spine.   -On daily ASA and plavix. Last dose evening of 2/29.   -Reversed w/ DDAVP only prior to transfer   -NSGY consulted, appreciate recommendations. Awaiting potential surgical plan.   -Keppra 500mg bid x7 days for seizure ppx.   -EKG, Echo, CXR pending  -Daily CBC, CMP, Mag, Phos  -SCDs; hold chemo DVT ppx in setting of acute SDH  -NPO   -PT/OT

## 2024-03-02 NOTE — PLAN OF CARE
OT goals set and POC ongoing.     Goals to be met by: 3/16/24     Patient will increase functional independence with ADLs by performing:    UE Dressing with [place].  LE Dressing with Minimal Assistance.  Grooming while standing at sink with [place].  Toileting from toilet with [place] for hygiene and clothing management.   Supine to sit with [place].  Step transfer with [place]  Toilet transfer to toilet with [place].  Increased functional strength to 4/5 for BUEs.

## 2024-03-02 NOTE — PROGRESS NOTES
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 She was visiting her  at the hospital 3/1 when she tripped and fell forward with her head hitting a cement pole. CTH showed R sylvian fissure and sulcal SAH as well as L parietal convexity SDH up to 9 mm thickness. She was able to walk after her fall, and at time of assessment has mild HA, denies any neck pain, weakness, numbness, speech difficulty. S/p DDAVP, DAPT held.     Post-Op Info:  * No surgery found *         Medications Prior to Admission   Medication Sig Dispense Refill Last Dose    amLODIPine (NORVASC) 10 MG tablet TAKE 1 TABLET BY MOUTH ONCE DAILY. 90 tablet 0     aspirin (ECOTRIN) 81 MG EC tablet Take 81 mg by mouth once daily.       atorvastatin (LIPITOR) 40 MG tablet TAKE 2 TABLETS BY MOUTH IN THE EVENING 180 tablet 0     clopidogreL (PLAVIX) 75 mg tablet Take 1 tablet (75 mg total) by mouth once daily. 90 tablet 3     levothyroxine (SYNTHROID) 75 MCG tablet TAKE 1 TABLET BY MOUTH ONCE A DAY 90 tablet 2     mag hydrox/aluminum hyd/simeth (MAALOX ORAL) Take 1 Dose by mouth as needed.       meloxicam (MOBIC) 7.5 MG tablet Take 1 tablet (7.5 mg total) by mouth once daily. 90 tablet 1     solifenacin (VESICARE) 5 MG tablet Take 5 mg by mouth once daily.       UNABLE TO FIND Daily. Folate 800 mcg  b12 1 mg       vit C/E/Zn/coppr/lutein/zeaxan (PRESERVISION AREDS-2 ORAL) as directed Orally          Review of patient's allergies indicates:   Allergen Reactions    Ace inhibitors Other (See Comments)     cough    Morphine Itching    Keflex [cephalexin] Itching and Rash       Past Medical History:   Diagnosis Date    Blood transfusion 1960    GERD (gastroesophageal reflux disease)     Hepatitis C     treated six months by Dr. Gaspar with interferon     History of hepatitis C     Hyperlipidemia     Hypertension     Hypothyroidism     Obesity     Primary osteoarthritis of left knee 2015    Sleep apnea     Stroke     TIA     TIA (transient ischemic attack)     Vision loss of right eye     Dr Eldridge Right eye blood clot      Past Surgical History:   Procedure Laterality Date    ABDOMINAL SURGERY      SBO from lap band wraping around small bowel, lap untwisted    ADENOIDECTOMY      BREAST BIOPSY  2014    needle biopsy     CATARACT EXTRACTION, BILATERAL Bilateral      SECTION      CHOLECYSTECTOMY  2001    COLONOSCOPY  2011    Dr. Alvares, 5 year recheck    EYE SURGERY      bilateral cataract Dr Carrillo     EYE SURGERY  10/2020    bottom lid    HEMORRHOID SURGERY      HYSTERECTOMY  1989    INCONTINENCE SURGERY  2008    sling    LAPAROSCOPIC GASTRIC BANDING      LAPAROSCOPIC NISSEN FUNDOPLICATION      Dr. Rutherford    TONSILLECTOMY       Family History       Problem Relation (Age of Onset)    Arthritis Mother, Paternal Grandmother, Daughter    Cancer Maternal Uncle, Paternal Grandfather    Diabetes Mother, Sister    Diverticulitis Sister    Early death Maternal Grandfather (47)    Emphysema Maternal Aunt    Heart disease Mother, Maternal Aunt, Maternal Grandfather, Paternal Grandfather    No Known Problems Daughter, Father, Son, Paternal Uncle, Maternal Grandmother, Brother          Tobacco Use    Smoking status: Former     Current packs/day: 0.50     Average packs/day: 0.5 packs/day for 5.0 years (2.5 ttl pk-yrs)     Types: Cigarettes    Smokeless tobacco: Never   Substance and Sexual Activity    Alcohol use: No    Drug use: No    Sexual activity: Yes     Partners: Male     Review of Systems   Constitutional:  Negative for chills, fever and unexpected weight change.   HENT:  Negative for sore throat.    Eyes:  Negative for visual disturbance.   Respiratory:  Negative for cough and shortness of breath.    Cardiovascular:  Negative for chest  pain and palpitations.   Gastrointestinal:  Negative for abdominal pain, blood in stool, constipation, diarrhea, nausea and vomiting.   Genitourinary:  Negative for difficulty urinating, dysuria and hematuria.   Musculoskeletal:  Negative for back pain, myalgias and neck pain.   Skin:  Negative for pallor and rash.   Neurological:  Negative for seizures, facial asymmetry, speech difficulty, weakness, numbness and headaches.   Psychiatric/Behavioral:  Negative for confusion.      Objective:        There is no height or weight on file to calculate BMI.  Vital Signs (Most Recent):  Temp: 98 °F (36.7 °C) (03/02/24 0701)  Pulse: 66 (03/02/24 0701)  Resp: 17 (03/02/24 0701)  BP: 131/61 (03/02/24 0701)  SpO2: 96 % (03/02/24 0701) Vital Signs (24h Range):  Temp:  [98 °F (36.7 °C)-98.4 °F (36.9 °C)] 98 °F (36.7 °C)  Pulse:  [64-90] 66  Resp:  [0-26] 17  SpO2:  [95 %-98 %] 96 %  BP: (113-167)/(53-84) 131/61                                 Physical Exam         Neurosurgery Physical Exam    Neurosurgery Physical Exam    General: well developed, well nourished, no distress.   HEENT: normocephalic, atraumatic  CV: regular rate   Pulmonary: normal respirations, no signs of respiratory distress  Abdomen: soft, non-distended, not tender to palpation  Skin: Skin is warm, dry and intact  Heme: no bruising    Neuro:   Mental Status: AO x4, no aphasia, no dysarthria   CN: PERRL, EOMI, VF intact to confrontation, sensation intact bilaterally, eyebrow raise and grimace symmetric, tongue midline  Motor: moves all extremities spontaneously, full strength throughout, subtle R pronator drift  Sensory: intact to light touch throughout  Cerebellar: finger to nose intact bilaterally  Reflexes: -Florez's, -Babinski, no clonus. Patellar: 2+ bilaterally   Gait: deferred    Significant Labs:  Recent Labs   Lab 03/01/24  1720 03/02/24  0522   * 105    140   K 3.9 3.5    108   CO2 26 24   BUN 26* 24*   CREATININE 0.7 0.6   CALCIUM  9.3 8.6*   MG 2.0 2.0     Recent Labs   Lab 03/01/24  1720 03/02/24  0522   WBC 6.96 7.68   HGB 14.0 11.6*   HCT 42.4 35.4*    178     Recent Labs   Lab 03/01/24  1720 03/02/24  0522   INR 1.0 1.1   APTT 22.7 24.0     Microbiology Results (last 7 days)       ** No results found for the last 168 hours. **          All pertinent labs from the last 24 hours have been reviewed.    Significant Diagnostics:  CT: CT Head Without Contrast    Result Date: 3/2/2024  Evolving scattered extra-axial hemorrhages with subdural hemorrhage overlying the left posterior temporoparietal convexity and evolving subarachnoid hemorrhage most pronounced along the right sylvian fissure and adjacent sulci There is no significant increased hemorrhage. Hypoattenuation within the right frontal lobe and insula which may be related to associated edema a component of parenchymal contusion or infarction not excluded.  This could be further evaluated with MRI as warranted if patient compatible Electronically signed by: Nick Emmanuel DO Date:    03/02/2024 Time:    07:40   MRI: No results found in the last 24 hours.  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 She was visiting her  at the hospital 3/1 when she tripped and fell forward with her head hitting a cement pole. Intact on initial exam other than mild R pronator drift.    CTH 4 PM 3/1: R sylvian and sulcal SAH, L parietal aSDH 9mm thickness on coronal; hyperostosis   CT C sp/maxface 3/1: no acute fx or displacement, addendum read as possible fracture through anterior C6-7 osteophyte   CTA 6 PM 3/1: no LVO, no aneurysm, absent R A1   CTH 3/2 4AM: stable     - admitted to [facility], q1h neuro checks and vitals  - continue C collar for now, please obtain MRI C-spine  - on ASA/plavix at home, s/p DDAVP. Hold AC/AP, coags wnl  - keppra  ppx  - SBP <160  - PT/OT   - no indication for operative intervention at this time, NSGY will continue to follow     Discussed with Dr. Timo Meza MD  Neurosurgery  [facility]

## 2024-03-02 NOTE — ASSESSMENT & PLAN NOTE
-Admit to NCC   -Hourly neurochecks and vital signs  -CTH initially revealed acute SAH in R sylvian fissue w/ conitnued posterior migration of SAH blood into the R temporal lobe sulci w/ conitnued posterior and superior extent of blood invovling the region of central sulcus. Small contrecoup focus of SDH of measuring 6 mm in max thickness.    -Repeat CTH ordered and pending for 0000 3/2.   -CTA H&N at OSH revealed 1. Atherosclerotic changes of cervical carotid artery with approximately 20% stenosis of the ICA bilaterally.  2. Atherosclerotic changes of the left middle cerebral artery as described above.  3. No evidence of large vessel intracranial arterial occlusion is identified.  4. Incidental note of subcutaneous edema and skin thickening in the left anterior neck.  -MRI c spine showing acute fracture at C6 vertebral body, possible microtrabecular fracture of C5  -On daily ASA and plavix. Last dose evening of 2/29.   -Reversed w/ DDAVP only prior to transfer   -NSGY consulted  -Keppra 500mg bid x7 days for seizure ppx.   -TTE pending  -Daily CBC, CMP, Mag, Phos  -SCDs; hold chemo DVT ppx in setting of acute SDH  -NPO   -PT/OT

## 2024-03-02 NOTE — SUBJECTIVE & OBJECTIVE
Medications Prior to Admission   Medication Sig Dispense Refill Last Dose    amLODIPine (NORVASC) 10 MG tablet TAKE 1 TABLET BY MOUTH ONCE DAILY. 90 tablet 0     aspirin (ECOTRIN) 81 MG EC tablet Take 81 mg by mouth once daily.       atorvastatin (LIPITOR) 40 MG tablet TAKE 2 TABLETS BY MOUTH IN THE EVENING 180 tablet 0     clopidogreL (PLAVIX) 75 mg tablet Take 1 tablet (75 mg total) by mouth once daily. 90 tablet 3     levothyroxine (SYNTHROID) 75 MCG tablet TAKE 1 TABLET BY MOUTH ONCE A DAY 90 tablet 2     mag hydrox/aluminum hyd/simeth (MAALOX ORAL) Take 1 Dose by mouth as needed.       meloxicam (MOBIC) 7.5 MG tablet Take 1 tablet (7.5 mg total) by mouth once daily. 90 tablet 1     solifenacin (VESICARE) 5 MG tablet Take 5 mg by mouth once daily.       UNABLE TO FIND Daily. Folate 800 mcg  b12 1 mg       vit C/E/Zn/coppr/lutein/zeaxan (PRESERVISION AREDS-2 ORAL) as directed Orally          Review of patient's allergies indicates:   Allergen Reactions    Ace inhibitors Other (See Comments)     cough    Morphine Itching    Keflex [cephalexin] Itching and Rash       Past Medical History:   Diagnosis Date    Blood transfusion     GERD (gastroesophageal reflux disease)     Hepatitis C     treated six months by Dr. Gaspar with interferon    History of hepatitis C     Hyperlipidemia     Hypertension     Hypothyroidism     Obesity     Primary osteoarthritis of left knee 2015    Sleep apnea     Stroke     TIA     TIA (transient ischemic attack)     Vision loss of right eye     Dr Eldridge Right eye blood clot      Past Surgical History:   Procedure Laterality Date    ABDOMINAL SURGERY      SBO from lap band wraping around small bowel, lap untwisted    ADENOIDECTOMY      BREAST BIOPSY  2014    needle biopsy     CATARACT EXTRACTION, BILATERAL Bilateral      SECTION      CHOLECYSTECTOMY  2001    COLONOSCOPY  2011    Dr. Alvares, 5 year recheck    EYE SURGERY  2014    bilateral cataract   Gerardo     EYE SURGERY  10/2020    bottom lid    HEMORRHOID SURGERY      HYSTERECTOMY  1989    INCONTINENCE SURGERY  2008    sling    LAPAROSCOPIC GASTRIC BANDING  2004    LAPAROSCOPIC NISSEN FUNDOPLICATION      Dr. Rutherford    TONSILLECTOMY       Family History       Problem Relation (Age of Onset)    Arthritis Mother, Paternal Grandmother, Daughter    Cancer Maternal Uncle, Paternal Grandfather    Diabetes Mother, Sister    Diverticulitis Sister    Early death Maternal Grandfather (47)    Emphysema Maternal Aunt    Heart disease Mother, Maternal Aunt, Maternal Grandfather, Paternal Grandfather    No Known Problems Daughter, Father, Son, Paternal Uncle, Maternal Grandmother, Brother          Tobacco Use    Smoking status: Former     Current packs/day: 0.50     Average packs/day: 0.5 packs/day for 5.0 years (2.5 ttl pk-yrs)     Types: Cigarettes    Smokeless tobacco: Never   Substance and Sexual Activity    Alcohol use: No    Drug use: No    Sexual activity: Yes     Partners: Male     Review of Systems   Constitutional:  Negative for chills, fever and unexpected weight change.   HENT:  Negative for sore throat.    Eyes:  Negative for visual disturbance.   Respiratory:  Negative for cough and shortness of breath.    Cardiovascular:  Negative for chest pain and palpitations.   Gastrointestinal:  Negative for abdominal pain, blood in stool, constipation, diarrhea, nausea and vomiting.   Genitourinary:  Negative for difficulty urinating, dysuria and hematuria.   Musculoskeletal:  Negative for back pain, myalgias and neck pain.   Skin:  Negative for pallor and rash.   Neurological:  Negative for seizures, facial asymmetry, speech difficulty, weakness, numbness and headaches.   Psychiatric/Behavioral:  Negative for confusion.      Objective:        There is no height or weight on file to calculate BMI.  Vital Signs (Most Recent):  Temp: 98 °F (36.7 °C) (03/02/24 0701)  Pulse: 66 (03/02/24 0701)  Resp: 17 (03/02/24 0701)  BP:  131/61 (03/02/24 0701)  SpO2: 96 % (03/02/24 0701) Vital Signs (24h Range):  Temp:  [98 °F (36.7 °C)-98.4 °F (36.9 °C)] 98 °F (36.7 °C)  Pulse:  [64-90] 66  Resp:  [0-26] 17  SpO2:  [95 %-98 %] 96 %  BP: (113-167)/(53-84) 131/61                                 Physical Exam         Neurosurgery Physical Exam    Neurosurgery Physical Exam    General: well developed, well nourished, no distress.   HEENT: normocephalic, atraumatic  CV: regular rate   Pulmonary: normal respirations, no signs of respiratory distress  Abdomen: soft, non-distended, not tender to palpation  Skin: Skin is warm, dry and intact  Heme: no bruising    Neuro:   Mental Status: AO x4, no aphasia, no dysarthria   CN: PERRL, EOMI, VF intact to confrontation, sensation intact bilaterally, eyebrow raise and grimace symmetric, tongue midline  Motor: moves all extremities spontaneously, full strength throughout, subtle R pronator drift  Sensory: intact to light touch throughout  Cerebellar: finger to nose intact bilaterally  Reflexes: -Florez's, -Babinski, no clonus. Patellar: 2+ bilaterally   Gait: deferred    Significant Labs:  Recent Labs   Lab 03/01/24  1720 03/02/24  0522   * 105    140   K 3.9 3.5    108   CO2 26 24   BUN 26* 24*   CREATININE 0.7 0.6   CALCIUM 9.3 8.6*   MG 2.0 2.0     Recent Labs   Lab 03/01/24  1720 03/02/24  0522   WBC 6.96 7.68   HGB 14.0 11.6*   HCT 42.4 35.4*    178     Recent Labs   Lab 03/01/24  1720 03/02/24  0522   INR 1.0 1.1   APTT 22.7 24.0     Microbiology Results (last 7 days)       ** No results found for the last 168 hours. **          All pertinent labs from the last 24 hours have been reviewed.    Significant Diagnostics:  CT: CT Head Without Contrast    Result Date: 3/2/2024  Evolving scattered extra-axial hemorrhages with subdural hemorrhage overlying the left posterior temporoparietal convexity and evolving subarachnoid hemorrhage most pronounced along the right sylvian fissure and  adjacent sulci There is no significant increased hemorrhage. Hypoattenuation within the right frontal lobe and insula which may be related to associated edema a component of parenchymal contusion or infarction not excluded.  This could be further evaluated with MRI as warranted if patient compatible Electronically signed by: Nick Emmanuel DO Date:    03/02/2024 Time:    07:40   MRI: No results found in the last 24 hours.

## 2024-03-02 NOTE — PLAN OF CARE
Problem: Physical Therapy  Goal: Physical Therapy Goal  Description: Goals to be met by: 3/12     Patient will increase functional independence with mobility by performin. Supine to sit with Modified [place]  2. Sit to supine with Modified [place]  3. Sit to stand transfer with Modified [place]  4. Gait  x 200 feet with Supervision using LRAD.   5. Ascend/descend 3 stair with unilateral Handrails Stand-by Assistance using No Assistive Device.     Outcome: Ongoing, Progressing   Evaluation completed, initiated plan of care.   Bisi Majano, PT  3/2/2024

## 2024-03-02 NOTE — PROGRESS NOTES
"[facility]  Neurocritical Care  Progress Note    Admit Date: 3/1/2024  Service Date: 03/02/2024  Length of Stay: 1    Subjective:     Chief Complaint: SDH (subdural hematoma)    History of Present Illness: Ms. Flynn is a pleasant 84 yo f w/ pmhx of R DELFINO ( 5-7 years ago on ASA/plavix), HTN, HLD who presents as transfer from OSH for SDH and tSAH following a fall.     Patient was in her usual state of health and visiting her  at [facility] for his cardiac angiogram on 3/1. When walking in the hallways her foot "caught" and she fell forward, striking her head on a column.     She denies any symptoms prior to the trip and fall, denies LOC, and endorses immediate HA and gradual onset of nausea but denies vomiting, visual changes, weakness, or change in sensation. CTH, CTA, CT c spine all done at OSH revealing large R SAH with contrecoup L SDH, but no c-spine fractures. Patient given DDAVP to reverse her antiplatelet and [place] [facility] [place] in [place]CC and evaluation by NSGY.     She is auto maintaining SP goal <160 and received 1 dose of Keppra 500mg for seizure ppx prior to transfer.     Hospital Course: 03/02/2024 Overnight CTH relatively stable. AF, HDS on RA. MRI C spine pending, repeat CTH tomorrow. LE US pending. Resuming diet.     Interval History:  See hospital course.    Review of Systems   Eyes:  Negative for visual disturbance.   Gastrointestinal:  Positive for nausea. Negative for abdominal pain and vomiting.   Musculoskeletal:  Negative for back pain and neck pain.   Neurological:  Negative for dizziness, weakness, light-headedness, numbness and headaches.     Objective:     Vitals:  Temp: 98 °F (36.7 °C)  Pulse: 70  Rhythm: normal sinus rhythm  BP: 134/67  MAP (mmHg): 77  Resp: (!) 25  SpO2: 95 %    Temp  Min: 98 °F (36.7 °C)  Max: 98.4 °F (36.9 °C)  Pulse  Min: 64  Max: 90  BP  Min: 113/53  Max: 167/80  MAP (mmHg)  Min: 76  Max: 107  Resp  Min: 0  " Max: 26  SpO2  Min: 95 %  Max: 98 %    03/01 0701 - 03/02 0700  In: 120 [P.O.:120]  Out: 100 [Urine:100]   Unmeasured Output  Urine Occurrence: 3  Stool Occurrence: 1  Pad Count: 3        Physical Exam  HENT:      Head: Normocephalic.      Comments: Ecchymosis and swelling of L periorbital area, lacerations over frontal skull     Mouth/Throat:      Mouth: Mucous membranes are moist.   Eyes:      Extraocular Movements: Extraocular movements intact.      Conjunctiva/sclera: Conjunctivae normal.      Pupils: Pupils are equal, round, and reactive to light.   Cardiovascular:      Rate and Rhythm: Normal rate and regular rhythm.   Pulmonary:      Effort: Pulmonary effort is normal. No respiratory distress.      Breath sounds: Normal breath sounds.   Abdominal:      General: Abdomen is flat. There is no distension.      Palpations: Abdomen is soft.      Tenderness: There is no abdominal tenderness.   Musculoskeletal:      Right lower leg: Edema (1+) present.      Left lower leg: Edema (1+) present.   Skin:     General: Skin is warm and dry.   Neurological:      Mental Status: She is alert and oriented to person, place, and time.      Cranial Nerves: No cranial nerve deficit.      Sensory: No sensory deficit.      Motor: No weakness (5/5 strength in all extremities).            Medications:  Continuous ScheduledamLODIPine, 10 mg, Daily  atorvastatin, 80 mg, Daily  levETIRAcetam (Keppra) IV (PEDS and ADULTS), 500 mg, Q12H  levothyroxine, 75 mcg, Daily  polyethylene glycol, 17 g, Daily    PRNacetaminophen, 650 mg, Q6H PRN  bisacodyL, 10 mg, Daily PRN  ondansetron, 4 mg, Q6H PRN  sodium chloride 0.9%, 10 mL, PRN      Today I personally reviewed pertinent  notably:    Diet  Diet Adult Regular (IDDSI Level 7)  Diet Adult Regular (IDDSI Level 7)    Assessment/Plan:     Neuro  * SDH (subdural hematoma)  -Admit to NCC   -Hourly neurochecks and vital signs  -CTH initially revealed acute SAH in R sylvian fissue w/ conitnued posterior  migration of SAH blood into the R temporal lobe sulci w/ conitnued posterior and superior extent of blood invovling the region of central sulcus. Small contrecoup focus of SDH of measuring 6 mm in max thickness.    -Repeat CTH ordered and pending for 0000 3/2.   -CTA H&N at OSH revealed 1. Atherosclerotic changes of cervical carotid artery with approximately 20% stenosis of the ICA bilaterally.  2. Atherosclerotic changes of the left middle cerebral artery as described above.  3. No evidence of large vessel intracranial arterial occlusion is identified.  4. Incidental note of subcutaneous edema and skin thickening in the left anterior neck.  -MRI c spine showing acute fracture at C6 vertebral body, possible microtrabecular fracture of C5  -On daily ASA and plavix. Last dose evening of 2/29.   -Reversed w/ DDAVP only prior to transfer   -NSGY consulted  -Keppra 500mg bid x7 days for seizure ppx.   -TTE pending  -Daily CBC, CMP, Mag, Phos  -SCDs; hold chemo DVT ppx in setting of acute SDH  -NPO   -PT/OT    Brain compression  Secondary to primary problem  -Follow hourly neurochecks      Vasogenic brain edema  Secondary to primary problem  -Follow hourly neurochecks      Traumatic SAH (subarachnoid hemorrhage)  Secondary to fall at OSH.   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  On ASA and plavix  -holding in setting of tSAH and SDH  -Plavix reversed w/ DDAVP prior to transfer     Cardiac/Vascular  Essential hypertension  -SBP goal < 160  -restart home antihypertensives as appropriate   -TTE pending      Hyperlipidemia  Lipid panel collected  -continue statin     Endocrine  Hypothyroidism  -TSH wnl  -continue home synthroid           The patient is being Prophylaxed for:  Venous Thromboembolism with: Mechanical  Stress Ulcer with: None  Ventilator Pneumonia with: not applicable    Activity Orders            Diet Adult Regular (IDDSI Level 7): Regular starting at  03/02 0957    Turn patient starting at 03/01 2200    Elevate HOB starting at 03/01 2150          Full Code    Tanya Lauren MD  Neurocritical Care  [facility]

## 2024-03-02 NOTE — SUBJECTIVE & OBJECTIVE
Interval History:  See hospital course.    Review of Systems   Eyes:  Negative for visual disturbance.   Gastrointestinal:  Positive for nausea. Negative for abdominal pain and vomiting.   Musculoskeletal:  Negative for back pain and neck pain.   Neurological:  Negative for dizziness, weakness, light-headedness, numbness and headaches.     Objective:     Vitals:  Temp: 98 °F (36.7 °C)  Pulse: 70  Rhythm: normal sinus rhythm  BP: 134/67  MAP (mmHg): 77  Resp: (!) 25  SpO2: 95 %    Temp  Min: 98 °F (36.7 °C)  Max: 98.4 °F (36.9 °C)  Pulse  Min: 64  Max: 90  BP  Min: 113/53  Max: 167/80  MAP (mmHg)  Min: 76  Max: 107  Resp  Min: 0  Max: 26  SpO2  Min: 95 %  Max: 98 %    03/01 0701 - 03/02 0700  In: 120 [P.O.:120]  Out: 100 [Urine:100]   Unmeasured Output  Urine Occurrence: 3  Stool Occurrence: 1  Pad Count: 3        Physical Exam  HENT:      Head: Normocephalic.      Comments: Ecchymosis and swelling of L periorbital area, lacerations over frontal skull     Mouth/Throat:      Mouth: Mucous membranes are moist.   Eyes:      Extraocular Movements: Extraocular movements intact.      Conjunctiva/sclera: Conjunctivae normal.      Pupils: Pupils are equal, round, and reactive to light.   Cardiovascular:      Rate and Rhythm: Normal rate and regular rhythm.   Pulmonary:      Effort: Pulmonary effort is normal. No respiratory distress.      Breath sounds: Normal breath sounds.   Abdominal:      General: Abdomen is flat. There is no distension.      Palpations: Abdomen is soft.      Tenderness: There is no abdominal tenderness.   Musculoskeletal:      Right lower leg: Edema (1+) present.      Left lower leg: Edema (1+) present.   Skin:     General: Skin is warm and dry.   Neurological:      Mental Status: She is alert and oriented to person, place, and time.      Cranial Nerves: No cranial nerve deficit.      Sensory: No sensory deficit.      Motor: No weakness (5/5 strength in all extremities).             Medications:  Continuous ScheduledamLODIPine, 10 mg, Daily  atorvastatin, 80 mg, Daily  levETIRAcetam (Keppra) IV (PEDS and ADULTS), 500 mg, Q12H  levothyroxine, 75 mcg, Daily  polyethylene glycol, 17 g, Daily    PRNacetaminophen, 650 mg, Q6H PRN  bisacodyL, 10 mg, Daily PRN  ondansetron, 4 mg, Q6H PRN  sodium chloride 0.9%, 10 mL, PRN      Today I personally reviewed pertinent  notably:    Diet  Diet Adult Regular (IDDSI Level 7)  Diet Adult Regular (IDDSI Level 7)

## 2024-03-02 NOTE — H&P
"[facility]  Neurocritical Care  History & Physical    Admit Date: 3/1/2024  Service Date: 2024  Length of Stay: 0    Subjective:     Chief Complaint: SDH (subdural hematoma)    History of Present Illness: Ms. Flynn is a pleasant 84 yo f w/ pmhx of R BRAO (on ASA/plavix), HTN, HLD who presents as transfer from OSH for SDH and tSAH following a fall.     Patient was in her usual state of health and visiting her  at [facility] for his cardiac angiogram on 3/1. When walking in the hallways her foot "caught" and she fell forward, striking her head on a column.     She denies any symptoms prior to the trip and fall, denies LOC, and endorses immediate HA and gradual onset of nausea but denies vomiting, visual changes, weakness, or change in sensation. CTH, CTA, CT c spine all done at OSH revealing large R SAH with contrecoup L SDH, but no c-spine fractures. Patient given DDAVP to reverse her antiplatelet and transferred [place] [facility] [place] monitoring in NCC and evaluation by NSGY.     She is auto maintaining SP goal <160 and received 1 dose of Keppra 500mg for seizure ppx prior to transfer.       Past Medical History:   Diagnosis Date    Blood transfusion     GERD (gastroesophageal reflux disease)     Hepatitis C     treated six months by Dr. Gaspar with interferon    History of hepatitis C     Hyperlipidemia     Hypertension     Hypothyroidism     Obesity     Primary osteoarthritis of left knee 2015    Sleep apnea     Stroke     TIA     TIA (transient ischemic attack)     Vision loss of right eye     Dr Eldridge Right eye blood clot      Past Surgical History:   Procedure Laterality Date    ABDOMINAL SURGERY      SBO from lap band wraping around small bowel, lap untwisted    ADENOIDECTOMY      BREAST BIOPSY  2014    needle biopsy     CATARACT EXTRACTION, BILATERAL Bilateral      SECTION      CHOLECYSTECTOMY  2001    COLONOSCOPY  2011    Dr." Giorgio, 5 year [facility]    EYE SURGERY  2014    bilateral cataract Dr Carrillo     EYE SURGERY  10/2020    bottom lid    HEMORRHOID SURGERY      HYSTERECTOMY  1989    INCONTINENCE SURGERY  2008    sling    LAPAROSCOPIC GASTRIC BANDING  2004    LAPAROSCOPIC NISSEN FUNDOPLICATION      Dr. Rutherford    TONSILLECTOMY        Current Facility-Administered Medications on File Prior to Encounter   Medication Dose Route Frequency Provider Last Rate Last Admin    [COMPLETED] desmopressin (DDAVP) 35.4 mcg in sodium chloride 0.9% 50 mL IVPB  0.4 mcg/kg Intravenous Once Kaleb Varela  mL/hr at 03/01/24 2015 35.4 mcg at 03/01/24 2015    [COMPLETED] hydrALAZINE injection 10 mg  10 mg Intravenous ED 1 Time Kaleb Varela MD   10 mg at 03/01/24 2012    [COMPLETED] iohexoL (OMNIPAQUE 350) injection 100 mL  100 mL Intravenous ONCE PRN Kaleb Varela MD   100 mL at 03/01/24 1746    [COMPLETED] levETIRAcetam in NaCl (iso-os) IVPB 500 mg  500 mg Intravenous ED 1 Time Kaleb Varela MD   Stopped at 03/01/24 2034    [DISCONTINUED] 0.9%  NaCl infusion (for blood administration)   Intravenous Q24H PRN Kaleb Varela MD         Current Outpatient Medications on File Prior to Encounter   Medication Sig Dispense Refill    amLODIPine (NORVASC) 10 MG tablet TAKE 1 TABLET BY MOUTH ONCE DAILY. 90 tablet 0    aspirin (ECOTRIN) 81 MG EC tablet Take 81 mg by mouth once daily.      atorvastatin (LIPITOR) 40 MG tablet TAKE 2 TABLETS BY MOUTH IN THE EVENING 180 tablet 0    clopidogreL (PLAVIX) 75 mg tablet Take 1 tablet (75 mg total) by mouth once daily. 90 tablet 3    levothyroxine (SYNTHROID) 75 MCG tablet TAKE 1 TABLET BY MOUTH ONCE A DAY 90 tablet 2    mag hydrox/aluminum hyd/simeth (MAALOX ORAL) Take 1 Dose by mouth as needed.      meloxicam (MOBIC) 7.5 MG tablet Take 1 tablet (7.5 mg total) by mouth once daily. 90 tablet 1    solifenacin (VESICARE) 5 MG tablet Take 5 mg by mouth once daily.      UNABLE TO FIND Daily. Folate 800 mcg  b12  1 mg      vit C/E/Zn/coppr/lutein/zeaxan (PRESERVISION AREDS-2 ORAL) as directed Orally        Allergies: Ace inhibitors, Morphine, and Keflex [cephalexin]  Family History   Problem Relation Age of Onset    Diabetes Mother     Arthritis Mother         RA    Heart disease Mother         MI at 79     Diabetes Sister     Diverticulitis Sister     No Known Problems Daughter     Emphysema Maternal Aunt     Heart disease Maternal Aunt     Cancer Maternal Uncle     Early death Maternal Grandfather 47    Heart disease Maternal Grandfather     Arthritis Paternal Grandmother     Heart disease Paternal Grandfather     Cancer Paternal Grandfather         lung heavy smoker     No Known Problems Father     No Known Problems Son     No Known Problems Paternal Uncle     No Known Problems Maternal Grandmother     No Known Problems Brother     Arthritis Daughter      Social History     Tobacco Use    Smoking status: Former     Current packs/day: 0.50     Average packs/day: 0.5 packs/day for 5.0 years (2.5 ttl pk-yrs)     Types: Cigarettes    Smokeless tobacco: Never   Substance Use Topics    Alcohol use: No    Drug use: No     Review of Systems   Constitutional:  Negative for fatigue and fever.   HENT:  Negative for dental problem and nosebleeds.    Respiratory:  Negative for cough and shortness of breath.    Cardiovascular:  Negative for chest pain and palpitations.   Gastrointestinal:  Positive for nausea. Negative for abdominal pain and vomiting.   Genitourinary:  Negative for difficulty urinating and dysuria.   Musculoskeletal:  Negative for neck pain and neck stiffness.   Skin:  Positive for color change (L periorbital area) and wound (L periorbital area).   Neurological:  Positive for headaches. Negative for dizziness, tremors, seizures, syncope, facial asymmetry, speech difficulty, weakness, light-headedness and numbness.   Psychiatric/Behavioral:  Negative for agitation and confusion.      Objective:     Vitals:         Temp   Min: 98.2 °F (36.8 °C)  Max: 98.3 °F (36.8 °C)  Pulse  Min: 82  Max: 90  BP  Min: 159/74  Max: 167/80  MAP (mmHg)  Min: 107  Max: 107  Resp  Min: 17  Max: 18  SpO2  Min: 96 %  Max: 98 %    No intake/output data recorded.            Physical Exam    Constitutional: Overweight female. Appears stated age. No obvious distress.   Patient lying in bed comfortably c-collar in place, fixed to appropriate position during exam.     Eyes: Clear conjunctiva. Anicteric. No discharge. No subconjunctival hemorrhage.   Lids without lesions. L lateral periorbital area swelling and ecchymosis, mildly tender to palpation.     HEENT: C-collar. Ecchymosis and swelling to L lateral periorbital area.   Nose and external ears atraumatic.   Mucus membranes are moist. Denies change in dentition.     Cardio: Regular rate and rhythm on telemetry monitor. Pulses intact in lower extremities.    Respiratory: Regular effort, no respiratory distress, on RA.    GI:Soft, non-distended, non-tender.    Skin: ecchymosis to R great toe. Telangiectasias on bilateral LE to knee. No associate pitting edema. Superficial triangular laceration over L, frontal skull (5cm and 4cm) and additional 4cm superficial laceration to left of triangular laceration.     Neuro: Sedation: None     E4 V5 M6    Awake, alert, and oriented to self, time, place, and situation.   Patient is answering all questions appropriately and following all commands briskly.   No facial droop. EOMI. PERRL.     Motor:   No pronator drift  THOMPSON spontaneously and against gravity   RUE: 5/5  LUE: 5/5  RLE: 4/5  LLE: 4/5    Sensory:  Sensation grossly intact    Gait deferred.     Today I personally reviewed pertinent medications, lines/drains/airways, imaging, laboratory results, notably: EKG, CTH, CTA, CT Max face and cervical spine, history, medications, VS         Assessment/Plan:     Neuro  * SDH (subdural hematoma)  -Admit to [facility]   -Hourly neurochecks and vital signs  -CTH initially revealed  acute SAH in R sylvian fissue w/ conitnued posterior migration of SAH blood into the R temporal lobe sulci w/ conitnued posterior and superior extent of blood invovling the region of central sulcus. Small contrecoup focus of SDH of measuring 6 mm in max thickness.    -Repeat CTH ordered and pending for 0000 3/2.   -CTA H&N at OSH revealed 1. Atherosclerotic changes of cervical carotid artery with approximately 20% stenosis of the ICA bilaterally.  2. Atherosclerotic changes of the left middle cerebral artery as described above.  3. No evidence of large vessel intracranial arterial occlusion is identified.  4. Incidental note of subcutaneous edema and skin thickening in the left anterior neck.  -CT C spine at OSH revealed no findings to suggest an acute fracture or subluxation within the cervical spine.   -On daily ASA and plavix. Last dose evening of 2/29.   -Reversed w/ DDAVP only prior to transfer   -NSGY consulted, appreciate recommendations. Awaiting potential surgical plan.   -Keppra 500mg bid x7 days for seizure ppx.   -EKG, Echo, CXR pending  -Daily CBC, CMP, Mag, Phos  -SCDs; hold chemo DVT ppx in setting of acute SDH  -NPO   -PT/OT      Traumatic SAH (subarachnoid hemorrhage)  Secondary to fall at OSH.   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  On ASA and plavix  -holding in setting of tSAH and SDH  -Plavix reversed w/ DDAVP prior to transfer     Cardiac/Vascular  Essential hypertension  -SBP goal < 160  -restart home antihypertensives as appropriate   -EKG and Echo       Hyperlipidemia  Lipid panel pending   -continue statin     Endocrine  Hypothyroidism  -TSH pending  -continue home synthroid           The patient is being Prophylaxed for:  Venous Thromboembolism with: Mechanical  Stress Ulcer with: Not Applicable   Ventilator Pneumonia with: not applicable    Activity Orders            Turn patient starting at 03/01 2200    Elevate HOB starting at  03/01 2150    Diet NPO: NPO starting at 03/01 2150          Full Code    Critical condition in that Patient has a condition that poses threat to life and bodily function: tSAH and SDH on DAPT, reversed, brain compression, HTN, HLD, previous BRAO holding DAPT     55 minutes of Critical care time was spent personally by me on the following activities: development of treatment plan with patient or surrogate and bedside caregivers, discussions with consultants, evaluation of patient's response to treatment, examination of patient, ordering and performing treatments and interventions, ordering and review of laboratory studies, ordering and review of radiographic studies, pulse oximetry, antibiotic titration if applicable, vasopressor titration if applicable, re-evaluation of patient's condition. This critical care time did not overlap with that of any other provider or involve time for any procedures. There is high probability for acute neurological change leading to clinical and possibly life-threatening deterioration requiring highest level of provider preparedness for urgent intervention.      Priyanka Whyte PA-C  Neurocritical Care  [facility] Neuro Critical [facility]

## 2024-03-03 PROBLEM — R53.81 DEBILITY: Status: ACTIVE | Noted: 2024-03-03

## 2024-03-03 PROBLEM — R60.0 BILATERAL LOWER EXTREMITY EDEMA: Status: ACTIVE | Noted: 2024-03-03

## 2024-03-03 PROBLEM — S12.501A CLOSED NONDISPLACED FRACTURE OF SIXTH CERVICAL VERTEBRA: Status: ACTIVE | Noted: 2024-03-03

## 2024-03-03 LAB
ALBUMIN SERPL BCP-MCNC: 3.4 G/DL (ref 3.5–5.2)
ALP SERPL-CCNC: 96 U/L (ref 55–135)
ALT SERPL W/O P-5'-P-CCNC: 12 U/L (ref 10–44)
ANION GAP SERPL CALC-SCNC: 8 MMOL/L (ref 8–16)
AST SERPL-CCNC: 31 U/L (ref 10–40)
BASOPHILS # BLD AUTO: 0.04 K/UL (ref 0–0.2)
BASOPHILS NFR BLD: 0.5 % (ref 0–1.9)
BILIRUB SERPL-MCNC: 0.9 MG/DL (ref 0.1–1)
BUN SERPL-MCNC: 22 MG/DL (ref 8–23)
CALCIUM SERPL-MCNC: 8.8 MG/DL (ref 8.7–10.5)
CHLORIDE SERPL-SCNC: 107 MMOL/L (ref 95–110)
CO2 SERPL-SCNC: 24 MMOL/L (ref 23–29)
CREAT SERPL-MCNC: 0.6 MG/DL (ref 0.5–1.4)
DIFFERENTIAL METHOD BLD: NORMAL
EOSINOPHIL # BLD AUTO: 0.1 K/UL (ref 0–0.5)
EOSINOPHIL NFR BLD: 1.3 % (ref 0–8)
ERYTHROCYTE [DISTWIDTH] IN BLOOD BY AUTOMATED COUNT: 14.3 % (ref 11.5–14.5)
EST. GFR  (NO RACE VARIABLE): >60 ML/MIN/1.73 M^2
GLUCOSE SERPL-MCNC: 106 MG/DL (ref 70–110)
HCT VFR BLD AUTO: 38.2 % (ref 37–48.5)
HGB BLD-MCNC: 12.7 G/DL (ref 12–16)
IMM GRANULOCYTES # BLD AUTO: 0.02 K/UL (ref 0–0.04)
IMM GRANULOCYTES NFR BLD AUTO: 0.3 % (ref 0–0.5)
LYMPHOCYTES # BLD AUTO: 1.6 K/UL (ref 1–4.8)
LYMPHOCYTES NFR BLD: 21.2 % (ref 18–48)
MAGNESIUM SERPL-MCNC: 2.1 MG/DL (ref 1.6–2.6)
MCH RBC QN AUTO: 30.6 PG (ref 27–31)
MCHC RBC AUTO-ENTMCNC: 33.2 G/DL (ref 32–36)
MCV RBC AUTO: 92 FL (ref 82–98)
MONOCYTES # BLD AUTO: 0.9 K/UL (ref 0.3–1)
MONOCYTES NFR BLD: 11.1 % (ref 4–15)
NEUTROPHILS # BLD AUTO: 5.1 K/UL (ref 1.8–7.7)
NEUTROPHILS NFR BLD: 65.6 % (ref 38–73)
NRBC BLD-RTO: 0 /100 WBC
OHS QRS DURATION: 146 MS
OHS QTC CALCULATION: 514 MS
PHOSPHATE SERPL-MCNC: 3.2 MG/DL (ref 2.7–4.5)
PLATELET # BLD AUTO: 177 K/UL (ref 150–450)
PMV BLD AUTO: 10.4 FL (ref 9.2–12.9)
POTASSIUM SERPL-SCNC: 3.5 MMOL/L (ref 3.5–5.1)
PROT SERPL-MCNC: 6.3 G/DL (ref 6–8.4)
RBC # BLD AUTO: 4.15 M/UL (ref 4–5.4)
SODIUM SERPL-SCNC: 139 MMOL/L (ref 136–145)
WBC # BLD AUTO: 7.74 K/UL (ref 3.9–12.7)

## 2024-03-03 PROCEDURE — 83735 ASSAY OF MAGNESIUM: CPT

## 2024-03-03 PROCEDURE — 63600175 PHARM REV CODE 636 W HCPCS: Performed by: REGISTERED NURSE

## 2024-03-03 PROCEDURE — 20600001 HC STEP DOWN PRIVATE ROOM

## 2024-03-03 PROCEDURE — 63600175 PHARM REV CODE 636 W HCPCS

## 2024-03-03 PROCEDURE — 25000003 PHARM REV CODE 250

## 2024-03-03 PROCEDURE — 85025 COMPLETE CBC W/AUTO DIFF WBC: CPT

## 2024-03-03 PROCEDURE — 25000003 PHARM REV CODE 250: Performed by: REGISTERED NURSE

## 2024-03-03 PROCEDURE — 94761 N-INVAS EAR/PLS OXIMETRY MLT: CPT

## 2024-03-03 PROCEDURE — 99233 SBSQ HOSP IP/OBS HIGH 50: CPT | Mod: FS,,, | Performed by: PSYCHIATRY & NEUROLOGY

## 2024-03-03 PROCEDURE — 80053 COMPREHEN METABOLIC PANEL: CPT

## 2024-03-03 PROCEDURE — 84100 ASSAY OF PHOSPHORUS: CPT

## 2024-03-03 RX ORDER — LOSARTAN POTASSIUM 25 MG/1
25 TABLET ORAL DAILY
Status: DISCONTINUED | OUTPATIENT
Start: 2024-03-03 | End: 2024-03-04 | Stop reason: HOSPADM

## 2024-03-03 RX ORDER — HEPARIN SODIUM 5000 [USP'U]/ML
5000 INJECTION, SOLUTION INTRAVENOUS; SUBCUTANEOUS EVERY 8 HOURS
Status: DISCONTINUED | OUTPATIENT
Start: 2024-03-03 | End: 2024-03-04 | Stop reason: HOSPADM

## 2024-03-03 RX ADMIN — LEVETIRACETAM 500 MG: 100 INJECTION, SOLUTION INTRAVENOUS at 09:03

## 2024-03-03 RX ADMIN — AMLODIPINE BESYLATE 10 MG: 10 TABLET ORAL at 08:03

## 2024-03-03 RX ADMIN — LEVOTHYROXINE SODIUM 75 MCG: 75 TABLET ORAL at 08:03

## 2024-03-03 RX ADMIN — LEVETIRACETAM 500 MG: 100 INJECTION, SOLUTION INTRAVENOUS at 08:03

## 2024-03-03 RX ADMIN — LOSARTAN POTASSIUM 25 MG: 25 TABLET, FILM COATED ORAL at 11:03

## 2024-03-03 RX ADMIN — HYDRALAZINE HYDROCHLORIDE 10 MG: 20 INJECTION, SOLUTION INTRAMUSCULAR; INTRAVENOUS at 09:03

## 2024-03-03 RX ADMIN — HEPARIN SODIUM 5000 UNITS: 5000 INJECTION INTRAVENOUS; SUBCUTANEOUS at 09:03

## 2024-03-03 RX ADMIN — HEPARIN SODIUM 5000 UNITS: 5000 INJECTION INTRAVENOUS; SUBCUTANEOUS at 02:03

## 2024-03-03 RX ADMIN — ATORVASTATIN CALCIUM 80 MG: 40 TABLET, FILM COATED ORAL at 08:03

## 2024-03-03 RX ADMIN — ACETAMINOPHEN 650 MG: 325 TABLET ORAL at 07:03

## 2024-03-03 NOTE — PROGRESS NOTES
Per NSGY MD Derrick C-collar in room is too big for patient. NSGY will place order to be fitted for a new one and once new c-collar is received ok to complete xray imaging of c-cspine. Pt to be placed in c-collar when sitting up or moving around. OK to leave collar off while at rest in bed.

## 2024-03-03 NOTE — ASSESSMENT & PLAN NOTE
Initial CTH revealed acute SAH in R sylvian fissue w/ conitnued posterior migration of SAH blood into the R temporal lobe sulci w/ conitnued posterior and superior extent of blood invovling the region of central sulcus. Small contrecoup focus of SDH of measuring 6 mm in max thickness.     -Admitted to NCC   -VS/Neuro checks q1  -NSGY following  -Repeat CTH this AM stable from prior  -MRI c-spine w/ acute fracture at C6 vertebral body, possible microtrabecular fracture of C5  -C-collar when out of bed and sitting up  -Keppra BID x 7 days  -SBP goal < 160  -Amlodipine daily  -Losartan added  -CBC, CMP, Mag, Phos daily  -SQH for DVT PPX  -Regular diet  -PT/OT  -Stable for stepdown to NSGY

## 2024-03-03 NOTE — PLAN OF CARE
"[facility] Care Plan    POC reviewed with Brandi Flynn and family at 1400. Pt verbalized understanding. Questions and concerns addressed. No acute events today. Pt progressing toward goals. Will continue to monitor. See below and flowsheets for full assessment and VS info.       BLE ultrasounds negative  Losartan added for SBP management  Fitted for C-collar  Xrays complete  Stepdown in  D/C tomorrow 3/4      Is this a stroke patient? yes- Stroke booklet reviewed with patient and family, risk factors identified for patient and stroke booklet remains at bedside for ongoing education.     Care individualization: Involve family in care and c-spine care instructions for discharge  Restraints:        Neuro:  [place] Coma Scale  Best Eye Response: 4-->(E4) spontaneous  Best Motor Response: 6-->(M6) obeys commands  Best Verbal Response: 5-->(V5) oriented  Tamela Coma Scale Score: 15  Assessment Qualifiers: patient not sedated/intubated  Pupil PERRLA: yes     24 hr Temp:  [97.8 °F (36.6 °C)-98.4 °F (36.9 °C)]     CV:   Rhythm: normal sinus rhythm  BP goals:   SBP < 160  MAP > 65    Resp:           Plan: N/A    GI/:     Diet/Nutrition Received: regular  Last Bowel Movement: 03/02/24  Voiding Characteristics: external catheter    Intake/Output Summary (Last 24 hours) at 3/3/2024 1656  Last data filed at 3/2/2024 2116  Gross per 24 hour   Intake 218 ml   Output --   Net 218 ml     Unmeasured Output  Urine Occurrence: 1  Stool Occurrence: 1  Pad Count: 2    Labs/Accuchecks:  Recent Labs   Lab 03/03/24  0325   WBC 7.74   RBC 4.15   HGB 12.7   HCT 38.2         Recent Labs   Lab 03/03/24  0325      K 3.5   CO2 24      BUN 22   CREATININE 0.6   ALKPHOS 96   ALT 12   AST 31   BILITOT 0.9      Recent Labs   Lab 03/02/24  0522   INR 1.1   APTT 24.0    No results for input(s): "CPK", "CPKMB", "TROPONINI", "MB" in the last 168 hours.    Electrolytes: N/A - electrolytes WDL  Accuchecks: " none    Gtts:      LDA/Wounds:  Lines/Drains/Airways       Peripheral Intravenous Line  Duration                  Peripheral IV - Single Lumen 03/01/24 0000 20 G Anterior;Left;Proximal Forearm 2 days         Peripheral IV - Single Lumen 03/01/24 1800 20 G Right Antecubital 1 day                  Wounds: No  Wound care consulted: No

## 2024-03-03 NOTE — PROGRESS NOTES
[facility]. Pt transported on monitor by transport tech and RN x1 on monitor. VSS throughout and no acute events.Reassessed on return to room.

## 2024-03-03 NOTE — SUBJECTIVE & OBJECTIVE
Interval History:  See hospital course.     Review of Systems   Respiratory:  Negative for cough and shortness of breath.    Cardiovascular:  Positive for leg swelling (Chronic). Negative for chest pain.   Gastrointestinal:  Positive for nausea. Negative for abdominal pain and vomiting.   Neurological:  Positive for headaches.     Objective:     Vitals:  Temp: 98.1 °F (36.7 °C)  Pulse: 75  Rhythm: normal sinus rhythm  BP: (!) 151/65  MAP (mmHg): 94  Resp: 18  SpO2: 96 %    Temp  Min: 97.8 °F (36.6 °C)  Max: 98.4 °F (36.9 °C)  Pulse  Min: 60  Max: 81  BP  Min: 118/57  Max: 184/78  MAP (mmHg)  Min: 85  Max: 117  Resp  Min: 12  Max: 30  SpO2  Min: 93 %  Max: 98 %    03/02 0701 - 03/03 0700  In: 336 [P.O.:336]  Out: -    Unmeasured Output  Urine Occurrence: 1  Stool Occurrence: 1  Pad Count: 2        Physical Exam  Vitals and nursing note reviewed.   HENT:      Head: Left periorbital erythema and laceration present.      Comments: L periorbital ecchymosis and swelling  Eyes:      Extraocular Movements: Extraocular movements intact.      Pupils: Pupils are equal, round, and reactive to light.   Cardiovascular:      Rate and Rhythm: Normal rate and regular rhythm.      Pulses: Normal pulses.   Pulmonary:      Effort: Pulmonary effort is normal.   Abdominal:      Palpations: Abdomen is soft.   Musculoskeletal:         General: Normal range of motion.      Cervical back: Normal range of motion.   Skin:     General: Skin is warm and dry.      Capillary Refill: Capillary refill takes less than 2 seconds.   Neurological:      Mental Status: She is oriented to person, place, and time.      GCS: GCS eye subscore is 3. GCS verbal subscore is 5. GCS motor subscore is 6.      Cranial Nerves: Cranial nerves 2-12 are intact.      Sensory: Sensation is intact.      Comments:   -E3 V5 M6  -PERRL  -Oriented to person, place, time, and situation  -Follows commands w/ all extremities  -THOMPSON spontaneously/purposefully  -MINGO 5/5  -INGRIDE  5/5  -RLE 4/5  -LLE 4/5            Medications:  Continuous ScheduledamLODIPine, 10 mg, Daily  atorvastatin, 80 mg, Daily  heparin (porcine), 5,000 Units, Q8H  levETIRAcetam (Keppra) IV (PEDS and ADULTS), 500 mg, Q12H  levothyroxine, 75 mcg, Daily  losartan, 25 mg, Daily  polyethylene glycol, 17 g, Daily    PRNacetaminophen, 650 mg, Q6H PRN  bisacodyL, 10 mg, Daily PRN  hydrALAZINE, 10 mg, Q4H PRN  ondansetron, 4 mg, Q6H PRN      Today I personally reviewed pertinent medications, lines/drains/airways, imaging, cardiology results, laboratory results, notably: CTH; CBC; CMP    Diet  Diet Adult Regular (IDDSI Level 7)  Diet Adult Regular (IDDSI Level 7)

## 2024-03-03 NOTE — PT/OT/SLP PROGRESS
Occupational Therapy      Patient Name:  Brandi Flynn   MRN:  [id number]    Patient not seen today secondary to patient reporting increased fatigue and requesting for therapy to return tomorrow upon OT attempt at 1326. . Will follow-up as appropriate.    3/3/2024

## 2024-03-04 VITALS
BODY MASS INDEX: 34.55 KG/M2 | HEIGHT: 63 IN | WEIGHT: 195 LBS | DIASTOLIC BLOOD PRESSURE: 65 MMHG | OXYGEN SATURATION: 95 % | HEART RATE: 64 BPM | TEMPERATURE: 98 F | RESPIRATION RATE: 16 BRPM | SYSTOLIC BLOOD PRESSURE: 142 MMHG

## 2024-03-04 LAB
ALBUMIN SERPL BCP-MCNC: 3.2 G/DL (ref 3.5–5.2)
ALP SERPL-CCNC: 95 U/L (ref 55–135)
ALT SERPL W/O P-5'-P-CCNC: 11 U/L (ref 10–44)
ANION GAP SERPL CALC-SCNC: 10 MMOL/L (ref 8–16)
AST SERPL-CCNC: 26 U/L (ref 10–40)
BASOPHILS # BLD AUTO: 0.06 K/UL (ref 0–0.2)
BASOPHILS NFR BLD: 0.7 % (ref 0–1.9)
BILIRUB SERPL-MCNC: 0.7 MG/DL (ref 0.1–1)
BUN SERPL-MCNC: 18 MG/DL (ref 8–23)
CALCIUM SERPL-MCNC: 8.6 MG/DL (ref 8.7–10.5)
CHLORIDE SERPL-SCNC: 107 MMOL/L (ref 95–110)
CO2 SERPL-SCNC: 22 MMOL/L (ref 23–29)
CREAT SERPL-MCNC: 0.6 MG/DL (ref 0.5–1.4)
DIFFERENTIAL METHOD BLD: ABNORMAL
EOSINOPHIL # BLD AUTO: 0.1 K/UL (ref 0–0.5)
EOSINOPHIL NFR BLD: 1.1 % (ref 0–8)
ERYTHROCYTE [DISTWIDTH] IN BLOOD BY AUTOMATED COUNT: 13.8 % (ref 11.5–14.5)
EST. GFR  (NO RACE VARIABLE): >60 ML/MIN/1.73 M^2
GLUCOSE SERPL-MCNC: 108 MG/DL (ref 70–110)
HCT VFR BLD AUTO: 37.2 % (ref 37–48.5)
HGB BLD-MCNC: 12.5 G/DL (ref 12–16)
IMM GRANULOCYTES # BLD AUTO: 0.03 K/UL (ref 0–0.04)
IMM GRANULOCYTES NFR BLD AUTO: 0.4 % (ref 0–0.5)
LYMPHOCYTES # BLD AUTO: 1.4 K/UL (ref 1–4.8)
LYMPHOCYTES NFR BLD: 16.4 % (ref 18–48)
MAGNESIUM SERPL-MCNC: 2 MG/DL (ref 1.6–2.6)
MCH RBC QN AUTO: 31.3 PG (ref 27–31)
MCHC RBC AUTO-ENTMCNC: 33.6 G/DL (ref 32–36)
MCV RBC AUTO: 93 FL (ref 82–98)
MONOCYTES # BLD AUTO: 1.1 K/UL (ref 0.3–1)
MONOCYTES NFR BLD: 12.9 % (ref 4–15)
NEUTROPHILS # BLD AUTO: 5.6 K/UL (ref 1.8–7.7)
NEUTROPHILS NFR BLD: 68.5 % (ref 38–73)
NRBC BLD-RTO: 0 /100 WBC
PHOSPHATE SERPL-MCNC: 2.8 MG/DL (ref 2.7–4.5)
PLATELET # BLD AUTO: 183 K/UL (ref 150–450)
PMV BLD AUTO: 9.7 FL (ref 9.2–12.9)
POTASSIUM SERPL-SCNC: 3.3 MMOL/L (ref 3.5–5.1)
PROT SERPL-MCNC: 6.1 G/DL (ref 6–8.4)
RBC # BLD AUTO: 3.99 M/UL (ref 4–5.4)
SODIUM SERPL-SCNC: 139 MMOL/L (ref 136–145)
WBC # BLD AUTO: 8.23 K/UL (ref 3.9–12.7)

## 2024-03-04 PROCEDURE — 25000003 PHARM REV CODE 250

## 2024-03-04 PROCEDURE — 94761 N-INVAS EAR/PLS OXIMETRY MLT: CPT

## 2024-03-04 PROCEDURE — 80053 COMPREHEN METABOLIC PANEL: CPT

## 2024-03-04 PROCEDURE — 25000003 PHARM REV CODE 250: Performed by: REGISTERED NURSE

## 2024-03-04 PROCEDURE — 63600175 PHARM REV CODE 636 W HCPCS

## 2024-03-04 PROCEDURE — 99239 HOSP IP/OBS DSCHRG MGMT >30: CPT | Mod: ,,, | Performed by: PSYCHIATRY & NEUROLOGY

## 2024-03-04 PROCEDURE — 83735 ASSAY OF MAGNESIUM: CPT

## 2024-03-04 PROCEDURE — 85025 COMPLETE CBC W/AUTO DIFF WBC: CPT

## 2024-03-04 PROCEDURE — 84100 ASSAY OF PHOSPHORUS: CPT

## 2024-03-04 PROCEDURE — 25000003 PHARM REV CODE 250: Performed by: PHYSICIAN ASSISTANT

## 2024-03-04 PROCEDURE — 1111F DSCHRG MED/CURRENT MED MERGE: CPT | Mod: CPTII,,, | Performed by: PSYCHIATRY & NEUROLOGY

## 2024-03-04 PROCEDURE — 63600175 PHARM REV CODE 636 W HCPCS: Performed by: REGISTERED NURSE

## 2024-03-04 RX ORDER — CLOPIDOGREL BISULFATE 75 MG/1
75 TABLET ORAL DAILY
Qty: 1 TABLET | Refills: 0 | Status: ON HOLD | OUTPATIENT
Start: 2024-03-04 | End: 2024-03-16 | Stop reason: HOSPADM

## 2024-03-04 RX ORDER — LOSARTAN POTASSIUM 25 MG/1
25 TABLET ORAL DAILY
Qty: 90 TABLET | Refills: 3 | Status: SHIPPED | OUTPATIENT
Start: 2024-03-05 | End: 2025-03-05

## 2024-03-04 RX ORDER — ASPIRIN 81 MG/1
81 TABLET ORAL DAILY
Qty: 1 TABLET | Refills: 0 | Status: ON HOLD | OUTPATIENT
Start: 2024-03-04 | End: 2024-03-16 | Stop reason: HOSPADM

## 2024-03-04 RX ORDER — POTASSIUM CHLORIDE 20 MEQ/1
40 TABLET, EXTENDED RELEASE ORAL ONCE
Status: COMPLETED | OUTPATIENT
Start: 2024-03-04 | End: 2024-03-04

## 2024-03-04 RX ORDER — LEVETIRACETAM 500 MG/1
500 TABLET ORAL 2 TIMES DAILY
Qty: 9 TABLET | Refills: 0 | Status: ON HOLD | OUTPATIENT
Start: 2024-03-04 | End: 2024-03-16

## 2024-03-04 RX ADMIN — ATORVASTATIN CALCIUM 80 MG: 40 TABLET, FILM COATED ORAL at 09:03

## 2024-03-04 RX ADMIN — POTASSIUM CHLORIDE 40 MEQ: 1500 TABLET, EXTENDED RELEASE ORAL at 09:03

## 2024-03-04 RX ADMIN — HEPARIN SODIUM 5000 UNITS: 5000 INJECTION INTRAVENOUS; SUBCUTANEOUS at 06:03

## 2024-03-04 RX ADMIN — LEVETIRACETAM 500 MG: 100 INJECTION, SOLUTION INTRAVENOUS at 09:03

## 2024-03-04 RX ADMIN — LEVOTHYROXINE SODIUM 75 MCG: 75 TABLET ORAL at 09:03

## 2024-03-04 RX ADMIN — AMLODIPINE BESYLATE 10 MG: 10 TABLET ORAL at 09:03

## 2024-03-04 RX ADMIN — ACETAMINOPHEN 650 MG: 325 TABLET ORAL at 12:03

## 2024-03-04 RX ADMIN — LOSARTAN POTASSIUM 25 MG: 25 TABLET, FILM COATED ORAL at 09:03

## 2024-03-04 NOTE — ASSESSMENT & PLAN NOTE
Initial CTH revealed acute SAH in R sylvian fissue w/ conitnued posterior migration of SAH blood into the R temporal lobe sulci w/ conitnued posterior and superior extent of blood invovling the region of central sulcus. Small contrecoup focus of SDH of measuring 6 mm in max thickness.     -Admitted to [facility], discharging home today  -VS/Neuro checks q1  -NSGY following  -Repeat CTH stable from prior  -MRI c-spine w/ acute fracture at C6 vertebral body, possible microtrabecular fracture of C5  -C-collar when out of bed and sitting up  -Keppra BID x 7 days  -SBP goal < 160  -Amlodipine daily  -Losartan added  -CBC, CMP, Mag, Phos daily  -SQH for DVT PPX  -Regular diet  -PT/OT  -Stable for stepdown to NSGY

## 2024-03-04 NOTE — PROGRESS NOTES
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 She was visiting her  at the hospital 3/1 when she tripped and fell forward with her head hitting a cement pole. CTH showed R sylvian fissure and sulcal SAH as well as L parietal convexity SDH up to 9 mm thickness. She was able to walk after her fall, and at time of assessment has mild HA, denies any neck pain, weakness, numbness, speech difficulty. S/p DDAVP, DAPT held.     Post-Op Info:  * No surgery found *       Interval History: 3/3: MRI C spine completed with acute fx ant inf C6 VB, edema within C5 sup endplate, small focal tear of ALL at C6, ligamentous strain of C4-7 interspinous ligaments. CTH 3/3 stable, exam stable continues to do well neurologically.    Medications:  Continuous Infusions:  Scheduled Meds:   amLODIPine  10 mg Oral Daily    atorvastatin  80 mg Oral Daily    heparin (porcine)  5,000 Units Subcutaneous Q8H    levETIRAcetam (Keppra) IV (PEDS and ADULTS)  500 mg Intravenous Q12H    levothyroxine  75 mcg Oral Daily    losartan  25 mg Oral Daily    polyethylene glycol  17 g Oral Daily     PRN Meds:acetaminophen, bisacodyL, hydrALAZINE, ondansetron     Review of Systems  Objective:     Weight: 88.5 kg (195 lb)  Body mass index is 34.54 kg/m².  Vital Signs (Most Recent):  Temp: 98.4 °F (36.9 °C) (03/03/24 1902)  Pulse: 69 (03/03/24 2025)  Resp: (!) 26 (03/03/24 2025)  BP: (!) 158/72 (03/03/24 2002)  SpO2: 97 % (03/03/24 2025) Vital Signs (24h Range):  Temp:  [97.8 °F (36.6 °C)-98.4 °F (36.9 °C)] 98.4 °F (36.9 °C)  Pulse:  [60-81] 69  Resp:  [12-33] 26  SpO2:  [94 %-98 %] 97 %  BP: (118-184)/() 158/72                                 Physical Exam         Neurosurgery Physical Exam    Neurosurgery Physical Exam     General: well  developed, well nourished, no distress.   HEENT: normocephalic, atraumatic  CV: regular rate   Pulmonary: normal respirations, no signs of respiratory distress  Abdomen: soft, non-distended, not tender to palpation  Skin: Skin is warm, dry and intact  Heme: no bruising     Neuro:   Mental Status: AO x4, no aphasia, no dysarthria   CN: PERRL, EOMI, VF intact to confrontation, sensation intact bilaterally, eyebrow raise and grimace symmetric, tongue midline  Motor: moves all extremities spontaneously, full strength throughout, no drift  Sensory: intact to light touch throughout  Cerebellar: finger to nose intact bilaterally  Reflexes: -Florez's, -Babinski, no clonus. Patellar: 2+ bilaterally   Gait: deferred       Significant Labs:  Recent Labs   Lab 03/02/24  0522 03/03/24  0325    106    139   K 3.5 3.5    107   CO2 24 24   BUN 24* 22   CREATININE 0.6 0.6   CALCIUM 8.6* 8.8   MG 2.0 2.1     Recent Labs   Lab 03/02/24  0522 03/03/24  0325   WBC 7.68 7.74   HGB 11.6* 12.7   HCT 35.4* 38.2    177     Recent Labs   Lab 03/02/24  0522   INR 1.1   APTT 24.0     Microbiology Results (last 7 days)       ** No results found for the last 168 hours. **          All pertinent labs from the last 24 hours have been reviewed.    Significant Diagnostics:  CT: CT Head Without Contrast    Result Date: 3/3/2024  Evolving left cerebral convexity subdural hemorrhage and right scattered subarachnoid hemorrhage most pronounced along the sylvian fissure. No evidence for significant new hemorrhage or new abnormal parenchymal attenuation with hypoattenuation along the right inferior frontal lobe and subinsular region similar to slightly less apparent Ventricles relatively stable without hydrocephalus.  Mass effect with rightward bowing of the septum pellucidum and slight rightward midline shift unchanged. Clinical correlation and close follow-up recommended. Electronically signed by: Nick Emmanuel DO  Date:    03/03/2024 Time:    08:30   MRI: No results found in the last 24 hours.  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 She was visiting her  at the hospital 3/1 when she tripped and fell forward with her head hitting a cement pole. Intact on initial exam other than mild R pronator drift.    CTH 4 PM 3/1: R sylvian and sulcal SAH, L parietal aSDH 9mm thickness on coronal; hyperostosis   CT C sp/maxface 3/1: no acute fx or displacement, addendum read as possible fracture through anterior C6-7 osteophyte   CTA 6 PM 3/1: no LVO, no aneurysm, absent R A1   CTH 3/2 4AM: stable   MRI C sp 3/2: acute fx ant inf C6 VB, edema within C5 sup endplate, small focal tear of ALL at C6, ligamentous strain of C4-7 interspinous ligaments   CTH 3/3: stable     - admitted to NCC, stable for stepdown to floor  - continue C collar when out of bed or moving, short C collar ordered  - f/u upright/supine XR in c-collar  - on ASA/plavix at home, s/p DDAVP. Hold AC/AP, coags wnl  - keppra ppx  - SBP <160  - PT/OT   - no indication for operative intervention at this time, NSGY will continue to follow     Dispo: discussed with NCC, will stepdown to floor today, likely d/c tomorrow    Discussed with Dr. Timo Meza MD  Neurosurgery  [facility]

## 2024-03-04 NOTE — PHYSICIAN QUERY
PT Name: Brandi Flynn  MR #: [id number]    DOCUMENTATION CLARIFICATION     CDS/: Esther Ovalle RN, CDS               Contact information: [email or web address]    This form is a permanent document in the medical record.     Query Date: March 4, 2024    By submitting this query, we are merely seeking further clarification of documentation. Please utilize your independent clinical judgment when addressing the question(s) below.    Supporting Clinical Findings Location in Medical Record     --pmhx of ALEXANDRE SALEH (on ASA/plavix)   --presents as transfer from OSH for SDH and tSAH following a fall.         -Patient given DDAVP to reverse her antiplatelet and transferred to [facility] for close monitoring in NCC and evaluation by NSGY.     --SDH (subdural hematoma)           -- on ASA/plavix at home, s/p DDAVP. Hold AC/AP, coags wnl      NCC H&P 3/1 (Dr Rainey/JORGE Whyte)      NeuroSx c/s 3/2 (Dr Meza)      03/02/24 05:22 03/03/24 03:25 03/04/24 04:40   Hemoglobin 11.6 (L) 12.7 12.5   Hematocrit 35.4 (L) 38.2 37.2      03/02/24 05:22   PT 11.5   INR 1.1   PTT 24.0      Labs 2/3- 3/4            Labs 3/2       Provider, please clarify if there is any clinical correlation between ___SDH and tSAH___ and ___ASA/plavix_______.           Are the conditions:      [  x] Associated with each other   [  ] Unrelated to each other   [  ] Other explanation (Please Specify): ______________   [  ] Clinically Undetermined                                                                               Please document in your progress notes daily for the duration of treatment until resolved and include in your discharge summary.

## 2024-03-04 NOTE — SUBJECTIVE & OBJECTIVE
Interval History: 3/3: MRI C spine completed with acute fx ant inf C6 VB, edema within C5 sup endplate, small focal tear of ALL at C6, ligamentous strain of C4-7 interspinous ligaments. CTH 3/3 stable, exam stable continues to do well neurologically.    Medications:  Continuous Infusions:  Scheduled Meds:   amLODIPine  10 mg Oral Daily    atorvastatin  80 mg Oral Daily    heparin (porcine)  5,000 Units Subcutaneous Q8H    levETIRAcetam (Keppra) IV (PEDS and ADULTS)  500 mg Intravenous Q12H    levothyroxine  75 mcg Oral Daily    losartan  25 mg Oral Daily    polyethylene glycol  17 g Oral Daily     PRN Meds:acetaminophen, bisacodyL, hydrALAZINE, ondansetron     Review of Systems  Objective:     Weight: 88.5 kg (195 lb)  Body mass index is 34.54 kg/m².  Vital Signs (Most Recent):  Temp: 98.4 °F (36.9 °C) (03/03/24 1902)  Pulse: 69 (03/03/24 2025)  Resp: (!) 26 (03/03/24 2025)  BP: (!) 158/72 (03/03/24 2002)  SpO2: 97 % (03/03/24 2025) Vital Signs (24h Range):  Temp:  [97.8 °F (36.6 °C)-98.4 °F (36.9 °C)] 98.4 °F (36.9 °C)  Pulse:  [60-81] 69  Resp:  [12-33] 26  SpO2:  [94 %-98 %] 97 %  BP: (118-184)/() 158/72                                 Physical Exam         Neurosurgery Physical Exam    Neurosurgery Physical Exam     General: well developed, well nourished, no distress.   HEENT: normocephalic, atraumatic  CV: regular rate   Pulmonary: normal respirations, no signs of respiratory distress  Abdomen: soft, non-distended, not tender to palpation  Skin: Skin is warm, dry and intact  Heme: no bruising     Neuro:   Mental Status: AO x4, no aphasia, no dysarthria   CN: PERRL, EOMI, VF intact to confrontation, sensation intact bilaterally, eyebrow raise and grimace symmetric, tongue midline  Motor: moves all extremities spontaneously, full strength throughout, no drift  Sensory: intact to light touch throughout  Cerebellar: finger to nose intact bilaterally  Reflexes: -Florez's, -Babinski, no clonus. Patellar: 2+  bilaterally   Gait: deferred       Significant Labs:  Recent Labs   Lab 03/02/24  0522 03/03/24  0325    106    139   K 3.5 3.5    107   CO2 24 24   BUN 24* 22   CREATININE 0.6 0.6   CALCIUM 8.6* 8.8   MG 2.0 2.1     Recent Labs   Lab 03/02/24  0522 03/03/24  0325   WBC 7.68 7.74   HGB 11.6* 12.7   HCT 35.4* 38.2    177     Recent Labs   Lab 03/02/24  0522   INR 1.1   APTT 24.0     Microbiology Results (last 7 days)       ** No results found for the last 168 hours. **          All pertinent labs from the last 24 hours have been reviewed.    Significant Diagnostics:  CT: CT Head Without Contrast    Result Date: 3/3/2024  Evolving left cerebral convexity subdural hemorrhage and right scattered subarachnoid hemorrhage most pronounced along the sylvian fissure. No evidence for significant new hemorrhage or new abnormal parenchymal attenuation with hypoattenuation along the right inferior frontal lobe and subinsular region similar to slightly less apparent Ventricles relatively stable without hydrocephalus.  Mass effect with rightward bowing of the septum pellucidum and slight rightward midline shift unchanged. Clinical correlation and close follow-up recommended. Electronically signed by: Nick Emmanuel DO Date:    03/03/2024 Time:    08:30   MRI: No results found in the last 24 hours.

## 2024-03-04 NOTE — DISCHARGE SUMMARY
"[facility] - Neuro Critical Care  Neurocritical Care  Discharge Summary    Admit Date: 3/1/2024    Service Date: 03/04/2024    Discharge Date:     Length of Stay: 3    Final Active Diagnoses:    Diagnosis Date Noted POA    PRINCIPAL PROBLEM:  SDH (subdural hematoma) [S06.5XAA] 03/01/2024 Yes    Debility [R53.81] 03/03/2024 Yes    Bilateral lower extremity edema [R60.0] 03/03/2024 Yes    Closed nondisplaced fracture of sixth cervical vertebra [S12.501A] 03/03/2024 Yes    Traumatic SAH (subarachnoid hemorrhage) [I60.9] 03/01/2024 Yes    Cytotoxic brain edema [G93.6] 03/01/2024 Yes    Brain compression [G93.5] 03/01/2024 Yes    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 Flynn is a pleasant 84 yo f w/ pmhx of ALEXANDRE SALEH ( 5-7 years ago on ASA/plavix), HTN, HLD who presents as transfer from OSH for SDH and tSAH following a fall.     Patient was in her usual state of health and visiting her  at [facility] for his cardiac angiogram on 3/1. When walking in the hallways her foot "caught" and she fell forward, striking her head on a column.     She denies any symptoms prior to the trip and fall, denies LOC, and endorses immediate HA and gradual onset of nausea but denies vomiting, visual changes, weakness, or change in sensation. CTH, CTA, CT c spine all done at OSH revealing large R SAH with contrecoup L SDH, but no c-spine fractures. Patient given DDAVP to reverse her antiplatelet and transferred to [facility] [place] for close monitoring in NCC and evaluation by NSGY.     She is auto maintaining SP goal <160 and received 1 dose of Keppra 500mg for seizure ppx prior to transfer.     Hospital Course by Event: 03/02/2024 Overnight CTH relatively stable. AF, HDS on RA. MRI C spine " pending, repeat CTH tomorrow. LE US pending. Resuming diet.   03/03/2024 No issues overnight. C-collar when OOB and sitting up, but needs smaller size. BLE US negative for DVT. Stable for stepdown to NSGY vs discharge home.  03/04/2024 NAEO. C-collar when OOB. Hypertensive, otherwise stable on RA. Discharging home today.    Hospital Course by Problem:   * SDH (subdural hematoma)  Initial CTH revealed acute SAH in R sylvian fissue w/ conitnued posterior migration of SAH blood into the R temporal lobe sulci w/ conitnued posterior and superior extent of blood invovling the region of central sulcus. Small contrecoup focus of SDH of measuring 6 mm in max thickness.     -Admitted to [facility], discharging home today  -VS/Neuro checks q1  -NSGY following  -Repeat CTH stable from prior  -MRI c-spine w/ acute fracture at C6 vertebral body, possible microtrabecular fracture of C5  -C-collar when out of bed and sitting up  -Keppra BID x 7 days  -SBP goal < 160  -Amlodipine daily  -Losartan added  -CBC, CMP, Mag, Phos daily  -SQH for DVT PPX  -Regular diet  -PT/OT  -Stable for stepdown to NSGY    Closed nondisplaced fracture of sixth cervical vertebra  -No spinal cord involvement  -NSGY following  -C-collar when OOB and sitting up    Bilateral lower extremity edema  -Family reports BLE edema is chronic  -BLE US negative for DVT  -Possibly 2/2 amlodipine    Debility  -PT/OT    Brain compression  -Noted on imaging  -See SDH (subdural hematoma)       Cytotoxic brain edema  -Noted on imaging  -See SDH (subdural hematoma)     Traumatic SAH (subarachnoid hemorrhage)  -2/2 to fall  -See SDH (subdural hematoma)     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  -On ASA and plavix; reversed w/ DDAVP prior to transfer  -Hold in setting of tSAH/SDH until f/u with neurosurgery    Essential hypertension  -SBP goal < 160  -Amlodipine daily  -Start losartan  -PRN hydralazine    Hyperlipidemia  -Atorvastatin daily    Hypothyroidism  -Levothyroxine  daily        Goals of Care Treatment Preferences:  Code Status: Full Code    Pending Results: None    Consultations:  IP CONSULT TO PHYSICAL MEDICINE REHAB  IP CONSULT TO REGISTERED DIETITIAN/NUTRITIONIST  IP CONSULT TO SOCIAL WORK/CASE MANAGEMENT  IP CONSULT TO NEUROSURGERY    Procedures:   * No surgery found * by * Surgery not found *.    Medications:      Medication List        START taking these medications      levETIRAcetam 500 MG Tab  Commonly known as: KEPPRA  Take 1 tablet (500 mg total) by mouth 2 (two) times daily. for 9 doses     losartan 25 MG tablet  Commonly known as: COZAAR  Take 1 tablet (25 mg total) by mouth once daily.  Start taking on: March 5, 2024            CHANGE how you take these medications      solifenacin 10 MG tablet  Commonly known as: VESICARE  What changed: Another medication with the same name was removed. Continue taking this medication, and follow the directions you see here.            CONTINUE taking these medications      amLODIPine 10 MG tablet  Commonly known as: NORVASC  TAKE 1 TABLET BY MOUTH ONCE DAILY.     aspirin 81 MG EC tablet  Commonly known as: ECOTRIN  Take 1 tablet (81 mg total) by mouth once daily. for 1 day     atorvastatin 40 MG tablet  Commonly known as: LIPITOR  TAKE 2 TABLETS BY MOUTH IN THE EVENING     clopidogreL 75 mg tablet  Commonly known as: PLAVIX  Take 1 tablet (75 mg total) by mouth once daily. for 1 day     levothyroxine 75 MCG tablet  Commonly known as: SYNTHROID  TAKE 1 TABLET BY MOUTH ONCE A DAY     MAALOX ORAL     meloxicam 7.5 MG tablet  Commonly known as: MOBIC  Take 1 tablet (7.5 mg total) by mouth once daily.     PRESERVISION AREDS-2 ORAL            STOP taking these medications      UNABLE TO FIND               Where to Get Your Medications        These medications were sent to [facility] - [place] - [address]  [address] [address]      Phone: [phone]   aspirin 81 MG EC tablet  clopidogreL 75 mg  tablet  levETIRAcetam 500 MG Tab  losartan 25 MG tablet       Diet: Regular    Activity: As tolerated.     Disposition: Discharged to home in stable condition.    Follow Up Plan:  Follow-up with neurosurgery in 4 weeks    This discharge took more than 30 minutes to complete.    Tanya Lauren MD  Neurocritical Care  [facility]

## 2024-03-04 NOTE — PROGRESS NOTES
"[facility] - Neuro Critical Care  Neurocritical Care  Progress Note    Admit Date: 3/1/2024  Service Date: 03/04/2024  Length of Stay: 3    Subjective:     Chief Complaint: SDH (subdural hematoma)    History of Present Illness: Ms. Flynn is a pleasant 82 yo f w/ pmhx of R ABIGAILO ( 5-7 years ago on ASA/plavix), HTN, HLD who presents as transfer from OSH for SDH and tSAH following a fall.     Patient was in her usual state of health and visiting her  at [facility] for his cardiac angiogram on 3/1. When walking in the hallways her foot "caught" and she fell forward, striking her head on a column.     She denies any symptoms prior to the trip and fall, denies LOC, and endorses immediate HA and gradual onset of nausea but denies vomiting, visual changes, weakness, or change in sensation. CTH, CTA, CT c spine all done at OSH revealing large R SAH with contrecoup L SDH, but no c-spine fractures. Patient given DDAVP to reverse her antiplatelet and transferred to [facility] for close monitoring in NCC and evaluation by NSGY.     She is auto maintaining SP goal <160 and received 1 dose of Keppra 500mg for seizure ppx prior to transfer.     Hospital Course: 03/02/2024 Overnight CTH relatively stable. AF, HDS on RA. MRI C spine pending, repeat CTH tomorrow. LE US pending. Resuming diet.   03/03/2024 No issues overnight. C-collar when OOB and sitting up, but needs smaller size. BLE US negative for DVT. Stable for stepdown to NSGY vs discharge home.  03/04/2024 NAEO. C-collar when OOB. Hypertensive, otherwise stable on RA. Discharging home today.    Interval History:  See hospital course. Able to walk to bathroom unassisted and to walk down the hollis.    Review of Systems   Gastrointestinal:  Negative for abdominal pain and nausea.   Musculoskeletal:  Negative for back pain and neck pain.   Neurological:  Negative for dizziness, light-headedness and headaches.     Objective:     Vitals:  Temp: 97.6 °F (36.4 " °C)  Pulse: 64  Rhythm: normal sinus rhythm  BP: (!) 148/66  MAP (mmHg): 88  Resp: 20  SpO2: 95 %    Temp  Min: 97.6 °F (36.4 °C)  Max: 98.4 °F (36.9 °C)  Pulse  Min: 62  Max: 81  BP  Min: 126/61  Max: 163/74  MAP (mmHg)  Min: 86  Max: 118  Resp  Min: 15  Max: 43  SpO2  Min: 94 %  Max: 98 %    No intake/output data recorded.   Unmeasured Output  Urine Occurrence: 1  Stool Occurrence: 1  Pad Count: 2        Physical Exam  Vitals and nursing note reviewed.   Constitutional:       General: She is not in acute distress.  HENT:      Head: Normocephalic. Contusion (L periorbital ecchymosis) and laceration present.   Eyes:      Extraocular Movements: Extraocular movements intact.      Pupils: Pupils are equal, round, and reactive to light.   Cardiovascular:      Rate and Rhythm: Regular rhythm.   Pulmonary:      Effort: Pulmonary effort is normal.   Abdominal:      General: Abdomen is flat. There is no distension.      Palpations: Abdomen is soft.      Tenderness: There is no abdominal tenderness.   Musculoskeletal:      Right lower leg: Edema (1+) present.      Left lower leg: Edema (1+) present.   Skin:     General: Skin is warm and dry.   Neurological:      Mental Status: She is oriented to person, place, and time.      Cranial Nerves: No cranial nerve deficit.      Sensory: No sensory deficit.      Motor: Weakness (5/5 UEs, 4/5 LEs) present.            Medications:  Continuous ScheduledamLODIPine, 10 mg, Daily  atorvastatin, 80 mg, Daily  heparin (porcine), 5,000 Units, Q8H  levETIRAcetam (Keppra) IV (PEDS and ADULTS), 500 mg, Q12H  levothyroxine, 75 mcg, Daily  losartan, 25 mg, Daily  polyethylene glycol, 17 g, Daily    PRNacetaminophen, 650 mg, Q6H PRN  bisacodyL, 10 mg, Daily PRN  hydrALAZINE, 10 mg, Q4H PRN  ondansetron, 4 mg, Q6H PRN      Today I personally reviewed pertinent medications, lines/drains/airways, imaging, cardiology results, laboratory results, microbiology results, notably:    Diet  Diet Adult  Regular (IDDSI Level 7)  Diet Adult Regular (IDDSI Level 7)    Assessment/Plan:     Neuro  * SDH (subdural hematoma)  Initial CTH revealed acute SAH in R sylvian fissue w/ conitnued posterior migration of SAH blood into the R temporal lobe sulci w/ conitnued posterior and superior extent of blood invovling the region of central sulcus. Small contrecoup focus of SDH of measuring 6 mm in max thickness.     -Admitted to [facility], discharging home today  -VS/Neuro checks q1  -NSGY following  -Repeat CTH stable from prior  -MRI c-spine w/ acute fracture at C6 vertebral body, possible microtrabecular fracture of C5  -C-collar when out of bed and sitting up  -Keppra BID x 7 days  -SBP goal < 160  -Amlodipine daily  -Losartan added  -CBC, CMP, Mag, Phos daily  -SQH for DVT PPX  -Regular diet  -PT/OT  -Stable for stepdown to NSGY    Closed nondisplaced fracture of sixth cervical vertebra  -No spinal cord involvement  -NSGY following  -C-collar when OOB and sitting up    Brain compression  -Noted on imaging  -See SDH (subdural hematoma)       Cytotoxic brain edema  -Noted on imaging  -See SDH (subdural hematoma)     Traumatic SAH (subarachnoid hemorrhage)  -2/2 to fall  -See SDH (subdural hematoma)     Ophtho  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  -On ASA and plavix; reversed w/ DDAVP prior to transfer  -Hold in setting of tSAH/SDH until f/u with neurosurgery    Cardiac/Vascular  Essential hypertension  -SBP goal < 160  -Amlodipine daily  -Start losartan  -PRN hydralazine    Hyperlipidemia  -Atorvastatin daily    Endocrine  Hypothyroidism  -Levothyroxine daily    Other  Bilateral lower extremity edema  -Family reports BLE edema is chronic  -BLE US negative for DVT  -Possibly 2/2 amlodipine    Debility  -PT/OT          The patient is being Prophylaxed for:  Venous Thromboembolism with: Chemical  Stress Ulcer with: None  Ventilator Pneumonia with: not applicable    Activity Orders            Diet Adult Regular (IDDSI Level  7): Regular starting at 03/02 0957    Turn patient starting at 03/01 2200    Elevate HOB starting at 03/01 2150          Full Code    Tanya Lauren MD  Neurocritical Care  [facility] - Neuro Critical Care

## 2024-03-04 NOTE — PROGRESS NOTES
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 She was visiting her  at the hospital 3/1 when she tripped and fell forward with her head hitting a cement pole. CTH showed R sylvian fissure and sulcal SAH as well as L parietal convexity SDH up to 9 mm thickness. She was able to walk after her fall, and at time of assessment has mild HA, denies any neck pain, weakness, numbness, speech difficulty. S/p DDAVP, DAPT held.     Post-Op Info:  * No surgery found *       Interval History: NAEON. Exam stable this morning. Pt has Collar and can FU with NSGY in Clinic with [facility] in 4 weeks    Medications:  Continuous Infusions:  Scheduled Meds:   amLODIPine  10 mg Oral Daily    atorvastatin  80 mg Oral Daily    heparin (porcine)  5,000 Units Subcutaneous Q8H    levETIRAcetam (Keppra) IV (PEDS and ADULTS)  500 mg Intravenous Q12H    levothyroxine  75 mcg Oral Daily    losartan  25 mg Oral Daily    polyethylene glycol  17 g Oral Daily    potassium chloride  40 mEq Oral Once     PRN Meds:acetaminophen, bisacodyL, hydrALAZINE, ondansetron     Review of Systems  Objective:     Weight: 88.5 kg (195 lb)  Body mass index is 34.54 kg/m².  Vital Signs (Most Recent):  Temp: 97.6 °F (36.4 °C) (03/04/24 0701)  Pulse: 64 (03/04/24 0810)  Resp: 20 (03/04/24 0810)  BP: 126/61 (03/04/24 0801)  SpO2: 95 % (03/04/24 0810) Vital Signs (24h Range):  Temp:  [97.6 °F (36.4 °C)-98.4 °F (36.9 °C)] 97.6 °F (36.4 °C)  Pulse:  [62-81] 64  Resp:  [15-43] 20  SpO2:  [94 %-98 %] 95 %  BP: (118-174)/() 126/61                                 Physical Exam         Neurosurgery Physical Exam  General: well developed, well nourished, no distress.   HEENT: normocephalic, atraumatic  CV: regular rate   Pulmonary: normal respirations, no signs of  "respiratory distress  Abdomen: soft, non-distended, not tender to palpation  Skin: Skin is warm, dry and intact  Heme: no bruising     Neuro:   Mental Status: AO x4, no aphasia, no dysarthria   CN: PERRL, EOMI, VF intact to confrontation, sensation intact bilaterally, eyebrow raise and grimace symmetric, tongue midline  Motor: moves all extremities spontaneously, full strength throughout, no drift  Sensory: intact to light touch throughout  Cerebellar: finger to nose intact bilaterally  Reflexes: -Florez's, -Babinski, no clonus. Patellar: 2+ bilaterally   Gait: deferred     Significant Labs:  Recent Labs   Lab 03/03/24  0325 03/04/24  0440    108    139   K 3.5 3.3*    107   CO2 24 22*   BUN 22 18   CREATININE 0.6 0.6   CALCIUM 8.8 8.6*   MG 2.1 2.0     Recent Labs   Lab 03/03/24  0325 03/04/24  0440   WBC 7.74 8.23   HGB 12.7 12.5   HCT 38.2 37.2    183     No results for input(s): "LABPT", "INR", "APTT" in the last 48 hours.  Microbiology Results (last 7 days)       ** No results found for the last 168 hours. **          All pertinent labs from the last 24 hours have been reviewed.    Significant Diagnostics:  CT: No results found in the last 24 hours.  I have reviewed all pertinent imaging results/findings within the past 24 hours.  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 She was visiting her  at the hospital 3/1 when she tripped and fell forward with her head hitting a cement pole. Intact on initial exam other than mild R pronator drift.    CTH 4 PM 3/1: R sylvian and sulcal SAH, L parietal aSDH 9mm thickness on coronal; hyperostosis   CT C sp/maxface 3/1: no acute fx or displacement, addendum read as possible fracture through anterior C6-7 osteophyte   CTA 6 PM 3/1: no LVO, no aneurysm, absent R A1   CTH 3/2 4AM: stable "   MRI C sp 3/2: acute fx ant inf C6 VB, edema within C5 sup endplate, small focal tear of ALL at C6, ligamentous strain of C4-7 interspinous ligaments   CTH 3/3: stable     - admitted to [facility], stable for stepdown to floor  - continue C collar when out of bed or moving  - Upright Xrays look okay  - on ASA/plavix at home, s/p DDAVP. Hold AC/AP, coags wnl  - keppra ppx  - SBP <160  - PT/OT   - no indication for operative intervention at this time Pt can be d/cd today  - OP FU scheduled for 4 weeks with [facility]    Dispo: D/C today    Discussed with Dr. Timo Grant MD  Neurosurgery  Scott fazal - Neuro Critical Care

## 2024-03-04 NOTE — PLAN OF CARE
"[facility] Care Plan    POC reviewed with Brandi Flynn and family at 0300. Pt verbalized understanding. Questions and concerns addressed. No acute events overnight. Pt progressing toward goals. Will continue to monitor. See below and flowsheets for full assessment and VS info.     - TTF  - PRN Tylenol given for HA          Is this a stroke patient? yes- Stroke booklet reviewed with patient and family, risk factors identified for patient and stroke booklet remains at bedside for ongoing education.     Care individualization: Blankets on      Neuro:  Ell[place] Coma Scale  Best Eye Response: 4-->(E4) spontaneous  Best Motor Response: 6-->(M6) obeys commands  Best Verbal Response: 5-->(V5) oriented  Ell[place]lie Coma Scale Score: 15  Assessment Qualifiers: patient not sedated/intubated, no eye obstruction present  Pupil PERRLA: yes     24hr Temp:  [97.8 °F (36.6 °C)-98.4 °F (36.9 °C)]     CV:   Rhythm: normal sinus rhythm  BP goals:   SBP < 160  MAP > 65    Resp:           Plan: N/A    GI/:     Diet/Nutrition Received: regular  Last Bowel Movement: 03/04/24  Voiding Characteristics: voids spontaneously without difficulty  No intake or output data in the 24 hours ending 03/04/24 0626  Unmeasured Output  Urine Occurrence: 1  Stool Occurrence: 1  Pad Count: 2    Labs/Accuchecks:  Recent Labs   Lab 03/04/24  0440   WBC 8.23   RBC 3.99*   HGB 12.5   HCT 37.2         Recent Labs   Lab 03/04/24  0440      K 3.3*   CO2 22*      BUN 18   CREATININE 0.6   ALKPHOS 95   ALT 11   AST 26   BILITOT 0.7      Recent Labs   Lab 03/02/24  0522   INR 1.1   APTT 24.0    No results for input(s): "CPK", "CPKMB", "TROPONINI", "MB" in the last 168 hours.    Electrolytes: N/A - electrolytes WDL  Accuchecks: none    Gtts:      LDA/Wounds:    Nurses Note -- 4 Eyes    Is there altered skin present? yes   Please check the following boxes that apply:   [x] LDA Added if Not in Epic (Describe Wound)   [] New Altered Skin Integrity was Present " on Admit and Documented in LDA   [] Wound Image Taken    Wound Care Consulted? Yes    Second RN/Staff Member:  ARIELLA Pillai       Problem: Adult Inpatient Plan of Care  Goal: Plan of Care Review  Outcome: Ongoing, Progressing  Goal: Patient-Specific Goal (Individualized)  Outcome: Ongoing, Progressing  Goal: Absence of Hospital-Acquired Illness or Injury  Outcome: Ongoing, Progressing  Goal: Optimal Comfort and Wellbeing  Outcome: Ongoing, Progressing  Goal: Readiness for Transition of Care  Outcome: Ongoing, Progressing     Problem: Adjustment to Illness (Stroke, Hemorrhagic)  Goal: Optimal Coping  Outcome: Ongoing, Progressing     Problem: Bowel Elimination Impaired (Stroke, Hemorrhagic)  Goal: Effective Bowel Elimination  Outcome: Ongoing, Progressing

## 2024-03-04 NOTE — PLAN OF CARE
[facility] - Neuro Critical Care  Initial Discharge Assessment       Primary Care Provider: Marcial Kan MD    Admission Diagnosis: Traumatic subarachnoid hemorrhage [S06.6XAA]    Admission Date: 3/1/2024  Expected Discharge Date: 3/4/2024    Transition of Care Barriers: None    Payor: HU[employer] MANAGED MEDICARE / Plan: HU[employer] MEDICARE HMO / Product Type: Capitation /     Extended Emergency Contact Information  Primary Emergency Contact: Elías Flynn  Address: [id number] [place] [address] [place] [address] [facility]  Home Phone: [phone]  Mobile Phone: [phone]  Relation: Spouse  Preferred language: English   needed? No  Secondary Emergency Contact: Fidelina Farris  Mobile Phone: [phone]  Relation: Daughter  Preferred language: English   needed? No    Discharge Plan A: Home with family, Home  Discharge Plan B: Home with family      [facility] - [place] - [address]  [address]  [place] [address]  Phone: [phone] Fax: [phone]      Transferred from:     Past Medical History:   Diagnosis Date    Blood transfusion 1960    GERD (gastroesophageal reflux disease)     Hepatitis C     treated six months by Dr. Gaspar with interferon    History of hepatitis C     Hyperlipidemia     Hypertension     Hypothyroidism     Obesity     Primary osteoarthritis of left knee 12/9/2015    Sleep apnea     Stroke     TIA 2012    TIA (transient ischemic attack)     Vision loss of right eye 2018    Dr Eldrdige Right eye blood clot          CM met with patient and Elías Flynn () [phone]  in room for Discharge Planning Assessment.  Patient is able to answer questions.  Per patient, she lives with her  in a single story house with 1 step(s) to enter from the garage.   Per patient, she was independent with ADLS and used no dme for ambulation.  Patient will have assistance from her  upon discharge.   Discharge Planning Booklet given to  patient/family and discussed.  All questions addressed.  CM will follow for needs.      Discharge Plan A and Plan B have been determined by review of patient's clinical status, future medical and therapeutic needs, and coverage/benefits for post-acute care in coordination with multidisciplinary team members.      Initial Assessment (most recent)       Adult Discharge Assessment - 03/04/24 1218          Discharge Assessment    Assessment Type Discharge Planning Assessment     Confirmed/corrected address, phone number and insurance Yes     Confirmed Demographics Correct on Facesheet     Source of Information patient     Communicated LITTLE with patient/caregiver Yes     Reason For Admission SDH     People in Home spouse     Facility Arrived From: [facility]     Do you expect to return to your current living situation? Yes     Do you have help at home or someone to help you manage your care at home? Yes     Who are your caregiver(s) and their phone number(s)? Elías Flynn () [phone]     Prior to hospitilization cognitive status: Alert/Oriented     Current cognitive status: Alert/Oriented     Walking or Climbing Stairs Difficulty no     Dressing/Bathing Difficulty no     Home Accessibility stairs to enter home     Number of Stairs, Main Entrance one     Home Layout Able to live on 1st floor     Equipment Currently Used at Home shower chair     Readmission within 30 days? No     Patient currently being followed by outpatient case management? No     Do you currently have service(s) that help you manage your care at home? No     Do you take prescription medications? Yes     Do you have prescription coverage? Yes     Coverage [employer] HMO     Do you have any problems affording any of your prescribed medications? No     Is the patient taking medications as prescribed? yes     Who is going to help you get home at discharge? Elías Flynn () [phone]     How do you get to doctors  appointments? family or friend will provide     Are you on dialysis? No     Do you take coumadin? No     Discharge Plan A Home with family     Discharge Plan B Home with family     DME Needed Upon Discharge  none     Discharge Plan discussed with: Patient     Transition of Care Barriers None        Physical Activity    On average, how many days per week do you engage in moderate to strenuous exercise (like a brisk walk)? 0 days     On average, how many minutes do you engage in exercise at this level? 0 min        Financial Resource Strain    How hard is it for you to pay for the very basics like food, housing, medical care, and heating? Not hard at all        Housing Stability    In the last 12 months, was there a time when you were not able to pay the mortgage or rent on time? No     In the last 12 months, how many places have you lived? 1     In the last 12 months, was there a time when you did not have a steady place to sleep or slept in a shelter (including now)? No        Transportation Needs    In the past 12 months, has lack of transportation kept you from medical appointments or from getting medications? No     In the past 12 months, has lack of transportation kept you from meetings, work, or from getting things needed for daily living? No        Food Insecurity    Within the past 12 months, you worried that your food would run out before you got the money to buy more. Never true     Within the past 12 months, the food you bought just didn't last and you didn't have money to get more. Never true        Stress    Do you feel stress - tense, restless, nervous, or anxious, or unable to sleep at night because your mind is troubled all the time - these days? Not at all        Social Connections    In a typical week, how many times do you talk on the phone with family, friends, or neighbors? More than three times a week     How often do you get together with friends or relatives? More than three times a week      How often do you attend Mandaeism or Episcopal services? More than 4 times per year     Do you belong to any clubs or organizations such as Mandaeism groups, unions, fraternal or athletic groups, or school groups? No     How often do you attend meetings of the clubs or organizations you belong to? Never     Are you , , , , never , or living with a partner?         Alcohol Use    Q1: How often do you have a drink containing alcohol? Never     Q2: How many drinks containing alcohol do you have on a typical day when you are drinking? Patient does not drink     Q3: How often do you have six or more drinks on one occasion? Never        OTHER    Name(s) of People in Home Elías lFynn () [phone]                      Discharge Plan A and Plan B have been determined by review of patient's clinical status, future medical and therapeutic needs, and coverage/benefits for post-acute care in coordination with multidisciplinary team members.      Kalani Morales RN, CCRN-K, [facility]  Neuro-Critical Care   [phone]

## 2024-03-04 NOTE — ASSESSMENT & PLAN NOTE
-On ASA and plavix; reversed w/ DDAVP prior to transfer  -Hold in setting of tSAH/SDH until f/u with neurosurgery

## 2024-03-04 NOTE — PLAN OF CARE
03/04/24 1219   Post-Acute Status   Post-Acute Authorization Other   Other Status No Post-Acute Service Needs   Discharge Delays None known at this time   Discharge Plan   Discharge Plan A Home with family;Home     Patient cleared for discharge from case management standpoint.      Chart and discharge orders reviewed.  Patient discharged home with no further case management needs.      FLO Gonzalez - [facility]  [phone]

## 2024-03-04 NOTE — PLAN OF CARE
[facility] - Neuro Critical Care  Discharge Final Note    Primary Care Provider: Marcial Kan MD    Expected Discharge Date: 3/4/2024    Patient discharged to home with no post acute needs.      Final Discharge Note (most recent)       Final Note - 03/04/24 1226          Final Note    Assessment Type Final Discharge Note     Anticipated Discharge Disposition Home or Self Care     What phone number can be called within the next 1-3 days to see how you are doing after discharge? [phone]     Hospital Resources/Appts/Education Provided Appointments scheduled and added to AVS                     Important Message from Medicare       03/04/2024    Signature of Patient or Representative Date / Time                Contact Info       Marcial Kan MD   Specialty: Family Medicine   Relationship: PCP - General    [address] [place] [address]   Phone: [phone]       Next Steps: Go on 3/13/2024    Instructions: Hospital follow up at 1:40 PM          Discharge Plan A and Plan B have been determined by review of patient's clinical status, future medical and therapeutic needs, and coverage/benefits for post-acute care in coordination with multidisciplinary team members.      Kalani Morales RN, CCRN-K, [facility]  Neuro-Critical Care   X [phone]

## 2024-03-04 NOTE — SUBJECTIVE & OBJECTIVE
Interval History:  See hospital course. Able to walk to bathroom unassisted and to walk down the hollis.    Review of Systems   Gastrointestinal:  Negative for abdominal pain and nausea.   Musculoskeletal:  Negative for back pain and neck pain.   Neurological:  Negative for dizziness, light-headedness and headaches.     Objective:     Vitals:  Temp: 97.6 °F (36.4 °C)  Pulse: 64  Rhythm: normal sinus rhythm  BP: (!) 148/66  MAP (mmHg): 88  Resp: 20  SpO2: 95 %    Temp  Min: 97.6 °F (36.4 °C)  Max: 98.4 °F (36.9 °C)  Pulse  Min: 62  Max: 81  BP  Min: 126/61  Max: 163/74  MAP (mmHg)  Min: 86  Max: 118  Resp  Min: 15  Max: 43  SpO2  Min: 94 %  Max: 98 %    No intake/output data recorded.   Unmeasured Output  Urine Occurrence: 1  Stool Occurrence: 1  Pad Count: 2        Physical Exam  Vitals and nursing note reviewed.   Constitutional:       General: She is not in acute distress.  HENT:      Head: Normocephalic. Contusion (L periorbital ecchymosis) and laceration present.   Eyes:      Extraocular Movements: Extraocular movements intact.      Pupils: Pupils are equal, round, and reactive to light.   Cardiovascular:      Rate and Rhythm: Regular rhythm.   Pulmonary:      Effort: Pulmonary effort is normal.   Abdominal:      General: Abdomen is flat. There is no distension.      Palpations: Abdomen is soft.      Tenderness: There is no abdominal tenderness.   Musculoskeletal:      Right lower leg: Edema (1+) present.      Left lower leg: Edema (1+) present.   Skin:     General: Skin is warm and dry.   Neurological:      Mental Status: She is oriented to person, place, and time.      Cranial Nerves: No cranial nerve deficit.      Sensory: No sensory deficit.      Motor: Weakness (5/5 UEs, 4/5 LEs) present.            Medications:  Continuous ScheduledamLODIPine, 10 mg, Daily  atorvastatin, 80 mg, Daily  heparin (porcine), 5,000 Units, Q8H  levETIRAcetam (Keppra) IV (PEDS and ADULTS), 500 mg, Q12H  levothyroxine, 75 mcg,  Daily  losartan, 25 mg, Daily  polyethylene glycol, 17 g, Daily    PRNacetaminophen, 650 mg, Q6H PRN  bisacodyL, 10 mg, Daily PRN  hydrALAZINE, 10 mg, Q4H PRN  ondansetron, 4 mg, Q6H PRN      Today I personally reviewed pertinent medications, lines/drains/airways, imaging, cardiology results, laboratory results, microbiology results, notably:    Diet  Diet Adult Regular (IDDSI Level 7)  Diet Adult Regular (IDDSI Level 7)

## 2024-03-04 NOTE — SUBJECTIVE & OBJECTIVE
Interval History: NAEON. Exam stable this morning. Pt has Collar and can FU with NSGY in Clinic with [facility] in 4 weeks    Medications:  Continuous Infusions:  Scheduled Meds:   amLODIPine  10 mg Oral Daily    atorvastatin  80 mg Oral Daily    heparin (porcine)  5,000 Units Subcutaneous Q8H    levETIRAcetam (Keppra) IV (PEDS and ADULTS)  500 mg Intravenous Q12H    levothyroxine  75 mcg Oral Daily    losartan  25 mg Oral Daily    polyethylene glycol  17 g Oral Daily    potassium chloride  40 mEq Oral Once     PRN Meds:acetaminophen, bisacodyL, hydrALAZINE, ondansetron     Review of Systems  Objective:     Weight: 88.5 kg (195 lb)  Body mass index is 34.54 kg/m².  Vital Signs (Most Recent):  Temp: 97.6 °F (36.4 °C) (03/04/24 0701)  Pulse: 64 (03/04/24 0810)  Resp: 20 (03/04/24 0810)  BP: 126/61 (03/04/24 0801)  SpO2: 95 % (03/04/24 0810) Vital Signs (24h Range):  Temp:  [97.6 °F (36.4 °C)-98.4 °F (36.9 °C)] 97.6 °F (36.4 °C)  Pulse:  [62-81] 64  Resp:  [15-43] 20  SpO2:  [94 %-98 %] 95 %  BP: (118-174)/() 126/61                                 Physical Exam         Neurosurgery Physical Exam  General: well developed, well nourished, no distress.   HEENT: normocephalic, atraumatic  CV: regular rate   Pulmonary: normal respirations, no signs of respiratory distress  Abdomen: soft, non-distended, not tender to palpation  Skin: Skin is warm, dry and intact  Heme: no bruising     Neuro:   Mental Status: AO x4, no aphasia, no dysarthria   CN: PERRL, EOMI, VF intact to confrontation, sensation intact bilaterally, eyebrow raise and grimace symmetric, tongue midline  Motor: moves all extremities spontaneously, full strength throughout, no drift  Sensory: intact to light touch throughout  Cerebellar: finger to nose intact bilaterally  Reflexes: -Florez's, -Babinski, no clonus. Patellar: 2+ bilaterally   Gait: deferred     Significant Labs:  Recent Labs   Lab 03/03/24  0325 03/04/24  0440    108    139   K 3.5  "3.3*    107   CO2 24 22*   BUN 22 18   CREATININE 0.6 0.6   CALCIUM 8.8 8.6*   MG 2.1 2.0     Recent Labs   Lab 03/03/24  0325 03/04/24  0440   WBC 7.74 8.23   HGB 12.7 12.5   HCT 38.2 37.2    183     No results for input(s): "LABPT", "INR", "APTT" in the last 48 hours.  Microbiology Results (last 7 days)       ** No results found for the last 168 hours. **          All pertinent labs from the last 24 hours have been reviewed.    Significant Diagnostics:  CT: No results found in the last 24 hours.  I have reviewed all pertinent imaging results/findings within the past 24 hours.  "

## 2024-03-04 NOTE — PROGRESS NOTES
"[facility] Neuro [facility]  Neurocritical Care  Progress Note    Admit Date: 3/1/2024  Service Date: 03/03/2024  Length of Stay: 2    Subjective:     Chief Complaint: SDH (subdural hematoma)    History of Present Illness: Ms. Flynn is a pleasant 82 yo f w/ pmhx of R ABIGAILO ( 5-7 years ago on ASA/plavix), HTN, HLD who presents as transfer from OSH for SDH and tSAH following a fall.     Patient was in her usual state of health and visiting her  at [facility] for his cardiac angiogram on 3/1. When walking in the hallways her foot "caught" and she fell forward, striking her head on a column.     She denies any symptoms prior to the trip and fall, denies LOC, and endorses immediate HA and gradual onset of nausea but denies vomiting, visual changes, weakness, or change in sensation. CTH, CTA, CT c spine all done at OSH revealing large R SAH with contrecoup L SDH, but no c-spine fractures. Patient given DDAVP to reverse her antiplatelet and [place] [facility] [place] in [place]CC and evaluation by NSGY.     She is auto maintaining SP goal <160 and received 1 dose of Keppra 500mg for seizure ppx prior to transfer.     Hospital Course: 03/02/2024 Overnight CTH relatively stable. AF, HDS on RA. MRI C spine pending, repeat CTH tomorrow. LE US pending. Resuming diet.   03/03/2024 No issues overnight. C-collar when OOB and sitting up, but needs smaller size. BLE US negative for DVT. Stable for stepdown to NSGY vs discharge home.    Interval History:  See hospital course.     Review of Systems   Respiratory:  Negative for cough and shortness of breath.    Cardiovascular:  Positive for leg swelling (Chronic). Negative for chest pain.   Gastrointestinal:  Positive for nausea. Negative for abdominal pain and vomiting.   Neurological:  Positive for headaches.     Objective:     Vitals:  Temp: 98.1 °F (36.7 °C)  Pulse: 75  Rhythm: normal sinus rhythm  BP: (!) 151/65  MAP (mmHg): 94  Resp: 18  SpO2: 96 " %    Temp  Min: 97.8 °F (36.6 °C)  Max: 98.4 °F (36.9 °C)  Pulse  Min: 60  Max: 81  BP  Min: 118/57  Max: 184/78  MAP (mmHg)  Min: 85  Max: 117  Resp  Min: 12  Max: 30  SpO2  Min: 93 %  Max: 98 %    03/02 0701 - 03/03 0700  In: 336 [P.O.:336]  Out: -    Unmeasured Output  Urine Occurrence: 1  Stool Occurrence: 1  Pad Count: 2        Physical Exam  Vitals and nursing note reviewed.   HENT:      Head: Left periorbital erythema and laceration present.      Comments: L periorbital ecchymosis and swelling  Eyes:      Extraocular Movements: Extraocular movements intact.      Pupils: Pupils are equal, round, and reactive to light.   Cardiovascular:      Rate and Rhythm: Normal rate and regular rhythm.      Pulses: Normal pulses.   Pulmonary:      Effort: Pulmonary effort is normal.   Abdominal:      Palpations: Abdomen is soft.   Musculoskeletal:         General: Normal range of motion.      Cervical back: Normal range of motion.   Skin:     General: Skin is warm and dry.      Capillary Refill: Capillary refill takes less than 2 seconds.   Neurological:      Mental Status: She is oriented to person, place, and time.      GCS: GCS eye subscore is 3. GCS verbal subscore is 5. GCS motor subscore is 6.      Cranial Nerves: Cranial nerves 2-12 are intact.      Sensory: Sensation is intact.      Comments:   -E3 V5 M6  -PERRL  -Oriented to person, place, time, and situation  -Follows commands w/ all extremities  -THOMPSON spontaneously/purposefully  -RUE 5/5  -LUE 5/5  -RLE 4/5  -LLE 4/5            Medications:  Continuous ScheduledamLODIPine, 10 mg, Daily  atorvastatin, 80 mg, Daily  heparin (porcine), 5,000 Units, Q8H  levETIRAcetam (Keppra) IV (PEDS and ADULTS), 500 mg, Q12H  levothyroxine, 75 mcg, Daily  losartan, 25 mg, Daily  polyethylene glycol, 17 g, Daily    PRNacetaminophen, 650 mg, Q6H PRN  bisacodyL, 10 mg, Daily PRN  hydrALAZINE, 10 mg, Q4H PRN  ondansetron, 4 mg, Q6H PRN      Today I personally reviewed pertinent  medications, lines/drains/airways, imaging, cardiology results, laboratory results, notably: CTH; CBC; CMP    Diet  Diet Adult Regular (IDDSI Level 7)  Diet Adult Regular (IDDSI Level 7)    Assessment/Plan:     Neuro  * SDH (subdural hematoma)  Initial CTH revealed acute SAH in R sylvian fissue w/ conitnued posterior migration of SAH blood into the R temporal lobe sulci w/ conitnued posterior and superior extent of blood invovling the region of central sulcus. Small contrecoup focus of SDH of measuring 6 mm in max thickness.     -Admitted to NCC   -VS/Neuro checks q1  -NSGY following  -Repeat CTH this AM stable from prior  -MRI c-spine w/ acute fracture at C6 vertebral body, possible microtrabecular fracture of C5  -C-collar when out of bed and sitting up  -Keppra BID x 7 days  -SBP goal < 160  -Amlodipine daily  -Losartan added  -CBC, CMP, Mag, Phos daily  -SQH for DVT PPX  -Regular diet  -PT/OT  -Stable for stepdown to NSGY    Closed nondisplaced fracture of sixth cervical vertebra  -No spinal cord involvement  -NSGY following  -C-collar when OOB and sitting up    Brain compression  -Noted on imaging  -See SDH (subdural hematoma)       Cytotoxic brain edema  -Noted on imaging  -See SDH (subdural hematoma)     Traumatic SAH (subarachnoid hemorrhage)  -2/2 to fall  -See SDH (subdural hematoma)     Ophtho  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  -On ASA and plavix; reversed w/ DDAVP prior to transfer  -Hold in setting of tSAH/SDH    Cardiac/Vascular  Essential hypertension  -SBP goal < 160  -Amlodipine daily  -Start losartan  -PRN hydralazine    Hyperlipidemia  -Atorvastatin daily    Endocrine  Hypothyroidism  -Levothyroxine daily    Other  Bilateral lower extremity edema  -Family reports BLE edema is chronic  -BLE US negative for DVT  -Possibly 2/2 amlodipine    Debility  -PT/OT          The patient is being Prophylaxed for:  Venous Thromboembolism with: Mechanical or Chemical  Stress Ulcer with: Not  Applicable   Ventilator Pneumonia with: not applicable    Activity Orders            Diet Adult Regular (IDDSI Level 7): Regular starting at 03/02 0957    Turn patient starting at 03/01 2200    Elevate HOB starting at 03/01 2150          Full Code    Level III    Priyanka Huber NP  Neurocritical Care  [facility] - Neuro Critical Care

## 2024-03-04 NOTE — ASSESSMENT & PLAN NOTE
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 She was visiting her  at the hospital 3/1 when she tripped and fell forward with her head hitting a cement pole. Intact on initial exam other than mild R pronator drift.    CTH 4 PM 3/1: R sylvian and sulcal SAH, L parietal aSDH 9mm thickness on coronal; hyperostosis   CT C sp/maxface 3/1: no acute fx or displacement, addendum read as possible fracture through anterior C6-7 osteophyte   CTA 6 PM 3/1: no LVO, no aneurysm, absent R A1   CTH 3/2 4AM: stable   MRI C sp 3/2: acute fx ant inf C6 VB, edema within C5 sup endplate, small focal tear of ALL at C6, ligamentous strain of C4-7 interspinous ligaments   CTH 3/3: stable     - admitted to [facility], stable for stepdown to floor  - continue C collar when out of bed or moving  - Upright Xrays look okay  - on ASA/plavix at home, s/p DDAVP. Hold AC/AP, coags wnl  - keppra ppx  - SBP <160  - PT/OT   - no indication for operative intervention at this time Pt can be d/cd today  - OP FU scheduled for 4 weeks with [facility]    Dispo: D/C today    Discussed with Dr. Greenwood

## 2024-03-04 NOTE — ASSESSMENT & PLAN NOTE
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 She was visiting her  at the hospital 3/1 when she tripped and fell forward with her head hitting a cement pole. Intact on initial exam other than mild R pronator drift.    CTH 4 PM 3/1: R sylvian and sulcal SAH, L parietal aSDH 9mm thickness on coronal; hyperostosis   CT C sp/maxface 3/1: no acute fx or displacement, addendum read as possible fracture through anterior C6-7 osteophyte   CTA 6 PM 3/1: no LVO, no aneurysm, absent R A1   CTH 3/2 4AM: stable   MRI C sp 3/2: acute fx ant inf C6 VB, edema within C5 sup endplate, small focal tear of ALL at C6, ligamentous strain of C4-7 interspinous ligaments   CTH 3/3: stable     - admitted to NCC, stable for stepdown to floor  - continue C collar when out of bed or moving, short C collar ordered  - f/u upright/supine XR in c-collar  - on ASA/plavix at home, s/p DDAVP. Hold AC/AP, coags wnl  - keppra ppx  - SBP <160  - PT/OT   - no indication for operative intervention at this time, NSGY will continue to follow     Dispo: discussed with NCC, will stepdown to floor today, likely d/c tomorrow    Discussed with Dr. Greenwood

## 2024-03-05 ENCOUNTER — PATIENT MESSAGE (OUTPATIENT)
Dept: FAMILY MEDICINE | Facility: CLINIC | Age: 84
End: 2024-03-05
Payer: MEDICARE

## 2024-03-08 LAB
OHS QRS DURATION: 140 MS
OHS QTC CALCULATION: 495 MS

## 2024-03-11 ENCOUNTER — PATIENT OUTREACH (OUTPATIENT)
Dept: ADMINISTRATIVE | Facility: CLINIC | Age: 84
End: 2024-03-11
Payer: MEDICARE

## 2024-03-11 NOTE — PROGRESS NOTES
C3 nurse spoke with Brandi Flynn  for a TCC post hospital discharge follow up call. The patient has a scheduled [facility] appointment with Marcial Kan MD on 03/13/2024 @ [id number].    Message sent to PCP staff.

## 2024-03-13 ENCOUNTER — OFFICE VISIT (OUTPATIENT)
Dept: FAMILY MEDICINE | Facility: CLINIC | Age: 84
End: 2024-03-13
Attending: FAMILY MEDICINE
Payer: MEDICARE

## 2024-03-13 VITALS
SYSTOLIC BLOOD PRESSURE: 126 MMHG | WEIGHT: 192.25 LBS | OXYGEN SATURATION: 96 % | TEMPERATURE: 98 F | DIASTOLIC BLOOD PRESSURE: 66 MMHG | HEIGHT: 63 IN | BODY MASS INDEX: 34.06 KG/M2 | HEART RATE: 86 BPM

## 2024-03-13 DIAGNOSIS — I60.9 SAH (SUBARACHNOID HEMORRHAGE): Primary | ICD-10-CM

## 2024-03-13 DIAGNOSIS — I10 ESSENTIAL HYPERTENSION: ICD-10-CM

## 2024-03-13 DIAGNOSIS — S12.501A CLOSED NONDISPLACED FRACTURE OF SIXTH CERVICAL VERTEBRA, UNSPECIFIED FRACTURE MORPHOLOGY, INITIAL ENCOUNTER: ICD-10-CM

## 2024-03-13 DIAGNOSIS — S06.5XAA SDH (SUBDURAL HEMATOMA): ICD-10-CM

## 2024-03-13 PROCEDURE — 99214 OFFICE O/P EST MOD 30 MIN: CPT | Mod: HCNC,S$GLB,, | Performed by: FAMILY MEDICINE

## 2024-03-13 PROCEDURE — 1159F MED LIST DOCD IN RCRD: CPT | Mod: HCNC,CPTII,S$GLB, | Performed by: FAMILY MEDICINE

## 2024-03-13 PROCEDURE — 3074F SYST BP LT 130 MM HG: CPT | Mod: HCNC,CPTII,S$GLB, | Performed by: FAMILY MEDICINE

## 2024-03-13 PROCEDURE — 99999 PR PBB SHADOW E&M-EST. PATIENT-LVL III: CPT | Mod: PBBFAC,HCNC,, | Performed by: FAMILY MEDICINE

## 2024-03-13 PROCEDURE — 1160F RVW MEDS BY RX/DR IN RCRD: CPT | Mod: HCNC,CPTII,S$GLB, | Performed by: FAMILY MEDICINE

## 2024-03-13 PROCEDURE — 1111F DSCHRG MED/CURRENT MED MERGE: CPT | Mod: HCNC,CPTII,S$GLB, | Performed by: FAMILY MEDICINE

## 2024-03-13 PROCEDURE — 1126F AMNT PAIN NOTED NONE PRSNT: CPT | Mod: HCNC,CPTII,S$GLB, | Performed by: FAMILY MEDICINE

## 2024-03-13 PROCEDURE — 3078F DIAST BP <80 MM HG: CPT | Mod: HCNC,CPTII,S$GLB, | Performed by: FAMILY MEDICINE

## 2024-03-13 NOTE — PROGRESS NOTES
Subjective:       Patient ID: Brandi Flynn is a 83 y.o. female.    Chief Complaint: No chief complaint on file.    83-year-old female coming in for a hospital follow-up.  She was at [facility] where her  was having an angiogram sitting with him in recovery when he felt tired and wanted to take a nap.  She went out to the waiting room to sit with her children and while walking to them her right foot caught on the floor, the rubber sole grab the floor and she fell forward striking the left side of her head and face on a column and in the process hyperextending her neck.  She was assisted up and taken to the emergency room where she was found to have a subarachnoid hemorrhage and a small contrecoup subdural hematoma.  No facial fractures were seen there was a question of whether she may have a C6 fracture.  She was transferred to intensive care at [facility] for neurosurgical care where additional imaging showed that she did have a C6 vertebral body fracture and possibly a C5 fracture as well.  She was placed in a cervical collar and monitored in ICU with close monitoring of her blood pressure.  The bleeding stopped spontaneously and she was put on Keppra 500 mg b.i.d. for a total of seven days the last 4-1/2 days occurring after discharge.  She has now completed the Keppra.  She has a follow-up with Neurosurgery on April 11 and was told to continue wearing the collar until that time.  She was discharged on amlodipine 10 mg daily and was given losartan 25 mg where she says she was verbally told to take it only if her systolic went over 160.  The discharge orders actually said take one daily and mirrored the Mar instructions.  The patient's blood pressure today was 118/62 and on recheck 126/66.  I do not believe we want to add the losartan in at this time or she may become hypotensive.  She is somewhat tired today but she is improving steadily.  She actually went to the [place] and renewed her 's  license today.    Past Medical History:  1960: Blood transfusion  No date: GERD (gastroesophageal reflux disease)  No date: Hepatitis C      Comment:  treated six months by Dr. Gaspar with interferon  No date: History of hepatitis C  No date: Hyperlipidemia  No date: Hypertension  No date: Hypothyroidism  No date: Obesity  2015: Primary osteoarthritis of left knee  No date: Sleep apnea  No date: Stroke      Comment:  TIA   No date: TIA (transient ischemic attack)  2018: Vision loss of right eye      Comment:  Dr Eldridge Right eye blood clot     Past Surgical History:  2015: ABDOMINAL SURGERY      Comment:  SBO from lap band wraping around small bowel, lap                untwisted  No date: ADENOIDECTOMY  2014: BREAST BIOPSY      Comment:  needle biopsy   No date: CATARACT EXTRACTION, BILATERAL; Bilateral  No date:  SECTION  : CHOLECYSTECTOMY  2011: COLONOSCOPY      Comment:  Dr. Alvares, 5 year recheck  2014: EYE SURGERY      Comment:  bilateral cataract Dr Carrillo   10/2020: EYE SURGERY      Comment:  bottom lid  No date: HEMORRHOID SURGERY  : HYSTERECTOMY  : INCONTINENCE SURGERY      Comment:  sling  : LAPAROSCOPIC GASTRIC BANDING  No date: LAPAROSCOPIC NISSEN FUNDOPLICATION      Comment:  Dr. Rutherford  No date: TONSILLECTOMY    Current Outpatient Medications on File Prior to Visit:  amLODIPine (NORVASC) 10 MG tablet, TAKE 1 TABLET BY MOUTH ONCE DAILY., Disp: 90 tablet, Rfl: 0  atorvastatin (LIPITOR) 40 MG tablet, TAKE 2 TABLETS BY MOUTH IN THE EVENING, Disp: 180 tablet, Rfl: 0  levothyroxine (SYNTHROID) 75 MCG tablet, TAKE 1 TABLET BY MOUTH ONCE A DAY, Disp: 90 tablet, Rfl: 2  mag hydrox/aluminum hyd/simeth (MAALOX ORAL), Take 1 Dose by mouth as needed., Disp: , Rfl:   meloxicam (MOBIC) 7.5 MG tablet, Take 1 tablet (7.5 mg total) by mouth once daily., Disp: 90 tablet, Rfl: 1  solifenacin (VESICARE) 10 MG tablet, Take 10 mg by mouth nightly., Disp: , Rfl:   aspirin (ECOTRIN) 81 MG EC  tablet, Take 1 tablet (81 mg total) by mouth once daily. for 1 day, Disp: 1 tablet, Rfl: 0  clopidogreL (PLAVIX) 75 mg tablet, Take 1 tablet (75 mg total) by mouth once daily. for 1 day, Disp: 1 tablet, Rfl: 0  levETIRAcetam (KEPPRA) 500 MG Tab, Take 1 tablet (500 mg total) by mouth 2 (two) times daily. for 9 doses, Disp: 9 tablet, Rfl: 0  losartan (COZAAR) 25 MG tablet, Take 1 tablet (25 mg total) by mouth once daily. (Patient not taking: Reported on 3/13/2024), Disp: 90 tablet, Rfl: 3  [DISCONTINUED] vit C/E/Zn/coppr/lutein/zeaxan (PRESERVISION AREDS-2 ORAL), as directed Orally, Disp: , Rfl:     No current facility-administered medications on file prior to visit.            Review of Systems   Eyes:  Negative for visual disturbance.   Gastrointestinal:  Negative for nausea (She did have some nausea earlier on but none since discharge).   Neurological:  Positive for headaches (She gets a tension headache if she is not wearing the cervical collar but has complete relief when she puts the collar on.). Negative for dizziness and light-headedness.       Objective:      Physical Exam  Vitals and nursing note reviewed.   Constitutional:       General: She is not in acute distress.     Appearance: Normal appearance. She is obese. She is not ill-appearing, toxic-appearing or diaphoretic.      Comments: Good blood pressure control   Obese with a BMI of 34.1 she is down 3.7 lb from her February 19, 2024 visit with me   HENT:      Right Ear: Tympanic membrane, ear canal and external ear normal. There is no impacted cerumen.      Left Ear: Tympanic membrane, ear canal and external ear normal. There is no impacted cerumen.      Ears:      Comments: No hemotympanum     Nose: Nose normal. No congestion or rhinorrhea.      Mouth/Throat:      Mouth: Mucous membranes are dry.      Pharynx: Oropharynx is clear. No oropharyngeal exudate or posterior oropharyngeal erythema.   Eyes:      Extraocular Movements: Extraocular movements  intact.      Pupils: Pupils are equal, round, and reactive to light.   Neck:      Comments: In hard cervical collar  Cardiovascular:      Rate and Rhythm: Normal rate and regular rhythm.      Heart sounds: Normal heart sounds. No murmur heard.     No friction rub. No gallop.   Pulmonary:      Effort: Pulmonary effort is normal. No respiratory distress.      Breath sounds: Normal breath sounds. No stridor. No wheezing, rhonchi or rales.   Chest:      Chest wall: No tenderness.   Neurological:      General: No focal deficit present.      Mental Status: She is alert and oriented to person, place, and time.   Psychiatric:         Mood and Affect: Mood normal.         Behavior: Behavior normal.         Thought Content: Thought content normal.         Judgment: Judgment normal.         Assessment:       1. Traumatic SAH (subarachnoid hemorrhage)    2. SDH (subdural hematoma)    3. Closed nondisplaced fracture of sixth cervical vertebra, unspecified fracture morphology, initial encounter    4. Essential hypertension        Plan:       1. Traumatic SAH (subarachnoid hemorrhage)  Stable    2. SDH (subdural hematoma)  Stable follow-up with Neurosurgery as planned    3. Closed nondisplaced fracture of sixth cervical vertebra, unspecified fracture morphology, initial encounter  Stable and comfortable as long as she is wearing the collar    4. Essential hypertension  Adequate control, she does not need to start taking the losartan on a regular basis but I did tell her to take it if her blood pressure goes over 160 systolic or over 90 diastolic

## 2024-03-15 ENCOUNTER — NURSE TRIAGE (OUTPATIENT)
Dept: ADMINISTRATIVE | Facility: CLINIC | Age: 84
End: 2024-03-15
Payer: MEDICARE

## 2024-03-15 ENCOUNTER — HOSPITAL ENCOUNTER (OUTPATIENT)
Facility: HOSPITAL | Age: 84
Discharge: HOME | End: 2024-03-16
Attending: STUDENT IN AN ORGANIZED HEALTH CARE EDUCATION/TRAINING PROGRAM | Admitting: STUDENT IN AN ORGANIZED HEALTH CARE EDUCATION/TRAINING PROGRAM
Payer: MEDICARE

## 2024-03-15 DIAGNOSIS — R07.9 CHEST PAIN: ICD-10-CM

## 2024-03-15 DIAGNOSIS — R29.818 ACUTE FOCAL NEUROLOGICAL DEFICIT: ICD-10-CM

## 2024-03-15 DIAGNOSIS — R29.90 STROKE-LIKE SYMPTOMS: Primary | ICD-10-CM

## 2024-03-15 LAB
ALBUMIN SERPL BCP-MCNC: 4.1 G/DL (ref 3.5–5.2)
ALP SERPL-CCNC: 106 U/L (ref 55–135)
ALT SERPL W/O P-5'-P-CCNC: 15 U/L (ref 10–44)
ANION GAP SERPL CALC-SCNC: 9 MMOL/L (ref 8–16)
AST SERPL-CCNC: 19 U/L (ref 10–40)
BASOPHILS # BLD AUTO: 0.07 K/UL (ref 0–0.2)
BASOPHILS NFR BLD: 0.9 % (ref 0–1.9)
BILIRUB SERPL-MCNC: 0.6 MG/DL (ref 0.1–1)
BUN SERPL-MCNC: 15 MG/DL (ref 8–23)
CALCIUM SERPL-MCNC: 9 MG/DL (ref 8.7–10.5)
CHLORIDE SERPL-SCNC: 103 MMOL/L (ref 95–110)
CHOLEST SERPL-MCNC: 101 MG/DL (ref 120–199)
CHOLEST/HDLC SERPL: 2.2 {RATIO} (ref 2–5)
CO2 SERPL-SCNC: 25 MMOL/L (ref 23–29)
CREAT SERPL-MCNC: 0.5 MG/DL (ref 0.5–1.4)
CREAT SERPL-MCNC: 0.5 MG/DL (ref 0.5–1.4)
DIFFERENTIAL METHOD BLD: NORMAL
EOSINOPHIL # BLD AUTO: 0.1 K/UL (ref 0–0.5)
EOSINOPHIL NFR BLD: 1.6 % (ref 0–8)
ERYTHROCYTE [DISTWIDTH] IN BLOOD BY AUTOMATED COUNT: 14 % (ref 11.5–14.5)
EST. GFR  (NO RACE VARIABLE): >60 ML/MIN/1.73 M^2
GLUCOSE SERPL-MCNC: 128 MG/DL (ref 70–110)
GLUCOSE SERPL-MCNC: 133 MG/DL (ref 70–110)
HCT VFR BLD AUTO: 42.1 % (ref 37–48.5)
HDLC SERPL-MCNC: 45 MG/DL (ref 40–75)
HDLC SERPL: 44.6 % (ref 20–50)
HGB BLD-MCNC: 14.1 G/DL (ref 12–16)
IMM GRANULOCYTES # BLD AUTO: 0.03 K/UL (ref 0–0.04)
IMM GRANULOCYTES NFR BLD AUTO: 0.4 % (ref 0–0.5)
INR PPP: 1 (ref 0.8–1.2)
LDLC SERPL CALC-MCNC: 41.6 MG/DL (ref 63–159)
LYMPHOCYTES # BLD AUTO: 2 K/UL (ref 1–4.8)
LYMPHOCYTES NFR BLD: 24.8 % (ref 18–48)
MCH RBC QN AUTO: 30.9 PG (ref 27–31)
MCHC RBC AUTO-ENTMCNC: 33.5 G/DL (ref 32–36)
MCV RBC AUTO: 92 FL (ref 82–98)
MONOCYTES # BLD AUTO: 0.9 K/UL (ref 0.3–1)
MONOCYTES NFR BLD: 11.6 % (ref 4–15)
NEUTROPHILS # BLD AUTO: 4.9 K/UL (ref 1.8–7.7)
NEUTROPHILS NFR BLD: 60.7 % (ref 38–73)
NONHDLC SERPL-MCNC: 56 MG/DL
NRBC BLD-RTO: 0 /100 WBC
PLATELET # BLD AUTO: 271 K/UL (ref 150–450)
PMV BLD AUTO: 9.6 FL (ref 9.2–12.9)
POTASSIUM SERPL-SCNC: 4.1 MMOL/L (ref 3.5–5.1)
PROT SERPL-MCNC: 7.4 G/DL (ref 6–8.4)
PROTHROMBIN TIME: 10.9 SEC (ref 9–12.5)
RBC # BLD AUTO: 4.56 M/UL (ref 4–5.4)
SAMPLE: NORMAL
SODIUM SERPL-SCNC: 137 MMOL/L (ref 136–145)
TRIGL SERPL-MCNC: 72 MG/DL (ref 30–150)
TSH SERPL DL<=0.005 MIU/L-ACNC: 2.54 UIU/ML (ref 0.34–5.6)
WBC # BLD AUTO: 8.09 K/UL (ref 3.9–12.7)

## 2024-03-15 PROCEDURE — 99285 EMERGENCY DEPT VISIT HI MDM: CPT | Mod: 25

## 2024-03-15 PROCEDURE — 80061 LIPID PANEL: CPT | Performed by: NURSE PRACTITIONER

## 2024-03-15 PROCEDURE — 84443 ASSAY THYROID STIM HORMONE: CPT | Performed by: NURSE PRACTITIONER

## 2024-03-15 PROCEDURE — G0378 HOSPITAL OBSERVATION PER HR: HCPCS

## 2024-03-15 PROCEDURE — 85025 COMPLETE CBC W/AUTO DIFF WBC: CPT | Performed by: NURSE PRACTITIONER

## 2024-03-15 PROCEDURE — 93005 ELECTROCARDIOGRAM TRACING: CPT | Performed by: GENERAL PRACTICE

## 2024-03-15 PROCEDURE — 82565 ASSAY OF CREATININE: CPT | Mod: 59

## 2024-03-15 PROCEDURE — 93010 ELECTROCARDIOGRAM REPORT: CPT | Mod: ,,, | Performed by: GENERAL PRACTICE

## 2024-03-15 PROCEDURE — 25500020 PHARM REV CODE 255: Performed by: STUDENT IN AN ORGANIZED HEALTH CARE EDUCATION/TRAINING PROGRAM

## 2024-03-15 PROCEDURE — 80053 COMPREHEN METABOLIC PANEL: CPT | Performed by: NURSE PRACTITIONER

## 2024-03-15 PROCEDURE — 85610 PROTHROMBIN TIME: CPT | Performed by: NURSE PRACTITIONER

## 2024-03-15 PROCEDURE — 82962 GLUCOSE BLOOD TEST: CPT

## 2024-03-15 RX ORDER — LOSARTAN POTASSIUM 25 MG/1
25 TABLET ORAL NIGHTLY
Status: DISCONTINUED | OUTPATIENT
Start: 2024-03-15 | End: 2024-03-16 | Stop reason: HOSPADM

## 2024-03-15 RX ORDER — ALUMINUM HYDROXIDE, MAGNESIUM HYDROXIDE, AND SIMETHICONE 1200; 120; 1200 MG/30ML; MG/30ML; MG/30ML
30 SUSPENSION ORAL 4 TIMES DAILY PRN
Status: DISCONTINUED | OUTPATIENT
Start: 2024-03-15 | End: 2024-03-16 | Stop reason: HOSPADM

## 2024-03-15 RX ORDER — LEVETIRACETAM 500 MG/1
500 TABLET ORAL 2 TIMES DAILY
Status: DISCONTINUED | OUTPATIENT
Start: 2024-03-16 | End: 2024-03-16 | Stop reason: HOSPADM

## 2024-03-15 RX ORDER — TALC
6 POWDER (GRAM) TOPICAL NIGHTLY PRN
Status: DISCONTINUED | OUTPATIENT
Start: 2024-03-15 | End: 2024-03-16 | Stop reason: HOSPADM

## 2024-03-15 RX ORDER — AMLODIPINE BESYLATE 5 MG/1
10 TABLET ORAL DAILY
Status: DISCONTINUED | OUTPATIENT
Start: 2024-03-16 | End: 2024-03-16 | Stop reason: HOSPADM

## 2024-03-15 RX ORDER — ACETAMINOPHEN 325 MG/1
650 TABLET ORAL EVERY 8 HOURS PRN
Status: DISCONTINUED | OUTPATIENT
Start: 2024-03-15 | End: 2024-03-16 | Stop reason: HOSPADM

## 2024-03-15 RX ORDER — LEVETIRACETAM 10 MG/ML
1000 INJECTION INTRAVASCULAR
Status: COMPLETED | OUTPATIENT
Start: 2024-03-15 | End: 2024-03-16

## 2024-03-15 RX ORDER — IBUPROFEN 200 MG
16 TABLET ORAL
Status: DISCONTINUED | OUTPATIENT
Start: 2024-03-15 | End: 2024-03-16 | Stop reason: HOSPADM

## 2024-03-15 RX ORDER — LOSARTAN POTASSIUM 25 MG/1
25 TABLET ORAL DAILY
Status: DISCONTINUED | OUTPATIENT
Start: 2024-03-16 | End: 2024-03-15

## 2024-03-15 RX ORDER — ONDANSETRON HYDROCHLORIDE 2 MG/ML
4 INJECTION, SOLUTION INTRAVENOUS EVERY 6 HOURS PRN
Status: DISCONTINUED | OUTPATIENT
Start: 2024-03-15 | End: 2024-03-16 | Stop reason: HOSPADM

## 2024-03-15 RX ORDER — ACETAMINOPHEN 325 MG/1
650 TABLET ORAL EVERY 4 HOURS PRN
Status: DISCONTINUED | OUTPATIENT
Start: 2024-03-15 | End: 2024-03-16 | Stop reason: HOSPADM

## 2024-03-15 RX ORDER — LEVOTHYROXINE SODIUM 25 UG/1
75 TABLET ORAL
Status: DISCONTINUED | OUTPATIENT
Start: 2024-03-16 | End: 2024-03-16 | Stop reason: HOSPADM

## 2024-03-15 RX ORDER — ATORVASTATIN CALCIUM 40 MG/1
80 TABLET, FILM COATED ORAL NIGHTLY
Status: DISCONTINUED | OUTPATIENT
Start: 2024-03-15 | End: 2024-03-16 | Stop reason: HOSPADM

## 2024-03-15 RX ORDER — IBUPROFEN 200 MG
200 TABLET ORAL EVERY 6 HOURS PRN
COMMUNITY

## 2024-03-15 RX ORDER — SODIUM CHLORIDE 0.9 % (FLUSH) 0.9 %
2 SYRINGE (ML) INJECTION EVERY 12 HOURS PRN
Status: DISCONTINUED | OUTPATIENT
Start: 2024-03-15 | End: 2024-03-16 | Stop reason: HOSPADM

## 2024-03-15 RX ORDER — VIT A/VIT C/VIT E/ZINC/COPPER 4296-226
1 CAPSULE ORAL DAILY
COMMUNITY

## 2024-03-15 RX ORDER — GLUCAGON 1 MG
1 KIT INJECTION
Status: DISCONTINUED | OUTPATIENT
Start: 2024-03-15 | End: 2024-03-16 | Stop reason: HOSPADM

## 2024-03-15 RX ORDER — AMOXICILLIN 250 MG
1 CAPSULE ORAL DAILY PRN
Status: DISCONTINUED | OUTPATIENT
Start: 2024-03-15 | End: 2024-03-16 | Stop reason: HOSPADM

## 2024-03-15 RX ORDER — NALOXONE HCL 0.4 MG/ML
0.02 VIAL (ML) INJECTION
Status: DISCONTINUED | OUTPATIENT
Start: 2024-03-15 | End: 2024-03-16 | Stop reason: HOSPADM

## 2024-03-15 RX ORDER — IBUPROFEN 200 MG
24 TABLET ORAL
Status: DISCONTINUED | OUTPATIENT
Start: 2024-03-15 | End: 2024-03-16 | Stop reason: HOSPADM

## 2024-03-15 RX ADMIN — IOHEXOL 100 ML: 350 INJECTION, SOLUTION INTRAVENOUS at 05:03

## 2024-03-15 NOTE — ED PROVIDER NOTES
Encounter Date: 3/15/2024       History     Chief Complaint   Patient presents with    Extremity Weakness     Pt had brain bleed on March 1st due to fall. Pt has been intermittent right sided weakness since Monday.      HPI  84-year-old presenting with sudden onset right upper and lower extremity weakness.  Patient with history of branch retinal artery occlusion was on aspirin, Plavix until patient had fall from standing 2 weeks ago and had traumatic R sah with contrecoup left acute SDH on 03/02/24. Also had  fx ant inf C6 VB, edema within C5 sup endplate, small focal tear of ALL at C6, ligamentous strain of C4-7 interspinous ligaments  She was discharged home on 03/04/24 and treated with  c-collar for when she was out of bed and moving. ASA/Plavix were held.  When she left the hospital she had no weakness.  Over the last week she was had periods of right arm and right leg weakness.  Today she started having right arm and right leg weakness at 4:00 p.m. and came to the ER.  Reports that she has been feeling fine otherwise and has no other symptoms.  Reports she has been wearing her C-collar when out of bed or moving.  No numbness or tingling or vision changes or speech changes  Review of patient's allergies indicates:   Allergen Reactions    Ace inhibitors Other (See Comments)     cough    Morphine Itching    Keflex [cephalexin] Itching and Rash     Past Medical History:   Diagnosis Date    Blood transfusion 1960    GERD (gastroesophageal reflux disease)     Hepatitis C     treated six months by Dr. Gaspar with interferon    History of hepatitis C     Hyperlipidemia     Hypertension     Hypothyroidism     Obesity     Primary osteoarthritis of left knee 12/9/2015    Sleep apnea     Stroke     TIA 2012    TIA (transient ischemic attack)     Vision loss of right eye 2018    Dr Eldridge Right eye blood clot      Past Surgical History:   Procedure Laterality Date    ABDOMINAL SURGERY  2015    SBO from lap band wraping  around small bowel, lap untwisted    ADENOIDECTOMY      BREAST BIOPSY  2014    needle biopsy     CATARACT EXTRACTION, BILATERAL Bilateral      SECTION      CHOLECYSTECTOMY  2001    COLONOSCOPY  2011    Dr. Alvares, 5 year recheck    EYE SURGERY  2014    bilateral cataract Dr Carrillo     EYE SURGERY  10/2020    bottom lid    HEMORRHOID SURGERY      HYSTERECTOMY  1989    INCONTINENCE SURGERY  2008    sling    LAPAROSCOPIC GASTRIC BANDING  2004    LAPAROSCOPIC NISSEN FUNDOPLICATION      Dr. Rutherford    TONSILLECTOMY       Family History   Problem Relation Age of Onset    Diabetes Mother     Arthritis Mother         RA    Heart disease Mother         MI at 79     Diabetes Sister     Diverticulitis Sister     No Known Problems Daughter     Emphysema Maternal Aunt     Heart disease Maternal Aunt     Cancer Maternal Uncle     Early death Maternal Grandfather 47    Heart disease Maternal Grandfather     Arthritis Paternal Grandmother     Heart disease Paternal Grandfather     Cancer Paternal Grandfather         lung heavy smoker     No Known Problems Father     No Known Problems Son     No Known Problems Paternal Uncle     No Known Problems Maternal Grandmother     No Known Problems Brother     Arthritis Daughter      Social History     Tobacco Use    Smoking status: Former     Current packs/day: 0.50     Average packs/day: 0.5 packs/day for 5.0 years (2.5 ttl pk-yrs)     Types: Cigarettes    Smokeless tobacco: Never   Substance Use Topics    Alcohol use: No    Drug use: No     Review of Systems   Constitutional:  Negative for chills and fever.   HENT:  Negative for congestion and sore throat.    Eyes:  Negative for redness and visual disturbance.   Respiratory:  Negative for cough and shortness of breath.    Cardiovascular:  Negative for chest pain, palpitations and leg swelling.   Gastrointestinal:  Negative for abdominal pain, blood in stool, constipation, diarrhea, nausea and vomiting.   Genitourinary:  Negative  for dysuria, frequency and hematuria.   Musculoskeletal:  Negative for back pain, joint swelling, neck pain and neck stiffness.   Skin:  Negative for rash and wound.   Neurological:  Positive for weakness. Negative for numbness.   Psychiatric/Behavioral:  Negative for confusion.        Physical Exam     Initial Vitals [03/15/24 1656]   BP Pulse Resp Temp SpO2   (!) 176/83 83 17 98.8 °F (37.1 °C) 97 %      MAP       --         Physical Exam    Nursing note and vitals reviewed.  Constitutional: She appears well-developed. She is not diaphoretic.   HENT:   Head: Normocephalic.   Eyes: Conjunctivae and EOM are normal. Pupils are equal, round, and reactive to light. Right eye exhibits no discharge. Left eye exhibits no discharge. No scleral icterus.   Peripheral vision intact   Neck: Neck supple. No tracheal deviation present.   Cardiovascular:  Normal rate and regular rhythm.           Pulmonary/Chest: Breath sounds normal. No stridor. No respiratory distress. She has no wheezes. She has no rhonchi. She has no rales.   Abdominal: Abdomen is soft. She exhibits no distension. There is no abdominal tenderness. There is no rebound and no guarding.   Musculoskeletal:         General: No edema.      Cervical back: Neck supple.     Neurological: She is alert and oriented to person, place, and time. No cranial nerve deficit or sensory deficit. GCS score is 15. GCS eye subscore is 4. GCS verbal subscore is 5. GCS motor subscore is 6.   RUE 3-4/5 strength, RLE: 4/5 strength. No R facial droop    Skin: Skin is warm and dry.         ED Course   Procedures  Labs Reviewed   COMPREHENSIVE METABOLIC PANEL - Abnormal; Notable for the following components:       Result Value    Glucose 128 (*)     All other components within normal limits   LIPID PANEL - Abnormal; Notable for the following components:    Cholesterol 101 (*)     LDL Cholesterol 41.6 (*)     All other components within normal limits   POCT GLUCOSE - Abnormal; Notable for  the following components:    POC Glucose 133 (*)     All other components within normal limits   CBC W/ AUTO DIFFERENTIAL   PROTIME-INR   TSH   ISTAT CREATININE   POCT GLUCOSE MONITORING CONTINUOUS          Imaging Results              MRI Brain Without Contrast (Final result)  Result time 03/15/24 22:23:56      Final result by Deshawn Haque MD (03/15/24 22:23:56)                   Narrative:    INDICATION: ischemis stroke vs worsening bleed    EXAMINATION: MRI -  MR BRAIN WITHOUT IV CONTRAST    TECHNIQUE:  Multiplanar and multisequence MR images of the brain were obtained.    IV Contrast Dosage and Agent: None.    COMPARISON: CT of the brain performed earlier today and an MRI of 4/25/2022  ____________________________________________    FINDINGS:    BRAIN PARENCHYMA:  Again noted is a left-sided subdural hemorrhage. This appears similar in size to the current CT. There is no evidence of parenchymal hematoma.  No evidence of acute infarct. There is no diffusion restriction. Grey white differentiation is normal. There is mild periventricular increased T2 signal consistent with chronic small vessel ischemic disease. This is unchanged compared to prior MRI. There is no midline shift.  Midline structures appear normal  Posterior fossa structures are unremarkable.    CSF SPACES: Appropriate for age. No hydrocephalus.    VASCULAR SYSTEM: Normal flow voids in the major intracranial circulation.    CALVARIUM, SKULL BASE, PARANASAL SINUSES AND MASTOID AIR CELLS: There is minimal fluid within the mastoid air cells bilaterally.    ORBITS: Both globes, extraocular muscles, optic nerves and retrobulbar fat appear unremarkable.    IMPRESSION:  Left-sided subdural hemorrhage appears similar to the current CT. There is no evidence of parenchymal hematoma or acute infarct.    Electronically signed by:  Deshawn Haque MD  03/15/2024 10:23 PM CDT Workstation: [id number]                                     CTA Head and Neck (xpd) (Edited  Result - FINAL)  Result time 03/15/24 19:47:17      Edited Result - FINAL by Albaro Boston MD (03/15/24 19:47:17)                   Narrative:         ADDENDUM #1       Addendum: Fracture of a right-sided anterior osteophyte is noted at the C6/7 level extending into the anterior and inferior aspect of the C6 vertebrae as noted on previous exam. No significant interval change is seen.    Electronically signed by:  Albaro Boston MD  03/15/2024 07:47 PM CDT Workstation: [id number]       ORIGINAL REPORT       CMS MANDATED QUALITY DATA - CT RADIATION - 436    One or more of the following dose-optimizing techniques was utilized for this exam: automated exposure control, adjustment of the mA and/or kV according to patient size, and/or use of iterative reconstruction technique.    CMS MANDATED QUALITY [id number]    NASCET CRITERIA WAS UTILIZED FOR ESTIMATION OF STENOSIS SEVERITY WITH THE NARROWEST SEGMENT BEING COMPARED TO THE DISTAL LUMINAL DIAMETER.        HISTORY:Neurologic deficit. Acute stroke suspected.    TECHNIQUE: Serial axial images were obtained from aortic arch to the vertex with 100 cc of Omnipaque 350 intravenous contrast utilizing a CT angiography protocol. Coronal and sagittal MIP CT angiography reconstructions were performed. Patient received approximately 2430 mGy-cm of radiation exposure from this exam.    COMPARISON: Comparison to previous CTA 15 days ago on March 1, 2024.    FINDINGS:Atherosclerotic changes of the aortic arch and great vessel origins is noted. Cervical carotid arteries appear patent bilaterally with approximately 20% stenosis of the proximal internal carotid artery bilaterally as previously described. No evidence of carotid dissection is seen. Codominant patent cervical vertebral arteries are visualized.    Internal carotid arteries appear patent bilaterally with atherosclerotic changes of the cavernous portion the ICA bilaterally, greatest on the right. Mild stenosis of the  cavernous portion of the right ICA is present. Basilar artery appears widely patent. Bilateral anterior, middle, and posterior cerebral arteries are patent. Atherosclerotic changes with calcified plaque of the left middle cerebral artery is again noted with mild associated stenosis without significant interval change. Absence of the A1 segment of the right anterior cerebral artery is present unchanged.    IMPRESSION:  1. Intracranial and extracranial atherosclerotic changes without significant interval change.  2. Approximately 20% stenosis of the proximal cervical ICA bilaterally.  3. Atherosclerotic changes of the left middle cerebral artery with stable interval appearance.  4. No evidence of large vessel intracranial arterial occlusion is identified. Stable interval appearance.          Electronically signed by:  Albaro Boston MD  03/15/2024 07:16 PM CDT Workstation: [id number]                      Final result by Albaro Boston MD (03/15/24 19:16:54)                   Narrative:    CMS MANDATED QUALITY DATA - CT RADIATION - 436    One or more of the following dose-optimizing techniques was utilized for this exam: automated exposure control, adjustment of the mA and/or kV according to patient size, and/or use of iterative reconstruction technique.    CMS MANDATED QUALITY [id number]    NASCET CRITERIA WAS UTILIZED FOR ESTIMATION OF STENOSIS SEVERITY WITH THE NARROWEST SEGMENT BEING COMPARED TO THE DISTAL LUMINAL DIAMETER.        HISTORY:Neurologic deficit. Acute stroke suspected.    TECHNIQUE: Serial axial images were obtained from aortic arch to the vertex with 100 cc of Omnipaque 350 intravenous contrast utilizing a CT angiography protocol. Coronal and sagittal MIP CT angiography reconstructions were performed. Patient received approximately 2430 mGy-cm of radiation exposure from this exam.    COMPARISON: Comparison to previous CTA 15 days ago on March 1, 2024.    FINDINGS:Atherosclerotic changes of  the aortic arch and great vessel origins is noted. Cervical carotid arteries appear patent bilaterally with approximately 20% stenosis of the proximal internal carotid artery bilaterally as previously described. No evidence of carotid dissection is seen. Codominant patent cervical vertebral arteries are visualized.    Internal carotid arteries appear patent bilaterally with atherosclerotic changes of the cavernous portion the ICA bilaterally, greatest on the right. Mild stenosis of the cavernous portion of the right ICA is present. Basilar artery appears widely patent. Bilateral anterior, middle, and posterior cerebral arteries are patent. Atherosclerotic changes with calcified plaque of the left middle cerebral artery is again noted with mild associated stenosis without significant interval change. Absence of the A1 segment of the right anterior cerebral artery is present unchanged.    IMPRESSION:  1. Intracranial and extracranial atherosclerotic changes without significant interval change.  2. Approximately 20% stenosis of the proximal cervical ICA bilaterally.  3. Atherosclerotic changes of the left middle cerebral artery with stable interval appearance.  4. No evidence of large vessel intracranial arterial occlusion is identified. Stable interval appearance.          Electronically signed by:  Albaro Boston MD  03/15/2024 07:16 PM CDT Workstation: [id number]                                     CT Head Without Contrast (Final result)  Result time 03/15/24 18:28:46      Final result by Albaro Boston MD (03/15/24 18:28:46)                   Narrative:    CMS MANDATED QUALITY DATA - CT RADIATION - 436    One or more of the following dose-optimizing techniques was utilized for this exam: automated exposure control, adjustment of the mA and/or kV according to patient size, and/or use of iterative reconstruction technique.      HISTORY:  Stroke. Follow-up.    TECHNIQUE:  CT images were obtained from the skull  base to the vertex without the administration of intravenous contrast. Coronal and sagittal reconstructions were performed. The patient received approximate 970 mGy-cm of radiation exposure from this exam.    COMPARISON: Comparison to previous study dated March 3, 2024.    FINDINGS:  Arterial calcification is present at skull base. Previously described subarachnoid hemorrhage appears resolved. Left parietal mixed density subdural hemorrhage is again visualized measuring 1.1 cm in greatest thickness without significant change in size when compared the previous exam. No new intracranial hemorrhage is seen. Ventricles appear nondilated. Diffuse atrophy is present. Chronic-appearing periventricular white matter ischemic changes are again visualized. Paranasal sinuses and mastoid air cells appear aerated.    IMPRESSION:  1. Left parietal mixed density subdural hemorrhage measuring 1.1 cm in greatest thickness without significant interval change in size when compared to the previous exam.  2. Interval resolution of previously described subarachnoid hemorrhage.  3. Atrophy and chronic ischemic change.    Electronically signed by:  Albaro Boston MD  03/15/2024 06:28 PM CDT Workstation: [id number]                                     Medications   levETIRAcetam in NaCl (iso-os) IVPB 1,000 mg (has no administration in time range)   amLODIPine tablet 10 mg (has no administration in time range)   atorvastatin tablet 80 mg (has no administration in time range)   levETIRAcetam tablet 500 mg (has no administration in time range)   levothyroxine tablet 75 mcg (has no administration in time range)   losartan tablet 25 mg (has no administration in time range)   sodium chloride 0.9% flush 2 mL (has no administration in time range)   melatonin tablet 6 mg (has no administration in time range)   ondansetron injection 4 mg (has no administration in time range)   senna-docusate 8.6-50 mg per tablet 1 tablet (has no administration in time  range)   acetaminophen tablet 650 mg (has no administration in time range)   aluminum-magnesium hydroxide-simethicone 200-200-20 mg/5 mL suspension 30 mL (has no administration in time range)   acetaminophen tablet 650 mg (has no administration in time range)   naloxone 0.4 mg/mL injection 0.02 mg (has no administration in time range)   glucose chewable tablet 16 g (has no administration in time range)   glucose chewable tablet 24 g (has no administration in time range)   dextrose 50% injection 12.5 g (has no administration in time range)   dextrose 50% injection 25 g (has no administration in time range)   glucagon (human recombinant) injection 1 mg (has no administration in time range)   iohexoL (OMNIPAQUE 350) injection 100 mL (100 mLs Intravenous Given 3/15/24 5314)     Medical Decision Making  Amount and/or Complexity of Data Reviewed  Radiology: ordered. Decision-making details documented in ED Course.    Risk  Prescription drug management.  Decision regarding hospitalization.    84-year-old presenting with sudden onset right upper and lower extremity weakness.  Patient with history of hypertension, hyperlipidemia, elevated BMI branch retinal artery occlusion and TIA was on aspirin, Plavix until patient had fall from standing 2 weeks ago and had traumatic R sah with contrecoup left acute SDH on 03/02/24. Also had  fx ant inf C6 VB, edema within C5 sup endplate, small focal tear of ALL at C6, ligamentous strain of C4-7 interspinous ligaments  She was discharged home on 03/04/24 and treated with  c-collar for when she was out of bed and moving. ASA/Plavix were held.  When she left the hospital she had no weakness.  Over the last week she was had periods of right arm and right leg weakness.  Today she started having right arm and right leg weakness at 4:00 p.m. and came to the ER.  Reports that she has been feeling fine otherwise and has no other symptoms.  Reports she has been wearing her C-collar when out of  bed or moving.  No numbness or tingling or vision changes or speech changes    Patient's stroke activated due to sudden-onset right extremity weakness since 4:00 p.m..  Tele stroke was not consulted as patient was not a tPA or TNK candidate due to her head bleed 2 weeks ago.  On my initial exam in triage patient did have right arm and right leg weakness as documented in the physical exam.  She continued to have this throughout the ER stay with slight improvement and slight worsening intermittently.  Right arm worse than right leg.  No right facial droop.  Otherwise neuro exam is documented in physical exam.  CT head and CTA head and neck did not show worsening of her head bleed or new occlusion that could cause her symptoms.  I discussed her results with neurologist on-call Dr. Leary who agreed with MRI brain and admission.  Reported that until mri results we were dealing with sx from either ischemic stroke not seen on CTA or sx from bleed. if ischemic stroke needed permissive htn but if sx from bleed needed sbp < 160. For now agreed to keep bp closer to 160. Pt staying in 160's then self-regulating to below 160 so no anti htn started. Reexamination of patient after she returned from MRI patient told me that she had in fact taken her collar off for a few hours today today while she was out of the house.  At this time as I have not found a reason for her weakness I considered worsening or edema from her C-spine fractures causing the weakness.  Therefore consulted neurosurgeon on-call Dr. Singh who reported that based on his review of her fractures with no new injury this is unlikely and reported that MRI C-spine was not needed.  He did report that he believed her weakness is likely from irritation of the brain from her persistent subdural and therefore recommended restarting Keppra with a load of 1 g and then 500 mg b.i.d.. Q4h neurochecks per Dr. Singh.  He also recommended x-ray of the C-spine AP and  lateral with the brace to further evaluate her fractures.  Patient and her family were updated at bedside and admitted after discussion with the patient to Dr. Don.   Oxana Menjivar MD  Emergency Medicine Staff Physician  11:02 PM              ED Course as of 03/15/24 2304   Fri Mar 15, 2024   2006 CT Head Without Contrast [MA]      ED Course User Index  [MA] Oxana Menjivar MD                           Clinical Impression:  Final diagnoses:  [R29.818] Acute focal neurological deficit          ED Disposition Condition    Observation                 Oxana Menjivar MD  03/15/24 2304

## 2024-03-15 NOTE — ED NOTES
Pt presents to the ED d/t experiencing right sided weakness. Patient is able to actively move right side extremities without any complications. 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 She states that the episode was very brief. On 3/15/24 around 3pm-4pm, patient experienced weakness and her  gave her 81mg of baby aspirin. Patient is AAOx4 with a GCS of 15. Denies slurred speech, facial droop or blurred vision (reports intermittent headache since fall). PEERLA, patient can move all 4 extremities (active range of motion).  and daughter at the bedside.

## 2024-03-15 NOTE — ED NOTES
MD Otto notified that it appears that the patient may have experienced a right arm drift during assessment. Patient did not have any facial droop, slurred speech, changes in orientation/mental status or vision. NAD noted.  and daughter at the bedside.

## 2024-03-15 NOTE — TELEPHONE ENCOUNTER
Spouse calling on behalf of patient who is present. Reports patient has been having episodes of intermittent weakness to right arm/leg. This began three days ago. Reports the episodes resolve quickly. Advised, per protocol and verbalizes understanding. He will bring patient to the ED.    Reason for Disposition   Neurologic deficit that was brief (now gone), ANY of the following: * Weakness of the face, arm, or leg on one side of the body * Numbness of the face, arm, or leg on one side of the body * Loss of speech or garbled speech    Additional Information   Negative: Difficult to awaken or acting confused (e.g., disoriented, slurred speech)   Negative: New neurologic deficit that is present NOW, sudden onset of ANY of the following: * Weakness of the face, arm, or leg on one side of the body* Numbness of the face, arm, or leg on one side of the body* Loss of speech or garbled speech   Negative: Sounds like a life-threatening emergency to the triager   Negative: SEVERE weakness (i.e., unable to walk or barely able to walk, requires support) and new-onset or worsening   Negative: Headache (with neurologic deficit)   Negative: Unable to urinate (or only a few drops) and bladder feels very full   Negative: Loss of bladder or bowel control (urine or bowel incontinence; wetting self, leaking stool) of new-onset   Negative: Back pain with numbness (loss of sensation) in groin or rectal area    Protocols used: Neurologic Deficit-A-OH

## 2024-03-16 VITALS
BODY MASS INDEX: 33.66 KG/M2 | WEIGHT: 190 LBS | SYSTOLIC BLOOD PRESSURE: 125 MMHG | HEART RATE: 69 BPM | DIASTOLIC BLOOD PRESSURE: 63 MMHG | TEMPERATURE: 98 F | OXYGEN SATURATION: 97 % | RESPIRATION RATE: 20 BRPM | HEIGHT: 63 IN

## 2024-03-16 LAB
ALBUMIN SERPL BCP-MCNC: 3.7 G/DL (ref 3.5–5.2)
ALP SERPL-CCNC: 86 U/L (ref 55–135)
ALT SERPL W/O P-5'-P-CCNC: 12 U/L (ref 10–44)
ANION GAP SERPL CALC-SCNC: 10 MMOL/L (ref 8–16)
AST SERPL-CCNC: 18 U/L (ref 10–40)
BASOPHILS # BLD AUTO: 0.07 K/UL (ref 0–0.2)
BASOPHILS NFR BLD: 1.1 % (ref 0–1.9)
BILIRUB SERPL-MCNC: 0.6 MG/DL (ref 0.1–1)
BUN SERPL-MCNC: 9 MG/DL (ref 8–23)
CALCIUM SERPL-MCNC: 9.1 MG/DL (ref 8.7–10.5)
CHLORIDE SERPL-SCNC: 105 MMOL/L (ref 95–110)
CO2 SERPL-SCNC: 25 MMOL/L (ref 23–29)
CREAT SERPL-MCNC: 0.5 MG/DL (ref 0.5–1.4)
DIFFERENTIAL METHOD BLD: NORMAL
EOSINOPHIL # BLD AUTO: 0.1 K/UL (ref 0–0.5)
EOSINOPHIL NFR BLD: 1.9 % (ref 0–8)
ERYTHROCYTE [DISTWIDTH] IN BLOOD BY AUTOMATED COUNT: 14 % (ref 11.5–14.5)
EST. GFR  (NO RACE VARIABLE): >60 ML/MIN/1.73 M^2
GLUCOSE SERPL-MCNC: 102 MG/DL (ref 70–110)
HCT VFR BLD AUTO: 40.2 % (ref 37–48.5)
HGB BLD-MCNC: 13.3 G/DL (ref 12–16)
IMM GRANULOCYTES # BLD AUTO: 0.02 K/UL (ref 0–0.04)
IMM GRANULOCYTES NFR BLD AUTO: 0.3 % (ref 0–0.5)
LYMPHOCYTES # BLD AUTO: 1.8 K/UL (ref 1–4.8)
LYMPHOCYTES NFR BLD: 27.4 % (ref 18–48)
MAGNESIUM SERPL-MCNC: 2 MG/DL (ref 1.6–2.6)
MCH RBC QN AUTO: 30.4 PG (ref 27–31)
MCHC RBC AUTO-ENTMCNC: 33.1 G/DL (ref 32–36)
MCV RBC AUTO: 92 FL (ref 82–98)
MONOCYTES # BLD AUTO: 0.9 K/UL (ref 0.3–1)
MONOCYTES NFR BLD: 14.1 % (ref 4–15)
NEUTROPHILS # BLD AUTO: 3.6 K/UL (ref 1.8–7.7)
NEUTROPHILS NFR BLD: 55.2 % (ref 38–73)
NRBC BLD-RTO: 0 /100 WBC
PLATELET # BLD AUTO: 241 K/UL (ref 150–450)
PMV BLD AUTO: 9.3 FL (ref 9.2–12.9)
POTASSIUM SERPL-SCNC: 3.9 MMOL/L (ref 3.5–5.1)
PROT SERPL-MCNC: 6.4 G/DL (ref 6–8.4)
RBC # BLD AUTO: 4.37 M/UL (ref 4–5.4)
SODIUM SERPL-SCNC: 140 MMOL/L (ref 136–145)
WBC # BLD AUTO: 6.47 K/UL (ref 3.9–12.7)

## 2024-03-16 PROCEDURE — 97116 GAIT TRAINING THERAPY: CPT

## 2024-03-16 PROCEDURE — 80053 COMPREHEN METABOLIC PANEL: CPT | Performed by: STUDENT IN AN ORGANIZED HEALTH CARE EDUCATION/TRAINING PROGRAM

## 2024-03-16 PROCEDURE — 63600175 PHARM REV CODE 636 W HCPCS: Performed by: STUDENT IN AN ORGANIZED HEALTH CARE EDUCATION/TRAINING PROGRAM

## 2024-03-16 PROCEDURE — 99233 SBSQ HOSP IP/OBS HIGH 50: CPT | Mod: ,,, | Performed by: INTERNAL MEDICINE

## 2024-03-16 PROCEDURE — 36415 COLL VENOUS BLD VENIPUNCTURE: CPT | Performed by: STUDENT IN AN ORGANIZED HEALTH CARE EDUCATION/TRAINING PROGRAM

## 2024-03-16 PROCEDURE — 96365 THER/PROPH/DIAG IV INF INIT: CPT

## 2024-03-16 PROCEDURE — 83735 ASSAY OF MAGNESIUM: CPT | Performed by: STUDENT IN AN ORGANIZED HEALTH CARE EDUCATION/TRAINING PROGRAM

## 2024-03-16 PROCEDURE — 25000003 PHARM REV CODE 250: Performed by: STUDENT IN AN ORGANIZED HEALTH CARE EDUCATION/TRAINING PROGRAM

## 2024-03-16 PROCEDURE — G0378 HOSPITAL OBSERVATION PER HR: HCPCS

## 2024-03-16 PROCEDURE — 95819 EEG AWAKE AND ASLEEP: CPT

## 2024-03-16 PROCEDURE — 97161 PT EVAL LOW COMPLEX 20 MIN: CPT

## 2024-03-16 PROCEDURE — 85025 COMPLETE CBC W/AUTO DIFF WBC: CPT | Performed by: STUDENT IN AN ORGANIZED HEALTH CARE EDUCATION/TRAINING PROGRAM

## 2024-03-16 PROCEDURE — 99204 OFFICE O/P NEW MOD 45 MIN: CPT | Mod: ,,, | Performed by: NEUROLOGICAL SURGERY

## 2024-03-16 PROCEDURE — 96375 TX/PRO/DX INJ NEW DRUG ADDON: CPT

## 2024-03-16 RX ORDER — LEVETIRACETAM 500 MG/1
500 TABLET ORAL 2 TIMES DAILY
Qty: 9 TABLET | Refills: 0 | Status: SHIPPED | OUTPATIENT
Start: 2024-03-16 | End: 2024-03-16

## 2024-03-16 RX ORDER — LEVETIRACETAM 500 MG/1
500 TABLET ORAL 2 TIMES DAILY
Qty: 60 TABLET | Refills: 1 | Status: SHIPPED | OUTPATIENT
Start: 2024-03-16 | End: 2024-05-01 | Stop reason: SDUPTHER

## 2024-03-16 RX ADMIN — LOSARTAN POTASSIUM 25 MG: 25 TABLET, FILM COATED ORAL at 01:03

## 2024-03-16 RX ADMIN — AMLODIPINE BESYLATE 10 MG: 5 TABLET ORAL at 09:03

## 2024-03-16 RX ADMIN — LEVETIRACETAM 500 MG: 500 TABLET, FILM COATED ORAL at 09:03

## 2024-03-16 RX ADMIN — LEVETIRACETAM INJECTION 1000 MG: 10 INJECTION INTRAVENOUS at 01:03

## 2024-03-16 RX ADMIN — ONDANSETRON 4 MG: 2 INJECTION INTRAMUSCULAR; INTRAVENOUS at 12:03

## 2024-03-16 RX ADMIN — ATORVASTATIN CALCIUM 80 MG: 40 TABLET, FILM COATED ORAL at 01:03

## 2024-03-16 RX ADMIN — LEVOTHYROXINE SODIUM 75 MCG: 0.03 TABLET ORAL at 05:03

## 2024-03-16 NOTE — DISCHARGE SUMMARY
[facility]  Discharge Summary      Patient Name: Brandi Flynn  MRN: [id number]  CARLEY: [id number]  Patient Class: OP- Observation  Admission Date: 3/15/2024  Hospital Length of Stay: 0 days  Discharge Date and Time:  03/16/2024 5:12 PM  Attending Physician: Darrin Hoyos Jr., MD   Discharging Provider: Darrin Hoyos Jr, MD  Primary Care Provider: Marcial Kan MD    Primary Care Team: Networked reference to record PCT     HPI:   84-year-old with a PMH of HTN, Branch Retinal Artery Occlusion who presented with sudden onset right upper and lower extremity weakness.  Patient had fall from standing 2 weeks ago (On ASA & Plavix) and had traumatic R sah with contrecoup left acute SDH on 03/02/24. Also had  fx ant inf C6 VB, edema within C5 sup endplate, small focal tear of ALL at C6, ligamentous strain of C4-7 interspinous ligaments  She was discharged home on 03/04/24 and treated with  c-collar for when she was out of bed and moving. ASA/Plavix were held.  When she left the hospital she had no weakness.  Over the last week she was had periods of right arm and right leg weakness.  Today she started having right arm and right leg weakness at 4:00 p.m. and came to the ER.  Reports that she has been feeling fine otherwise and has no other symptoms such as fever, chest pain, vision, hearing or cognitive changes    In the ED Patient had a CT Scan & MRI which demonstrated no changes to her SDH    ED spoke to Neurology who recommend continuing to hold Plavix & ASA  Neurosurgery recommend one time IV Keppra, then 500mg Keppra BID    * No surgery found *      Hospital Course:   Came in for R sided weakness. Likely 2/2 SDH. No surgical intervention. Cleared by neuro and PT     Goals of Care Treatment Preferences:  Code Status: Full Code      Consults:   Consults (From admission, onward)          Status Ordering Provider     Inpatient consult to Neurosurgery  Once        Provider:  Francisco  Adam SAM MD    Completed TONY BENAVIDEZ     Inpatient consult to neurology  Once        Provider:  Deja Leary MD    Completed TONY BENAVIDEZ            No new Assessment & Plan notes have been filed under this hospital service since the last note was generated.  Service: Hospital Medicine    Final Active Diagnoses:    Diagnosis Date Noted POA    PRINCIPAL PROBLEM:  Stroke-like symptoms [R29.90] 03/15/2024 Yes    Essential hypertension [I10] 06/12/2015 Yes    Hypothyroidism [E03.9]  Yes     Chronic    Hyperlipidemia [E78.5]  Yes     Chronic      Problems Resolved During this Admission:       Discharged Condition: fair    Disposition: Home or Self Care    Follow Up:    Patient Instructions:   No discharge procedures on file.    Significant Diagnostic Studies: Labs: CMP   Recent Labs   Lab 03/15/24  1742 03/16/24  0558    140   K 4.1 3.9    105   CO2 25 25   * 102   BUN 15 9   CREATININE 0.5 0.5   CALCIUM 9.0 9.1   PROT 7.4 6.4   ALBUMIN 4.1 3.7   BILITOT 0.6 0.6   ALKPHOS 106 86   AST 19 18   ALT 15 12   ANIONGAP 9 10    and CBC   Recent Labs   Lab 03/15/24  1742 03/16/24  0558   WBC 8.09 6.47   HGB 14.1 13.3   HCT 42.1 40.2    241       Pending Diagnostic Studies:       Procedure Component Value Units Date/Time    Urinalysis Microscopic [[id number]] Collected: 03/16/24 1322    Order Status: Sent Lab Status: In process Updated: 03/16/24 1343    Specimen: Urine, Clean Catch            Medications:  Reconciled Home Medications:      Medication List        CHANGE how you take these medications      levothyroxine 75 MCG tablet  Commonly known as: SYNTHROID  TAKE 1 TABLET BY MOUTH ONCE A DAY  What changed: when to take this            CONTINUE taking these medications      amLODIPine 10 MG tablet  Commonly known as: NORVASC  TAKE 1 TABLET BY MOUTH ONCE DAILY.     atorvastatin 40 MG tablet  Commonly known as: LIPITOR  TAKE 2 TABLETS BY MOUTH IN THE EVENING     ibuprofen 200 MG  tablet  Commonly known as: ADVIL,MOTRIN  Take 200 mg by mouth every 6 (six) hours as needed for Pain (headaches).     ICAPS AREDS 4,296 mcg-226 mg-90 mg Cap  Generic drug: vitamins A,C,E-zinc-copper  Take 1 capsule by mouth once daily.     levETIRAcetam 500 MG Tab  Commonly known as: KEPPRA  Take 1 tablet (500 mg total) by mouth 2 (two) times daily. for 9 doses     losartan 25 MG tablet  Commonly known as: COZAAR  Take 1 tablet (25 mg total) by mouth once daily.     MAALOX ORAL  Take 1 Dose by mouth as needed.     meloxicam 7.5 MG tablet  Commonly known as: MOBIC  Take 1 tablet (7.5 mg total) by mouth once daily.     solifenacin 10 MG tablet  Commonly known as: VESICARE  Take 10 mg by mouth nightly.            STOP taking these medications      aspirin 81 MG EC tablet  Commonly known as: ECOTRIN     clopidogreL 75 mg tablet  Commonly known as: PLAVIX              Indwelling Lines/Drains at time of discharge:   Lines/Drains/Airways       None                   Time spent on the discharge of patient: 40 minutes         Darrin Hoyos Jr, MD  Department of Hospital Medicine  [facility]

## 2024-03-16 NOTE — PLAN OF CARE
[facility]  Initial Discharge Assessment      CM engaged with patient, spouse and daughter at bedside to complete initial d/c assessment. Patient has a living will but no POA. Patient reports [facility], [place], [place] . Patient confirmed all demographic information on face sheet as correct and reports last encounter with PCP as Wednesday, 3/13/2024. Patient resides with her spouse Elías who I her primary care taker. Patient does not attend dialysis, use coumadin, or receive and outpatient services at this time.  Patient reports the use of only the DME listed below. Patient would like to discharge home to family and reports no additional needs at this time. CM will continue to monitor.     Primary Care Provider: Marcial Kan MD    Admission Diagnosis: Acute focal neurological deficit [R29.818]    Admission Date: 3/15/2024  Expected Discharge Date:     Transition of Care Barriers: None    Payor: HUMANA MANAGED MEDICARE / Plan: HU[employer]NA MEDICARE HMO / Product Type: Capitation /     Extended Emergency Contact Information  Primary Emergency Contact: GeeElías moyer  Address: [id number] [place] [address]           [place] [address] [facility]  Home Phone: [phone]  Mobile Phone: [phone]  Relation: Spouse  Preferred language: English   needed? No  Secondary Emergency Contact: Fidelina Farris  Mobile Phone: [phone]  Relation: Daughter  Preferred language: English   needed? No    Discharge Plan A: Home with family  Discharge Plan B: Home with family      [facility] - [place] - [address]  [address] [place] [address]  Phone: [phone] Fax: [phone]      Initial Assessment (most recent)       Adult Discharge Assessment - 03/16/24 1000          Discharge Assessment    Assessment Type Discharge Planning Assessment     Confirmed/corrected address, phone number and insurance Yes     Confirmed Demographics Correct on  Facesheet     Source of Information patient;family     When was your last doctors appointment? --   02/2024    Does patient/caregiver understand observation status Yes     Communicated LITTLE with patient/caregiver Yes     Reason For Admission Stroke like symptoms     People in Home spouse;child(mario), adult     Facility Arrived From: Home     Do you expect to return to your current living situation? Yes     Do you have help at home or someone to help you manage your care at home? Yes     Who are your caregiver(s) and their phone number(s)? Elías Flynn, Spouse [phone]     Prior to hospitilization cognitive status: Alert/Oriented     Current cognitive status: Alert/Oriented     Walking or Climbing Stairs Difficulty yes     Walking or Climbing Stairs ambulation difficulty, requires equipment     Mobility Management rolling walker     Dressing/Bathing Difficulty yes     Dressing/Bathing bathing difficulty, requires equipment     Dressing/Bathing Management Shower chair/bench     Home Accessibility wheelchair accessible     Home Layout Able to live on 1st floor     Equipment Currently Used at Home walker, rolling;shower chair     Readmission within 30 days? No     Patient currently being followed by outpatient case management? No     Do you currently have service(s) that help you manage your care at home? No     Do you take prescription medications? Yes     Do you have prescription coverage? Yes     Coverage [employer] Medicare     Do you have any problems affording any of your prescribed medications? No     Is the patient taking medications as prescribed? yes     Who is going to help you get home at discharge? Elías Flynn, Spouse     How do you get to doctors appointments? family or friend will provide     Are you on dialysis? No     Do you take coumadin? No     Discharge Plan A Home with family     Discharge Plan B Home with family     DME Needed Upon Discharge  none     Discharge Plan discussed with: Spouse/sig  other;Adult children     Name(s) and Number(s) Elías Flynn, [phone]  Argelia , Daughter [phone]     Transition of Care Barriers None

## 2024-03-16 NOTE — SUBJECTIVE & OBJECTIVE
Interval History:  No acute events overnight.  Patient states she is well at this time.  Still has some right-sided weakness, but patient states it is much improved.  MRI not show any acute process.    Review of Systems  Objective:     Vital Signs (Most Recent):  Temp: 97.9 °F (36.6 °C) (03/16/24 0829)  Pulse: 65 (03/16/24 0829)  Resp: 20 (03/16/24 0829)  BP: (!) 156/65 (03/16/24 0829)  SpO2: 95 % (03/16/24 0829) Vital Signs (24h Range):  Temp:  [97.9 °F (36.6 °C)-98.8 °F (37.1 °C)] 97.9 °F (36.6 °C)  Pulse:  [65-83] 65  Resp:  [17-24] 20  SpO2:  [95 %-99 %] 95 %  BP: (153-186)/(65-83) 156/65     Weight: 86.2 kg (190 lb)  Body mass index is 33.66 kg/m².    Intake/Output Summary (Last 24 hours) at 3/16/2024 0855  Last data filed at 3/16/2024 0829  Gross per 24 hour   Intake 0 ml   Output 800 ml   Net -800 ml         Physical Exam  Vitals reviewed.   Constitutional:       General: She is not in acute distress.     Appearance: Normal appearance.   HENT:      Head: Normocephalic and atraumatic.      Right Ear: External ear normal.      Left Ear: External ear normal.      Nose: Nose normal.      Mouth/Throat:      Mouth: Mucous membranes are moist.      Pharynx: Oropharynx is clear.   Eyes:      Extraocular Movements: Extraocular movements intact.      Conjunctiva/sclera: Conjunctivae normal.   Cardiovascular:      Rate and Rhythm: Normal rate and regular rhythm.      Pulses: Normal pulses.      Heart sounds: Normal heart sounds. No murmur heard.     No gallop.   Pulmonary:      Effort: Pulmonary effort is normal. No respiratory distress.      Breath sounds: Normal breath sounds. No wheezing or rales.   Abdominal:      General: Abdomen is flat. There is no distension.      Palpations: Abdomen is soft.      Tenderness: There is no abdominal tenderness. There is no guarding.   Musculoskeletal:         General: No swelling. Normal range of motion.      Cervical back: Normal range of motion and neck supple.   Skin:      General: Skin is warm and dry.   Neurological:      General: No focal deficit present.      Mental Status: She is alert and oriented to person, place, and time.      Sensory: No sensory deficit.      Motor: Weakness (4+ out of 5 strength in the right upper and lower extremity) present.             Significant Labs: All pertinent labs within the past 24 hours have been reviewed.    Significant Imaging: I have reviewed all pertinent imaging results/findings within the past 24 hours.

## 2024-03-16 NOTE — ASSESSMENT & PLAN NOTE
Patient's Right Arm and Right Leg weakness possibly due to intracranial blood irritation  Neurology & Neurosurgery following

## 2024-03-16 NOTE — PLAN OF CARE
03/16/24 1003   DIALLO Message   Medicare Outpatient and Observation Notification regarding financial responsibility Given to patient/caregiver;Explained to patient/caregiver;Signed/date by patient/caregiver   Date DIALLO was signed 03/16/24   Time DIALLO was signed 1003

## 2024-03-16 NOTE — PT/OT/SLP EVAL
Physical Therapy Evaluation    Patient Name:  Brandi Flynn   MRN:  [id number]    Recommendations:     Discharge Recommendations: Low Intensity Therapy   Discharge Equipment Recommendations: none   Barriers to discharge: None    Assessment:     Brandi Flynn is a 84 y.o. female admitted with a medical diagnosis of Stroke-like symptoms.  She presents with the following impairments/functional limitations: weakness, impaired endurance, impaired functional mobility, gait instability, decreased lower extremity function, impaired cardiopulmonary response to activity, orthopedic precautions .    Pt seen supine in bed and agreeable to PT. Pt with hx of fall 2 weeks ago resulting to C6 fx with rigid C collar and SAH. Pt alert and interactive- not as active since fall. Pt ambulated at hallways 100ft x2 with RW and OOB chair.  Pt to benefit from continued therapies. Spouse and daughter at bedside.    Rehab Prognosis: Fair; patient would benefit from acute skilled PT services to address these deficits and reach maximum level of function.    Recent Surgery: * No surgery found *      Plan:     During this hospitalization, patient to be seen 6 x/week to address the identified rehab impairments via gait training, therapeutic activities, therapeutic exercises and progress toward the following goals:    Plan of Care Expires:  03/30/24    Subjective   Stated that she dons her rigid collar while seated  Chief Complaint: weakness/tired  Patient/Family Comments/goals: get well  Pain/Comfort:  Pain Rating 1: 0/10    Patients cultural, spiritual, Amish conflicts given the current situation:      Living Environment:  Home with spouse  Prior to admission, patients level of function was ambulatory household distance.  Equipment used at home: walker, rolling.  DME owned (not currently used): none.  Upon discharge, patient will have assistance from family.    Objective:     Communicated with nurse Hui prior to session.  Patient found HOB  elevated with telemetry  upon PT entry to room.    General Precautions: Standard, fall  Orthopedic Precautions:spinal precautions   Braces: [place] J collar  Respiratory Status: Room air    Exams:  Postural Exam:  Patient presented with the following abnormalities:    -       Rounded shoulders  -       Forward head  -       BMI 33.66  RLE ROM: WFL  RLE Strength: WFL  LLE ROM: WFL  LLE Strength: WFL    Functional Mobility:  Bed Mobility:     Rolling Right: minimum assistance  Scooting: minimum assistance  Supine to Sit: minimum assistance  Transfers:     Sit to Stand:  minimum assistance with rolling walker  Bed to Chair: minimum assistance with  rolling walker  using  Stand Pivot  Gait: 200ft with RW min assist with slow dianelys. Rigid c collar on      AM-PAC 6 CLICK MOBILITY  Total Score:16       Treatment & Education:  Patient was educated on the importance of OOB activity and functional mobility to negate negative effects of prolonged bed rest during hospitalization, safe transfers and ambulation, and D/C planning   OOB chair post PT with all needs within reach    Patient left up in chair with all lines intact, call button in reach, chair alarm on, and spouse and daughter present.    GOALS:   Multidisciplinary Problems       Physical Therapy Goals          Problem: Physical Therapy    Goal Priority Disciplines Outcome Goal Variances Interventions   Physical Therapy Goal     PT, PT/OT Ongoing, Progressing     Description: Goals to be met by: 2024     Patient will increase functional independence with mobility by performin. Supine to sit with Contact Guard Assistance  2. Sit to stand transfer with Contact Guard Assistance  3. Bed to chair transfer with Contact Guard Assistance using Rolling Walker  4. Gait  x 350 feet with Contact Guard Assistance using Rolling Walker.   5. Lower extremity exercise program x20 reps    Rigid C collar on for ambulation and OOB                           History:     Past  Medical History:   Diagnosis Date    Blood transfusion     GERD (gastroesophageal reflux disease)     Hepatitis C     treated six months by Dr. Gaspar with interferon    History of hepatitis C     Hyperlipidemia     Hypertension     Hypothyroidism     Obesity     Primary osteoarthritis of left knee 2015    Sleep apnea     Stroke     TIA     TIA (transient ischemic attack)     Vision loss of right eye     Dr Eldridge Right eye blood clot        Past Surgical History:   Procedure Laterality Date    ABDOMINAL SURGERY      SBO from lap band wraping around small bowel, lap untwisted    ADENOIDECTOMY      BREAST BIOPSY  2014    needle biopsy     CATARACT EXTRACTION, BILATERAL Bilateral      SECTION      CHOLECYSTECTOMY  2001    COLONOSCOPY  2011    Dr. Alvares, 5 year recheck    EYE SURGERY      bilateral cataract Dr Carrillo     EYE SURGERY  10/2020    bottom lid    HEMORRHOID SURGERY      HYSTERECTOMY  1989    INCONTINENCE SURGERY  2008    sling    LAPAROSCOPIC GASTRIC BANDING      LAPAROSCOPIC NISSEN FUNDOPLICATION      Dr. Rutherford    TONSILLECTOMY         Time Tracking:     PT Received On: 24  PT Start Time: 1029     PT Stop Time: 1046  PT Total Time (min): 17 min     Billable Minutes: Evaluation 8 and Gait Training 9      2024

## 2024-03-16 NOTE — PHARMACY MED REC
"Admission Medication History     The home medication history was taken by Danisha Jacome.    You may go to "Admission" then "Reconcile Home Medications" tabs to review and/or act upon these items.     The home medication list has been updated by the Pharmacy department.   Please read ALL comments highlighted in yellow.   Please address this information as you see fit.    Feel free to contact us if you have any questions or require assistance.        Medications listed below were obtained from: Patient/family and Analytic software- [employer]  No current facility-administered medications on file prior to encounter.     Current Outpatient Medications on File Prior to Encounter   Medication Sig Dispense Refill    amLODIPine (NORVASC) 10 MG tablet TAKE 1 TABLET BY MOUTH ONCE DAILY. (Patient taking differently: Take 10 mg by mouth once daily.) 90 tablet 0    atorvastatin (LIPITOR) 40 MG tablet TAKE 2 TABLETS BY MOUTH IN THE EVENING (Patient taking differently: Take 80 mg by mouth every evening.) 180 tablet 0    ibuprofen (ADVIL,MOTRIN) 200 MG tablet Take 200 mg by mouth every 6 (six) hours as needed for Pain (headaches).      levothyroxine (SYNTHROID) 75 MCG tablet TAKE 1 TABLET BY MOUTH ONCE A DAY (Patient taking differently: Take 75 mcg by mouth before breakfast.) 90 tablet 2    losartan (COZAAR) 25 MG tablet Take 1 tablet (25 mg total) by mouth once daily. 90 tablet 3    mag hydrox/aluminum hyd/simeth (MAALOX ORAL) Take 1 Dose by mouth as needed.      meloxicam (MOBIC) 7.5 MG tablet Take 1 tablet (7.5 mg total) by mouth once daily. 90 tablet 1    solifenacin (VESICARE) 10 MG tablet Take 10 mg by mouth nightly.      vitamins A,C,E-zinc-copper (ICAPS AREDS) 4,296 mcg-226 mg-90 mg Cap Take 1 capsule by mouth once daily.      aspirin (ECOTRIN) 81 MG EC tablet Take 1 tablet (81 mg total) by mouth once daily. for 1 day (Patient not taking: Reported on 3/15/2024) 1 tablet 0    clopidogreL (PLAVIX) 75 mg tablet Take 1 tablet " (75 mg total) by mouth once daily. for 1 day (Patient not taking: Reported on 3/15/2024) 1 tablet 0    levETIRAcetam (KEPPRA) 500 MG Tab Take 1 tablet (500 mg total) by mouth 2 (two) times daily. for 9 doses (Patient not taking: Reported on 3/15/2024) 9 tablet 0       Potential issues to be addressed PRIOR TO DISCHARGE  Patient reported not taking the following medications: (Keppra, Aspirin & Plavix). These medications remain on the home medication list. Please address accordingly.     Danisha Jacome  EXT [phone]                  .

## 2024-03-16 NOTE — H&P
[facility] - Emergency Dept  Hospital Medicine  History & Physical    Patient Name: Brandi Flynn  MRN: [id number]  Patient Class: OP- Observation  Admission Date: 3/15/2024  Attending Physician: Godwin Don MD  Primary Care Provider: Marcial Kan MD         Patient information was obtained from patient, relative(s), and ER records.     Subjective:     Principal Problem:Stroke-like symptoms    Chief Complaint:   Chief Complaint   Patient presents with    Extremity Weakness     Pt had brain bleed on March 1st due to fall. Pt has been intermittent right sided weakness since Monday.         HPI: 84-year-old with a PMH of HTN, Branch Retinal Artery Occlusion who presented with sudden onset right upper and lower extremity weakness.  Patient had fall from standing 2 weeks ago (On ASA & Plavix) and had traumatic R sah with contrecoup left acute SDH on 03/02/24. Also had  fx ant inf C6 VB, edema within C5 sup endplate, small focal tear of ALL at C6, ligamentous strain of C4-7 interspinous ligaments  She was discharged home on 03/04/24 and treated with  c-collar for when she was out of bed and moving. ASA/Plavix were held.  When she left the hospital she had no weakness.  Over the last week she was had periods of right arm and right leg weakness.  Today she started having right arm and right leg weakness at 4:00 p.m. and came to the ER.  Reports that she has been feeling fine otherwise and has no other symptoms such as fever, chest pain, vision, hearing or cognitive changes    In the ED Patient had a CT Scan & MRI which demonstrated no changes to her SDH    ED spoke to Neurology who recommend continuing to hold Plavix & ASA  Neurosurgery recommend one time IV Keppra, then 500mg Keppra BID    Past Medical History:   Diagnosis Date    Blood transfusion 1960    GERD (gastroesophageal reflux disease)     Hepatitis C     treated six months by Dr. Gaspar with interferon    History of hepatitis C      Hyperlipidemia     Hypertension     Hypothyroidism     Obesity     Primary osteoarthritis of left knee 2015    Sleep apnea     Stroke     TIA     TIA (transient ischemic attack)     Vision loss of right eye     Dr Eldridge Right eye blood clot        Past Surgical History:   Procedure Laterality Date    ABDOMINAL SURGERY      SBO from lap band wraping around small bowel, lap untwisted    ADENOIDECTOMY      BREAST BIOPSY  2014    needle biopsy     CATARACT EXTRACTION, BILATERAL Bilateral      SECTION      CHOLECYSTECTOMY      COLONOSCOPY  2011    Dr. Alvares, 5 year recheck    EYE SURGERY      bilateral cataract Dr Carrillo     EYE SURGERY  10/2020    bottom lid    HEMORRHOID SURGERY      HYSTERECTOMY      INCONTINENCE SURGERY      sling    LAPAROSCOPIC GASTRIC BANDING      LAPAROSCOPIC NISSEN FUNDOPLICATION      Dr. Rutherford    TONSILLECTOMY         Review of patient's allergies indicates:   Allergen Reactions    Ace inhibitors Other (See Comments)     cough    Morphine Itching    Keflex [cephalexin] Itching and Rash       No current facility-administered medications on file prior to encounter.     Current Outpatient Medications on File Prior to Encounter   Medication Sig    amLODIPine (NORVASC) 10 MG tablet TAKE 1 TABLET BY MOUTH ONCE DAILY. (Patient taking differently: Take 10 mg by mouth once daily.)    atorvastatin (LIPITOR) 40 MG tablet TAKE 2 TABLETS BY MOUTH IN THE EVENING (Patient taking differently: Take 80 mg by mouth every evening.)    ibuprofen (ADVIL,MOTRIN) 200 MG tablet Take 200 mg by mouth every 6 (six) hours as needed for Pain (headaches).    levothyroxine (SYNTHROID) 75 MCG tablet TAKE 1 TABLET BY MOUTH ONCE A DAY (Patient taking differently: Take 75 mcg by mouth before breakfast.)    losartan (COZAAR) 25 MG tablet Take 1 tablet (25 mg total) by mouth once daily.    mag hydrox/aluminum hyd/simeth (MAALOX ORAL) Take 1 Dose by mouth as needed.    meloxicam  (MOBIC) 7.5 MG tablet Take 1 tablet (7.5 mg total) by mouth once daily.    solifenacin (VESICARE) 10 MG tablet Take 10 mg by mouth nightly.    vitamins A,C,E-zinc-copper (ICAPS AREDS) 4,296 mcg-226 mg-90 mg Cap Take 1 capsule by mouth once daily.    aspirin (ECOTRIN) 81 MG EC tablet Take 1 tablet (81 mg total) by mouth once daily. for 1 day (Patient not taking: Reported on 3/15/2024)    clopidogreL (PLAVIX) 75 mg tablet Take 1 tablet (75 mg total) by mouth once daily. for 1 day (Patient not taking: Reported on 3/15/2024)    levETIRAcetam (KEPPRA) 500 MG Tab Take 1 tablet (500 mg total) by mouth 2 (two) times daily. for 9 doses (Patient not taking: Reported on 3/15/2024)     Family History       Problem Relation (Age of Onset)    Arthritis Mother, Paternal Grandmother, Daughter    Cancer Maternal Uncle, Paternal Grandfather    Diabetes Mother, Sister    Diverticulitis Sister    Early death Maternal Grandfather (47)    Emphysema Maternal Aunt    Heart disease Mother, Maternal Aunt, Maternal Grandfather, Paternal Grandfather    No Known Problems Daughter, Father, Son, Paternal Uncle, Maternal Grandmother, Brother          Tobacco Use    Smoking status: Former     Current packs/day: 0.50     Average packs/day: 0.5 packs/day for 5.0 years (2.5 ttl pk-yrs)     Types: Cigarettes    Smokeless tobacco: Never   Substance and Sexual Activity    Alcohol use: No    Drug use: No    Sexual activity: Yes     Partners: Male     Review of Systems   Constitutional:  Negative for activity change, appetite change, chills, fatigue and fever.   HENT:  Negative for congestion, sinus pressure, sinus pain and sneezing.    Eyes:  Negative for photophobia and visual disturbance.   Respiratory:  Negative for apnea, choking, shortness of breath and wheezing.    Cardiovascular:  Negative for chest pain, palpitations and leg swelling.   Gastrointestinal:  Negative for abdominal distention, diarrhea, nausea and vomiting.   Endocrine: Negative for  polydipsia, polyphagia and polyuria.   Genitourinary:  Negative for difficulty urinating, hematuria and urgency.   Musculoskeletal:  Negative for back pain, gait problem and myalgias.   Skin:  Negative for pallor, rash and wound.   Neurological:  Positive for dizziness and weakness. Negative for seizures, speech difficulty, numbness and headaches.   Psychiatric/Behavioral:  Negative for confusion. The patient is not nervous/anxious.      Objective:     Vital Signs (Most Recent):  Temp: 98.8 °F (37.1 °C) (03/15/24 1656)  Pulse: 74 (03/15/24 2136)  Resp: (!) 24 (03/15/24 2136)  BP: (!) 159/68 (03/15/24 2136)  SpO2: 97 % (03/15/24 2136) Vital Signs (24h Range):  Temp:  [98.8 °F (37.1 °C)] 98.8 °F (37.1 °C)  Pulse:  [74-83] 74  Resp:  [17-24] 24  SpO2:  [97 %-99 %] 97 %  BP: (159-186)/(68-83) 159/68     Weight: 86.2 kg (190 lb)  Body mass index is 33.66 kg/m².     Physical Exam  Constitutional:       General: She is not in acute distress.     Appearance: She is not ill-appearing, toxic-appearing or diaphoretic.   HENT:      Head: Normocephalic and atraumatic.      Right Ear: External ear normal.      Left Ear: External ear normal.   Eyes:      Conjunctiva/sclera: Conjunctivae normal.   Cardiovascular:      Rate and Rhythm: Normal rate and regular rhythm.      Heart sounds: No murmur heard.  Pulmonary:      Effort: No respiratory distress.      Breath sounds: No stridor. No wheezing, rhonchi or rales.   Chest:      Chest wall: No tenderness.   Abdominal:      General: There is no distension.      Palpations: There is no mass.      Tenderness: There is no abdominal tenderness. There is no guarding or rebound.      Hernia: No hernia is present.   Musculoskeletal:         General: No swelling, tenderness, deformity or signs of injury.      Right lower leg: No edema.      Left lower leg: No edema.   Skin:     Coloration: Skin is not jaundiced or pale.      Findings: No bruising, erythema, lesion or rash.   Neurological:       Mental Status: She is alert.      Comments: Patient had 5/5 Strength Bilaterally in UE  Patient had 4/5 Strength in RLE 5/5 in RLE                Significant Labs: All pertinent labs within the past 24 hours have been reviewed.  CBC:   Recent Labs   Lab 03/15/24  1742   WBC 8.09   HGB 14.1   HCT 42.1        CMP:   Recent Labs   Lab 03/15/24  1742      K 4.1      CO2 25   *   BUN 15   CREATININE 0.5   CALCIUM 9.0   PROT 7.4   ALBUMIN 4.1   BILITOT 0.6   ALKPHOS 106   AST 19   ALT 15   ANIONGAP 9       Significant Imaging: I have reviewed all pertinent imaging results/findings within the past 24 hours.  Assessment/Plan:     * Stroke-like symptoms  Patient's Right Arm and Right Leg weakness possibly due to intracranial blood irritation  Neurology & Neurosurgery following       Essential hypertension  Chronic, controlled. Latest blood pressure and vitals reviewed-     Temp:  [98.8 °F (37.1 °C)]   Pulse:  [74-83]   Resp:  [17-24]   BP: (159-186)/(68-83)   SpO2:  [97 %-99 %] .   Home meds for hypertension were reviewed and noted below.   Hypertension Medications               amLODIPine (NORVASC) 10 MG tablet TAKE 1 TABLET BY MOUTH ONCE DAILY.    losartan (COZAAR) 25 MG tablet Take 1 tablet (25 mg total) by mouth once daily.            While in the hospital, will manage blood pressure as follows; Continue home antihypertensive regimen    Will utilize p.r.n. blood pressure medication only if patient's blood pressure greater than 160/100 and she develops symptoms such as worsening chest pain or shortness of breath    While Stroke Like Symptoms persist  Continue home Losartan & Norvasc    Hyperlipidemia  Continue Statin      Hypothyroidism  Continue Home Synthroid        VTE Risk Mitigation (From admission, onward)           Ordered     IP VTE HIGH RISK PATIENT  Once         03/15/24 2229     Place sequential compression device  Until discontinued         03/15/24 2229     Reason for No  Pharmacological VTE Prophylaxis  Once        Question:  Reasons:  Answer:  Risk of Bleeding    03/15/24 2229                       On 03/15/2024, patient should be placed in hospital observation services under my care.        Neurosurgery consulted for this 84F with recent fall and left acute SDH on 03/02/24. She was admitted at [facility] and neurosurgery was consulted. She was also found to have stable C6 mild fx of the anterior/inferior C6 body without cord compression.  She was discharged home on 03/04/24 and treated with a c-collar. ASA/Plavix were held.    Repeat head Ct and MRI done today reviewed showing subacute left FTP SDH. No significant mass effect.     Repeat Cervical XR in brace    No surgical intervention necessary for SDH  Recommending Keppra 1000 mg IV than 500 mg 12h.    Adam Don MD  Department of Hospital Medicine  [facility] - Emergency Dept

## 2024-03-16 NOTE — HPI
84-year-old with a PMH of HTN, Branch Retinal Artery Occlusion who presented with sudden onset right upper and lower extremity weakness.  Patient had fall from standing 2 weeks ago (On ASA & Plavix) and had traumatic R sah with contrecoup left acute SDH on 03/02/24. Also had  fx ant inf C6 VB, edema within C5 sup endplate, small focal tear of ALL at C6, ligamentous strain of C4-7 interspinous ligaments  She was discharged home on 03/04/24 and treated with  c-collar for when she was out of bed and moving. ASA/Plavix were held.  When she left the hospital she had no weakness.  Over the last week she was had periods of right arm and right leg weakness.  Today she started having right arm and right leg weakness at 4:00 p.m. and came to the ER.  Reports that she has been feeling fine otherwise and has no other symptoms such as fever, chest pain, vision, hearing or cognitive changes    In the ED Patient had a CT Scan & MRI which demonstrated no changes to her SDH    ED spoke to Neurology who recommend continuing to hold Plavix & ASA  Neurosurgery recommend one time IV Keppra, then 500mg Keppra BID

## 2024-03-16 NOTE — CONSULTS
NEUROSURGICAL INPATIENT CONSULTATION NOTE    DATE OF SERVICE:  03/16/2024    ATTENDING PHYSICIAN:  Adam Singh MD    CONSULT REQUESTED BY:  Hospital medicine    REASON FOR CONSULT:  Subdural hematoma    SUBJECTIVE:    HISTORY OF PRESENT ILLNESS:  This is a very pleasant 84 y.o. female who had a fall about 2 weeks ago.  The patient was admitted at [facility].  She was found to have a acute traumatic left subdural hematoma.  She was also found to have a C6 stable nondisplaced fracture.  She was discharged home.  She was given Keppra during her hospital stay.  The patient reports that she took her Keppra for a few days after being discharged and the Keppra was not renewed.  She has a follow up appointment with my colleague Dr. Greenwood on April 11, 2024.    She has been readmitted due to several episodes of right-sided upper extremity and lower extremity weakness lasting less than 5 minutes every time.  Her  brought her to the hospital because he was concerned that she was having a stroke.  This morning she is doing well.  She denies having any new weakness, numbness or headaches.  She has been mobilizing with physical therapy in the corridor.  She denies having any neck pain.  No new onset of motor weakness, numbness or sphincter dysfunction symptoms.              PAST MEDICAL HISTORY:  Active Ambulatory Problems     Diagnosis Date Noted    Hypothyroidism     Hyperlipidemia     GERD (gastroesophageal reflux disease)     TIA (transient ischemic attack)  2/2012     OAB (overactive bladder)-followed by urology Dr Greg Little  10/03/2014    Osteoporosis 10/03/2014    Essential hypertension 06/12/2015    History of hepatitis C 06/12/2015    Primary osteoarthritis of left knee 12/09/2015    Hx SBO 06/17/2016    History of laparoscopic adjustable gastric banding 06/17/2016    Pes planovalgus 08/19/2016    Osteoarthritis of left hindfoot 08/19/2016    Stenosis of left carotid artery 08/17/2017     Anticoagulant long-term use 2019    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 2019    Carotid artery disease 2019    SDH (subdural hematoma) 2024    Traumatic SAH (subarachnoid hemorrhage) 2024    Cytotoxic brain edema 2024    Brain compression 2024    Debility 2024    Bilateral lower extremity edema 2024    Closed nondisplaced fracture of sixth cervical vertebra 2024     Resolved Ambulatory Problems     Diagnosis Date Noted    Hypertension     Hepatitis C     Chronic hepatitis C 10/03/2014    Class 2 severe obesity with serious comorbidity and body mass index (BMI) of 39.0 to 39.9 in adult 2019    Acute encephalopathy 2022    TGA (transient global amnesia) 2022     Past Medical History:   Diagnosis Date    Blood transfusion 1960    Obesity     Sleep apnea     Stroke     Vision loss of right eye        PAST SURGICAL HISTORY:  Past Surgical History:   Procedure Laterality Date    ABDOMINAL SURGERY      SBO from lap band wraping around small bowel, lap untwisted    ADENOIDECTOMY      BREAST BIOPSY      needle biopsy     CATARACT EXTRACTION, BILATERAL Bilateral      SECTION      CHOLECYSTECTOMY  2001    COLONOSCOPY  2011    Dr. Alvares, 5 year recheck    EYE SURGERY      bilateral cataract Dr Carrillo     EYE SURGERY  10/2020    bottom lid    HEMORRHOID SURGERY      HYSTERECTOMY  1989    INCONTINENCE SURGERY  2008    sling    LAPAROSCOPIC GASTRIC BANDING      LAPAROSCOPIC NISSEN FUNDOPLICATION      Dr. Rutherford    TONSILLECTOMY         SOCIAL HISTORY:   Social History     Socioeconomic History    Marital status:    Tobacco Use    Smoking status: Former     Current packs/day: 0.50     Average packs/day: 0.5 packs/day for 5.0 years (2.5 ttl pk-yrs)     Types: Cigarettes    Smokeless tobacco: Never   Substance and Sexual Activity    Alcohol use: No    Drug use: No    Sexual activity: Yes     Partners: Male      Social Determinants of Health     Financial Resource Strain: Low Risk  (3/4/2024)    Overall Financial Resource Strain (CARDIA)     Difficulty of Paying Living Expenses: Not hard at all   Food Insecurity: No Food Insecurity (3/4/2024)    Hunger Vital Sign     Worried About Running Out of Food in the Last Year: Never true     Ran Out of Food in the Last Year: Never true   Transportation Needs: No Transportation Needs (3/4/2024)    PRAPARE - Transportation     Lack of Transportation (Medical): No     Lack of Transportation (Non-Medical): No   Physical Activity: Inactive (3/4/2024)    Exercise Vital Sign     Days of Exercise per Week: 0 days     Minutes of Exercise per Session: 0 min   Stress: No Stress Concern Present (3/4/2024)    [employer] [place] [employer] - Occupational Stress Questionnaire     Feeling of Stress : Not at all   Social Connections: Moderately Integrated (3/4/2024)    Social Connection and Isolation Panel [NHANES]     Frequency of Communication with Friends and Family: More than three times a week     Frequency of Social Gatherings with Friends and Family: More than three times a week     Attends [employer] Services: More than 4 times per year     Active Member of Clubs or Organizations: No     Attends Club or Organization Meetings: Never     Marital Status:    Housing Stability: Low Risk  (3/4/2024)    Housing Stability Vital Sign     Unable to Pay for Housing in the Last Year: No     Number of Places Lived in the Last Year: 1     Unstable Housing in the Last Year: No       FAMILY HISTORY:  Family History   Problem Relation Age of Onset    Diabetes Mother     Arthritis Mother         RA    Heart disease Mother         MI at 79     Diabetes Sister     Diverticulitis Sister     No Known Problems Daughter     Emphysema Maternal Aunt     Heart disease Maternal Aunt     Cancer Maternal Uncle     Early death Maternal Grandfather 47    Heart disease Maternal Grandfather     Arthritis  Paternal Grandmother     Heart disease Paternal Grandfather     Cancer Paternal Grandfather         lung heavy smoker     No Known Problems Father     No Known Problems Son     No Known Problems Paternal Uncle     No Known Problems Maternal Grandmother     No Known Problems Brother     Arthritis Daughter        CURRENTS MEDICATIONS:    No current facility-administered medications on file prior to encounter.     Current Outpatient Medications on File Prior to Encounter   Medication Sig Dispense Refill    amLODIPine (NORVASC) 10 MG tablet TAKE 1 TABLET BY MOUTH ONCE DAILY. (Patient taking differently: Take 10 mg by mouth once daily.) 90 tablet 0    atorvastatin (LIPITOR) 40 MG tablet TAKE 2 TABLETS BY MOUTH IN THE EVENING (Patient taking differently: Take 80 mg by mouth every evening.) 180 tablet 0    ibuprofen (ADVIL,MOTRIN) 200 MG tablet Take 200 mg by mouth every 6 (six) hours as needed for Pain (headaches).      levothyroxine (SYNTHROID) 75 MCG tablet TAKE 1 TABLET BY MOUTH ONCE A DAY (Patient taking differently: Take 75 mcg by mouth before breakfast.) 90 tablet 2    losartan (COZAAR) 25 MG tablet Take 1 tablet (25 mg total) by mouth once daily. 90 tablet 3    mag hydrox/aluminum hyd/simeth (MAALOX ORAL) Take 1 Dose by mouth as needed.      meloxicam (MOBIC) 7.5 MG tablet Take 1 tablet (7.5 mg total) by mouth once daily. 90 tablet 1    solifenacin (VESICARE) 10 MG tablet Take 10 mg by mouth nightly.      vitamins A,C,E-zinc-copper (ICAPS AREDS) 4,296 mcg-226 mg-90 mg Cap Take 1 capsule by mouth once daily.      aspirin (ECOTRIN) 81 MG EC tablet Take 1 tablet (81 mg total) by mouth once daily. for 1 day (Patient not taking: Reported on 3/15/2024) 1 tablet 0    clopidogreL (PLAVIX) 75 mg tablet Take 1 tablet (75 mg total) by mouth once daily. for 1 day (Patient not taking: Reported on 3/15/2024) 1 tablet 0    levETIRAcetam (KEPPRA) 500 MG Tab Take 1 tablet (500 mg total) by mouth 2 (two) times daily. for 9 doses  (Patient not taking: Reported on 3/15/2024) 9 tablet 0        amLODIPine  10 mg Oral Daily    atorvastatin  80 mg Oral QHS    levETIRAcetam  500 mg Oral BID    levothyroxine  75 mcg Oral Before breakfast    losartan  25 mg Oral QHS       ALLERGIES:  Review of patient's allergies indicates:   Allergen Reactions    Ace inhibitors Other (See Comments)     cough    Morphine Itching    Keflex [cephalexin] Itching and Rash       REVIEW OF SYSTEMS:  Review of Systems   Constitutional:  Negative for diaphoresis, fever and weight loss.   Respiratory:  Negative for shortness of breath.    Cardiovascular:  Negative for chest pain.   Gastrointestinal:  Negative for blood in stool.   Genitourinary:  Negative for hematuria.   Endo/Heme/Allergies:  Does not bruise/bleed easily.   All other systems reviewed and are negative.      OBJECTIVE:    PHYSICAL EXAMINATION:   Vitals:    03/16/24 1115   BP: (!) 146/70   Pulse: 64   Resp: 20   Temp: 98 °F (36.7 °C)       Physical Exam:  Vitals reviewed.    Constitutional: She appears well-developed and well-nourished.     Eyes: Pupils are equal, round, and reactive to light. Conjunctivae and EOM are normal.     Cardiovascular: Normal distal pulses and no edema.     Abdominal: Soft.     Skin: Skin displays no rash on trunk and no rash on extremities. Skin displays no lesions on trunk and no lesions on extremities.     Psych/Behavior: She is alert. She is oriented to person, place, and time. She has a normal mood and affect.     Musculoskeletal:        Neck: Range of motion is limited.     Neurological:        DTRs: Tricep reflexes are 2+ on the right side and 2+ on the left side. Bicep reflexes are 2+ on the right side and 2+ on the left side. Brachioradialis reflexes are 2+ on the right side and 2+ on the left side. Patellar reflexes are 2+ on the right side and 2+ on the left side. Achilles reflexes are 2+ on the right side and 2+ on the left side.       Back Exam     Muscle Strength   Right  Quadriceps:  5/5   Left Quadriceps:  5/5   Right Hamstrings:  5/5   Left Hamstrings:  5/5               Neurologic Exam     Mental Status   Oriented to person, place, and time.   Speech: speech is normal   Level of consciousness: alert    Cranial Nerves   Cranial nerves II through XII intact.     CN III, IV, VI   Pupils are equal, round, and reactive to light.  Extraocular motions are normal.     Motor Exam   Muscle bulk: normal  Overall muscle tone: normal    Strength   Right deltoid: 5/5  Left deltoid: 5/5  Right biceps: 5/5  Left biceps: 5/5  Right triceps: 5/5  Left triceps: 5/5  Right wrist flexion: 5/5  Left wrist flexion: 5/5  Right wrist extension: 5/5  Left wrist extension: 5/5  Right interossei: 5/5  Left interossei: 5/5  Right iliopsoas: 5/5  Left iliopsoas: 5/5  Right quadriceps: 5/5  Left quadriceps: 5/5  Right hamstrin/5  Left hamstrin/5  Right anterior tibial: 5/5  Left anterior tibial: 5/5  Right posterior tibial: 5/5  Left posterior tibial: 5/5  Right peroneal: 5/5  Left peroneal: 5/5  Right gastroc: 5/5  Left gastroc: 5/5    Sensory Exam   Light touch normal.   Pinprick normal.     Gait, Coordination, and Reflexes     Gait  Gait: normal    Coordination   Finger to nose coordination: normal  Tandem walking coordination: normal    Reflexes   Right brachioradialis: 2+  Left brachioradialis: 2+  Right biceps: 2+  Left biceps: 2+  Right triceps: 2+  Left triceps: 2+  Right patellar: 2+  Left patellar: 2+  Right achilles: 2+  Left achilles: 2+  Right plantar: normal  Left plantar: normal  Right Florez: absent  Left Florez: absent  Right ankle clonus: absent  Left ankle clonus: absent      DIAGNOSTIC DATA:    Recent Labs     03/15/24  1742 24  0558   HGB 14.1 13.3   HCT 42.1 40.2   MCV 92 92   WBC 8.09 6.47    241     --    K 4.1  --      --    *  --    BUN 15  --    CREATININE 0.5  --    CALCIUM 9.0  --    ALT 15  --    AST 19  --    ALBUMIN 4.1  --    BILITOT  0.6  --    INR 1.0  --        Microbiology Results (last 7 days)       ** No results found for the last 168 hours. **            I personally interpreted the following imaging:  Repeat head Ct and MRI done yesterday reviewed showing subacute left FTP SDH. No significant mass effect.     X-ray cervical spine done yesterday shows adequate alignment, no visible fracture.    ASSESSMENT:  This is a 84 y.o. female with recent fall and inpatient admission for left acute subdural hematoma.    New onset of right upper extremity and lower extremity motor weakness lasting less than 5 minutes.  The subdural hematoma is smaller in size.  The patient had a nondisplaced C6 fracture and the repeat cervical x-rays shows normal alignment.    This morning her physical examination shows no motor weakness.  She most likely had some transient weakness due to motor cortex irritation in the presence of a subdural hematoma.  She was not on antiseizure medicine.    PLAN:  Continue Keppra 500 mg twice daily  No surgical intervention recommended at this time  Okay to discharge home from neurosurgical standpoint when medically stable  Patient will follow up with Dr. Greenwood at [facility] in April.  Continue to wear cervical collar when out of bed.  Continue to hold antiplatelet medicine (Asa and Plavix)  All questions answered        Adam Singh MD  Cell: [phone]

## 2024-03-16 NOTE — PROGRESS NOTES
[facility] Medicine  Progress Note    Patient Name: Brandi Flynn  MRN: [id number]  Patient Class: OP- Observation   Admission Date: 3/15/2024  Length of Stay: 0 days  Attending Physician: Darrin Hoyos Jr., MD  Primary Care Provider: Marcial Kan MD        Subjective:     Principal Problem:Stroke-like symptoms        HPI:  84-year-old with a PMH of HTN, Branch Retinal Artery Occlusion who presented with sudden onset right upper and lower extremity weakness.  Patient had fall from standing 2 weeks ago (On ASA & Plavix) and had traumatic R sah with contrecoup left acute SDH on 03/02/24. Also had  fx ant inf C6 VB, edema within C5 sup endplate, small focal tear of ALL at C6, ligamentous strain of C4-7 interspinous ligaments  She was discharged home on 03/04/24 and treated with  c-collar for when she was out of bed and moving. ASA/Plavix were held.  When she left the hospital she had no weakness.  Over the last week she was had periods of right arm and right leg weakness.  Today she started having right arm and right leg weakness at 4:00 p.m. and came to the ER.  Reports that she has been feeling fine otherwise and has no other symptoms such as fever, chest pain, vision, hearing or cognitive changes    In the ED Patient had a CT Scan & MRI which demonstrated no changes to her SDH    ED spoke to Neurology who recommend continuing to hold Plavix & ASA  Neurosurgery recommend one time IV Keppra, then 500mg Keppra BID    Overview/Hospital Course:  No notes on file    Interval History:  No acute events overnight.  Patient states she is well at this time.  Still has some right-sided weakness, but patient states it is much improved.  MRI not show any acute process.    Review of Systems  Objective:     Vital Signs (Most Recent):  Temp: 97.9 °F (36.6 °C) (03/16/24 0829)  Pulse: 65 (03/16/24 0829)  Resp: 20 (03/16/24 0829)  BP: (!) 156/65 (03/16/24 0829)  SpO2: 95 % (03/16/24 0829) Vital Signs (24h  Range):  Temp:  [97.9 °F (36.6 °C)-98.8 °F (37.1 °C)] 97.9 °F (36.6 °C)  Pulse:  [65-83] 65  Resp:  [17-24] 20  SpO2:  [95 %-99 %] 95 %  BP: (153-186)/(65-83) 156/65     Weight: 86.2 kg (190 lb)  Body mass index is 33.66 kg/m².    Intake/Output Summary (Last 24 hours) at 3/16/2024 0855  Last data filed at 3/16/2024 0829  Gross per 24 hour   Intake 0 ml   Output 800 ml   Net -800 ml         Physical Exam  Vitals reviewed.   Constitutional:       General: She is not in acute distress.     Appearance: Normal appearance.   HENT:      Head: Normocephalic and atraumatic.      Right Ear: External ear normal.      Left Ear: External ear normal.      Nose: Nose normal.      Mouth/Throat:      Mouth: Mucous membranes are moist.      Pharynx: Oropharynx is clear.   Eyes:      Extraocular Movements: Extraocular movements intact.      Conjunctiva/sclera: Conjunctivae normal.   Cardiovascular:      Rate and Rhythm: Normal rate and regular rhythm.      Pulses: Normal pulses.      Heart sounds: Normal heart sounds. No murmur heard.     No gallop.   Pulmonary:      Effort: Pulmonary effort is normal. No respiratory distress.      Breath sounds: Normal breath sounds. No wheezing or rales.   Abdominal:      General: Abdomen is flat. There is no distension.      Palpations: Abdomen is soft.      Tenderness: There is no abdominal tenderness. There is no guarding.   Musculoskeletal:         General: No swelling. Normal range of motion.      Cervical back: Normal range of motion and neck supple.   Skin:     General: Skin is warm and dry.   Neurological:      General: No focal deficit present.      Mental Status: She is alert and oriented to person, place, and time.      Sensory: No sensory deficit.      Motor: Weakness (4+ out of 5 strength in the right upper and lower extremity) present.             Significant Labs: All pertinent labs within the past 24 hours have been reviewed.    Significant Imaging: I have reviewed all pertinent  imaging results/findings within the past 24 hours.    Assessment/Plan:      * Stroke-like symptoms  Patient's Right Arm and Right Leg weakness possibly due to intracranial blood irritation  Neurosurgery following, no surgical intervention.  Started on Keppra 500 mg b.i.d.  PT OT recommendations pending   MRI reviewed, did not show any acute process  Pending Neurology recommendations      Essential hypertension  Chronic, controlled. Latest blood pressure and vitals reviewed-     Temp:  [98.8 °F (37.1 °C)]   Pulse:  [74-83]   Resp:  [17-24]   BP: (159-186)/(68-83)   SpO2:  [97 %-99 %] .   Home meds for hypertension were reviewed and noted below.   Hypertension Medications               amLODIPine (NORVASC) 10 MG tablet TAKE 1 TABLET BY MOUTH ONCE DAILY.    losartan (COZAAR) 25 MG tablet Take 1 tablet (25 mg total) by mouth once daily.            While in the hospital, will manage blood pressure as follows; Continue home antihypertensive regimen    Will utilize p.r.n. blood pressure medication only if patient's blood pressure greater than 160/100 and she develops symptoms such as worsening chest pain or shortness of breath    While Stroke Like Symptoms persist  Continue home Losartan & Norvasc    Hyperlipidemia  Continue Statin      Hypothyroidism  Continue Home Synthroid        VTE Risk Mitigation (From admission, onward)           Ordered     IP VTE HIGH RISK PATIENT  Once         03/15/24 2229     Place sequential compression device  Until discontinued         03/15/24 2229     Reason for No Pharmacological VTE Prophylaxis  Once        Question:  Reasons:  Answer:  Risk of Bleeding    03/15/24 2229                    Discharge Planning   LITTLE:      Code Status: Full Code   Is the patient medically ready for discharge?:     Reason for patient still in hospital (select all that apply): Treatment, Consult recommendations, and PT / OT recommendations                     Darrin Hoyos Jr, MD  Department of Hospital  Medicine   [facility]

## 2024-03-16 NOTE — PROCEDURES
EEG REPORT    NAME: Brandi Flynn  : 1940  MRN: [id number]    DATE of EEG: 3/16/2024    CLINICAL INDICATION: This is a 84 y.o. female with history of traumatic subdural hemorrhage being evaluated for transient neurological deficits to rule out seizure.    MEDICATIONS:  Scheduled Meds:   amLODIPine  10 mg Oral Daily    atorvastatin  80 mg Oral QHS    levETIRAcetam  500 mg Oral BID    levothyroxine  75 mcg Oral Before breakfast    losartan  25 mg Oral QHS     Continuous Infusions:  PRN Meds:.acetaminophen, acetaminophen, aluminum-magnesium hydroxide-simethicone, dextrose 50% in water (D50W), dextrose 50% in water (D50W), glucagon (human recombinant), glucose, glucose, melatonin, naloxone, ondansetron, senna-docusate 8.6-50 mg, sodium chloride 0.9%    EEG DESCRIPTION:  A 16-channel EEG was performed with electrode placement in 10/20 International System. Longitudinal bipolar and referential montages were utilized in analysis. Total duration of this study was 25 minutes.    This is a technically adequate study with minor muscle and motion artifacts.    There is a posterior dominant rhythm of 7-8 Hz which is interspersed with slower theta frequencies and occasional delta frequencies during wakefulness. Stage II sleep structures are not seen.    Photic stimulation and hyperventilation are performed with no abnormal reactivity noted.    No epileptiform discharges or seizures are captured.    Impression:  This is an abnormal awake and drowsy routine EEG due to diffuse slowing of the background activity consistent with a mild to moderate encephalopathy. No epileptiform discharges or seizures are captured.  Findings of this study indicate a generalized encephalopathic process that is nonspecific in type. Toxic, metabolic, or structural abnormalities may be considered.  Further clinical correlation is advised.      Deja Travis MD  Neurology

## 2024-03-16 NOTE — NURSING
Nurses Note -- 4 Eyes      3/16/2024   2:15 AM      Skin assessed during: Admit      [x] No Altered Skin Integrity Present    []Prevention Measures Documented      [] Yes- Altered Skin Integrity Present or Discovered   [] LDA Added if Not in Epic (Describe Wound)   [] New Altered Skin Integrity was Present on Admit and Documented in LDA   [] Wound Image Taken    Wound Care Consulted? No    Attending Nurse:  Dasia Nunez RN/Staff Member:     [id number]

## 2024-03-16 NOTE — ASSESSMENT & PLAN NOTE
Patient's Right Arm and Right Leg weakness possibly due to intracranial blood irritation  Neurosurgery following, no surgical intervention.  Started on Keppra 500 mg b.i.d.  PT OT recommendations pending   MRI reviewed, did not show any acute process  Pending Neurology recommendations

## 2024-03-16 NOTE — ASSESSMENT & PLAN NOTE
Chronic, controlled. Latest blood pressure and vitals reviewed-     Temp:  [98.8 °F (37.1 °C)]   Pulse:  [74-83]   Resp:  [17-24]   BP: (159-186)/(68-83)   SpO2:  [97 %-99 %] .   Home meds for hypertension were reviewed and noted below.   Hypertension Medications               amLODIPine (NORVASC) 10 MG tablet TAKE 1 TABLET BY MOUTH ONCE DAILY.    losartan (COZAAR) 25 MG tablet Take 1 tablet (25 mg total) by mouth once daily.            While in the hospital, will manage blood pressure as follows; Continue home antihypertensive regimen    Will utilize p.r.n. blood pressure medication only if patient's blood pressure greater than 160/100 and she develops symptoms such as worsening chest pain or shortness of breath    While Stroke Like Symptoms persist  Continue home Losartan & Norvasc

## 2024-03-16 NOTE — PLAN OF CARE
Problem: Physical Therapy  Goal: Physical Therapy Goal  Description: Goals to be met by: 2024     Patient will increase functional independence with mobility by performin. Supine to sit with Contact Guard Assistance  2. Sit to stand transfer with Contact Guard Assistance  3. Bed to chair transfer with Contact Guard Assistance using Rolling Walker  4. Gait  x 350 feet with Contact Guard Assistance using Rolling Walker.   5. Lower extremity exercise program x20 reps    Rigid C collar on for ambulation and OOB      Outcome: Ongoing, Progressing   PT eval and treat. Gait with RW with rigid c collar on 200ft with RW. Low intensity

## 2024-03-16 NOTE — PROGRESS NOTES
Neurosurgery consulted for this 84F with recent fall and left acute SDH on 03/02/24. She was admitted at [facility] and neurosurgery was consulted. She was also found to have stable C6 mild fx of the anterior/inferior C6 body without cord compression.  She was discharged home on 03/04/24 and treated with a c-collar. ASA/Plavix were held.    Repeat head Ct and MRI done today reviewed showing subacute left FTP SDH. No significant mass effect.     Repeat Cervical XR in brace    No surgical intervention necessary for SDH  Recommending Keppra 1000 mg IV than 500 mg 12h.    Adam Singh MD

## 2024-03-16 NOTE — NURSING
1755- Discharge instructions reviewed with pt and family. Pt and family voices understanding of discharge instructions. All questions answered. Pt and family deny any further needs at this time. IV removed and tele monitor off. Patient discharged.

## 2024-03-16 NOTE — CONSULTS
[facility]  Neurology Consult    Patient Name: Brandi Flynn   MRN: [id number]  : 1940  TODAY'S DATE: 2024  ADMIT DATE: 3/15/2024  4:52 PM                                          CONSULTED PROVIDER: Deja Travis MD, Neurologist. On-call Phone: [phone]  CONSULT REQUESTED BY: Darrin Hoyos Jr., MD     Chief Complaint   Patient presents with    Extremity Weakness     Pt had brain bleed on  due to fall. Pt has been intermittent right sided weakness since Monday.         HPI per EMR:  84-year-old with a PMH of HTN, Branch Retinal Artery Occlusion who presented with sudden onset right upper and lower extremity weakness.  Patient had fall from standing 2 weeks ago (On ASA & Plavix) and had traumatic R sah with contrecoup left acute SDH on 24. Also had  fx ant inf C6 VB, edema within C5 sup endplate, small focal tear of ALL at C6, ligamentous strain of C4-7 interspinous ligaments  She was discharged home on 24 and treated with  c-collar for when she was out of bed and moving. ASA/Plavix were held.  When she left the hospital she had no weakness.  Over the last week she was had periods of right arm and right leg weakness.  Today she started having right arm and right leg weakness at 4:00 p.m. and came to the ER.  Reports that she has been feeling fine otherwise and has no other symptoms such as fever, chest pain, vision, hearing or cognitive changes     In the ED Patient had a CT Scan & MRI which demonstrated no changes to her SDH     ED spoke to Neurology who recommend continuing to hold Plavix & ASA  Neurosurgery recommend one time IV Keppra, then 500mg Keppra BID     Overview/Hospital Course:  No notes on file     Interval History:  No acute events overnight.  Patient states she is well at this time.  Still has some right-sided weakness, but patient states it is much improved.  MRI not show any acute process.    Neurology Consult: Patient was seen and examined  by me today. She is an 85 yo woman with history of HTN, BRAO, and recent post traumatic subdural hemorrhage, who presented to the ED with complaints of sudden onset right upper and lower extremity weakness. She had been discharged home on 3/4/24 with a C-collar for when she is out of bed, and had no weakness by the time she left the hospital. Over the last week, she has been having some periods of right sided weakness, but the day of admit at around 4 pm, it worsened so she decided to come to the ED. Neurology and neurosurgery consulted for evaluation.    Review of Systems:  A comprehensive ROS was performed and all systems were negative except as noted above in HPI.    Past Medical History:  has a past medical history of Blood transfusion (), GERD (gastroesophageal reflux disease), Hepatitis C, History of hepatitis C, Hyperlipidemia, Hypertension, Hypothyroidism, Obesity, Primary osteoarthritis of left knee (2015), Sleep apnea, Stroke, TIA (transient ischemic attack), and Vision loss of right eye ().    Past Surgical History:  has a past surgical history that includes Incontinence surgery (); Cholecystectomy (); Laparoscopic gastric banding (); Hysterectomy ();  section; Hemorrhoid surgery; Tonsillectomy; Adenoidectomy; Colonoscopy (2011); Laparoscopic Nissen fundoplication; Breast biopsy (); Cataract extraction, bilateral (Bilateral); Abdominal surgery (); Eye surgery (); and Eye surgery (10/2020).    Family Medical History: family history includes Arthritis in her daughter, mother, and paternal grandmother; Cancer in her maternal uncle and paternal grandfather; Diabetes in her mother and sister; Diverticulitis in her sister; Early death (age of onset: 47) in her maternal grandfather; Emphysema in her maternal aunt; Heart disease in her maternal aunt, maternal grandfather, mother, and paternal grandfather; No Known Problems in her brother, daughter, father,  maternal grandmother, paternal uncle, and son.    Social History:  reports that she has quit smoking. Her smoking use included cigarettes. She has a 2.5 pack-year smoking history. She has never used smokeless tobacco. She reports that she does not drink alcohol and does not use drugs.    PCP: Marcial Kan MD    Allergies:   Review of patient's allergies indicates:   Allergen Reactions    Ace inhibitors Other (See Comments)     cough    Morphine Itching    Keflex [cephalexin] Itching and Rash       Medications:   No current facility-administered medications on file prior to encounter.     Current Outpatient Medications on File Prior to Encounter   Medication Sig Dispense Refill    amLODIPine (NORVASC) 10 MG tablet TAKE 1 TABLET BY MOUTH ONCE DAILY. (Patient taking differently: Take 10 mg by mouth once daily.) 90 tablet 0    atorvastatin (LIPITOR) 40 MG tablet TAKE 2 TABLETS BY MOUTH IN THE EVENING (Patient taking differently: Take 80 mg by mouth every evening.) 180 tablet 0    ibuprofen (ADVIL,MOTRIN) 200 MG tablet Take 200 mg by mouth every 6 (six) hours as needed for Pain (headaches).      levothyroxine (SYNTHROID) 75 MCG tablet TAKE 1 TABLET BY MOUTH ONCE A DAY (Patient taking differently: Take 75 mcg by mouth before breakfast.) 90 tablet 2    losartan (COZAAR) 25 MG tablet Take 1 tablet (25 mg total) by mouth once daily. 90 tablet 3    mag hydrox/aluminum hyd/simeth (MAALOX ORAL) Take 1 Dose by mouth as needed.      meloxicam (MOBIC) 7.5 MG tablet Take 1 tablet (7.5 mg total) by mouth once daily. 90 tablet 1    solifenacin (VESICARE) 10 MG tablet Take 10 mg by mouth nightly.      vitamins A,C,E-zinc-copper (ICAPS AREDS) 4,296 mcg-226 mg-90 mg Cap Take 1 capsule by mouth once daily.      aspirin (ECOTRIN) 81 MG EC tablet Take 1 tablet (81 mg total) by mouth once daily. for 1 day (Patient not taking: Reported on 3/15/2024) 1 tablet 0    clopidogreL (PLAVIX) 75 mg tablet Take 1 tablet (75 mg total) by mouth  once daily. for 1 day (Patient not taking: Reported on 3/15/2024) 1 tablet 0    levETIRAcetam (KEPPRA) 500 MG Tab Take 1 tablet (500 mg total) by mouth 2 (two) times daily. for 9 doses (Patient not taking: Reported on 3/15/2024) 9 tablet 0     Scheduled Meds:   amLODIPine  10 mg Oral Daily    atorvastatin  80 mg Oral QHS    levETIRAcetam  500 mg Oral BID    levothyroxine  75 mcg Oral Before breakfast    losartan  25 mg Oral QHS     Continuous Infusions:  PRN Meds:.acetaminophen, acetaminophen, aluminum-magnesium hydroxide-simethicone, dextrose 50% in water (D50W), dextrose 50% in water (D50W), glucagon (human recombinant), glucose, glucose, melatonin, naloxone, ondansetron, senna-docusate 8.6-50 mg, sodium chloride 0.9%      Physical Exam  Current Vitals:  Vitals:    03/16/24 0829   BP: (!) 156/65   Pulse: 65   Resp: 20   Temp: 97.9 °F (36.6 °C)     Physical Exam:  General: AO x4  HEENT: PERRL, EOMI, left periorbital ecchymosis  CV: RRR  Lungs: no respiratory distress  Abdomen: soft, non tender    Neurological Exam    MENTAL STATUS EXAM:  Level of alertness: Alert  Level of attention: Attentive w/out deficit  Orientation/Awareness: intact to person, place, time, situation  Language: fluent. Comprehension/repetition/naming intact    CRANIAL NERVE EXAM:  II/III: fundoscopic exam deferred, PERRL; visual fields full to confrontation  III/IV/VI: EOMI with horizontal nystagmus but no skew deviation  V: no deficits appreciated to light touch  VII: no facial asymmetry noted  VIII: decreased hearing bilaterally  IX/X: palate @ ML and raises symmetrically  XI: shoulder shrug 5/5 bilaterally  XII: tongue to midline w/out asymmetry  No dysarthria noted on exam.    MOTOR EXAM:  Bulk and Tone: normal throughout  Strength is 5/5 proximally and distally in upper extremities, 4+/5 in bilateral lower extremities  No pronator drift in bilateral UE. No tremor either at rest or with intention.    REFLEXES:  2+ in bilateral upper and  lower extremities.    SENSORY EXAM  Light touch intact throughout in upper and lower extremities without deficits.    COORDINATION/CEREBELLAR EXAM:  FTN: no dysmetria or other signs of appendicular ataxia  HTS: unable to assess    GAIT:  Deferred for safety.    NIH Stroke Scale      Date: 2024  Time: 1330  Person Administering Scale: Deja Travis MD    Administer stroke scale items in the order listed. Record performance in each category after each subscale exam. Do not go back and change scores. Follow directions provided for each exam technique. Scores should reflect what the patient does, not what the clinician thinks the patient can do. The clinician should record answers while administering the exam and work quickly. Except where indicated, the patient should not be coached (i.e., repeated requests to patient to make a special effort).      1a  Level of consciousness: 0=alert; keenly responsive   1b. LOC questions:  0=Answers both tasks correctly   1c. LOC commands: 0=Answers both tasks correctly   2.  Best Gaze: 0=normal   3.  Visual: 0=No visual loss   4. Facial Palsy: 0=Normal symmetric movement   5a.  Motor left arm: 0=No drift, limb holds 90 (or 45) degrees for full 10 seconds   5b.  Motor right arm: 0=No drift, limb holds 90 (or 45) degrees for full 10 seconds   6a. motor left le=No drift, limb holds 90 (or 45) degrees for full 10 seconds   6b  Motor right le=No drift, limb holds 90 (or 45) degrees for full 10 seconds   7. Limb Ataxia: 0=Absent   8.  Sensory: 0=Normal; no sensory loss   9. Best Language:  0=No aphasia, normal   10. Dysarthria: 0=Normal   11. Extinction and Inattention: 0=No abnormality    Total:   0       Laboratory Data & Studies    Recent Labs   Lab 03/15/24  1742 24  0558   WBC 8.09 6.47   HGB 14.1 13.3    241   MCV 92 92       Recent Labs   Lab 03/15/24  174      K 4.1      CO2 25   BUN 15   *   CALCIUM 9.0       Recent Labs   Lab  03/15/24  1742   PROT 7.4   ALBUMIN 4.1   BILITOT 0.6   AST 19   ALT 15   ALKPHOS 106       Recent Labs   Lab 03/15/24  1742   INR 1.0       Recent Labs   Lab 03/15/24  1742   CHOL 101*   TRIG 72   LDLCALC 41.6*   HDL 45   TSH 2.541       Microbiology:  Microbiology Results (last 7 days)       ** No results found for the last 168 hours. **            Imaging:  X-Ray Cervical Spine AP And Lateral    Result Date: 3/15/2024  EXAM:  XR Cervical Spine, 2 or 3 Views CLINICAL HISTORY:  Recent Cervical trauma TECHNIQUE:  Frontal and lateral views of the cervical spine. COMPARISON:  No relevant prior studies available. FINDINGS: Vertebrae:  No obvious fracture or malalignment is demonstrated. The fracture through an anterior osteophyte of the inferior endplate of C6 and possibly through the anterior superior corner of C7 by CT scan, is not well-demonstrated by plain radiography at this time. Disc spaces:  Some degenerative spurring is noted. Soft tissues:  Unremarkable. IMPRESSION: No acute findings in the cervical spine. Thank you for allowing us to participate in the care of this patient. Electronically signed by:  Jerson Perez DO  03/15/2024 11:43 PM CDT Workstation: [id number]    MRI Brain Without Contrast    Result Date: 3/15/2024  INDICATION: ischemis stroke vs worsening bleed EXAMINATION: MRI -  MR BRAIN WITHOUT IV CONTRAST TECHNIQUE: Multiplanar and multisequence MR images of the brain were obtained. IV Contrast Dosage and Agent: None. COMPARISON: CT of the brain performed earlier today and an MRI of 4/25/2022 ____________________________________________ FINDINGS: BRAIN PARENCHYMA: Again noted is a left-sided subdural hemorrhage. This appears similar in size to the current CT. There is no evidence of parenchymal hematoma.  No evidence of acute infarct. There is no diffusion restriction. Grey white differentiation is normal. There is mild periventricular increased T2 signal consistent with chronic small  vessel ischemic disease. This is unchanged compared to prior MRI. There is no midline shift.  Midline structures appear normal  Posterior fossa structures are unremarkable. CSF SPACES: Appropriate for age. No hydrocephalus. VASCULAR SYSTEM: Normal flow voids in the major intracranial circulation. CALVARIUM, SKULL BASE, PARANASAL SINUSES AND MASTOID AIR CELLS: There is minimal fluid within the mastoid air cells bilaterally. ORBITS: Both globes, extraocular muscles, optic nerves and retrobulbar fat appear unremarkable. IMPRESSION: Left-sided subdural hemorrhage appears similar to the current CT. There is no evidence of parenchymal hematoma or acute infarct. Electronically signed by:  Deshawn Haque MD  03/15/2024 10:23 PM CDT Workstation: [id number]    CTA Head and Neck (xpd)    Result Date: 3/15/2024   ADDENDUM #1 Addendum: Fracture of a right-sided anterior osteophyte is noted at the C6/7 level extending into the anterior and inferior aspect of the C6 vertebrae as noted on previous exam. No significant interval change is seen. Electronically signed by:  Albaro Boston MD  03/15/2024 07:47 PM CDT Workstation: [id number]  ORIGINAL REPORT CMS MANDATED QUALITY DATA - CT RADIATION - 436 One or more of the following dose-optimizing techniques was utilized for this exam: automated exposure control, adjustment of the mA and/or kV according to patient size, and/or use of iterative reconstruction technique. CMS MANDATED QUALITY [id number] NASCET CRITERIA WAS UTILIZED FOR ESTIMATION OF STENOSIS SEVERITY WITH THE NARROWEST SEGMENT BEING COMPARED TO THE DISTAL LUMINAL DIAMETER. HISTORY:Neurologic deficit. Acute stroke suspected. TECHNIQUE: Serial axial images were obtained from aortic arch to the vertex with 100 cc of Omnipaque 350 intravenous contrast utilizing a CT angiography protocol. Coronal and sagittal MIP CT angiography reconstructions were performed. Patient received approximately 2430 mGy-cm of radiation exposure from  this exam. COMPARISON: Comparison to previous CTA 15 days ago on March 1, 2024. FINDINGS:Atherosclerotic changes of the aortic arch and great vessel origins is noted. Cervical carotid arteries appear patent bilaterally with approximately 20% stenosis of the proximal internal carotid artery bilaterally as previously described. No evidence of carotid dissection is seen. Codominant patent cervical vertebral arteries are visualized. Internal carotid arteries appear patent bilaterally with atherosclerotic changes of the cavernous portion the ICA bilaterally, greatest on the right. Mild stenosis of the cavernous portion of the right ICA is present. Basilar artery appears widely patent. Bilateral anterior, middle, and posterior cerebral arteries are patent. Atherosclerotic changes with calcified plaque of the left middle cerebral artery is again noted with mild associated stenosis without significant interval change. Absence of the A1 segment of the right anterior cerebral artery is present unchanged. IMPRESSION: 1. Intracranial and extracranial atherosclerotic changes without significant interval change. 2. Approximately 20% stenosis of the proximal cervical ICA bilaterally. 3. Atherosclerotic changes of the left middle cerebral artery with stable interval appearance. 4. No evidence of large vessel intracranial arterial occlusion is identified. Stable interval appearance. Electronically signed by:  Albaro Boston MD  03/15/2024 07:16 PM CDT Workstation: [id number]    CT Head Without Contrast    Result Date: 3/15/2024  CMS MANDATED QUALITY DATA - CT RADIATION - 436 One or more of the following dose-optimizing techniques was utilized for this exam: automated exposure control, adjustment of the mA and/or kV according to patient size, and/or use of iterative reconstruction technique. HISTORY:  Stroke. Follow-up. TECHNIQUE:  CT images were obtained from the skull base to the vertex without the administration of intravenous  contrast. Coronal and sagittal reconstructions were performed. The patient received approximate 970 mGy-cm of radiation exposure from this exam. COMPARISON: Comparison to previous study dated March 3, 2024. FINDINGS: Arterial calcification is present at skull base. Previously described subarachnoid hemorrhage appears resolved. Left parietal mixed density subdural hemorrhage is again visualized measuring 1.1 cm in greatest thickness without significant change in size when compared the previous exam. No new intracranial hemorrhage is seen. Ventricles appear nondilated. Diffuse atrophy is present. Chronic-appearing periventricular white matter ischemic changes are again visualized. Paranasal sinuses and mastoid air cells appear aerated. IMPRESSION: 1. Left parietal mixed density subdural hemorrhage measuring 1.1 cm in greatest thickness without significant interval change in size when compared to the previous exam. 2. Interval resolution of previously described subarachnoid hemorrhage. 3. Atrophy and chronic ischemic change. Electronically signed by:  Albaro Boston MD  03/15/2024 06:28 PM CDT Workstation: [id number]    X-Ray Cervical Spine lateral supine and lateral upright    Result Date: 3/3/2024  EXAMINATION: XR CERVICAL SPINE LATERAL SUPINE AND LATERAL UPRIGHT CLINICAL HISTORY: cervical fracture stability. please obtain WHILE IN COLLAR; TECHNIQUE: Cervical spine radiograph two views COMPARISON: CT cervical spine dated 03/01/2024 and MRI cervical spine dated 03/02/2024 FINDINGS: Visualization to the level of C6-C7 on lateral view. Multilevel discogenic change and marginal endplate spurring.  Previously demonstrated fracture anteroinferiorly at C6, better demonstrated on comparison imaging.  Borderline prominence of the lower precervical soft tissues.     As above Electronically signed by: Tanmay Baker Date:    03/03/2024 Time:    16:42    CT Head Without Contrast    Result Date: 3/3/2024  EXAMINATION: CT HEAD  WITHOUT CONTRAST CLINICAL HISTORY: Subdural hemorrhage; TECHNIQUE: Multiple sequential 5 mm axial images of the head without contrast.  Coronal and sagittal reformatted imaging from the axial acquisition. COMPARISON: 03/02/2023 FINDINGS: Evolving left cerebral convexity subdural hemorrhage and scattered subarachnoid hemorrhage centered about the right sylvian fissure.  There is continued slight rightward midline shift with bowing of the septum pellucidum measuring approximately 0.5 cm similar to prior.  No evidence for significant new hemorrhage or new abnormal parenchymal attenuation.  Hypodensity along the right inferior frontal lobe and subinsular region similar to less apparent. Questionable trace subdural hemorrhage along the tentorium suggestive for component of redistribution of hemorrhage. No significant new abnormal parenchymal attenuation.  Visualized paranasal sinuses and mastoid air cells are relatively clear.     Evolving left cerebral convexity subdural hemorrhage and right scattered subarachnoid hemorrhage most pronounced along the sylvian fissure. No evidence for significant new hemorrhage or new abnormal parenchymal attenuation with hypoattenuation along the right inferior frontal lobe and subinsular region similar to slightly less apparent Ventricles relatively stable without hydrocephalus.  Mass effect with rightward bowing of the septum pellucidum and slight rightward midline shift unchanged. Clinical correlation and close follow-up recommended. Electronically signed by: Nick Emmanuel DO Date:    03/03/2024 Time:    08:30    US Lower Extremity Veins Bilateral    Result Date: 3/2/2024  EXAMINATION: US LOWER EXTREMITY VEINS BILATERAL CLINICAL HISTORY: swollen LEs; TECHNIQUE: Duplex and color flow Doppler and dynamic compression was performed of the bilateral lower extremity veins was performed. COMPARISON: None FINDINGS: Right thigh veins: The common femoral, femoral, popliteal, upper greater  saphenous, and deep femoral veins are patent and free of thrombus. The veins are normally compressible and have normal phasic flow and augmentation response. Right calf veins: The visualized calf veins are patent. Left thigh veins: The common femoral, femoral, popliteal, upper greater saphenous, and deep femoral veins are patent and free of thrombus. The veins are normally compressible and have normal phasic flow and augmentation response. Left calf veins: The visualized calf veins are patent. Miscellaneous: None     No evidence of deep venous thrombosis in either lower extremity. Electronically signed by resident: Lam Merritt Date:    03/02/2024 Time:    20:24 Electronically signed by: Lele Muro MD Date:    03/02/2024 Time:    20:26    Echo    Result Date: 3/2/2024    Left Ventricle: The left ventricle is normal in size. Normal wall thickness. There is concentric remodeling. Normal wall motion. There is normal systolic function with a visually estimated ejection fraction of 60 - 65%. Diastolic function cannot be reliably determined in the presence of mitral annular calcification.   Right Ventricle: Normal right ventricular cavity size. Wall thickness is normal. Right ventricle wall motion  is normal. Systolic function is normal.   The left atrium is mildly dilated.   Pulmonary Artery: The estimated pulmonary artery systolic pressure is 42 mmHg.   IVC/SVC: Intermediate venous pressure at 8 mmHg.     MRI Cervical Spine Without Contrast    Result Date: 3/2/2024  EXAMINATION: MRI CERVICAL SPINE WITHOUT CONTRAST CLINICAL HISTORY: fall, osteophyte fx at C6-7;  . TECHNIQUE: Multiplanar, multisequence MR images of the cervical spine without intravenous contrast. COMPARISON: CT cervical spine from 03/01/2024. FINDINGS: Alignment: The cervical spine alignment is within normal limits. Vertebra: There is an acute fracture of the C6 anterior inferior endplate.  There is minimal marrow edema within the C5 superior  endplate, concerning for a microtrabecular fracture.  Vertebral body heights are within normal limits. Discs: Mild multilevel disc height loss. Cord: The cervical cord is normal in caliber and signal characteristics.  No cord hemorrhage. Degenerative changes: C2-C3: No spinal canal stenosis or neural foraminal narrowing.  Mild right-sided facet arthropathy. C3-C4: There is moderate spinal canal stenosis secondary to a posterior disc bulge.  There is moderate bilateral neural foraminal narrowing secondary to facet arthropathy. C4-C5: There is mild spinal canal stenosis secondary to a mild posterior disc bulge and ligamentum flavum hypertrophy.  There is mild bilateral neural foraminal narrowing secondary to facet arthropathy. C5-C6: There is moderate spinal canal stenosis secondary to ligamentum flavum hypertrophy and a small posterior disc bulge.  There is moderate right and mild left neural foraminal narrowing secondary to facet arthropathy. C6-C7: Mild spinal canal stenosis and moderate right, mild left neural foraminal narrowing secondary to ligamentum flavum hypertrophy, facet arthropathy, and a small disc bulge. C7-T1: No spinal canal stenosis or neural foraminal narrowing. Miscellaneous: The craniocervical junction and visualized intracranial contents are unremarkable.  There is a small tear of the ALL at the C6 inferior endplate of the left (series 6, image 10).  The PLL and ligamentum flavum are intact.  There is mild edema involving the inter spinous ligaments at C4-C5, C5-C6, and C6-C7, compatible with low-grade ligamentous strains.  There is diffuse prevertebral soft tissue edema without focal collection.  Vertebral and carotid artery flow voids are patent.     1. Acute fracture of the anterior/inferior corner of the C6 vertebral body. 2. Minimal marrow edema within the C5 superior endplate, concerning for a microtrabecular fracture. 3. Small focal tear of the ALL at the level of C6. 4. Low-grade  ligamentous strains of the C4-C5, C5-C6, and C6-C7 interspinous ligaments. 5. Multilevel degenerative changes as detailed above. Electronically signed by: Alek Kan Date:    03/02/2024 Time:    11:05    CT Head Without Contrast    Result Date: 3/2/2024  EXAMINATION: CT HEAD WITHOUT CONTRAST CLINICAL HISTORY: Subdural hemorrhage;tSAH and SDH stability; TECHNIQUE: Multiple sequential 5 mm axial images of the head without contrast.  Coronal and sagittal reformatted imaging from the axial acquisition. COMPARISON: Outside CT head 03/01/2024 FINDINGS: Evolving scattered extra-axial hemorrhages as seen on prior.  There is continued subdural hemorrhage overlying the left posterior frontal parietal posterior temporal convexity measuring approximately 0.9 cm in thickness overall slightly the more conspicuous on today's examination..  Scattered subarachnoid hemorrhages most pronounced along the right sylvian fissure again seen extending to the right frontotemporal sulci. Probable additional small volume subarachnoid hemorrhage along the parasagittal frontal sulci. There is evolving hypoattenuation within the right inferior frontal lobe and insula which may be evolving edema. Stable focal hypodensity within the right caudate suggestive for remote lacunar-type infarct Superimposed recent infarction cannot be entirely excluded further evaluation with MRI advised if patient compatible There is soft tissue swelling/hematoma in the left periorbital/preseptal soft tissues.  This report was flagged in Epic as abnormal.     Evolving scattered extra-axial hemorrhages with subdural hemorrhage overlying the left posterior temporoparietal convexity and evolving subarachnoid hemorrhage most pronounced along the right sylvian fissure and adjacent sulci There is no significant increased hemorrhage. Hypoattenuation within the right frontal lobe and insula which may be related to associated edema a component of parenchymal contusion or  infarction not excluded.  This could be further evaluated with MRI as warranted if patient compatible Electronically signed by: Nick Emmanuel DO Date:    03/02/2024 Time:    07:40    CT Cervical Spine Without Contrast    Result Date: 3/2/2024   ADDENDUM #2 This report contains critical findings that may be critical to patient care. At 5:36 PM CST on 3/1/2024 , the report findings were confirmed with Dr. Varela who has received exam report, is aware of the finding(s), and indicated that a call was not necessary to discuss exam findings. Electronically signed by:  Inga Calvin DO  03/02/2024 06:51 AM CST Workstation: [id number]  ADDENDUM #1 There is extensive artifact noted through the cervical spine. There is a questionable fracture through the osteophyte of C6/C7/anterior inferior aspect at C7. Electronically signed by:  Inga Calvin DO  03/01/2024 05:27 PM CST Workstation: [id number]  ORIGINAL REPORT CT CERVICAL SPINE: 3/1/2024 4:43 PM CST TECHNIQUE: Axial contiguous images were obtained through the cervical spine without intravenous contrast. Sagittal and coronal reconstructions were also reviewed. This exam was performed according to our departmental dose-optimization program, which includes automated exposure control, adjustment of the mA and/or KV according to the patient's size and/or use of iterative reconstruction technique. COMPARISON: None available HISTORY: 83 years  old Female with Neck trauma, mechanically unstable spine (Age >= 16y). FINDINGS: The vertebral bodies appear well aligned. The vertebral body heights appear well maintained. No significant pre-vertebral soft tissue swelling is noted. No acute fracture or subluxation is noted. Mild multilevel intervertebral disc space narrowing is seen. There are multilevel anterior osteophytes seen at the cervical spine. The visualized brain parenchyma appears unremarkable. The craniocervical junction is unremarkable. IMPRESSION: There are no findings  to suggest an acute fracture or subluxation within the cervical spine. Electronically signed by:  Inga Calvin DO  03/01/2024 04:45 PM CST Workstation: [id number]    X-Ray Chest AP Single View    Result Date: 3/2/2024  EXAMINATION: XR CHEST 1 VIEW CLINICAL HISTORY: possible pre-procedural clearance; TECHNIQUE: Single frontal view of the chest was performed. COMPARISON: Chest radiograph April 25, 2022 FINDINGS: Single chest view is submitted.  When accounting for position and technique and depth of inspiration, the appearance of the cardiomediastinal silhouette is stable, aortic atherosclerotic change noted. Mild accentuation of the pulmonary vasculature centrally is noted.  There is mild opacity at the left lung base, this may relate to atelectasis, however could relate to mild pleural fluid and or infiltrate.  Otherwise pulmonary bronchovascular markings appears stable.  There is no large pleural effusion and there is no evidence for pneumothorax.  There is a skin fold overlying the right chest. The osseous structures demonstrate chronic change.     Radiographic findings as above. Electronically signed by: Trey Gregory Date:    03/02/2024 Time:    01:14    CTA Head and Neck (xpd)    Result Date: 3/1/2024  CMS MANDATED QUALITY DATA - CT RADIATION - 436 One or more of the following dose-optimizing techniques was utilized for this exam: automated exposure control, adjustment of the mA and/or kV according to patient size, and/or use of iterative reconstruction technique. CMS MANDATED QUALITY [id number] NASCET CRITERIA WAS UTILIZED FOR ESTIMATION OF STENOSIS SEVERITY WITH THE NARROWEST SEGMENT BEING COMPARED TO THE DISTAL LUMINAL DIAMETER. HISTORY:Neurologic deficit. Acute stroke suspected. TECHNIQUE: Serial axial images were obtained from aortic arch to the vertex with Omnipaque 350 intravenous contrast utilizing a CT angiography protocol. Volume of IV contrast administered is not available for reporting.  Coronal and sagittal MIP CT angiography reconstructions were performed. Patient received approximately 296 mGy-cm of radiation exposure from this exam. COMPARISON: Comparison to previous CTA dated April 25, 2022. FINDINGS:Atherosclerotic changes of the aortic arch and left subclavian great vessel origin is noted. Great vessel origins appear widely patent. Cervical carotid arteries appear patent bilaterally with significant calcified plaques at the carotid bifurcation bilaterally. Approximately 20% stenosis is noted at the origin internal carotid artery bilaterally. Patent codominant bilateral cervical vertebral arteries are visualized. Calcified plaque is noted at the origin of the left vertebral artery. Internal carotid arteries appear patent at skull base bilaterally. Atherosclerotic changes of the cavernous portion of the ICA is present bilaterally. Intracranial vertebral arteries and basilar artery appears widely patent. Bilateral anterior, middle, and posterior cerebral arteries appear patent. Right A1 segment is absent. Both anterior cerebral arteries fill via the left A1 segment. Calcified plaque is noted in the M1 segment of the left middle cerebral artery which appears to cause at least mild stenosis. No evidence of large vessel stenosis is seen. No large vessel occlusion is identified. Incidental note of subcutaneous edema and skin thickening in the left anterior aspect of the mid at the level of the hyoid bone. IMPRESSION: 1. Atherosclerotic changes of cervical carotid artery with approximately 20% stenosis of the ICA bilaterally. 2. Atherosclerotic changes of the left middle cerebral artery as described above. 3. No evidence of large vessel intracranial arterial occlusion is identified. 4. Incidental note of subcutaneous edema and skin thickening in the left anterior neck. Electronically signed by:  Albaro Boston MD  03/01/2024 06:45 PM RUST Workstation: [id number]    CT Maxillofacial Without  Contrast    Result Date: 3/1/2024  CT OF THE FACE: 3/1/2024 4:38 PM CST TECHNIQUE: Axial helical images were obtained through the face without intravenous contrast administered. Coronal and sagittal reformatted images were also obtained and examined. This exam was performed according to our departmental dose-optimization program, which includes automated exposure control, adjustment of the mA and/or KV according to the patient's size and/or use of iterative reconstruction technique. COMPARISON: None available HISTORY: 83 years  old Female with Facial trauma, blunt. FINDINGS: No acute, displaced facial bone fracture is seen. The mandible is intact. The visualized portions of the cervical spine appear unremarkable. There is subarachnoid hemorrhage partially visualized on the right side, better described on the CT of the head from the same day. The globes appear symmetric. No gross radiopaque foreign body is readily apparent. The visualized mastoid air cells appear clear. There is subcutaneous soft tissue contusion seen at the left side of the face. The visualized paranasal sinuses appear clear. IMPRESSION: 1.  No acute, displaced facial bone fracture is seen. 2.  There is subarachnoid hemorrhage partially visualized on the right side, better described on the CT of the head from the same day. Electronically signed by:  Inga Calvin DO  03/01/2024 04:41 PM CST Workstation: [id number]    CT Head Without Contrast    Result Date: 3/1/2024  CLINICAL HISTORY: 83 years (1940) Female Head trauma, moderate-severe Fall (Trip and fall hit post, facial pain, swelling and bruising noted, also c/o left shoulder and neck pain) TECHNIQUE: CT HEAD WITHOUT IV CONTRAST. 58 images obtained. COMPARISON: No prior comparison study is made available. FINDINGS: Acute subarachnoid blood tracks in the right sylvian fissure with continued posterior migration of the subarachnoid blood into the right temporal lobe sulci with continued  posterior and superior extent of blood involving the region central sulcus. On the contralateral side, there is evidence of a small contrecoup focus of subdural hemorrhage of subacute status measuring at least 6 mm in thickness. There is evidence of midline shift. No evidence of ventriculomegaly. No evidence of intra-articular bleed. Orbits and retro-orbital compartments are well-maintained. There are atheromatous calcifications of the proximal left middle cerebral artery. IMPRESSION: 1.  Acute subarachnoid blood tracks in the right Sylvian fissure with continued posterior migration of the subarachnoid blood into the right temporal lobe sulci with continued posterior and superior extent of blood involving the region of the central sulcus. 2.  Small contrecoup focus of subdural hemorrhage of subacute status measuring at least 6 mm in thickness. Above findings were conveyed to Dr. Keyur Perkins on 3/1/2024 at 1630 hours with the verification. Electronically signed by:  Junior Gee MD  03/01/2024 04:29 PM CST Workstation: [id number]      Assessment and Plan:    Transient right sided weakness - resolved  Recent post traumatic subdural hemorrhage  Post concussion syndrome  85 yo woman with history of HTN, BRAO, and recent post traumatic subdural hemorrhage, who presented to the ED with complaints of sudden onset right upper and lower extremity weakness that happened at least 3 times over the past few days and resolved in less than 5 minutes each time. Neuro exam is non focal and she denies any other neuro deficits. Differential includes TIA, seizure, post concussion syndrome. Neurology consulted for evaluation.  - Admitted to hospital medicine with q4 hour neuro checks, on telemetry, continuous pulse oximetry  - Diet after eval per SLP.  - Secondary stroke prevention being held for now in the setting of recent subdural hemorrhage. Recommend continuing to hold until she is seen by neurosurgery in clinic (appointment  scheduled for 4/11)  - MRI brain showed stable left parietal convexity subdural hematoma with no significant midline shift or edema  - EEG routine showed mild slowing but no evidence of epileptiform activity  - Continue Keppra 500 mg BID upon discharge until at least she is seen by neurosurgery and neurology in clinic  - Maintain Euthermia with Tylenol prn temp > 37.2 degrees C.  - Assessment for rehab with PT/OT/SLP evaluation and treatment.  - SBP goal syst < 160   - Cardiac enzymes and EKG   - Go to the ED with any new neuro deficits  - Patient and family explained that brain concussions take a long time to heal  - No further inpatient neurology workup is needed at this time. Please call with questions, concerns or neurological decline.      DVT prophylaxis with chemo/SCD prophylaxis      Patient to follow up with [facility] at [phone] within 7 days from discharge.     Stroke education was provided including stroke risk factors modification and any acute neurological changes including weakness, confusion, visual changes to come straight to the ER.     All questions were answered.                              Thank you kindly for including us in the care of this patient. Please do not hesitate to contact us with any questions.      65 minutes of care time has been spent evaluating with the patient. Time includes chart review not limited to diagnostic imaging, labs, and vitals, patient assessment, discussion with family and nursing, current order evaluations, and new order entries.      Deja Travis MD  Neurology

## 2024-03-16 NOTE — SUBJECTIVE & OBJECTIVE
Past Medical History:   Diagnosis Date    Blood transfusion     GERD (gastroesophageal reflux disease)     Hepatitis C     treated six months by Dr. Gaspar with interferon    History of hepatitis C     Hyperlipidemia     Hypertension     Hypothyroidism     Obesity     Primary osteoarthritis of left knee 2015    Sleep apnea     Stroke     TIA     TIA (transient ischemic attack)     Vision loss of right eye     Dr Eldridge Right eye blood clot        Past Surgical History:   Procedure Laterality Date    ABDOMINAL SURGERY      SBO from lap band wraping around small bowel, lap untwisted    ADENOIDECTOMY      BREAST BIOPSY  2014    needle biopsy     CATARACT EXTRACTION, BILATERAL Bilateral      SECTION      CHOLECYSTECTOMY  2001    COLONOSCOPY  2011    Dr. Alvares, 5 year recheck    EYE SURGERY      bilateral cataract Dr Carrillo     EYE SURGERY  10/2020    bottom lid    HEMORRHOID SURGERY      HYSTERECTOMY      INCONTINENCE SURGERY      sling    LAPAROSCOPIC GASTRIC BANDING      LAPAROSCOPIC NISSEN FUNDOPLICATION      Dr. Rutherford    TONSILLECTOMY         Review of patient's allergies indicates:   Allergen Reactions    Ace inhibitors Other (See Comments)     cough    Morphine Itching    Keflex [cephalexin] Itching and Rash       No current facility-administered medications on file prior to encounter.     Current Outpatient Medications on File Prior to Encounter   Medication Sig    amLODIPine (NORVASC) 10 MG tablet TAKE 1 TABLET BY MOUTH ONCE DAILY. (Patient taking differently: Take 10 mg by mouth once daily.)    atorvastatin (LIPITOR) 40 MG tablet TAKE 2 TABLETS BY MOUTH IN THE EVENING (Patient taking differently: Take 80 mg by mouth every evening.)    ibuprofen (ADVIL,MOTRIN) 200 MG tablet Take 200 mg by mouth every 6 (six) hours as needed for Pain (headaches).    levothyroxine (SYNTHROID) 75 MCG tablet TAKE 1 TABLET BY MOUTH ONCE A DAY (Patient taking differently: Take 75 mcg  by mouth before breakfast.)    losartan (COZAAR) 25 MG tablet Take 1 tablet (25 mg total) by mouth once daily.    mag hydrox/aluminum hyd/simeth (MAALOX ORAL) Take 1 Dose by mouth as needed.    meloxicam (MOBIC) 7.5 MG tablet Take 1 tablet (7.5 mg total) by mouth once daily.    solifenacin (VESICARE) 10 MG tablet Take 10 mg by mouth nightly.    vitamins A,C,E-zinc-copper (ICAPS AREDS) 4,296 mcg-226 mg-90 mg Cap Take 1 capsule by mouth once daily.    aspirin (ECOTRIN) 81 MG EC tablet Take 1 tablet (81 mg total) by mouth once daily. for 1 day (Patient not taking: Reported on 3/15/2024)    clopidogreL (PLAVIX) 75 mg tablet Take 1 tablet (75 mg total) by mouth once daily. for 1 day (Patient not taking: Reported on 3/15/2024)    levETIRAcetam (KEPPRA) 500 MG Tab Take 1 tablet (500 mg total) by mouth 2 (two) times daily. for 9 doses (Patient not taking: Reported on 3/15/2024)     Family History       Problem Relation (Age of Onset)    Arthritis Mother, Paternal Grandmother, Daughter    Cancer Maternal Uncle, Paternal Grandfather    Diabetes Mother, Sister    Diverticulitis Sister    Early death Maternal Grandfather (47)    Emphysema Maternal Aunt    Heart disease Mother, Maternal Aunt, Maternal Grandfather, Paternal Grandfather    No Known Problems Daughter, Father, Son, Paternal Uncle, Maternal Grandmother, Brother          Tobacco Use    Smoking status: Former     Current packs/day: 0.50     Average packs/day: 0.5 packs/day for 5.0 years (2.5 ttl pk-yrs)     Types: Cigarettes    Smokeless tobacco: Never   Substance and Sexual Activity    Alcohol use: No    Drug use: No    Sexual activity: Yes     Partners: Male     Review of Systems   Constitutional:  Negative for activity change, appetite change, chills, fatigue and fever.   HENT:  Negative for congestion, sinus pressure, sinus pain and sneezing.    Eyes:  Negative for photophobia and visual disturbance.   Respiratory:  Negative for apnea, choking, shortness of breath  and wheezing.    Cardiovascular:  Negative for chest pain, palpitations and leg swelling.   Gastrointestinal:  Negative for abdominal distention, diarrhea, nausea and vomiting.   Endocrine: Negative for polydipsia, polyphagia and polyuria.   Genitourinary:  Negative for difficulty urinating, hematuria and urgency.   Musculoskeletal:  Negative for back pain, gait problem and myalgias.   Skin:  Negative for pallor, rash and wound.   Neurological:  Positive for dizziness and weakness. Negative for seizures, speech difficulty, numbness and headaches.   Psychiatric/Behavioral:  Negative for confusion. The patient is not nervous/anxious.      Objective:     Vital Signs (Most Recent):  Temp: 98.8 °F (37.1 °C) (03/15/24 1656)  Pulse: 74 (03/15/24 2136)  Resp: (!) 24 (03/15/24 2136)  BP: (!) 159/68 (03/15/24 2136)  SpO2: 97 % (03/15/24 2136) Vital Signs (24h Range):  Temp:  [98.8 °F (37.1 °C)] 98.8 °F (37.1 °C)  Pulse:  [74-83] 74  Resp:  [17-24] 24  SpO2:  [97 %-99 %] 97 %  BP: (159-186)/(68-83) 159/68     Weight: 86.2 kg (190 lb)  Body mass index is 33.66 kg/m².     Physical Exam  Constitutional:       General: She is not in acute distress.     Appearance: She is not ill-appearing, toxic-appearing or diaphoretic.   HENT:      Head: Normocephalic and atraumatic.      Right Ear: External ear normal.      Left Ear: External ear normal.   Eyes:      Conjunctiva/sclera: Conjunctivae normal.   Cardiovascular:      Rate and Rhythm: Normal rate and regular rhythm.      Heart sounds: No murmur heard.  Pulmonary:      Effort: No respiratory distress.      Breath sounds: No stridor. No wheezing, rhonchi or rales.   Chest:      Chest wall: No tenderness.   Abdominal:      General: There is no distension.      Palpations: There is no mass.      Tenderness: There is no abdominal tenderness. There is no guarding or rebound.      Hernia: No hernia is present.   Musculoskeletal:         General: No swelling, tenderness, deformity or signs  of injury.      Right lower leg: No edema.      Left lower leg: No edema.   Skin:     Coloration: Skin is not jaundiced or pale.      Findings: No bruising, erythema, lesion or rash.   Neurological:      Mental Status: She is alert.      Comments: Patient had 5/5 Strength Bilaterally in UE  Patient had 4/5 Strength in RLE 5/5 in RLE                Significant Labs: All pertinent labs within the past 24 hours have been reviewed.  CBC:   Recent Labs   Lab 03/15/24  1742   WBC 8.09   HGB 14.1   HCT 42.1        CMP:   Recent Labs   Lab 03/15/24  1742      K 4.1      CO2 25   *   BUN 15   CREATININE 0.5   CALCIUM 9.0   PROT 7.4   ALBUMIN 4.1   BILITOT 0.6   ALKPHOS 106   AST 19   ALT 15   ANIONGAP 9       Significant Imaging: I have reviewed all pertinent imaging results/findings within the past 24 hours.

## 2024-03-21 ENCOUNTER — NURSE TRIAGE (OUTPATIENT)
Dept: ADMINISTRATIVE | Facility: CLINIC | Age: 84
End: 2024-03-21
Payer: MEDICARE

## 2024-03-21 ENCOUNTER — PATIENT MESSAGE (OUTPATIENT)
Dept: FAMILY MEDICINE | Facility: CLINIC | Age: 84
End: 2024-03-21
Payer: MEDICARE

## 2024-03-21 DIAGNOSIS — R29.90 STROKE-LIKE SYMPTOMS: Primary | ICD-10-CM

## 2024-03-21 DIAGNOSIS — I62.00 SUBDURAL HEMORRHAGE: ICD-10-CM

## 2024-03-21 DIAGNOSIS — I60.9 SAH (SUBARACHNOID HEMORRHAGE): ICD-10-CM

## 2024-03-21 LAB
OHS QRS DURATION: 134 MS
OHS QTC CALCULATION: 497 MS

## 2024-03-21 NOTE — TELEPHONE ENCOUNTER
Home health physical therapy, occupational therapy, and  consult have been placed.  Patient was notified by e-mail

## 2024-03-21 NOTE — TELEPHONE ENCOUNTER
Family has a question regarding patients Keppra prescription. She is questioning the prescription for for 9 tablets that was given previously. She wants to make sure she should still be taking the Keppra. She would like follow up in this regard.     Reason for Disposition   Caller has URGENT medicine question about med that PCP or specialist prescribed and triager unable to answer question    Protocols used: Medication Question Call-A-OH

## 2024-03-21 NOTE — TELEPHONE ENCOUNTER
Dr. Castillo is one of the hospitalists at [facility].  Apparently the 1st prescription she was given had a limit of nine doses on it and that was not supposed to be.  It is unlimited so he corrected it with a 2nd prescription.  Usually this is a change in plans from what was 1st discussed and someone did not get the word.  He has fixed it.  Continue taking the Keppra twice a day until told to stop by either Neurology or Neurosurgery.

## 2024-03-21 NOTE — TELEPHONE ENCOUNTER
Spoke with .   He received a new prescription for Keppra 500 mg twice a day   He does not know who Dr Rigoberto Castillo is.   Verifying that he should continue the medication.   Patient has appt with Dr Leary on 4/11/2024  She finished the prescription given at discharge it was 9 doses.  Instructed to give her a dose tonight until this could be verified.   He states she is doing better on the medication.   Please advise

## 2024-03-26 ENCOUNTER — TELEPHONE (OUTPATIENT)
Dept: NEUROSURGERY | Facility: CLINIC | Age: 84
End: 2024-03-26
Payer: MEDICARE

## 2024-03-26 ENCOUNTER — PATIENT MESSAGE (OUTPATIENT)
Dept: NEUROSURGERY | Facility: CLINIC | Age: 84
End: 2024-03-26
Payer: MEDICARE

## 2024-03-26 PROCEDURE — G0180 MD CERTIFICATION HHA PATIENT: HCPCS | Mod: ,,, | Performed by: FAMILY MEDICINE

## 2024-04-06 ENCOUNTER — DOCUMENT SCAN (OUTPATIENT)
Dept: HOME HEALTH SERVICES | Facility: HOSPITAL | Age: 84
End: 2024-04-06
Payer: MEDICARE

## 2024-04-11 NOTE — TELEPHONE ENCOUNTER
No care due was identified.  [facility] Embedded Care Due Messages. Reference number: [id number].   5/31/2023 9:59:37 AM CDT   107

## 2024-04-12 ENCOUNTER — TELEPHONE (OUTPATIENT)
Dept: NEUROSURGERY | Facility: CLINIC | Age: 84
End: 2024-04-12
Payer: MEDICARE

## 2024-04-12 NOTE — TELEPHONE ENCOUNTER
Called patient to schedule an upcoming appointment with Iona Bueno PA-C  and provided next appointment date and time.  Future Appointments   Date Time Provider Department Center   5/1/2024 11:00 AM Iona Bueno PA-C [id number]        Patient voiced understanding and thanked me.

## 2024-04-29 ENCOUNTER — EXTERNAL HOME HEALTH (OUTPATIENT)
Dept: HOME HEALTH SERVICES | Facility: HOSPITAL | Age: 84
End: 2024-04-29
Payer: MEDICARE

## 2024-05-01 ENCOUNTER — OFFICE VISIT (OUTPATIENT)
Dept: NEUROSURGERY | Facility: CLINIC | Age: 84
End: 2024-05-01
Payer: MEDICARE

## 2024-05-01 VITALS — DIASTOLIC BLOOD PRESSURE: 53 MMHG | SYSTOLIC BLOOD PRESSURE: 126 MMHG | HEART RATE: 71 BPM

## 2024-05-01 DIAGNOSIS — R56.9 SEIZURE: ICD-10-CM

## 2024-05-01 DIAGNOSIS — S12.501S CLOSED NONDISPLACED FRACTURE OF SIXTH CERVICAL VERTEBRA, UNSPECIFIED FRACTURE MORPHOLOGY, SEQUELA: ICD-10-CM

## 2024-05-01 DIAGNOSIS — I62.00 NONTRAUMATIC SUBDURAL HEMORRHAGE, UNSPECIFIED: Primary | ICD-10-CM

## 2024-05-01 PROCEDURE — 3288F FALL RISK ASSESSMENT DOCD: CPT | Mod: HCNC,CPTII,S$GLB, | Performed by: PHYSICIAN ASSISTANT

## 2024-05-01 PROCEDURE — 1159F MED LIST DOCD IN RCRD: CPT | Mod: HCNC,CPTII,S$GLB, | Performed by: PHYSICIAN ASSISTANT

## 2024-05-01 PROCEDURE — 1101F PT FALLS ASSESS-DOCD LE1/YR: CPT | Mod: HCNC,CPTII,S$GLB, | Performed by: PHYSICIAN ASSISTANT

## 2024-05-01 PROCEDURE — 3078F DIAST BP <80 MM HG: CPT | Mod: HCNC,CPTII,S$GLB, | Performed by: PHYSICIAN ASSISTANT

## 2024-05-01 PROCEDURE — 99999 PR PBB SHADOW E&M-EST. PATIENT-LVL III: CPT | Mod: PBBFAC,HCNC,, | Performed by: PHYSICIAN ASSISTANT

## 2024-05-01 PROCEDURE — 1126F AMNT PAIN NOTED NONE PRSNT: CPT | Mod: HCNC,CPTII,S$GLB, | Performed by: PHYSICIAN ASSISTANT

## 2024-05-01 PROCEDURE — 3074F SYST BP LT 130 MM HG: CPT | Mod: HCNC,CPTII,S$GLB, | Performed by: PHYSICIAN ASSISTANT

## 2024-05-01 PROCEDURE — 99215 OFFICE O/P EST HI 40 MIN: CPT | Mod: HCNC,S$GLB,, | Performed by: PHYSICIAN ASSISTANT

## 2024-05-01 RX ORDER — LEVETIRACETAM 500 MG/1
500 TABLET ORAL 2 TIMES DAILY
Qty: 60 TABLET | Refills: 0 | Status: SHIPPED | OUTPATIENT
Start: 2024-05-01

## 2024-05-01 NOTE — PROGRESS NOTES
[facility] 8th Fl  Neurosurgery    SUBJECTIVE:     History of Present Illness:  Brandi Flynn is a 84 y.o. 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 She was visiting her  at the hospital 3/1 when she tripped and fell forward with her head hitting a cement pole. No LOC. CTH with left SDH and SAH along right sylvian fissure. CT cervical spine with questionable fracture through C6/7 anterior osteophyte. MRI cervical shows acute fracture anterior inferior corner C6 vertebral body, superior endplate edema C5, small focal tear ALL at C6, interspinous ligamentous strain C4-7. No surgical intervention for SDH or cervical fracture after repeat imaging stable. Discharged on keppra and c-collar use when OOB. Presented to the ED 3/15 with RSW after completing keppra for seizure ppx. Suspected motor cortex irritation in the setting of SDH while off AED. Restarted AED and instructed to follow-up outpatient. Presents today for follow-up. She is on keppra 500 BID. Denies neck pain, headache, acute visual changes, numbness, weakness. She has occasional dizziness when walking. Still using a walker and would like to get back to ambulating without assistive devices. Denies falls, seizure. She has not restarted ASA/Plavix. She is accompanied by her  and daughter Fidelina Farris (POA). Compliant with collar use when ambulating and working with therapy.       Review of patient's allergies indicates:   Allergen Reactions    Ace inhibitors Other (See Comments)     cough    Morphine Itching    Keflex [cephalexin] Itching and Rash       Past Medical History:   Diagnosis Date    Blood transfusion 1960    GERD (gastroesophageal reflux disease)     Hepatitis C     treated six months by Dr. Gaspar with interferon    History of hepatitis C     Hyperlipidemia     Hypertension     Hypothyroidism      Obesity     Primary osteoarthritis of left knee 2015    Sleep apnea     Stroke     TIA     TIA (transient ischemic attack)     Vision loss of right eye     Dr Eldridge Right eye blood clot        Past Surgical History:   Procedure Laterality Date    ABDOMINAL SURGERY      SBO from lap band wraping around small bowel, lap untwisted    ADENOIDECTOMY      BREAST BIOPSY  2014    needle biopsy     CATARACT EXTRACTION, BILATERAL Bilateral      SECTION      CHOLECYSTECTOMY  2001    COLONOSCOPY  2011    Dr. Alvares, 5 year recheck    EYE SURGERY      bilateral cataract Dr Carrillo     EYE SURGERY  10/2020    bottom lid    HEMORRHOID SURGERY      HYSTERECTOMY  1989    INCONTINENCE SURGERY      sling    LAPAROSCOPIC GASTRIC BANDING      LAPAROSCOPIC NISSEN FUNDOPLICATION      Dr. Rutherford    TONSILLECTOMY          Family History   Problem Relation Name Age of Onset    Diabetes Mother      Arthritis Mother          RA    Heart disease Mother          MI at 79     Diabetes Sister 1     Diverticulitis Sister 1     No Known Problems Daughter Fidelina     Emphysema Maternal Aunt      Heart disease Maternal Aunt      Cancer Maternal Uncle      Early death Maternal Grandfather  47    Heart disease Maternal Grandfather      Arthritis Paternal Grandmother      Heart disease Paternal Grandfather      Cancer Paternal Grandfather          lung heavy smoker     No Known Problems Father      No Known Problems Son      No Known Problems Paternal Uncle      No Known Problems Maternal Grandmother      No Known Problems Brother      Arthritis Daughter Argelia        Social History     Socioeconomic History    Marital status:    Tobacco Use    Smoking status: Former     Current packs/day: 0.50     Average packs/day: 0.5 packs/day for 5.0 years (2.5 ttl pk-yrs)     Types: Cigarettes    Smokeless tobacco: Never   Substance and Sexual Activity    Alcohol use: No    Drug use: No    Sexual activity: Yes      Partners: Male     Social Determinants of Health     Financial Resource Strain: Low Risk  (3/4/2024)    Overall Financial Resource Strain (CARDIA)     Difficulty of Paying Living Expenses: Not hard at all   Food Insecurity: No Food Insecurity (3/4/2024)    Hunger Vital Sign     Worried About Running Out of Food in the Last Year: Never true     Ran Out of Food in the Last Year: Never true   Transportation Needs: No Transportation Needs (3/4/2024)    PRAPARE - Transportation     Lack of Transportation (Medical): No     Lack of Transportation (Non-Medical): No   Physical Activity: Inactive (3/4/2024)    Exercise Vital Sign     Days of Exercise per Week: 0 days     Minutes of Exercise per Session: 0 min   Stress: No Stress Concern Present (3/4/2024)    [employer] - Occupational Stress Questionnaire     Feeling of Stress : Not at all   Housing Stability: Low Risk  (3/4/2024)    Housing Stability Vital Sign     Unable to Pay for Housing in the Last Year: No     Number of Places Lived in the Last Year: 1     Unstable Housing in the Last Year: No       Review of Systems:  Per HPI    OBJECTIVE:     Vital Signs (Most Recent):  Vitals:    05/01/24 1100   BP: (!) 126/53   Pulse: 71       Physical Exam:  General: well developed, well nourished, no distress.   Head: normocephalic, atraumatic  Neurologic: Alert and oriented. Thought content appropriate.  GCS: Motor: 6/Verbal: 5/Eyes: 4 GCS Total: 15  Mental Status: Awake, Alert, Oriented x 4  Language: No aphasia  Speech: No dysarthria  Cranial nerves: face symmetric, tongue midline, CN II-XII grossly intact.   Eyes: pupils equal, round, reactive to light with accomodation, EOMI.  Pulmonary: normal respirations, no signs of respiratory distress  Abdomen: soft, non-distended, not tender to palpation  Sensory: intact to light touch throughout  Motor Strength: Moves all extremities spontaneously with good tone.  Full strength upper and lower extremities.  No abnormal movements seen.     Strength  Deltoids Triceps Biceps Wrist Extension Wrist Flexion Hand    Upper: R 5/5 5/5 5/5 5/5 5/5 5/5    L 5/5 5/5 5/5 5/5 5/5 5/5     Iliopsoas Quadriceps Knee  Flexion Tibialis  anterior Gastro- cnemius EHL   Lower: R 5/5 5/5 5/5 5/5 5/5 5/5    L 5/5 5/5 5/5 5/5 5/5 5/5   Pronator Drift: no drift noted  Finger-to-nose: Intact bilaterally  Florez: absent  Skin: Skin is warm, dry and intact.  Gait: mild ataxia   No midline TTP to cervical spine.    Diagnostic Results:  I have personally reviewed all pertinent imaging. No new imaging to review.      ASSESSMENT/PLAN:     Brandi Flynn is a 84 y.o. female who presents for follow-up of left parietal SDH and C6 fracture. Will obtain CTH to assess for interval stability to resume ASA/plavix. Last CTH 3/15 showed stable left parietal SDH. If stable or improved SDH, we can discuss resuming aspirin/plavix with repeat imaging to follow depending when DAPT is resumed. Will call with plan of this. Recommended to continue collar. Follow-up in 4 weeks for cervical fracture. If remains asymptomatic we can obtain XR cervical flex/ex out of the collar after the visit. If no instability, then dc collar. Referral to PT for gait training. Referral to neuro to discuss if keppra can be tapered. Discussed with Dr. Greenwood. We discussed concerning signs and symptoms that would prompt return to clinic or urgent medical attention, patient v/u. All questions answered. Encouraged to call the clinic with questions/concerns prior to the next visit.        Iona Bueno PA-C  Neurosurgery      Note dictated with voice recognition software, please excuse any grammatical errors.

## 2024-05-02 ENCOUNTER — HOSPITAL ENCOUNTER (OUTPATIENT)
Dept: RADIOLOGY | Facility: HOSPITAL | Age: 84
Discharge: HOME | End: 2024-05-02
Attending: PHYSICIAN ASSISTANT
Payer: MEDICARE

## 2024-05-02 DIAGNOSIS — I62.00 NONTRAUMATIC SUBDURAL HEMORRHAGE, UNSPECIFIED: ICD-10-CM

## 2024-05-02 PROCEDURE — 70450 CT HEAD/BRAIN W/O DYE: CPT | Mod: TC,PO

## 2024-05-02 PROCEDURE — 70450 CT HEAD/BRAIN W/O DYE: CPT | Mod: 26,,, | Performed by: RADIOLOGY

## 2024-05-06 ENCOUNTER — CLINICAL SUPPORT (OUTPATIENT)
Dept: REHABILITATION | Facility: HOSPITAL | Age: 84
End: 2024-05-06
Attending: PHYSICIAN ASSISTANT
Payer: MEDICARE

## 2024-05-06 DIAGNOSIS — R53.1 DECREASED STRENGTH: Primary | ICD-10-CM

## 2024-05-06 DIAGNOSIS — Z74.09 IMPAIRED FUNCTIONAL MOBILITY, BALANCE, GAIT, AND ENDURANCE: ICD-10-CM

## 2024-05-06 DIAGNOSIS — I62.00 NONTRAUMATIC SUBDURAL HEMORRHAGE, UNSPECIFIED: ICD-10-CM

## 2024-05-06 DIAGNOSIS — S12.501S CLOSED NONDISPLACED FRACTURE OF SIXTH CERVICAL VERTEBRA, UNSPECIFIED FRACTURE MORPHOLOGY, SEQUELA: ICD-10-CM

## 2024-05-06 PROCEDURE — 97161 PT EVAL LOW COMPLEX 20 MIN: CPT | Mod: HCNC,PO

## 2024-05-07 PROBLEM — R53.1 DECREASED STRENGTH: Status: ACTIVE | Noted: 2024-05-07

## 2024-05-07 PROBLEM — Z74.09 IMPAIRED FUNCTIONAL MOBILITY, BALANCE, GAIT, AND ENDURANCE: Status: ACTIVE | Noted: 2024-05-07

## 2024-05-07 NOTE — TELEPHONE ENCOUNTER
Refill Decision Note   Brandi Flynn  is requesting a refill authorization.  Brief Assessment and Rationale for Refill:  Approve     Medication Therapy Plan:         Comments:     Note composed:12:01 PM 05/31/2023            Yes, get tested Previously negative

## 2024-05-08 NOTE — PLAN OF CARE
[facility]  Physical Therapy Neurological Rehabilitation Initial Evaluation    Name: Brandi Flynn  Clinic Number: [id number]    Therapy Diagnosis:   Encounter Diagnoses   Name Primary?    Nontraumatic subdural hemorrhage, unspecified     Closed nondisplaced fracture of sixth cervical vertebra, unspecified fracture morphology, sequela     Decreased strength Yes    Impaired functional mobility, balance, gait, and endurance          Physician: Iona Bueno PA-C    Physician Orders: PT Eval and Treat   Medical Diagnosis from Referral:   I62.00 (ICD-10-CM) - Nontraumatic subdural hemorrhage, unspecified   S12.501S (ICD-10-CM) - Closed nondisplaced fracture of sixth cervical vertebra, unspecified fracture morphology, sequela     Evaluation Date: 5/6/2024  Authorization Period Expiration: 5/1/2025  Plan of Care Expiration: 7/10/2024  Visit # / Visits authorized: 1/ 1    Time In: 1:00 pm  Time Out: 1:40 pm  Total Billable Time: 40 minutes    Precautions: Standard and Fall    Subjective   Date of onset: March 1st  History of current condition - Brandi reports: Pt's  was in the hospital and she was visiting, when she was returning to waiting room she tripped and fell into concrete post. Had a fracture to cervical spine and brain bleed. Saw home health for 2 weeks. Currently supposed to be donning cervical collar but states she does not want to any longer as she has been wearing it since the injury. Previously was walking without AD; currently using FWW. Independent with ADL's currently. History of TIA effecting LLE strength. Wants to improve balance, strength, and gait to reduce risk of falls and re-injury.      Medical History:   Past Medical History:   Diagnosis Date    Blood transfusion 1960    GERD (gastroesophageal reflux disease)     Hepatitis C     treated six months by Dr. Gaspar with interferon    History of hepatitis C     Hyperlipidemia     Hypertension     Hypothyroidism     Obesity      Primary osteoarthritis of left knee 2015    Sleep apnea     Stroke     TIA     TIA (transient ischemic attack)     Vision loss of right eye     Dr Eldridge Right eye blood clot        Surgical History:   Brandi Flynn  has a past surgical history that includes Incontinence surgery (); Cholecystectomy (); Laparoscopic gastric banding (); Hysterectomy ();  section; Hemorrhoid surgery; Tonsillectomy; Adenoidectomy; Colonoscopy (2011); Laparoscopic Nissen fundoplication; Breast biopsy (); Cataract extraction, bilateral (Bilateral); Abdominal surgery (); Eye surgery (); and Eye surgery (10/2020).    Medications:   Brandi has a current medication list which includes the following prescription(s): amlodipine, atorvastatin, ibuprofen, levetiracetam, levothyroxine, losartan, mag hydrox/aluminum hyd/simeth, meloxicam, solifenacin, and icaps areds.    Allergies:   Review of patient's allergies indicates:   Allergen Reactions    Ace inhibitors Other (See Comments)     cough    Morphine Itching    Keflex [cephalexin] Itching and Rash        Imaging, MRI studies: 2024  Impression:     Interval resolution of previous left subdural hematoma, with no acute intracranial abnormality.    Prior Therapy: home health 2x/week for 2 weeks  Social History:  lives with their spouse  Falls: just the fall causing fracture in March   DME: [employer] since fall  Home Environment: single story   Exercise Routine / History: none   Family Present at time of Eval:    Occupation: retired   Prior Level of Function: walking without AD, independent with all ADL's  Current Level of Function: walking with FWW, supposed to be donning cervical collar for fracture, impaired balance, weakness to BLE    Pain:  Current 0/10, worst 0/10, best 0/10   Location:  NA  Description: NA  Aggravating Factors: NA  Easing Factors: NA    Pts goals: improve walking without AD and balance     Objective        Mental status: alert, oriented to person, place, and time  Appearance: Casually dressed  Behavior:  calm and cooperative  Attention Span and Concentration:  Normal    Dominant hand:  right     Posture Alignment :slouched posture, forward head    Skin integrity:  Intact    Sensation:  Light Touch: Intact           Proprioception:   Impaired:     Visual/Auditory: denies changes   Tracking:NT  Saccades: NT  Acuity:NT  R/L discrimination: Intact    ROM:   UPPER EXTREMITY--AROM/PROM  (R) UE: limited as follows: flexion and abduction to 160 deg  (L) UE: limited as follows: flexion and abduction to 160 deg         Strength: manual muscle test grades below   Upper Extremity Strength   RUE LUE   Shoulder Flexion: 4-/5 4-/5   Shoulder Abduction: 4-/5 4-/5   Shoulder Extension: 4-/5 4-/5   : NT NT     Lower Extremity Strength   RLE LLE   Hip Flexion: 4-/5 3+/5   Hip Extension:  NT NT   Hip Abduction: in sitting 3+/5 3+/5   Hip Adduction: NT NT   Knee Extension: 4/5 4-/5   Knee Flexion: 4-/5 4-/5   Ankle Dorsiflexion: 4/5 4-/5   Ankle Plantarflexion: 4-/5 4-/5     Abdominal Strength: 4-/5       Evaluation   Single Limb Stance R LE <2 sec  (<10 sec = HIGH FALL RISK)   Single Limb Stance L LE <2 sec  (<10 sec = HIGH FALL RISK)   5 times sit-stand 14 seconds without hands   >12 sec= fall risk for general elderly  >16 sec= fall risk for Parkinson's disease  >10 sec= balance/vestibular dysfunction (<59 y/o)  >14.2 sec= balance/vestibular dysfunction (>59 y/o)  >12 sec= fall risk for CVA   Pearl 44/56   PEARL Balance Assessment    1. Sitting to Standing   4 - able to stand without using hands and stabilize independently  2. Standing Unsupported   4 - able to stand safely 2 minutes without hold  3. Sitting Unsupported   4 - able to sit safely and securely 2 minutes  4. Standing to Sitting   4 - sits safely with minimal use of hands  5. Pivot Transfer   4 - able to trnasfer safely with minor use of hands  6. Standing with Eyes  Closed   4 - albe to stand 10 seconds safely  7. Standing with Feet Together   3 - able to place feet together independently and stand for 1 minute with supervision  8. Reaching Forward with Outstretched Arm   3 - can reach forward 12 cm/5 inches safely  9. Retrieving Object from Floor   3 - able to pick slipper but needs supervision  10. Turning to Look Behind   4 - looks behind from both sides and weights shifts well  11. Turning 360 Degrees   4 - able to turn 360 in seconds or less  12. Placing Alternate Foot on Step-step to for most stairs   1 - able to complete > 2 steps needs min assist  13. Standing with One Foot in Front   0 - Looses balance while stepping or standing  14. Standing on One Foot   1 - tries to lift leg and unable to hold 3 seconds but remains standing independently  Total: 44  Maximum: 56    Score:   0-20= high fall risk   21-40 moderate fall risk   41-56 low fall risk     Fall risk cut-off scores:   Elderly and History of falls: <51/56   Elderly and No history of falls: <42/56   CVA: <45/56        Postural control:  MCTSIB:  1. Eyes Open/feet together/Firm: 30 seconds  2. Eyes Closed/feet together/Firm: 30 seconds. Min sway  3. Eyes Open/feet together/Foam: 30 seconds, min sway  4. Eyes Closed/feet together/Foam: 4 seconds    Gait Assessment:   - AD used: FWW  - Assistance: Supervision  - Distance: 50 feet  - Curb: NT,  - Ramp:  use of FWW  - Stairs: step to pattern with BUE support      GAIT DEVIATIONS:  Gait component performance:   She ambulates with FWW with hip drop on L, limited heel strike and toe off phases of gait       Evaluation   6 min walk test 267 ft in 2'47 sec prior to leg fatigue and knee discomfort     Functional Mobility (Bed mobility, transfers)  Bed mobility: I      CMS Impairment/Limitation/Restriction for FOTO  Survey    Therapist reviewed FOTO scores for Brandi Flynn on 5/6/2024.   FOTO documents entered into [employer] - see Media section.    Limitation Score: %  Category:           TREATMENT   Initial evaluation only .     Assessment   Brandi is a 84 y.o. female referred to outpatient Physical Therapy with a medical diagnosis of nontraumatic subdural hemorrhage and Closed nondisplaced fracture of sixth cervical vertebra, unspecified fracture morphology, sequela. Pt presents to therapy ambulating with FWW. No cervical collar donned upon arrival. Discussed continuation of donning collar per previous physician note; pt states she understood recommendation but no longer is going to wear collar. She is at low fall risk when assessed using Doe Balance Assessment, however demonstrates increased fall risk with standing in narrowed base of support, single leg balance, and vision eliminated on variable surfaces placing her at increased risk of falls at night and walking on variable surfaces in community. History of TIA with residual weakness on LLE. She would benefit from physical therapy to address deficits listed and return to gait safely without AD.    Pt prognosis is Good.   Pt will benefit from skilled outpatient Physical Therapy to address the deficits stated above and in the chart below, provide pt/family education, and to maximize pt's level of independence.     Plan of care discussed with patient: Yes  Pt's spiritual, cultural and educational needs considered and patient is agreeable to the plan of care and goals as stated below:     Anticipated Barriers for therapy: age      Medical Necessity is demonstrated by the following  History  Co-morbidities and personal factors that may impact the plan of care [] LOW: no personal factors / co-morbidities  [] MODERATE: 1-2 personal factors / co-morbidities  [x] HIGH: 3+ personal factors / co-morbidities    Moderate / High Support Documentation:   Co-morbidities affecting plan of care: TIA in 2012 with residual LLE weakness, vision limitation of R eye, HTN, hypothyroidism,    Personal Factors:   age  lifestyle     Examination  Body Structures and  Functions, activity limitations and participation restrictions that may impact the plan of care [] LOW: addressing 1-2 elements  [x] MODERATE: 3+ elements  [] HIGH: 4+ elements (please support below)    Moderate / High Support Documentation: balance- fall risk, BLE weakness, gait to improve independence      Clinical Presentation [x] LOW: stable  [] MODERATE: Evolving  [] HIGH: Unstable     Decision Making/ Complexity Score: low       Goals:  Short Term Goals: 3 weeks   1. Pt will report compliance with HEP >/= 3x/week to improve carry over.   2. Pt will improve BLE strength by 1/2 MMT score to improve independence with gait.   3. Pt will perform 5x sit<> </=12 seconds to improve functional strength and endurance.   4. Pt will perform 6 min walk test with cane or LRAD for distance of 250 feet to demonstrate improved gait and decrease fall risk.   5. Pt will perform MCTSIB categories 4 for 15 seconds to improve static balance in dim lit environments and on uneven surfaces.    Long Term Goals: 6 weeks   1. Pt will report compliance with HEP >/= 3x/week to improve carry over.   2. Pt will balance on single leg for 5 seconds to improve gait pattern.   3. Pt will perform 5x sit<> </=11 seconds to improve functional strength and endurance.   4. Pt will perform 6 min walk test for 350 ft using SPC or LRAD to demonstrate improved endurance and decrease fall risk.   5. Pt will perform MCTSIB categories 4 for 30 seconds to improve static balance in dim lit environments and on uneven surfaces.  6. Pt will score 48/56 on Doe Balance Assessment to decrease risk of falls and improve independence with gait.    Plan   Plan of care Certification: 5/6/2024 to 7/10/2024.    Outpatient Physical Therapy 2 times weekly for 6 weeks to include the following interventions: Gait Training, Moist Heat/ Ice, Neuromuscular Re-ed, Orthotic Management and Training, Patient Education, Therapeutic Activities, and Therapeutic  Exercise.     Joann Felipe, PT, DPT

## 2024-05-15 ENCOUNTER — CLINICAL SUPPORT (OUTPATIENT)
Dept: REHABILITATION | Facility: HOSPITAL | Age: 84
End: 2024-05-15
Payer: MEDICARE

## 2024-05-15 DIAGNOSIS — Z74.09 IMPAIRED FUNCTIONAL MOBILITY, BALANCE, GAIT, AND ENDURANCE: ICD-10-CM

## 2024-05-15 DIAGNOSIS — R53.1 DECREASED STRENGTH: Primary | ICD-10-CM

## 2024-05-15 PROCEDURE — 97530 THERAPEUTIC ACTIVITIES: CPT | Mod: HCNC,PO

## 2024-05-15 PROCEDURE — 97116 GAIT TRAINING THERAPY: CPT | Mod: HCNC,PO

## 2024-05-15 PROCEDURE — 97112 NEUROMUSCULAR REEDUCATION: CPT | Mod: HCNC,PO

## 2024-05-15 NOTE — PROGRESS NOTES
Physical Therapy Daily Treatment Note     Name: Brandi Flynn  Clinic Number: [id number]    Therapy Diagnosis:   Encounter Diagnoses   Name Primary?    Decreased strength Yes    Impaired functional mobility, balance, gait, and endurance        Physician: Iona Bueno PA-C    Visit Date: 5/15/2024    Physician Orders: PT Eval and Treat   Medical Diagnosis from Referral:   I62.00 (ICD-10-CM) - Nontraumatic subdural hemorrhage, unspecified   S12.501S (ICD-10-CM) - Closed nondisplaced fracture of sixth cervical vertebra, unspecified fracture morphology, sequela      Evaluation Date: 5/6/2024  Authorization Period Expiration: 5/1/2025  Plan of Care Expiration: 7/10/2024  Visit # / Visits authorized: 1/20 +eval   PN Due: 6/6/2024     Time In: 3:18 pm  Time Out: 4:01 pm  Total Billable Time: 43 minutes    Precautions: Standard and Fall    Subjective     Pt reports: She had some dizziness when getting up to go to the bathroom last night; states it usually settles but last night it didn't go away.  She was not compliant with home exercise program.  Response to previous treatment: initial evaluation only.  Functional change: TBD    Dizziness: 3/10  No pain indicated today.    Objective     Brandi participated in neuromuscular re-education activities to improve: Balance and Proprioception for 13 minutes. The following activities were included:    Heel/toe raises with BUE x 10  Full Romberg on firm surface  eyes open/closed x 30 sec each  75% romberg on AirEx eyes open/closed x 30 sec each with SBA, min sway    Sit to stands with hands in lap x 10  Alternating toe taps on 5 inch step x 10 each without UE support, CGA    Brandi participated in dynamic functional therapeutic activities to improve functional performance for 10  minutes, including:    Ladder drill:   Lateral stepping x 2 laps with CGA-Min A   Forward reciprocal gait x 2 laps with CGA-Bhavin with loss of balance    Brandi participated in gait training to improve functional  mobility and safety for 20  minutes, including:    SBQC and SPC gait sequencing with CGA-Bhavin with frequent feedback initially, 2 x 120 feet    Home Exercises Provided and Patient Education Provided     Education provided:   Cont to perform HEP as provided.     Written Home Exercises Provided: yes.  Exercises were reviewed and Brandi was able to demonstrate them prior to the end of the session.  Brandi demonstrated good  understanding of the education provided.     See EMR under Patient Instructions for exercises provided 5/15/2024.    Assessment     Brandi arrived to therapy with rollator. Focus during treatment was gait training with SBQC and SPC with heavy cues for sequencing; use of gait belt for safety with CGA-Bhavin from PT. Challenged pt with toe taps and variable surfaces. She remains motivated for PT to return to gait without AD.     Brandi Is progressing well towards her goals.   Pt prognosis is Good.     Pt will continue to benefit from skilled outpatient physical therapy to address the deficits listed in the problem list box on initial evaluation, provide pt/family education and to maximize pt's level of independence in the home and community environment.     Pt's spiritual, cultural and educational needs considered and pt agreeable to plan of care and goals.    Anticipated barriers to physical therapy: age    Goals:   Short Term Goals: 3 weeks   1. Pt will report compliance with HEP >/= 3x/week to improve carry over.   2. Pt will improve BLE strength by 1/2 MMT score to improve independence with gait.   3. Pt will perform 5x sit<> </=12 seconds to improve functional strength and endurance.   4. Pt will perform 6 min walk test with cane or LRAD for distance of 250 feet to demonstrate improved gait and decrease fall risk.   5. Pt will perform MCTSIB categories 4 for 15 seconds to improve static balance in dim lit environments and on uneven surfaces.     Long Term Goals: 6 weeks   1. Pt will report compliance  with HEP >/= 3x/week to improve carry over.   2. Pt will balance on single leg for 5 seconds to improve gait pattern.   3. Pt will perform 5x sit<> </=11 seconds to improve functional strength and endurance.   4. Pt will perform 6 min walk test for 350 ft using SPC or LRAD to demonstrate improved endurance and decrease fall risk.   5. Pt will perform MCTSIB categories 4 for 30 seconds to improve static balance in dim lit environments and on uneven surfaces.  6. Pt will score 48/56 on Doe Balance Assessment to decrease risk of falls and improve independence with gait.    Plan     Certification date: 5/6/2024 to 7/10/2024.     Outpatient Physical Therapy 2 times weekly for 6 weeks to include the following interventions: Gait Training, Moist Heat/ Ice, Neuromuscular Re-ed, Orthotic Management and Training, Patient Education, Therapeutic Activities, and Therapeutic Exercise.        Cont skilled PT session towards PT and patient's goals.    Joann Felipe, PT   05/15/2024

## 2024-05-17 ENCOUNTER — CLINICAL SUPPORT (OUTPATIENT)
Dept: REHABILITATION | Facility: HOSPITAL | Age: 84
End: 2024-05-17
Payer: MEDICARE

## 2024-05-17 DIAGNOSIS — Z74.09 IMPAIRED FUNCTIONAL MOBILITY, BALANCE, GAIT, AND ENDURANCE: ICD-10-CM

## 2024-05-17 DIAGNOSIS — R53.1 DECREASED STRENGTH: Primary | ICD-10-CM

## 2024-05-17 PROCEDURE — 97530 THERAPEUTIC ACTIVITIES: CPT | Mod: HCNC,PO,CQ

## 2024-05-17 PROCEDURE — 97110 THERAPEUTIC EXERCISES: CPT | Mod: HCNC,PO,CQ

## 2024-05-17 NOTE — PROGRESS NOTES
"  Physical Therapy Daily Treatment Note     Name: Brandi Flynn  Clinic Number: [id number]    Therapy Diagnosis:   Encounter Diagnoses   Name Primary?    Decreased strength Yes    Impaired functional mobility, balance, gait, and endurance      Physician: Iona Bueno PA-C    Visit Date: 5/17/2024    Physician Orders: PT Eval and Treat   Medical Diagnosis from Referral:   I62.00 (ICD-10-CM) - Nontraumatic subdural hemorrhage, unspecified   S12.501S (ICD-10-CM) - Closed nondisplaced fracture of sixth cervical vertebra, unspecified fracture morphology, sequela      Evaluation Date: 5/6/2024  Authorization Period Expiration: 5/1/2025  Plan of Care Expiration: 7/10/2024  Visit # / Visits authorized: 2/20 +evaluation (3)   PN Due: 6/6/2024     Time In: 0930  Time Out: 1010  Total Billable Time: 40 minutes    Precautions: Standard and Fall    Subjective     Pt reports: dizziness "not too bad today".  She was compliant with home exercise program.  Response to previous treatment: no problems stated  Functional change: ongoing    Dizziness: 2/10  Pain: 0/10  Location: Not Applicable     Objective     therapeutic exercises to develop strength, endurance, and range of motion  for 25 minutes including:    Seated:  Long arc quads 3 sets of 10 2# Right Left, verbal cues for increased knee flexion and decreased speed on descent  March 3 sets of 10 2#  Right Left     SciFit Level 1 10 minutes (stated Left knee soreness at start of time, stated "got better" at 5 minutes)    Shuttle:  50# Bilateral leg press 2 minutes  37# Unilateral leg press 1 minute Right Left     Brandi participated in dynamic functional therapeutic activities to improve functional performance for 15 minutes, including:    Parallel bars:  Bilateral Heel Raises 10 times  March in place 10 times Right Left alternating  Hip abduction 10 times Right Left alternating  Hip extension 10 times Right Left alternating  Hamstring curls 10 times Right Left " alternating    Ambulation with own 4 wheel walker 2x120 feet with verbal cues to increase upright    Home Exercises Provided and Patient Education Provided     Education provided:   - Patient provided with verbal and demonstrative instruction for all activities performed in today's session.  - educated about decreasing range/weight/repetitions when experiencing pain, stated understanding.     Written Home Exercises Provided: Patient instructed to cont prior HEP.      See EMR under Patient Instructions for exercises provided 5/15/2024.    Assessment     Brandi provided good participation and effort during today's session with treatment focused on lower extremity strengthening and functional mobility to improve balance. Brandi tolerated activities well, stated Left knee pain at approximately 3 minute hernán encouraged to decrease speed and then stated no knee pain at 5 minute hernán.    Brandi Is progressing well towards her goals.   Pt prognosis is Good.     Pt will continue to benefit from skilled outpatient physical therapy to address the deficits listed in the problem list box on initial evaluation, provide pt/family education and to maximize pt's level of independence in the home and community environment.     Pt's spiritual, cultural and educational needs considered and pt agreeable to plan of care and goals.    Anticipated barriers to physical therapy: age    Goals:   Short Term Goals: 3 weeks   1. Pt will report compliance with HEP >/= 3x/week to improve carry over.   2. Pt will improve BLE strength by 1/2 MMT score to improve independence with gait.   3. Pt will perform 5x sit<> </=12 seconds to improve functional strength and endurance.   4. Pt will perform 6 min walk test with cane or LRAD for distance of 250 feet to demonstrate improved gait and decrease fall risk.   5. Pt will perform MCTSIB categories 4 for 15 seconds to improve static balance in dim lit environments and on uneven surfaces.     Long Term  Goals: 6 weeks   1. Pt will report compliance with HEP >/= 3x/week to improve carry over.   2. Pt will balance on single leg for 5 seconds to improve gait pattern.   3. Pt will perform 5x sit<> </=11 seconds to improve functional strength and endurance.   4. Pt will perform 6 min walk test for 350 ft using SPC or LRAD to demonstrate improved endurance and decrease fall risk.   5. Pt will perform MCTSIB categories 4 for 30 seconds to improve static balance in dim lit environments and on uneven surfaces.  6. Pt will score 48/56 on Doe Balance Assessment to decrease risk of falls and improve independence with gait.    Plan     Certification date: 5/6/2024 to 7/10/2024.     Outpatient Physical Therapy 2 times weekly for 6 weeks to include the following interventions: Gait Training, Moist Heat/ Ice, Neuromuscular Re-ed, Orthotic Management and Training, Patient Education, Therapeutic Activities, and Therapeutic Exercise.      Corina Antonio, PTA   05/17/2024

## 2024-05-20 ENCOUNTER — CLINICAL SUPPORT (OUTPATIENT)
Dept: REHABILITATION | Facility: HOSPITAL | Age: 84
End: 2024-05-20
Payer: MEDICARE

## 2024-05-20 DIAGNOSIS — Z74.09 IMPAIRED FUNCTIONAL MOBILITY, BALANCE, GAIT, AND ENDURANCE: ICD-10-CM

## 2024-05-20 DIAGNOSIS — R53.1 DECREASED STRENGTH: Primary | ICD-10-CM

## 2024-05-20 PROCEDURE — 97112 NEUROMUSCULAR REEDUCATION: CPT | Mod: HCNC,PO

## 2024-05-20 PROCEDURE — 97110 THERAPEUTIC EXERCISES: CPT | Mod: HCNC,PO

## 2024-05-20 PROCEDURE — 97530 THERAPEUTIC ACTIVITIES: CPT | Mod: HCNC,PO

## 2024-05-20 NOTE — PROGRESS NOTES
Physical Therapy Daily Treatment Note     Name: Brandi Flynn  Clinic Number: [id number]    Therapy Diagnosis:   Encounter Diagnoses   Name Primary?    Decreased strength Yes    Impaired functional mobility, balance, gait, and endurance      Physician: Iona Bueno PA-C    Visit Date: 5/20/2024    Physician Orders: PT Eval and Treat   Medical Diagnosis from Referral:   I62.00 (ICD-10-CM) - Nontraumatic subdural hemorrhage, unspecified   S12.501S (ICD-10-CM) - Closed nondisplaced fracture of sixth cervical vertebra, unspecified fracture morphology, sequela      Evaluation Date: 5/6/2024  Authorization Period Expiration: 5/1/2025  Plan of Care Expiration: 7/10/2024  Visit # / Visits authorized: 3/20 +evaluation (4)   PN Due: 6/6/2024     Time In: 1:02 pm  Time Out: 1:45 pm  Total Billable Time: 43 minutes    Precautions: Standard and Fall    Subjective     Pt reports: She arrives with rollator stating she feels better with that because it is more stable than her walker, but it is harder to  to put into her car. Brought her cane to practice with proper height  She was compliant with home exercise program.  Response to previous treatment: no problems stated  Functional change: ongoing    Dizziness: 2/10  Pain: 0/10  Location: Not Applicable     Objective     therapeutic exercises to develop strength, endurance, and range of motion  for 20 minutes including:    SciFit Level 1 10 minutes (stated Left knee soreness)    Parallel bars:  Bilateral Heel Raises 10 times  March in place 10 times Right Left alternating  Hip abduction 10 times Right Left alternating  Hip extension 10 times Right Left alternating  Hamstring curls 10 times Right Left alternating    Brandi participated in dynamic functional therapeutic activities to improve functional performance for 15 minutes, including:    Ambulation with own tripod cane 2 x 120 feet; adjusted height for pt    Neuromuscular re-education to improve balance for 8 minutes  includin% romberg on AirEx eyes open/closed 2 x 30 sec each  50% romberg on AirEx, eyes open with head movements horizontally and vertically 2 x 30 sec each  Staggered stance on firm surface x 30 sec each    Home Exercises Provided and Patient Education Provided     Education provided:   - Patient provided with verbal and demonstrative instruction for all activities performed in today's session.  - educated about decreasing range/weight/repetitions when experiencing pain, stated understanding.     Written Home Exercises Provided: Patient instructed to cont prior HEP.      See EMR under Patient Instructions for exercises provided 5/15/2024.    Assessment     Brandi provided good participation and effort during today's session with treatment focused on gait with least restrictive AD. Modified height for pt's personal tripod cane, however instructed to use rollator at home until cleared by PT for use of cane at home. Pt remains unsteady on variable surfaces, with head movements, and with vision eliminated placing pt at increased risk in community and I'm lit environments.    Brandi Is progressing well towards her goals.   Pt prognosis is Good.     Pt will continue to benefit from skilled outpatient physical therapy to address the deficits listed in the problem list box on initial evaluation, provide pt/family education and to maximize pt's level of independence in the home and community environment.     Pt's spiritual, cultural and educational needs considered and pt agreeable to plan of care and goals.    Anticipated barriers to physical therapy: age    Goals:   Short Term Goals: 3 weeks   1. Pt will report compliance with HEP >/= 3x/week to improve carry over.   2. Pt will improve BLE strength by 1/2 MMT score to improve independence with gait.   3. Pt will perform 5x sit<> </=12 seconds to improve functional strength and endurance.   4. Pt will perform 6 min walk test with cane or LRAD for distance of 250  feet to demonstrate improved gait and decrease fall risk.   5. Pt will perform MCTSIB categories 4 for 15 seconds to improve static balance in dim lit environments and on uneven surfaces.     Long Term Goals: 6 weeks   1. Pt will report compliance with HEP >/= 3x/week to improve carry over.   2. Pt will balance on single leg for 5 seconds to improve gait pattern.   3. Pt will perform 5x sit<> </=11 seconds to improve functional strength and endurance.   4. Pt will perform 6 min walk test for 350 ft using SPC or LRAD to demonstrate improved endurance and decrease fall risk.   5. Pt will perform MCTSIB categories 4 for 30 seconds to improve static balance in dim lit environments and on uneven surfaces.  6. Pt will score 48/56 on Doe Balance Assessment to decrease risk of falls and improve independence with gait.    Plan     Certification date: 5/6/2024 to 7/10/2024.     Outpatient Physical Therapy 2 times weekly for 6 weeks to include the following interventions: Gait Training, Moist Heat/ Ice, Neuromuscular Re-ed, Orthotic Management and Training, Patient Education, Therapeutic Activities, and Therapeutic Exercise.      Joann Felipe, PT   05/20/2024

## 2024-05-22 ENCOUNTER — CLINICAL SUPPORT (OUTPATIENT)
Dept: REHABILITATION | Facility: HOSPITAL | Age: 84
End: 2024-05-22
Payer: MEDICARE

## 2024-05-22 DIAGNOSIS — R53.1 DECREASED STRENGTH: Primary | ICD-10-CM

## 2024-05-22 DIAGNOSIS — Z74.09 IMPAIRED FUNCTIONAL MOBILITY, BALANCE, GAIT, AND ENDURANCE: ICD-10-CM

## 2024-05-22 PROCEDURE — 97530 THERAPEUTIC ACTIVITIES: CPT | Mod: HCNC,PO

## 2024-05-22 PROCEDURE — 97110 THERAPEUTIC EXERCISES: CPT | Mod: HCNC,PO

## 2024-05-22 PROCEDURE — 97112 NEUROMUSCULAR REEDUCATION: CPT | Mod: HCNC,PO

## 2024-05-22 NOTE — PROGRESS NOTES
Physical Therapy Daily Treatment Note     Name: Brandi Flynn  Clinic Number: [id number]    Therapy Diagnosis:   Encounter Diagnoses   Name Primary?    Decreased strength Yes    Impaired functional mobility, balance, gait, and endurance      Physician: Iona Bueno PA-C    Visit Date: 5/22/2024    Physician Orders: PT Eval and Treat   Medical Diagnosis from Referral:   I62.00 (ICD-10-CM) - Nontraumatic subdural hemorrhage, unspecified   S12.501S (ICD-10-CM) - Closed nondisplaced fracture of sixth cervical vertebra, unspecified fracture morphology, sequela      Evaluation Date: 5/6/2024  Authorization Period Expiration: 5/1/2025  Plan of Care Expiration: 7/10/2024  Visit # / Visits authorized: 4/20 +evaluation (5)   PN Due: 6/6/2024     Time In: 1:02 pm  Time Out: 1:45 pm  Total Billable Time: 43 minutes    Precautions: Standard and Fall    Subjective     Pt reports: She arrives stating no significant knee pain, feels the exercises is helping with her knee. No dizziness upon arrival but states she has some when she gets up.  She was compliant with home exercise program.  Response to previous treatment: no problems stated  Functional change: ongoing    Dizziness: 0/10  Pain: 0/10  Location: Not Applicable     Objective     therapeutic exercises to develop strength, endurance, and range of motion  for 27 minutes including:    SciFit Level 1 12 minutes     Parallel bars:  Bilateral Heel Raises 10 times  March in place 2 x 10 times Right Left alternating  Hip abduction 2 x 10 times Right Left alternating  Hip extension 2 x 10 times Right Left alternating  Hamstring curls 10 times Right Left alternating    Shuttle:  50# Bilateral leg press 2 minutes  37# Unilateral leg press 1 minute Right Left     Brandi participated in dynamic functional therapeutic activities to improve functional performance for 08 minutes, including:    Ambulation with own tripod cane x 130 ft and x 100 feet; adjusted height for pt, cues for  posture    Neuromuscular re-education to improve balance for 08 minutes includin% romberg on AirEx eyes open/closed 2 x 30 sec each  50% romberg on AirEx, eyes open with head movements horizontally and vertically 2 x 30 sec each  Staggered stance on firm surface x 30 sec each    Home Exercises Provided and Patient Education Provided     Education provided:   - Patient provided with verbal and demonstrative instruction for all activities performed in today's session.  - educated about decreasing range/weight/repetitions when experiencing pain, stated understanding.     Written Home Exercises Provided: Patient instructed to cont prior HEP.      See EMR under Patient Instructions for exercises provided 5/15/2024.    Assessment     Brandi provided good participation and effort during today's session with treatment focused on gait with least restrictive AD. Continued practice for sequencing with pt's personal tripod cane for goal of LRAD. Slowed dianelys and occasional cues for sequencing required during gait. Progressed repetition for standing exercise with good tolerance.     Brandi Is progressing well towards her goals.   Pt prognosis is Good.     Pt will continue to benefit from skilled outpatient physical therapy to address the deficits listed in the problem list box on initial evaluation, provide pt/family education and to maximize pt's level of independence in the home and community environment.     Pt's spiritual, cultural and educational needs considered and pt agreeable to plan of care and goals.    Anticipated barriers to physical therapy: age    Goals:   Short Term Goals: 3 weeks   1. Pt will report compliance with HEP >/= 3x/week to improve carry over.   2. Pt will improve BLE strength by 1/2 MMT score to improve independence with gait.   3. Pt will perform 5x sit<> </=12 seconds to improve functional strength and endurance.   4. Pt will perform 6 min walk test with cane or LRAD for distance of 250  feet to demonstrate improved gait and decrease fall risk.   5. Pt will perform MCTSIB categories 4 for 15 seconds to improve static balance in dim lit environments and on uneven surfaces.     Long Term Goals: 6 weeks   1. Pt will report compliance with HEP >/= 3x/week to improve carry over.   2. Pt will balance on single leg for 5 seconds to improve gait pattern.   3. Pt will perform 5x sit<> </=11 seconds to improve functional strength and endurance.   4. Pt will perform 6 min walk test for 350 ft using SPC or LRAD to demonstrate improved endurance and decrease fall risk.   5. Pt will perform MCTSIB categories 4 for 30 seconds to improve static balance in dim lit environments and on uneven surfaces.  6. Pt will score 48/56 on Doe Balance Assessment to decrease risk of falls and improve independence with gait.    Plan     Certification date: 5/6/2024 to 7/10/2024.     Outpatient Physical Therapy 2 times weekly for 6 weeks to include the following interventions: Gait Training, Moist Heat/ Ice, Neuromuscular Re-ed, Orthotic Management and Training, Patient Education, Therapeutic Activities, and Therapeutic Exercise.      Joann Felipe, PT   05/22/2024

## 2024-05-27 ENCOUNTER — CLINICAL SUPPORT (OUTPATIENT)
Dept: REHABILITATION | Facility: HOSPITAL | Age: 84
End: 2024-05-27
Payer: MEDICARE

## 2024-05-27 DIAGNOSIS — R53.1 DECREASED STRENGTH: Primary | ICD-10-CM

## 2024-05-27 DIAGNOSIS — Z74.09 IMPAIRED FUNCTIONAL MOBILITY, BALANCE, GAIT, AND ENDURANCE: ICD-10-CM

## 2024-05-27 PROCEDURE — 97530 THERAPEUTIC ACTIVITIES: CPT | Mod: HCNC,PO

## 2024-05-27 PROCEDURE — 97110 THERAPEUTIC EXERCISES: CPT | Mod: HCNC,PO

## 2024-05-27 NOTE — PROGRESS NOTES
Physical Therapy Daily Treatment Note     Name: Brandi Flynn  Clinic Number: [id number]    Therapy Diagnosis:   Encounter Diagnoses   Name Primary?    Decreased strength Yes    Impaired functional mobility, balance, gait, and endurance      Physician: Iona Bueno PA-C    Visit Date: 5/27/2024    Physician Orders: PT Eval and Treat   Medical Diagnosis from Referral:   I62.00 (ICD-10-CM) - Nontraumatic subdural hemorrhage, unspecified   S12.501S (ICD-10-CM) - Closed nondisplaced fracture of sixth cervical vertebra, unspecified fracture morphology, sequela      Evaluation Date: 5/6/2024  Authorization Period Expiration: 5/1/2025  Plan of Care Expiration: 7/10/2024  Visit # / Visits authorized: 5/20 +evaluation (6)   PN Due: 6/6/2024     Time In: 1:00 pm  Time Out: 1:45 pm  Total Billable Time: 45 minutes    Precautions: Standard and Fall    Subjective     Pt reports: She arrives with little to no pain in left knee today. States it worsens after walking for a while but feels it is improving with exercises.     She was compliant with home exercise program.  Response to previous treatment: no problems stated  Functional change: ongoing    Dizziness: 0/10  Pain: 0/10  Location: L knee    Objective     therapeutic exercises to develop strength, endurance, and range of motion  for 23 minutes including:    SciFit Level 2 10 minutes     Parallel bars: with 2# ankle wts  Bilateral Heel Raises 10 times  March in place 2 x 10 times Right Left alternating  Hip abduction 2 x 10 times Right Left alternating  Hip extension 2 x 10 times Right Left alternating  Hamstring curls 10 times Right Left alternating      Brandi participated in dynamic functional therapeutic activities to improve functional performance for 18 minutes, including:    Ambulation with own tripod cane x 100 ft and x 138 feet; adjusted height for pt, cues for posture    Ladder drill in parallel bars:   -lateral stepping x 3 laps with SBA-CGA   -forward stepping with  step to pattern x 3 laps CGA    Stepping over 1/2 foam roll step to pattern 2 x 4    Neuromuscular re-education to improve balance for 04 minutes includin% romberg on AirEx eyes open/closed 2 x 30 sec each  50% romberg on AirEx, eyes open with head movements horizontally and vertically 2 x 30 sec each  Staggered stance on firm surface x 30 sec each-NP    Home Exercises Provided and Patient Education Provided     Education provided:   - Patient provided with verbal and demonstrative instruction for all activities performed in today's session.  - educated about decreasing range/weight/repetitions when experiencing pain, stated understanding.     Written Home Exercises Provided: Patient instructed to cont prior HEP.      See EMR under Patient Instructions for exercises provided 5/15/2024.    Assessment     Brandi provided good participation and effort during today's session with treatment focused on gait with least restrictive AD. Instructed in step height and stride with use of ladder for external cues, SBA-CGA for safety. Progressed strengthening in bars with addition of 2 lb ankle weights bilaterally, required 2 rest breaks to complete exercise cycle. Continues to improve with cane, however still feels more stable with rollator. Discussed continued use of rollator in community for safety.    Brandi Is progressing well towards her goals.   Pt prognosis is Good.     Pt will continue to benefit from skilled outpatient physical therapy to address the deficits listed in the problem list box on initial evaluation, provide pt/family education and to maximize pt's level of independence in the home and community environment.     Pt's spiritual, cultural and educational needs considered and pt agreeable to plan of care and goals.    Anticipated barriers to physical therapy: age    Goals:   Short Term Goals: 3 weeks   1. Pt will report compliance with HEP >/= 3x/week to improve carry over.   2. Pt will improve BLE strength  by 1/2 MMT score to improve independence with gait.   3. Pt will perform 5x sit<> </=12 seconds to improve functional strength and endurance.   4. Pt will perform 6 min walk test with cane or LRAD for distance of 250 feet to demonstrate improved gait and decrease fall risk.   5. Pt will perform MCTSIB categories 4 for 15 seconds to improve static balance in dim lit environments and on uneven surfaces.     Long Term Goals: 6 weeks   1. Pt will report compliance with HEP >/= 3x/week to improve carry over.   2. Pt will balance on single leg for 5 seconds to improve gait pattern.   3. Pt will perform 5x sit<> </=11 seconds to improve functional strength and endurance.   4. Pt will perform 6 min walk test for 350 ft using SPC or LRAD to demonstrate improved endurance and decrease fall risk.   5. Pt will perform MCTSIB categories 4 for 30 seconds to improve static balance in dim lit environments and on uneven surfaces.  6. Pt will score 48/56 on Doe Balance Assessment to decrease risk of falls and improve independence with gait.    Plan     Certification date: 5/6/2024 to 7/10/2024.     Outpatient Physical Therapy 2 times weekly for 6 weeks to include the following interventions: Gait Training, Moist Heat/ Ice, Neuromuscular Re-ed, Orthotic Management and Training, Patient Education, Therapeutic Activities, and Therapeutic Exercise.      Joann Felipe, PT   05/27/2024

## 2024-05-31 ENCOUNTER — CLINICAL SUPPORT (OUTPATIENT)
Dept: REHABILITATION | Facility: HOSPITAL | Age: 84
End: 2024-05-31
Payer: MEDICARE

## 2024-05-31 DIAGNOSIS — R53.1 DECREASED STRENGTH: Primary | ICD-10-CM

## 2024-05-31 DIAGNOSIS — Z74.09 IMPAIRED FUNCTIONAL MOBILITY, BALANCE, GAIT, AND ENDURANCE: ICD-10-CM

## 2024-05-31 PROCEDURE — 97110 THERAPEUTIC EXERCISES: CPT | Mod: HCNC,PO

## 2024-05-31 PROCEDURE — 97530 THERAPEUTIC ACTIVITIES: CPT | Mod: HCNC,PO

## 2024-05-31 NOTE — PROGRESS NOTES
Physical Therapy Daily Treatment Note     Name: Brandi Flynn  Clinic Number: [id number]    Therapy Diagnosis:   Encounter Diagnoses   Name Primary?    Decreased strength Yes    Impaired functional mobility, balance, gait, and endurance      Physician: Iona Bueno PA-C    Visit Date: 5/31/2024    Physician Orders: PT Eval and Treat   Medical Diagnosis from Referral:   I62.00 (ICD-10-CM) - Nontraumatic subdural hemorrhage, unspecified   S12.501S (ICD-10-CM) - Closed nondisplaced fracture of sixth cervical vertebra, unspecified fracture morphology, sequela      Evaluation Date: 5/6/2024  Authorization Period Expiration: 5/1/2025  Plan of Care Expiration: 7/10/2024  Visit # / Visits authorized: 6/20 +evaluation (7)   PN Due: 6/6/2024     Time In: 11:50 am  Time Out: 12:31 pm  Total Billable Time: 41 minutes    Precautions: Standard and Fall    Subjective     Pt reports: She arrives with report of no pain in knee today. States she has been walking at home without AD; however uses walls/furniture for support. Use of rollator for community.    She was compliant with home exercise program.  Response to previous treatment: no problems stated  Functional change: ongoing    Dizziness: 0/10  Pain: 0/10  Location: L knee    Objective     therapeutic exercises to develop strength, endurance, and range of motion  for 26 minutes including:    SciFit Level 2 10 minutes     Parallel bars: with 2# ankle wts  Bilateral Heel Raises x 20 times  March in place 2 x 10 times Right Left alternating  Hip abduction 2 x 10 times Right Left alternating  Hip extension 2 x 10 times Right Left alternating  Hamstring curls x 15 times Right Left alternating      Brandi participated in dynamic functional therapeutic activities to improve functional performance for 15 minutes, including:    Ambulation with own tripod cane x 130 ft and x 150 feet; adjusted height for pt, cues for posture      Neuromuscular re-education to improve balance for 00 minutes  includin% romberg on AirEx eyes open/closed 2 x 30 sec each  50% romberg on AirEx, eyes open with head movements horizontally and vertically 2 x 30 sec each  Staggered stance on firm surface x 30 sec each-NP    Home Exercises Provided and Patient Education Provided     Education provided:   - Patient provided with verbal and demonstrative instruction for all activities performed in today's session.  - educated about decreasing range/weight/repetitions when experiencing pain, stated understanding.     Written Home Exercises Provided: Patient instructed to cont prior HEP.      See EMR under Patient Instructions for exercises provided 5/15/2024.    Assessment     Brandi provided good participation and effort during today's session with treatment focused on gait with least restrictive AD. Continued with strength training progressing repetitions with good tolerance, however fatigues and requires standing rest breaks between sets. Increased distance walking with cane with verbal cues for posture and looking straight ahead.    Brandi Is progressing well towards her goals.   Pt prognosis is Good.     Pt will continue to benefit from skilled outpatient physical therapy to address the deficits listed in the problem list box on initial evaluation, provide pt/family education and to maximize pt's level of independence in the home and community environment.     Pt's spiritual, cultural and educational needs considered and pt agreeable to plan of care and goals.    Anticipated barriers to physical therapy: age    Goals:   Short Term Goals: 3 weeks   1. Pt will report compliance with HEP >/= 3x/week to improve carry over.   2. Pt will improve BLE strength by 1/2 MMT score to improve independence with gait.   3. Pt will perform 5x sit<> </=12 seconds to improve functional strength and endurance.   4. Pt will perform 6 min walk test with cane or LRAD for distance of 250 feet to demonstrate improved gait and decrease fall  risk.   5. Pt will perform MCTSIB categories 4 for 15 seconds to improve static balance in dim lit environments and on uneven surfaces.     Long Term Goals: 6 weeks   1. Pt will report compliance with HEP >/= 3x/week to improve carry over.   2. Pt will balance on single leg for 5 seconds to improve gait pattern.   3. Pt will perform 5x sit<> </=11 seconds to improve functional strength and endurance.   4. Pt will perform 6 min walk test for 350 ft using SPC or LRAD to demonstrate improved endurance and decrease fall risk.   5. Pt will perform MCTSIB categories 4 for 30 seconds to improve static balance in dim lit environments and on uneven surfaces.  6. Pt will score 48/56 on Doe Balance Assessment to decrease risk of falls and improve independence with gait.    Plan     Certification date: 5/6/2024 to 7/10/2024.     Outpatient Physical Therapy 2 times weekly for 6 weeks to include the following interventions: Gait Training, Moist Heat/ Ice, Neuromuscular Re-ed, Orthotic Management and Training, Patient Education, Therapeutic Activities, and Therapeutic Exercise.      Joann Feliep, PT   05/31/2024

## 2024-06-03 ENCOUNTER — CLINICAL SUPPORT (OUTPATIENT)
Dept: REHABILITATION | Facility: HOSPITAL | Age: 84
End: 2024-06-03
Payer: MEDICARE

## 2024-06-03 DIAGNOSIS — Z74.09 IMPAIRED FUNCTIONAL MOBILITY, BALANCE, GAIT, AND ENDURANCE: ICD-10-CM

## 2024-06-03 DIAGNOSIS — R53.1 DECREASED STRENGTH: Primary | ICD-10-CM

## 2024-06-03 PROCEDURE — 97116 GAIT TRAINING THERAPY: CPT | Mod: HCNC,PO

## 2024-06-03 PROCEDURE — 97112 NEUROMUSCULAR REEDUCATION: CPT | Mod: HCNC,PO

## 2024-06-03 PROCEDURE — 97110 THERAPEUTIC EXERCISES: CPT | Mod: HCNC,PO

## 2024-06-03 NOTE — PROGRESS NOTES
Physical Therapy Daily Treatment Note    Progress Note    Name: Brandi Flynn  Clinic Number: [id number]    Therapy Diagnosis:   Encounter Diagnoses   Name Primary?    Decreased strength Yes    Impaired functional mobility, balance, gait, and endurance      Physician: Iona Bueno PA-C    Visit Date: 6/3/2024    Physician Orders: PT Eval and Treat   Medical Diagnosis from Referral:   I62.00 (ICD-10-CM) - Nontraumatic subdural hemorrhage, unspecified   S12.501S (ICD-10-CM) - Closed nondisplaced fracture of sixth cervical vertebra, unspecified fracture morphology, sequela      Evaluation Date: 5/6/2024  Authorization Period Expiration: 5/1/2025  Plan of Care Expiration: 7/10/2024  Visit # / Visits authorized: 7/20 +evaluation (8)   PN Due: 7/6/2024     Time In: 1:00 pm  Time Out: 1:55 pm  Total Billable Time: 55 minutes    Precautions: Standard and Fall    Subjective     Pt reports: She arrives with report of no pain in knee today. States she has been walking at home without AD; however uses walls/furniture for support. Use of rollator for community. Has brick pavers as floor in home with exception of bedrooms and is fearful of falling    She was compliant with home exercise program.  Response to previous treatment: no problems stated  Functional change: ongoing    Dizziness: 0/10  Pain: 0/10  Location: L knee    Objective     Lower Extremity Strength    RLE LLE   Hip Flexion: 4-/5 3+/5   Hip Extension:  NT NT   Hip Abduction: in sitting 4-/5 4-/5   Hip Adduction: NT NT   Knee Extension: 4+/5 4+/5   Knee Flexion: 4-/5 4-/5   Ankle Dorsiflexion: 4/5 4-/5   Ankle Plantarflexion: 4-/5 4-/5      Abdominal Strength: 4/5          Evaluation 6/3/2024   Single Limb Stance R LE <2 sec  (<10 sec = HIGH FALL RISK) <2 sec   Single Limb Stance L LE <2 sec  (<10 sec = HIGH FALL RISK) <2 sec   5 times sit-stand 15 seconds without hands   >12 sec= fall risk for general elderly  >16 sec= fall risk for Parkinson's disease  >10 sec=  balance/vestibular dysfunction (<59 y/o)  >14.2 sec= balance/vestibular dysfunction (>59 y/o)  >12 sec= fall risk for CVA 17 seconds with loss of balance x 2   Pearl 43/56 44/56     PEARL Balance Assessment    1. Sitting to Standing   4 - able to stand without using hands and stabilize independently  2. Standing Unsupported   4 - able to stand safely 2 minutes without hold  3. Sitting Unsupported   4 - able to sit safely and securely 2 minutes  4. Standing to Sitting   4 - sits safely with minimal use of hands  5. Pivot Transfer   4 - able to trnasfer safely with minor use of hands  6. Standing with Eyes Closed   4 - albe to stand 10 seconds safely  7. Standing with Feet Together   4 - able to place feet together independently and stand 1 minute safely  8. Reaching Forward with Outstretched Arm   3 - can reach forward 12 cm/5 inches safely  9. Retrieving Object from Floor   3 - able to pick slipper but needs supervision  10. Turning to Look Behind   4 - looks behind with weight shift  11. Turning 360 Degrees   4 - able to turn 360 in seconds or less  12. Placing Alternate Foot on Step   1 - able to complete > 2 steps needs min assist  13. Standing with One Foot in Front   0 - Looses balance while stepping or standing  14. Standing on One Foot   1 - tries to lift leg and unable to hold 3 seconds but remains standing independently  Total: 44  Maximum: 56    Score:   0-20= high fall risk   21-40 moderate fall risk   41-56 low fall risk     Fall risk cut-off scores:   Elderly and History of falls: <51/56   Elderly and No history of falls: <42/56   CVA: <45/56          Postural control:  MCTSIB:  1. Eyes Open/feet together/Firm: 30 seconds  2. Eyes Closed/feet together/Firm: 30 seconds. Min sway  3. Eyes Open/feet together/Foam: 30 seconds,  4. Eyes Closed/feet together/Foam: 30 seconds, mod sway          Evaluation 6/3/24   6 min walk test 267 ft in 2'47 sec prior to leg fatigue and knee discomfort with rollator 698' in  "4'45" with Rollator        therapeutic exercises to develop strength, endurance, and range of motion  for 30 minutes including:    reassessment    SciFit Level 2 10 minutes     Parallel bars: with 2# ankle wts  Bilateral Heel Raises x 20 times  March in place 2 x 10 times Right Left alternating  Hip abduction 2 x 10 times Right Left alternating  Hip extension 2 x 10 times Right Left alternating  Hamstring curls x 15 times Right Left alternating      Brandi participated in dynamic functional therapeutic activities to improve functional performance for 15 minutes, including:    Ambulation with own tripod cane x 130 ft and x 150 feet; adjusted height for pt, cues for posture      Neuromuscular re-education to improve balance for 10 minutes includin% romberg on AirEx eyes open/closed 2 x 30 sec each  50% romberg on AirEx, eyes open with head movements horizontally and vertically 2 x 30 sec each  Staggered stance on firm surface x 30 sec each    Home Exercises Provided and Patient Education Provided     Education provided:   - Patient provided with verbal and demonstrative instruction for all activities performed in today's session.  - educated about decreasing range/weight/repetitions when experiencing pain, stated understanding.     Written Home Exercises Provided: Patient instructed to cont prior HEP.      See EMR under Patient Instructions for exercises provided 5/15/2024.    Assessment     Brandi was reassessed by physical therapist today. She demonstrates improved endurance by increasing gait distance using rollator from 267 ft to 698 ft and increasing time ambulated by 2 minutes. She made no clinically significant improvement with balance via Doe Balance Assessment and single leg stance. She would benefit from continued physical therapy to continue to address balance and increase independence to LRAD for gait.    Brandi Is progressing well towards her goals.   Pt prognosis is Good.     Pt will continue to benefit " from skilled outpatient physical therapy to address the deficits listed in the problem list box on initial evaluation, provide pt/family education and to maximize pt's level of independence in the home and community environment.     Pt's spiritual, cultural and educational needs considered and pt agreeable to plan of care and goals.    Anticipated barriers to physical therapy: age    Goals:   Short Term Goals: 3 weeks   1. Pt will report compliance with HEP >/= 3x/week to improve carry over.   2. Pt will improve BLE strength by 1/2 MMT score to improve independence with gait.   3. Pt will perform 5x sit<> </=12 seconds to improve functional strength and endurance.   4. Pt will perform 6 min walk test with cane or LRAD for distance of 250 feet to demonstrate improved gait and decrease fall risk.   5. Pt will perform MCTSIB categories 4 for 15 seconds to improve static balance in dim lit environments and on uneven surfaces.     Long Term Goals: 6 weeks   1. Pt will report compliance with HEP >/= 3x/week to improve carry over.   2. Pt will balance on single leg for 5 seconds to improve gait pattern.   3. Pt will perform 5x sit<> </=11 seconds to improve functional strength and endurance.   4. Pt will perform 6 min walk test for 350 ft using SPC or LRAD to demonstrate improved endurance and decrease fall risk.   5. Pt will perform MCTSIB categories 4 for 30 seconds to improve static balance in dim lit environments and on uneven surfaces.  6. Pt will score 48/56 on Doe Balance Assessment to decrease risk of falls and improve independence with gait.    Plan     Certification date: 5/6/2024 to 7/10/2024.     Outpatient Physical Therapy 2 times weekly for 6 weeks to include the following interventions: Gait Training, Moist Heat/ Ice, Neuromuscular Re-ed, Orthotic Management and Training, Patient Education, Therapeutic Activities, and Therapeutic Exercise.      Joann Felipe, PT   06/03/2024

## 2024-06-05 ENCOUNTER — DOCUMENTATION ONLY (OUTPATIENT)
Dept: NEUROSURGERY | Facility: HOSPITAL | Age: 84
End: 2024-06-05
Payer: MEDICARE

## 2024-06-05 ENCOUNTER — HOSPITAL ENCOUNTER (OUTPATIENT)
Dept: RADIOLOGY | Facility: HOSPITAL | Age: 84
Discharge: HOME | End: 2024-06-05
Attending: PHYSICIAN ASSISTANT
Payer: MEDICARE

## 2024-06-05 ENCOUNTER — OFFICE VISIT (OUTPATIENT)
Dept: NEUROSURGERY | Facility: CLINIC | Age: 84
End: 2024-06-05
Payer: MEDICARE

## 2024-06-05 VITALS — SYSTOLIC BLOOD PRESSURE: 138 MMHG | HEART RATE: 74 BPM | DIASTOLIC BLOOD PRESSURE: 80 MMHG

## 2024-06-05 DIAGNOSIS — S06.5XAA SDH (SUBDURAL HEMATOMA): ICD-10-CM

## 2024-06-05 DIAGNOSIS — S12.501S CLOSED NONDISPLACED FRACTURE OF SIXTH CERVICAL VERTEBRA, UNSPECIFIED FRACTURE MORPHOLOGY, SEQUELA: Primary | ICD-10-CM

## 2024-06-05 DIAGNOSIS — S12.501S CLOSED NONDISPLACED FRACTURE OF SIXTH CERVICAL VERTEBRA, UNSPECIFIED FRACTURE MORPHOLOGY, SEQUELA: ICD-10-CM

## 2024-06-05 PROCEDURE — 3079F DIAST BP 80-89 MM HG: CPT | Mod: HCNC,CPTII,S$GLB, | Performed by: PHYSICIAN ASSISTANT

## 2024-06-05 PROCEDURE — 1126F AMNT PAIN NOTED NONE PRSNT: CPT | Mod: HCNC,CPTII,S$GLB, | Performed by: PHYSICIAN ASSISTANT

## 2024-06-05 PROCEDURE — 1159F MED LIST DOCD IN RCRD: CPT | Mod: HCNC,CPTII,S$GLB, | Performed by: PHYSICIAN ASSISTANT

## 2024-06-05 PROCEDURE — 3075F SYST BP GE 130 - 139MM HG: CPT | Mod: HCNC,CPTII,S$GLB, | Performed by: PHYSICIAN ASSISTANT

## 2024-06-05 PROCEDURE — 99999 PR PBB SHADOW E&M-EST. PATIENT-LVL II: CPT | Mod: PBBFAC,HCNC,, | Performed by: PHYSICIAN ASSISTANT

## 2024-06-05 PROCEDURE — 3288F FALL RISK ASSESSMENT DOCD: CPT | Mod: HCNC,CPTII,S$GLB, | Performed by: PHYSICIAN ASSISTANT

## 2024-06-05 PROCEDURE — 99214 OFFICE O/P EST MOD 30 MIN: CPT | Mod: HCNC,S$GLB,, | Performed by: PHYSICIAN ASSISTANT

## 2024-06-05 PROCEDURE — 1100F PTFALLS ASSESS-DOCD GE2>/YR: CPT | Mod: HCNC,CPTII,S$GLB, | Performed by: PHYSICIAN ASSISTANT

## 2024-06-05 NOTE — PROGRESS NOTES
[facility] 8th Fl  Neurosurgery    SUBJECTIVE:     History of Present Illness:  Brandi Flynn is a 84 y.o. 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 She was visiting her  at the hospital 3/1 when she tripped and fell forward with her head hitting a cement pole. No LOC. CTH with left SDH and SAH along right sylvian fissure. CT cervical spine with questionable fracture through C6/7 anterior osteophyte. MRI cervical shows acute fracture anterior inferior corner C6 vertebral body, superior endplate edema C5, small focal tear ALL at C6, interspinous ligamentous strain C4-7. No surgical intervention for SDH or cervical fracture after repeat imaging stable. Discharged on keppra and c-collar use when OOB. Presented to the ED 3/15 with RSW after completing keppra for seizure ppx. Suspected motor cortex irritation in the setting of SDH while off AED. Restarted AED and instructed to follow-up outpatient. Presents today for follow-up. She is on keppra 500 BID. Denies neck pain, headache, acute visual changes, numbness, weakness. She has occasional dizziness when walking. Still using a walker and would like to get back to ambulating without assistive devices. Denies falls, seizure. She has not restarted ASA/Plavix. She is accompanied by her  and daughter Fidelina Farris (POA). Compliant with collar use when ambulating and working with therapy.     Interval Hx 6/5:  Presents today with her  and daughter. Doing well. Working with therapy on gait training. Denies falls. Compliant with collar. Denies neck pain, arm pain, hand clumsiness, new weakness, numbness.       Review of patient's allergies indicates:   Allergen Reactions    Ace inhibitors Other (See Comments)     cough    Morphine Itching    Keflex [cephalexin] Itching and Rash       Past Medical History:   Diagnosis Date     Blood transfusion     GERD (gastroesophageal reflux disease)     Hepatitis C     treated six months by Dr. Gaspar with interferon    History of hepatitis C     Hyperlipidemia     Hypertension     Hypothyroidism     Obesity     Primary osteoarthritis of left knee 2015    Sleep apnea     Stroke     TIA     TIA (transient ischemic attack)     Vision loss of right eye     Dr Eldridge Right eye blood clot        Past Surgical History:   Procedure Laterality Date    ABDOMINAL SURGERY      SBO from lap band wraping around small bowel, lap untwisted    ADENOIDECTOMY      BREAST BIOPSY      needle biopsy     CATARACT EXTRACTION, BILATERAL Bilateral      SECTION      CHOLECYSTECTOMY      COLONOSCOPY  2011    Dr. Alvares, 5 year recheck    EYE SURGERY      bilateral cataract Dr Carrillo     EYE SURGERY  10/2020    bottom lid    HEMORRHOID SURGERY      HYSTERECTOMY      INCONTINENCE SURGERY      sling    LAPAROSCOPIC GASTRIC BANDING      LAPAROSCOPIC NISSEN FUNDOPLICATION      Dr. Rutherford    TONSILLECTOMY          Family History   Problem Relation Name Age of Onset    Diabetes Mother      Arthritis Mother          RA    Heart disease Mother          MI at 79     Diabetes Sister 1     Diverticulitis Sister 1     No Known Problems Daughter Fidelina     Emphysema Maternal Aunt      Heart disease Maternal Aunt      Cancer Maternal Uncle      Early death Maternal Grandfather  47    Heart disease Maternal Grandfather      Arthritis Paternal Grandmother      Heart disease Paternal Grandfather      Cancer Paternal Grandfather          lung heavy smoker     No Known Problems Father      No Known Problems Son      No Known Problems Paternal Uncle      No Known Problems Maternal Grandmother      No Known Problems Brother      Arthritis Daughter Argelia        Social History     Socioeconomic History    Marital status:    Tobacco Use    Smoking status: Former     Current packs/day:  0.50     Average packs/day: 0.5 packs/day for 5.0 years (2.5 ttl pk-yrs)     Types: Cigarettes    Smokeless tobacco: Never   Substance and Sexual Activity    Alcohol use: No    Drug use: No    Sexual activity: Yes     Partners: Male     Social Determinants of Health     Financial Resource Strain: Low Risk  (3/4/2024)    Overall Financial Resource Strain (CARDIA)     Difficulty of Paying Living Expenses: Not hard at all   Food Insecurity: No Food Insecurity (3/4/2024)    Hunger Vital Sign     Worried About Running Out of Food in the Last Year: Never true     Ran Out of Food in the Last Year: Never true   Transportation Needs: No Transportation Needs (3/4/2024)    PRAPARE - Transportation     Lack of Transportation (Medical): No     Lack of Transportation (Non-Medical): No   Physical Activity: Inactive (3/4/2024)    Exercise Vital Sign     Days of Exercise per Week: 0 days     Minutes of Exercise per Session: 0 min   Stress: No Stress Concern Present (3/4/2024)    [employer] - Occupational Stress Questionnaire     Feeling of Stress : Not at all   Housing Stability: Low Risk  (3/4/2024)    Housing Stability Vital Sign     Unable to Pay for Housing in the Last Year: No     Number of Places Lived in the Last Year: 1     Unstable Housing in the Last Year: No       Review of Systems:  Per HPI    OBJECTIVE:     Vital Signs (Most Recent):  Vitals:    06/05/24 1058   BP: 138/80   Pulse: 74       Physical Exam:  General: well developed, well nourished, no distress.   Head: normocephalic, atraumatic  Neurologic: Alert and oriented. Thought content appropriate.  GCS: Motor: 6/Verbal: 5/Eyes: 4 GCS Total: 15  Mental Status: Awake, Alert, Oriented x 4  Language: No aphasia  Speech: No dysarthria  Cranial nerves: face symmetric, tongue midline, CN II-XII grossly intact.   Eyes: pupils equal, round, reactive to light with accomodation, EOMI.  Pulmonary: normal respirations, no signs of respiratory  distress  Abdomen: soft, non-distended, not tender to palpation  Sensory: intact to light touch throughout  Motor Strength: Moves all extremities spontaneously with good tone.  Full strength upper and lower extremities. No abnormal movements seen.     Strength  Deltoids Triceps Biceps Wrist Extension Wrist Flexion Hand    Upper: R 5/5 5/5 5/5 5/5 5/5 5/5    L 5/5 5/5 5/5 5/5 5/5 5/5     Iliopsoas Quadriceps Knee  Flexion Tibialis  anterior Gastro- cnemius EHL   Lower: R 5/5 5/5 5/5 5/5 5/5 5/5    L 5/5 5/5 5/5 5/5 5/5 5/5     Skin: Skin is warm, dry and intact.  Gait: deferred   No midline TTP to cervical spine. Full ROM cervical spine without pain.    Diagnostic Results:  I have personally reviewed all pertinent imaging.     CT head 5/2/24:  - resolution prior left subdural hematoma, no new hemorrhage    ASSESSMENT/PLAN:     Brandi Flynn is a 84 y.o. female who presents for follow-up of left parietal SDH and C6 fracture. Recent CTH with resolution of prior hemorrhage. Ok to resume ASA/Plavix, no interval imaging needed. Patient can contact us if she has neuro changes to obtain repeat CTH. Will obtain XR cervical flex/ext today. If no instability, ok to dc collar. Will contact patient with results of XR after reviewed by Dr. Greenwood. Continue PT. Keppra management per neurology. We discussed concerning signs and symptoms that would prompt return to clinic or urgent medical attention, patient v/u. All questions answered. Encouraged to call the clinic with questions/concerns prior to the next visit. F/u PRN.    Iona Bueno PA-C  Neurosurgery      Note dictated with voice recognition software, please excuse any grammatical errors.

## 2024-06-05 NOTE — PROGRESS NOTES
XR reviewed with Dr. Greenwood. No evidence of instability. Ok to dc collar. F/u with NSGY prn. Discussed update with patient's  over the phone. All questions answered.

## 2024-06-10 ENCOUNTER — CLINICAL SUPPORT (OUTPATIENT)
Dept: REHABILITATION | Facility: HOSPITAL | Age: 84
End: 2024-06-10
Payer: MEDICARE

## 2024-06-10 DIAGNOSIS — Z74.09 IMPAIRED FUNCTIONAL MOBILITY, BALANCE, GAIT, AND ENDURANCE: ICD-10-CM

## 2024-06-10 DIAGNOSIS — R53.1 DECREASED STRENGTH: Primary | ICD-10-CM

## 2024-06-10 PROCEDURE — 97110 THERAPEUTIC EXERCISES: CPT | Mod: HCNC,PO,CQ

## 2024-06-10 PROCEDURE — 97530 THERAPEUTIC ACTIVITIES: CPT | Mod: HCNC,PO,CQ

## 2024-06-10 NOTE — PROGRESS NOTES
[facility]   Physical Therapy Treatment Note      Name: Brandi Flynn  Clinic Number: [id number]    Therapy Diagnosis:   Encounter Diagnoses   Name Primary?    Decreased strength Yes    Impaired functional mobility, balance, gait, and endurance      Physician: Iona Bueno PA-C    Visit Date: 6/10/2024    Physician Orders: PT Eval and Treat   Medical Diagnosis from Referral:   I62.00 (ICD-10-CM) - Nontraumatic subdural hemorrhage, unspecified   S12.501S (ICD-10-CM) - Closed nondisplaced fracture of sixth cervical vertebra, unspecified fracture morphology, sequela      Evaluation Date: 5/6/2024  Authorization Period Expiration: 5/1/2025  Plan of Care Expiration: 7/10/2024  Visit # / Visits authorized: 8/20(9)         PN Due: 7/6/2024                Time In: 1300  Time Out: 1340  Total Billable Time: 40 minutes     Precautions: Standard and Fall    PTA Visit #: 1/5       Subjective     Patient reports: doing okay, not using assistive device in the house.  She reports was compliant with home exercise program.  Response to previous treatment: no problems stated  Functional change: ongoing    Pain: 0/10  Location:  Not Applicable      Objective      Objective Measures updated at progress report unless specified.     Treatment     Brandi received the treatments listed below:      therapeutic exercises to develop strength, endurance, ROM, core stabilization and flexibility for 25 minutes including:    NuStep Level 4 10 minutes Bilateral Lower Extremities only    Seated:  Quad sets 30 times Right Left     Supine:  Posterior pelvic tilt with ball squeeze 1 second hold/relax for 1 minute  Bridge 3 sets of 10   Straight leg raise 2 sets of 10 Right Left     therapeutic activities to improve functional performance for 15  minutes, including:    Ambulation with 4 wheel walker 200 feet and 150 feet, verbal cues to increase upright    Parallel bars:  Weight shift 2 minutes on foam cushion  anterior/posterior, Right/Left  (verbal cues to maintain Bilateral knee extension and head up)  Bilateral heel raises 10 times on foam cushion   Single leg stand 2x30 seconds Right Left alternating      Patient Education and Home Exercises       Education provided:   - Patient provided with verbal and demonstrative instruction for all activities performed in today's session.    Written Home Exercises Provided: Patient instructed to cont prior HEP.     See Electronic Medical Record under Patient Instructions for exercises provided during therapy sessions    Assessment     Brandi provided good participation and effort during today's session with treatment focused on lower extremity strengthening and functional mobility. Brandi tolerated activities with rest breaks and with cues to increase upright during standing activities with fair follow through.      Brandi Is progressing towards her goals.   Patient prognosis is Good.     Patient will continue to benefit from skilled outpatient physical therapy to address the deficits listed in the problem list box on initial evaluation, provide pt/family education and to maximize pt's level of independence in the home and community environment.     Patient's spiritual, cultural and educational needs considered and pt agreeable to plan of care and goals.     Anticipated barriers to physical therapy:  age    Goals:     Short Term Goals: 3 weeks   1. Pt will report compliance with HEP >/= 3x/week to improve carry over. (Progressing)  2. Pt will improve BLE strength by 1/2 MMT score to improve independence with gait. (Ongoing)  3. Pt will perform 5x sit<> </=12 seconds to improve functional strength and endurance. (Ongoing)  4. Pt will perform 6 min walk test with cane or LRAD for distance of 250 feet to demonstrate improved gait and decrease fall risk. (Ongoing)  5. Pt will perform MCTSIB categories 4 for 15 seconds to improve static balance in dim lit environments and on uneven  surfaces. (Ongoing)     Long Term Goals: 6 weeks   1. Pt will report compliance with HEP >/= 3x/week to improve carry over. (Ongoing)  2. Pt will balance on single leg for 5 seconds to improve gait pattern. (Ongoing)  3. Pt will perform 5x sit<> </=11 seconds to improve functional strength and endurance. (Ongoing)  4. Pt will perform 6 min walk test for 350 ft using SPC or LRAD to demonstrate improved endurance and decrease fall risk. (Ongoing)  5. Pt will perform MCTSIB categories 4 for 30 seconds to improve static balance in dim lit environments and on uneven surfaces. (Ongoing)  6. Pt will score 48/56 on Doe Balance Assessment to decrease risk of falls and improve independence with gait. (Ongoing)    Plan     Certification date: 5/6/2024 to 7/10/2024.     Outpatient Physical Therapy 2 times weekly for 6 weeks to include the following interventions: Gait Training, Moist Heat/ Ice, Neuromuscular Re-ed, Orthotic Management and Training, Patient Education, Therapeutic Activities, and Therapeutic Exercise.        Corina Antonio, PTA

## 2024-06-12 ENCOUNTER — CLINICAL SUPPORT (OUTPATIENT)
Dept: REHABILITATION | Facility: HOSPITAL | Age: 84
End: 2024-06-12
Payer: MEDICARE

## 2024-06-12 DIAGNOSIS — Z74.09 IMPAIRED FUNCTIONAL MOBILITY, BALANCE, GAIT, AND ENDURANCE: ICD-10-CM

## 2024-06-12 DIAGNOSIS — R53.1 DECREASED STRENGTH: Primary | ICD-10-CM

## 2024-06-12 PROCEDURE — 97110 THERAPEUTIC EXERCISES: CPT | Mod: HCNC,PO,CQ

## 2024-06-12 PROCEDURE — 97530 THERAPEUTIC ACTIVITIES: CPT | Mod: HCNC,PO,CQ

## 2024-06-12 NOTE — PROGRESS NOTES
[facility]   Physical Therapy Treatment Note      Name: Brandi Flynn  Clinic Number: [id number]    Therapy Diagnosis:   Encounter Diagnoses   Name Primary?    Decreased strength Yes    Impaired functional mobility, balance, gait, and endurance      Physician: Iona Bueno PA-C    Visit Date: 6/12/2024    Physician Orders: PT Eval and Treat   Medical Diagnosis from Referral:   I62.00 (ICD-10-CM) - Nontraumatic subdural hemorrhage, unspecified   S12.501S (ICD-10-CM) - Closed nondisplaced fracture of sixth cervical vertebra, unspecified fracture morphology, sequela      Evaluation Date: 5/6/2024  Authorization Period Expiration: 5/1/2025  Plan of Care Expiration: 7/10/2024  Visit # / Visits authorized: 9/20(10)         PN Due: 7/6/2024                Time In: 1255 (early in)  Time Out: 1335  Total Billable Time: 40 minutes     Precautions: Standard and Fall    PTA Visit #: 2/5       Subjective     Patient reports: Walking without walker at home.  She reports was compliant with home exercise program.  Response to previous treatment: no problems stated  Functional change: ongoing    Pain: 2-3/10  Location:  Bilateral knees Left greater than Right      Objective      Objective Measures updated at progress report unless specified.     Treatment     Brandi received the treatments listed below:      therapeutic exercises to develop strength, endurance, ROM, core stabilization and flexibility for 13 minutes including:    NuStep Level 4 10 minutes Bilateral Lower Extremities only    Seated:  Hamstring stretch with foot on stool performing dorsiflexion/plantarflexion 1 minute Right Left   Long arc quads 10 times Right Left alternating    therapeutic activities to improve functional performance for 27  minutes, including:    Ambulation with 4 wheel walker 2x120 feet, verbal cues to increase upright    Parallel bars:  Bilateral heel raises 10 times on foam cushion   Weight shift 2 minutes on foam cushion  anterior/posterior, Right/Left    Single leg stand 3x30 seconds Right Left alternating  Step up to 4 inch box 10 times Right Left   Step up to 6 inch box 10 times Right Left     Sit<>stand 10 times verbal cues to decrease speed on descent    Patient Education and Home Exercises       Education provided:   - Patient provided with verbal and demonstrative instruction for all activities performed in today's session.    Written Home Exercises Provided: Patient instructed to cont prior HEP.     See Electronic Medical Record under Patient Instructions for exercises provided during therapy sessions    Assessment     Brandi provided good participation and effort during today's session with treatment focused on lower extremity strengthening and functional mobility. Brandi tolerated activities without complaints, good carryover for head up during stand activities and remaining close to walker during ambulation.    Brandi Is progressing towards her goals.   Patient prognosis is Good.     Patient will continue to benefit from skilled outpatient physical therapy to address the deficits listed in the problem list box on initial evaluation, provide pt/family education and to maximize pt's level of independence in the home and community environment.     Patient's spiritual, cultural and educational needs considered and pt agreeable to plan of care and goals.     Anticipated barriers to physical therapy:  age    Goals:     Short Term Goals: 3 weeks   1. Pt will report compliance with HEP >/= 3x/week to improve carry over. (Progressing)  2. Pt will improve BLE strength by 1/2 MMT score to improve independence with gait. (Ongoing)  3. Pt will perform 5x sit<> </=12 seconds to improve functional strength and endurance. (Ongoing)  4. Pt will perform 6 min walk test with cane or LRAD for distance of 250 feet to demonstrate improved gait and decrease fall risk. (Ongoing)  5. Pt will perform MCTSIB categories 4 for 15 seconds to improve  static balance in dim lit environments and on uneven surfaces. (Ongoing)     Long Term Goals: 6 weeks   1. Pt will report compliance with HEP >/= 3x/week to improve carry over. (Ongoing)  2. Pt will balance on single leg for 5 seconds to improve gait pattern. (Ongoing)  3. Pt will perform 5x sit<> </=11 seconds to improve functional strength and endurance. (Ongoing)  4. Pt will perform 6 min walk test for 350 ft using SPC or LRAD to demonstrate improved endurance and decrease fall risk. (Ongoing)  5. Pt will perform MCTSIB categories 4 for 30 seconds to improve static balance in dim lit environments and on uneven surfaces. (Ongoing)  6. Pt will score 48/56 on Doe Balance Assessment to decrease risk of falls and improve independence with gait. (Ongoing)    Plan     Certification date: 5/6/2024 to 7/10/2024.     Outpatient Physical Therapy 2 times weekly for 6 weeks to include the following interventions: Gait Training, Moist Heat/ Ice, Neuromuscular Re-ed, Orthotic Management and Training, Patient Education, Therapeutic Activities, and Therapeutic Exercise.        Corina Antonio, PTA

## 2024-06-20 ENCOUNTER — PATIENT MESSAGE (OUTPATIENT)
Dept: FAMILY MEDICINE | Facility: CLINIC | Age: 84
End: 2024-06-20
Payer: MEDICARE

## 2024-06-24 ENCOUNTER — CLINICAL SUPPORT (OUTPATIENT)
Dept: REHABILITATION | Facility: HOSPITAL | Age: 84
End: 2024-06-24
Attending: FAMILY MEDICINE
Payer: MEDICARE

## 2024-06-24 DIAGNOSIS — R53.1 DECREASED STRENGTH: Primary | ICD-10-CM

## 2024-06-24 DIAGNOSIS — Z74.09 IMPAIRED FUNCTIONAL MOBILITY, BALANCE, GAIT, AND ENDURANCE: ICD-10-CM

## 2024-06-24 PROCEDURE — 97110 THERAPEUTIC EXERCISES: CPT | Mod: KX,HCNC,PO

## 2024-06-24 PROCEDURE — 97530 THERAPEUTIC ACTIVITIES: CPT | Mod: KX,HCNC,PO

## 2024-06-24 NOTE — PROGRESS NOTES
"[facility]   Physical Therapy Treatment Note      Name: Brandi Flynn  Clinic Number: [id number]    Therapy Diagnosis:   Encounter Diagnoses   Name Primary?    Decreased strength Yes    Impaired functional mobility, balance, gait, and endurance        Physician: No ref. provider found    Visit Date: 6/24/2024    Physician Orders: PT Eval and Treat   Medical Diagnosis from Referral:   I62.00 (ICD-10-CM) - Nontraumatic subdural hemorrhage, unspecified   S12.501S (ICD-10-CM) - Closed nondisplaced fracture of sixth cervical vertebra, unspecified fracture morphology, sequela      Evaluation Date: 5/6/2024  Authorization Period Expiration: 5/1/2025  Plan of Care Expiration: 7/10/2024  Visit # / Visits authorized: 10/20(11)         PN Due: 7/6/2024                Time In: 1302  Time Out: 1341  Total Billable Time: 39 minutes     Precautions: Standard and Fall    PTA Visit #: 0/5       Subjective     Patient reports: Patient reports no complaints at start of session today.   She reports was compliant with home exercise program.  Response to previous treatment: no problems stated  Functional change: ongoing    Pain: 2-3/10  Location:  "my left knee is bothering me, right one is ok."     Objective      Objective Measures updated at progress report unless specified.     Treatment     Brandi received the treatments listed below:      therapeutic exercises to develop strength, endurance, ROM, core stabilization and flexibility for 16 minutes including:    SciFit Level 3 x 10 minutes using Bilateral upper/lower Extremities only  Standing hip abduction with light upper extremity support, 2 x 10 repetitions   Standing heel raise with light upper extremity support, 2 x 10 repetitions       therapeutic activities to improve functional performance for 23 minutes, including:    Sit to stand from elevated edge of mat with hands on thighs x 10 repetitions  Sit to stand rom elevated edge of mat with feet on [employer] " foam cushion and hands on thighs x 10 repetitions  Self standing cane adjusted for proper height   Patient ambulated x 300 feet with self standing cane and supervision from physical therapist. Verbal cues for upright posture with onset of fatigue   2 x 10 feet side stepping to left with light upper extremity support  2 x 10 feet side stepping to right with light upper extremity support  2 x 10 forward/backward walking with light left upper extremity support  Alternating toe taps to 6 inch step x 20 repetitions   Static balance in split stance with forward foot on 6 inch step, 2 x 30 seconds alternating forward foot        Patient Education and Home Exercises       Education provided:   - Patient provided with verbal and demonstrative instruction for all activities performed in today's session.    Written Home Exercises Provided: Patient instructed to cont prior HEP.     See Electronic Medical Record under Patient Instructions for exercises provided during therapy sessions    Assessment     Brandi provided good participation and effort during today's session with treatment focused on lower extremity strengthening and functional mobility.  With STS on foam cushion, patient demonstrated propensity for loss of balance posterior and uncontrolled sit. She ambulated throughout clinic today with use of self standing cane. Demonstrated fatigue, with noted forward trunk flexion, with longer distance ambulation using cane. Recommended performance of home exercise program 3x/week and use of cane with household mobility. Patient verbalized understanding. She remains a good candidate for skilled physical therapy intervention.       Brandi Is progressing towards her goals.   Patient prognosis is Good.     Patient will continue to benefit from skilled outpatient physical therapy to address the deficits listed in the problem list box on initial evaluation, provide pt/family education and to maximize pt's level of independence in the home  and community environment.     Patient's spiritual, cultural and educational needs considered and pt agreeable to plan of care and goals.     Anticipated barriers to physical therapy:  age    Goals:     Short Term Goals: 3 weeks   1. Pt will report compliance with HEP >/= 3x/week to improve carry over. (Progressing)  2. Pt will improve BLE strength by 1/2 MMT score to improve independence with gait. (Ongoing)  3. Pt will perform 5x sit<> </=12 seconds to improve functional strength and endurance. (Ongoing)  4. Pt will perform 6 min walk test with cane or LRAD for distance of 250 feet to demonstrate improved gait and decrease fall risk. (Ongoing)  5. Pt will perform MCTSIB categories 4 for 15 seconds to improve static balance in dim lit environments and on uneven surfaces. (Ongoing)     Long Term Goals: 6 weeks   1. Pt will report compliance with HEP >/= 3x/week to improve carry over. (Ongoing)  2. Pt will balance on single leg for 5 seconds to improve gait pattern. (Ongoing)  3. Pt will perform 5x sit<> </=11 seconds to improve functional strength and endurance. (Ongoing)  4. Pt will perform 6 min walk test for 350 ft using SPC or LRAD to demonstrate improved endurance and decrease fall risk. (Ongoing)  5. Pt will perform MCTSIB categories 4 for 30 seconds to improve static balance in dim lit environments and on uneven surfaces. (Ongoing)  6. Pt will score 48/56 on Doe Balance Assessment to decrease risk of falls and improve independence with gait. (Ongoing)    Plan     Certification date: 5/6/2024 to 7/10/2024.     Outpatient Physical Therapy 2 times weekly for 6 weeks to include the following interventions: Gait Training, Moist Heat/ Ice, Neuromuscular Re-ed, Orthotic Management and Training, Patient Education, Therapeutic Activities, and Therapeutic Exercise.     Recommendations for next treatment session: continue with dynamic balance training          ARIS OSMAN, PT

## 2024-07-01 ENCOUNTER — CLINICAL SUPPORT (OUTPATIENT)
Dept: REHABILITATION | Facility: HOSPITAL | Age: 84
End: 2024-07-01
Payer: MEDICARE

## 2024-07-01 DIAGNOSIS — R53.1 DECREASED STRENGTH: Primary | ICD-10-CM

## 2024-07-01 DIAGNOSIS — Z74.09 IMPAIRED FUNCTIONAL MOBILITY, BALANCE, GAIT, AND ENDURANCE: ICD-10-CM

## 2024-07-01 PROCEDURE — 97110 THERAPEUTIC EXERCISES: CPT | Mod: HCNC,PO

## 2024-07-01 PROCEDURE — 97530 THERAPEUTIC ACTIVITIES: CPT | Mod: HCNC,PO

## 2024-07-01 NOTE — PROGRESS NOTES
"[facility]   Physical Therapy Treatment Note      Name: Brandi Flynn  Clinic Number: [id number]    Therapy Diagnosis:   Encounter Diagnoses   Name Primary?    Decreased strength Yes    Impaired functional mobility, balance, gait, and endurance          Physician: Iona Bueno PA-C    Visit Date: 7/1/2024    Physician Orders: PT Eval and Treat   Medical Diagnosis from Referral:   I62.00 (ICD-10-CM) - Nontraumatic subdural hemorrhage, unspecified   S12.501S (ICD-10-CM) - Closed nondisplaced fracture of sixth cervical vertebra, unspecified fracture morphology, sequela      Evaluation Date: 5/6/2024  Authorization Period Expiration: 5/1/2025  Plan of Care Expiration: 7/10/2024  Visit # / Visits authorized: 11/20(12)         PN Due: 7/6/2024                Time In: 1:00 pm  Time Out: 1:43 pm  Total Billable Time: 43 minutes     Precautions: Standard and Fall    PTA Visit #: 0/5       Subjective     Patient reports: Patient reports no complaints at start of session today.   She reports was compliant with home exercise program.  Response to previous treatment: no problems stated  Functional change: ongoing    Pain: 2-3/10  Location:  "my left knee is bothering me, right one is ok."     Objective      Objective Measures updated at progress report unless specified.     Treatment     Brandi received the treatments listed below:      therapeutic exercises to develop strength, endurance, ROM, core stabilization and flexibility for 28 minutes including:    SciFit Level 4 x 15 minutes using Bilateral upper/lower Extremities only  Standing hip abduction with 2# ankle wts, B upper extremity support, 2 x 10 repetitions   Standing heel raise with 2# ankle wts, B upper extremity support, 2 x 10 repetitions   Standing marches with 2# ankle wts, BUE support, 2 x 20 alternating    therapeutic activities to improve functional performance for 15 minutes, including:    Standing narrowed base of support on firm eyes " closed 2 x 30 sec  Standing narrowed base of support on AirEx, eyes open/closed 2 x 30 sec each  Patient ambulated 2 x 120 feet with self standing cane and supervision from physical therapist. Verbal cues for upright posture with onset of fatigue   2 x 10 feet side stepping to left with light upper extremity support  2 x 10 feet side stepping to right with light upper extremity support  Static balance in split stance with forward foot on 6 inch step, 2 x 30 seconds alternating forward foot        Patient Education and Home Exercises       Education provided:   - Patient provided with verbal and demonstrative instruction for all activities performed in today's session.    Written Home Exercises Provided: Patient instructed to cont prior HEP.     See Electronic Medical Record under Patient Instructions for exercises provided during therapy sessions    Assessment     Brandi provided good participation and effort during today's session with treatment focused on lower extremity strengthening and functional mobility.  Progressed strength and endurance training with NuStep x 15 minutes at increased resistance. She ambulated throughout clinic today with use of self standing cane. Demonstrated fatigue, with noted forward trunk flexion, with longer distance ambulation using cane. Continued with strength training in standing to improve standing tolerance and gait with LRAD. She remains a good candidate for skilled physical therapy intervention.       Brandi Is progressing towards her goals.   Patient prognosis is Good.     Patient will continue to benefit from skilled outpatient physical therapy to address the deficits listed in the problem list box on initial evaluation, provide pt/family education and to maximize pt's level of independence in the home and community environment.     Patient's spiritual, cultural and educational needs considered and pt agreeable to plan of care and goals.     Anticipated barriers to physical  therapy:  age    Goals:     Short Term Goals: 3 weeks   1. Pt will report compliance with HEP >/= 3x/week to improve carry over. (Progressing)  2. Pt will improve BLE strength by 1/2 MMT score to improve independence with gait. (Ongoing)  3. Pt will perform 5x sit<> </=12 seconds to improve functional strength and endurance. (Ongoing)  4. Pt will perform 6 min walk test with cane or LRAD for distance of 250 feet to demonstrate improved gait and decrease fall risk. (Ongoing)  5. Pt will perform MCTSIB categories 4 for 15 seconds to improve static balance in dim lit environments and on uneven surfaces. (Ongoing)     Long Term Goals: 6 weeks   1. Pt will report compliance with HEP >/= 3x/week to improve carry over. (Ongoing)  2. Pt will balance on single leg for 5 seconds to improve gait pattern. (Ongoing)  3. Pt will perform 5x sit<> </=11 seconds to improve functional strength and endurance. (Ongoing)  4. Pt will perform 6 min walk test for 350 ft using SPC or LRAD to demonstrate improved endurance and decrease fall risk. (Ongoing)  5. Pt will perform MCTSIB categories 4 for 30 seconds to improve static balance in dim lit environments and on uneven surfaces. (Ongoing)  6. Pt will score 48/56 on Doe Balance Assessment to decrease risk of falls and improve independence with gait. (Ongoing)    Plan     Certification date: 5/6/2024 to 7/10/2024.     Outpatient Physical Therapy 2 times weekly for 6 weeks to include the following interventions: Gait Training, Moist Heat/ Ice, Neuromuscular Re-ed, Orthotic Management and Training, Patient Education, Therapeutic Activities, and Therapeutic Exercise.     Recommendations for next treatment session: continue with dynamic balance training          Joann Felipe, PT

## 2024-07-03 ENCOUNTER — CLINICAL SUPPORT (OUTPATIENT)
Dept: REHABILITATION | Facility: HOSPITAL | Age: 84
End: 2024-07-03
Payer: MEDICARE

## 2024-07-03 DIAGNOSIS — Z74.09 IMPAIRED FUNCTIONAL MOBILITY, BALANCE, GAIT, AND ENDURANCE: ICD-10-CM

## 2024-07-03 DIAGNOSIS — R53.1 DECREASED STRENGTH: Primary | ICD-10-CM

## 2024-07-03 PROCEDURE — 97110 THERAPEUTIC EXERCISES: CPT | Mod: HCNC,PO

## 2024-07-03 PROCEDURE — 97530 THERAPEUTIC ACTIVITIES: CPT | Mod: HCNC,PO

## 2024-07-03 NOTE — PROGRESS NOTES
[facility]   Physical Therapy Treatment Note      Name: Brandi Flynn  Clinic Number: [id number]    Therapy Diagnosis:   Encounter Diagnoses   Name Primary?    Decreased strength Yes    Impaired functional mobility, balance, gait, and endurance        Physician: Iona Bueno PA-C    Visit Date: 7/3/2024    Physician Orders: PT Eval and Treat   Medical Diagnosis from Referral:   I62.00 (ICD-10-CM) - Nontraumatic subdural hemorrhage, unspecified   S12.501S (ICD-10-CM) - Closed nondisplaced fracture of sixth cervical vertebra, unspecified fracture morphology, sequela      Evaluation Date: 5/6/2024  Authorization Period Expiration: 5/1/2025  Plan of Care Expiration: 7/10/2024-8/10/2024  Visit # / Visits authorized: 12/20(13)         PN Due: 8/6/2024                Time In: 1:00 pm  Time Out: 1:45 pm  Total Billable Time: 45 minutes     Precautions: Standard and Fall    PTA Visit #: 0/5       Subjective     Patient reports: (upon arrival)She has some left ankle pain today. Bruised medial ankle but unsure of cause. States she is on blood thinners and has bruises to arms and legs with brushing up against objects. States she ambulates in home with cane or without AD using furniture for support as needed. Rollator remains in vehicle for outings. Denies any recent falls.  (During treatment session after walking)Has daily pain to knees with walking but improves when sitting down. Describes pain as dull ache.    She reports was compliant with home exercise program.  Response to previous treatment: no problems stated  Functional change: ongoing    Pain: 3/10  Location:  L ankle      Objective      Objective Measures updated at progress report unless specified.     Swelling to B lower leg around ankles. Bruising to medial L ankle(unsure of what caused bruising; is on blood thinners). No SOB or chest pain. No redness or warmth present. No pain to gastroc. Tenderness at moderate depth touch over bruised  area.  SpO2: 97%  HR: 72 bpm    Lower Extremity Strength    RLE LLE   Hip Flexion: 4-/5 4-/5   Hip Extension:  NT NT   Hip Abduction: in sitting 4-/5 4-/5   Hip Adduction: NT NT   Knee Extension: 5/5 4+/5   Knee Flexion: 4-/5 4-/5   Ankle Dorsiflexion: 4/5 4/5   Ankle Plantarflexion: 4-/5 4-/5      Abdominal Strength: 4/5          Evaluation 6/3/2024 7/3/3034   Single Limb Stance R LE <2 sec  (<10 sec = HIGH FALL RISK) <2 sec 1-2 sec   Single Limb Stance L LE <2 sec  (<10 sec = HIGH FALL RISK) <2 sec 1-2 sec   5 times sit-stand 15 seconds without hands   >12 sec= fall risk for general elderly  >16 sec= fall risk for Parkinson's disease  >10 sec= balance/vestibular dysfunction (<59 y/o)  >14.2 sec= balance/vestibular dysfunction (>59 y/o)  >12 sec= fall risk for CVA 17 seconds with loss of balance x 2 13 seconds with hand in lap   Pearl 43/56 44/56 46/56      PEARL Balance Assessment     1. Sitting to Standing              4 - able to stand without using hands and stabilize independently  2. Standing Unsupported              4 - able to stand safely 2 minutes without hold  3. Sitting Unsupported              4 - able to sit safely and securely 2 minutes  4. Standing to Sitting              4 - sits safely with minimal use of hands  5. Pivot Transfer              4 - able to trnasfer safely with minor use of hands  6. Standing with Eyes Closed              4 - albe to stand 10 seconds safely  7. Standing with Feet Together              4 - able to place feet together independently and stand 1 minute safely  8. Reaching Forward with Outstretched Arm              3 - can reach forward 12 cm/5 inches safely  9. Retrieving Object from Floor              4 - able to pick slipper up safely and easily  10. Turning to Look Behind              4 - looks behind with weight shift  11. Turning 360 Degrees              4 - able to turn 360 in seconds or less  12. Placing Alternate Foot on Step            2 - able to complete 4 steps  "without aid with supervision  13. Standing with One Foot in Front              0 - Looses balance while stepping or standing  14. Standing on One Foot            1 - tries to lift leg and unable to hold 3 seconds but remains standing independently  Total: 46  Maximum: 56     Score:   0-20= high fall risk   21-40 moderate fall risk   41-56 low fall risk      Fall risk cut-off scores:   Elderly and History of falls: <51/56   Elderly and No history of falls: <42/56   CVA: <45/56           Postural control:  MCTSIB:  1. Eyes Open/feet together/Firm: 30 seconds  2. Eyes Closed/feet together/Firm: 30 seconds. Min sway  3. Eyes Open/feet together/Foam: 30 seconds,  4. Eyes Closed/feet together/Foam: 30 seconds, min sway          Evaluation 6/3/24 7/3/2024   6 min walk test 267 ft in 2'47 sec prior to leg fatigue and knee discomfort with rollator 698' in 4'45" with Rollator 370 ft with rollator in 3 min 52 sec(stopped due to knee pain)  233 ft in 2 min with hurry cane  (Performed post bike today)        Treatment     Brandi received the treatments listed below:      therapeutic exercises to develop strength, endurance, ROM, core stabilization and flexibility for 30 minutes including:    Reassessment  SciFit Level 4 x 15 minutes using Bilateral upper/lower Extremities only  Standing hip abduction with 2# ankle wts, B upper extremity support, 2 x 10 repetitions (no wts today due to knee pain and fatigue)  Standing heel raise with 2# ankle wts, B upper extremity support, 2 x 10 repetitions (no wts today due to knee pain and fatigue)  Standing marches with 2# ankle wts, BUE support, 2 x 20 alternating (no wts today due to knee pain and fatigue)    therapeutic activities to improve functional performance for 15 minutes, including:    Standing narrowed base of support on firm eyes closed 2 x 30 sec  Standing narrowed base of support on AirEx, eyes open/closed 2 x 30 sec each  Tandem stance 2 x 15 seconds with LOB  SLS- attempted but " unable to maintain for more than 1-2 seconds each      Patient Education and Home Exercises       Education provided:   - Patient provided with verbal and demonstrative instruction for all activities performed in today's session.    Written Home Exercises Provided: Patient instructed to cont prior HEP.     See Electronic Medical Record under Patient Instructions for exercises provided during therapy sessions    Assessment     Brandi was reassessed by physical therapist today. She demonstrates improvement in gross lower extremity strength, however she remains limited functionally by knee pain. Ambulates into clinic using rollator, and brings hurry cane with her for short distances. States she is able to ambulate in house with cane or no AD, external support from furniture intermittently as needed. Demonstrates improved functional strength and endurance by performing 5x sit to stand test in 13 seconds. She remains a fall risk via Doe Balance Assessment. She scored a 46/56 indicating fall risk due to age and history of falls. Will reassess 6 minute walk test to be performed at next visit with primary PT prior to bike or strength training exercises for more accurate assessment. She would benefit from continued physical therapy to address dynamic balance and gait deficits limiting independence during community outings.       Brandi Is progressing towards her goals.   Patient prognosis is Good.     Patient will continue to benefit from skilled outpatient physical therapy to address the deficits listed in the problem list box on initial evaluation, provide pt/family education and to maximize pt's level of independence in the home and community environment.     Patient's spiritual, cultural and educational needs considered and pt agreeable to plan of care and goals.     Anticipated barriers to physical therapy:  age    Goals:     Short Term Goals: 3 weeks   1. Pt will report compliance with HEP >/= 3x/week to improve carry over.  MET  2. Pt will improve BLE strength by 1/2 MMT score to improve independence with gait. MET  3. Pt will perform 5x sit<> </=12 seconds to improve functional strength and endurance. (Ongoing)  4. Pt will perform 6 min walk test with cane or LRAD for distance of 250 feet to demonstrate improved gait and decrease fall risk. (Ongoing)  5. Pt will perform MCTSIB categories 4 for 15 seconds to improve static balance in dim lit environments and on uneven surfaces. MET     Long Term Goals: 6 weeks   1. Pt will report compliance with HEP >/= 3x/week to improve carry over. (Ongoing)  2. Pt will balance on single leg for 5 seconds to improve gait pattern. (Ongoing)  3. Pt will perform 5x sit<> </=11 seconds to improve functional strength and endurance. (Ongoing)  4. Pt will perform 6 min walk test for 350 ft using SPC or LRAD to demonstrate improved endurance and decrease fall risk. (Ongoing)  5. Pt will perform MCTSIB categories 4 for 30 seconds to improve static balance in dim lit environments and on uneven surfaces. MET  6. Pt will score 48/56 on Doe Balance Assessment to decrease risk of falls and improve independence with gait. (Ongoing)    Plan     Certification date: 7/3/2024 to 8/10/2024.     Outpatient Physical Therapy 2 times weekly for 4 weeks to include the following interventions: Gait Training, Moist Heat/ Ice, Neuromuscular Re-ed, Orthotic Management and Training, Patient Education, Therapeutic Activities, and Therapeutic Exercise.     Recommendations for next treatment session: continue with dynamic balance training, gait endurance with LRAD, Gait with: curbs/stairs/ramps for community outings         Joann Felipe, PT, DPT

## 2024-07-03 NOTE — PLAN OF CARE
Outpatient Therapy Updated Plan of Care     Visit Date: 7/3/2024  Name: Brandi Flynn  Clinic Number: [id number]    Therapy Diagnosis:   Encounter Diagnoses   Name Primary?    Decreased strength Yes    Impaired functional mobility, balance, gait, and endurance      Physician: Iona Bueno PA-C    Physician Orders: PT Eval and Treat   Medical Diagnosis from Referral:   I62.00 (ICD-10-CM) - Nontraumatic subdural hemorrhage, unspecified   S12.501S (ICD-10-CM) - Closed nondisplaced fracture of sixth cervical vertebra, unspecified fracture morphology, sequela      Evaluation Date: 5/6/2024  Authorization Period Expiration: 5/1/2025  Plan of Care Expiration: 7/10/2024-8/10/2024  Visit # / Visits authorized: 12/20(13)      Total Visits Received: 12  Cancelled Visits: 3  No Show Visits: 0    Current Certification Period:  5/6/2024 to 7/10/2024  Precautions:  Standard and Fall  Visits from Evaluation Date:  13  Functional Level Prior to Evaluation: gait with rollator, fall risk, impaired endurance, decreased strength    Subjective     Update: (upon arrival)She has some left ankle pain today. Bruised medial ankle but unsure of cause. States she is on blood thinners and has bruises to arms and legs with brushing up against objects. States she ambulates in home with cane or without AD using furniture for support as needed. Rollator remains in vehicle for outings. Denies any recent falls.  (During treatment session after walking)Has daily pain to knees with walking but improves when sitting down. Describes pain as dull ache.    Objective     Update:   Swelling to B lower leg around ankles. Bruising to medial L ankle(unsure of what caused bruising; is on blood thinners). No SOB or chest pain. No redness or warmth present. No pain to gastroc. Tenderness at moderate depth touch over bruised area.  SpO2: 97%  HR: 72 bpm    Lower Extremity Strength    RLE LLE   Hip Flexion: 4-/5 4-/5   Hip Extension:  NT NT   Hip Abduction: in  sitting 4-/5 4-/5   Hip Adduction: NT NT   Knee Extension: 5/5 4+/5   Knee Flexion: 4-/5 4-/5   Ankle Dorsiflexion: 4/5 4/5   Ankle Plantarflexion: 4-/5 4-/5      Abdominal Strength: 4/5          Evaluation 6/3/2024 7/3/3034   Single Limb Stance R LE <2 sec  (<10 sec = HIGH FALL RISK) <2 sec 1-2 sec   Single Limb Stance L LE <2 sec  (<10 sec = HIGH FALL RISK) <2 sec 1-2 sec   5 times sit-stand 15 seconds without hands   >12 sec= fall risk for general elderly  >16 sec= fall risk for Parkinson's disease  >10 sec= balance/vestibular dysfunction (<61 y/o)  >14.2 sec= balance/vestibular dysfunction (>61 y/o)  >12 sec= fall risk for CVA 17 seconds with loss of balance x 2 13 seconds with hand in lap   Pearl 43/56 44/56 46/56      PEARL Balance Assessment     1. Sitting to Standing              4 - able to stand without using hands and stabilize independently  2. Standing Unsupported              4 - able to stand safely 2 minutes without hold  3. Sitting Unsupported              4 - able to sit safely and securely 2 minutes  4. Standing to Sitting              4 - sits safely with minimal use of hands  5. Pivot Transfer              4 - able to trnasfer safely with minor use of hands  6. Standing with Eyes Closed              4 - albe to stand 10 seconds safely  7. Standing with Feet Together              4 - able to place feet together independently and stand 1 minute safely  8. Reaching Forward with Outstretched Arm              3 - can reach forward 12 cm/5 inches safely  9. Retrieving Object from Floor              4 - able to pick slipper up safely and easily  10. Turning to Look Behind              4 - looks behind with weight shift  11. Turning 360 Degrees              4 - able to turn 360 in seconds or less  12. Placing Alternate Foot on Step            2 - able to complete 4 steps without aid with supervision  13. Standing with One Foot in Front              0 - Looses balance while stepping or standing  14.  "Standing on One Foot            1 - tries to lift leg and unable to hold 3 seconds but remains standing independently  Total: 46  Maximum: 56     Score:   0-20= high fall risk   21-40 moderate fall risk   41-56 low fall risk      Fall risk cut-off scores:   Elderly and History of falls: <51/56   Elderly and No history of falls: <42/56   CVA: <45/56           Postural control:  MCTSIB:  1. Eyes Open/feet together/Firm: 30 seconds  2. Eyes Closed/feet together/Firm: 30 seconds. Min sway  3. Eyes Open/feet together/Foam: 30 seconds,  4. Eyes Closed/feet together/Foam: 30 seconds, min sway          Evaluation 6/3/24 7/3/2024   6 min walk test 267 ft in 2'47 sec prior to leg fatigue and knee discomfort with rollator 698' in 4'45" with Rollator 370 ft with rollator in 3 min 52 sec(stopped due to knee pain)  233 ft in 2 min with hurry cane  (Performed post bike today)        Assessment     Update: Brandi was reassessed by physical therapist today. She demonstrates improvement in gross lower extremity strength, however she remains limited functionally by knee pain. Ambulates into clinic using rollator, and brings hurry cane with her for short distances. States she is able to ambulate in house with cane or no AD, external support from furniture intermittently as needed. Demonstrates improved functional strength and endurance by performing 5x sit to stand test in 13 seconds. She remains a fall risk via Doe Balance Assessment. She scored a 46/56 indicating fall risk due to age and history of falls. Will reassess 6 minute walk test to be performed at next visit with primary PT prior to bike or strength training exercises for more accurate assessment. She would benefit from continued physical therapy to address dynamic balance and gait deficits limiting independence during community outings.       Short Term Goals: 3 weeks   1. Pt will report compliance with HEP >/= 3x/week to improve carry over. MET  2. Pt will improve BLE " strength by 1/2 MMT score to improve independence with gait. MET  3. Pt will perform 5x sit<> </=12 seconds to improve functional strength and endurance. (Ongoing)  4. Pt will perform 6 min walk test with cane or LRAD for distance of 250 feet to demonstrate improved gait and decrease fall risk. (Ongoing)  5. Pt will perform MCTSIB categories 4 for 15 seconds to improve static balance in dim lit environments and on uneven surfaces. MET     Long Term Goals: 6 weeks   1. Pt will report compliance with HEP >/= 3x/week to improve carry over. (Ongoing)  2. Pt will balance on single leg for 5 seconds to improve gait pattern. (Ongoing)  3. Pt will perform 5x sit<> </=11 seconds to improve functional strength and endurance. (Ongoing)  4. Pt will perform 6 min walk test for 350 ft using SPC or LRAD to demonstrate improved endurance and decrease fall risk. (Ongoing)  5. Pt will perform MCTSIB categories 4 for 30 seconds to improve static balance in dim lit environments and on uneven surfaces. MET  6. Pt will score 48/56 on Doe Balance Assessment to decrease risk of falls and improve independence with gait. (Ongoing)  Long Term Goal Status:   continue per initial plan of care.  Reasons for Recertification of Therapy:  limited gait, remains fall risk    Plan     Updated Certification Period: 7/3/2024 to 8/10/2024  Recommended Treatment Plan: 2 times per week for 4 weeks: Gait Training, Neuromuscular Re-ed, Orthotic Management and Training, Patient Education, Therapeutic Activities, and Therapeutic Exercise  Other Recommendations: SHELLIE Felipe, PT  7/3/2024      I CERTIFY THE NEED FOR THESE SERVICES FURNISHED UNDER THIS PLAN OF TREATMENT AND WHILE UNDER MY CARE    Physician's comments:        Physician's Signature: ___________________________________________________

## 2024-07-11 ENCOUNTER — CLINICAL SUPPORT (OUTPATIENT)
Dept: REHABILITATION | Facility: HOSPITAL | Age: 84
End: 2024-07-11
Payer: MEDICARE

## 2024-07-11 DIAGNOSIS — R53.1 DECREASED STRENGTH: Primary | ICD-10-CM

## 2024-07-11 DIAGNOSIS — Z74.09 IMPAIRED FUNCTIONAL MOBILITY, BALANCE, GAIT, AND ENDURANCE: ICD-10-CM

## 2024-07-11 PROCEDURE — 97530 THERAPEUTIC ACTIVITIES: CPT | Mod: KX,HCNC,PO

## 2024-07-11 PROCEDURE — 97110 THERAPEUTIC EXERCISES: CPT | Mod: KX,HCNC,PO

## 2024-07-11 NOTE — PROGRESS NOTES
[facility]   Physical Therapy Treatment Note      Name: Brandi Flynn  Clinic Number: [id number]    Therapy Diagnosis:   Encounter Diagnoses   Name Primary?    Decreased strength Yes    Impaired functional mobility, balance, gait, and endurance          Physician: Iona Bueno PA-C    Visit Date: 7/11/2024    Physician Orders: PT Eval and Treat   Medical Diagnosis from Referral:   I62.00 (ICD-10-CM) - Nontraumatic subdural hemorrhage, unspecified   S12.501S (ICD-10-CM) - Closed nondisplaced fracture of sixth cervical vertebra, unspecified fracture morphology, sequela      Evaluation Date: 5/6/2024  Authorization Period Expiration: 5/1/2025  Plan of Care Expiration: 7/10/2024-8/10/2024  Visit # / Visits authorized: 12/20(13)         PN Due: 8/6/2024                Time In: 1346  Time Out: 1430  Total Billable Time: 44 minutes     Precautions: Standard and Fall    PTA Visit #: 0/5       Subjective     Patient reports: Patient reports she feels she is improving with therapy. Patient reports that tomorrow is her last scheduled session. She is unsure if she will need more therapy or not.     She reports was compliant with home exercise program.  Response to previous treatment: no problems stated  Functional change: Patient has been taking her cane out with her (in the community) vs. Rolling walker.     Pain: 0/10  Location:  L ankle      Objective      Objective Measures updated at progress report unless specified.     Treatment     Brandi received the treatments listed below:      therapeutic exercises to develop strength, endurance, ROM, core stabilization and flexibility for 20 minutes including:    SciFit Level 4 x 10 minutes using Bilateral upper/lower Extremities only  Standing hip extension with resistance from red theraband loop, 2 x 10 repetitions bilaterally  Standing heel raise with light upper extremity support, 2 x 10 repetitions    therapeutic activities to improve functional  performance for 24 minutes, including:    Sit to stand from elevated edge of mat with feet on AirEx foam cushion x 10 repetitions  Sit to stand from elevated edge of mat with feet on AirEx foam cushion with chest press (2lb weighted bar) upon standing x 10 repetitions   Progression through floor ladder as follows:  Forward with step-to pattern x 2 trials using self-standing cane  Forward with  step-through pattern x 2 trials using self-standing cane  Patient ambulated x 270 feet with use of self standing cane to improve safety and endurance       Patient Education and Home Exercises       Education provided:   - Patient provided with verbal and demonstrative instruction for all activities performed in today's session.    Written Home Exercises Provided: Patient instructed to cont prior HEP.     See Electronic Medical Record under Patient Instructions for exercises provided during therapy sessions    Assessment     Brandi provided good participation and effort during session today with focus on lower extremity strength/endurance and dynamic gait with self standing cane. Patient has largely transitioned to use of cane with community mobility. Her long term goal is to return to walking without an assistive device. With gait using self standing cane today, patient demonstrated fatigue following 270 feet. Feel she will benefit from continued work on endurance and balance to improve her safety with community mobility using cane. She remains a good candidate for continued skilled physical therapy intervention.     Brandi Is progressing towards her goals.   Patient prognosis is Good.     Patient will continue to benefit from skilled outpatient physical therapy to address the deficits listed in the problem list box on initial evaluation, provide pt/family education and to maximize pt's level of independence in the home and community environment.     Patient's spiritual, cultural and educational needs considered and pt agreeable to  plan of care and goals.     Anticipated barriers to physical therapy:  age    Goals:     Short Term Goals: 3 weeks   1. Pt will report compliance with HEP >/= 3x/week to improve carry over. MET  2. Pt will improve BLE strength by 1/2 MMT score to improve independence with gait. MET  3. Pt will perform 5x sit<> </=12 seconds to improve functional strength and endurance. (Ongoing)  4. Pt will perform 6 min walk test with cane or LRAD for distance of 250 feet to demonstrate improved gait and decrease fall risk. (Ongoing)  5. Pt will perform MCTSIB categories 4 for 15 seconds to improve static balance in dim lit environments and on uneven surfaces. MET     Long Term Goals: 6 weeks   1. Pt will report compliance with HEP >/= 3x/week to improve carry over. (Ongoing)  2. Pt will balance on single leg for 5 seconds to improve gait pattern. (Ongoing)  3. Pt will perform 5x sit<> </=11 seconds to improve functional strength and endurance. (Ongoing)  4. Pt will perform 6 min walk test for 350 ft using SPC or LRAD to demonstrate improved endurance and decrease fall risk. (Ongoing)  5. Pt will perform MCTSIB categories 4 for 30 seconds to improve static balance in dim lit environments and on uneven surfaces. MET  6. Pt will score 48/56 on Doe Balance Assessment to decrease risk of falls and improve independence with gait. (Ongoing)    Plan     Certification date: 7/3/2024 to 8/10/2024.     Outpatient Physical Therapy 2 times weekly for 4 weeks to include the following interventions: Gait Training, Moist Heat/ Ice, Neuromuscular Re-ed, Orthotic Management and Training, Patient Education, Therapeutic Activities, and Therapeutic Exercise.     Recommendations for next treatment session: continue with dynamic balance training, gait endurance with LRAD, Gait with: curbs/stairs/ramps for community outings         ARIS OSMAN, PT, DPT

## 2024-07-12 ENCOUNTER — CLINICAL SUPPORT (OUTPATIENT)
Dept: REHABILITATION | Facility: HOSPITAL | Age: 84
End: 2024-07-12
Payer: MEDICARE

## 2024-07-12 DIAGNOSIS — Z74.09 IMPAIRED FUNCTIONAL MOBILITY, BALANCE, GAIT, AND ENDURANCE: ICD-10-CM

## 2024-07-12 DIAGNOSIS — I62.00 NONTRAUMATIC SUBDURAL HEMORRHAGE, UNSPECIFIED: Primary | ICD-10-CM

## 2024-07-12 DIAGNOSIS — R53.1 DECREASED STRENGTH: ICD-10-CM

## 2024-07-12 PROCEDURE — 97112 NEUROMUSCULAR REEDUCATION: CPT | Mod: HCNC,PO

## 2024-07-12 PROCEDURE — 97110 THERAPEUTIC EXERCISES: CPT | Mod: HCNC,PO

## 2024-07-12 NOTE — PROGRESS NOTES
[facility]   Physical Therapy Treatment Note      Name: Brandi Flynn  Clinic Number: [id number]    Therapy Diagnosis:   Encounter Diagnoses   Name Primary?    Nontraumatic subdural hemorrhage, unspecified Yes    Decreased strength     Impaired functional mobility, balance, gait, and endurance      Physician: Iona Bueno PA-C    Visit Date: 7/12/2024    Physician Orders: PT Eval and Treat   Medical Diagnosis from Referral:   I62.00 (ICD-10-CM) - Nontraumatic subdural hemorrhage, unspecified   S12.501S (ICD-10-CM) - Closed nondisplaced fracture of sixth cervical vertebra, unspecified fracture morphology, sequela   Evaluation Date: 5/6/2024  Authorization Period Expiration: 12/31/2024  Plan of Care Expiration: 7/10/2024-8/10/2024  Visit # / Visits authorized: 13/ 20                       Time In: 1150  Time Out: 1232  Total Billable Time: 42 minutes     Precautions: Fall       Subjective     Patient reports: minimal lower extremity discomfort during general mobility.    She was compliant with home exercise program.  Response to previous treatment: no changes  Functional change: none    Pain: 2/10  Location: bilateral legs       Objective      Objective Measures updated at progress report unless specified.     Treatment     Brandi received the treatments listed below:      therapeutic exercises to develop strength and endurance for 10 minutes including:    X 15 each seated bilateral lower extremity therapeutic exercise (3#) = marching, long arc quad, ball squeeze   2 X 20 each shuttle mini squats and heel raises (62#)       neuromuscular re-education activities to improve: Balance for 32 minutes. The following activities were included:    X 25 feet each balance gait = side stepping, backwards gait  X 15 each balance therapeutic exercise = heel raises/toe raises, mini squats  X 1 minute stand on AirEx*  X 15 alternating toe taps on 5-inch stool  X 15 ankle leans = forwards/backwards  X 15 each  dynamic leans = forwards/backwards, side to side  2 x 25 feet square pattern (facing same direction)  Functional ambulation 200 feet on compliant surface with rollator assist  Functional ambulation 2 x 120 feet with rollator and stand by assist       *minimal difficulty      Patient Education and Home Exercises       Education provided:   - proper therapeutic exercise technique  - continue home exercise program twice a day     Written Home Exercises Provided: Patient instructed to cont prior HEP.       Assessment     Patient needed occasional upper extremity support during balance gait and balance therapeutic exercise; no loss of balance demonstrated during alternating toe taps on stool; minimal sway noted while standing on AirEx.    Brandi Is progressing fairly well towards her goals.   Patient prognosis is Fair.     Patient will continue to benefit from skilled outpatient physical therapy to address the deficits listed in the problem list box on initial evaluation, provide pt/family education and to maximize pt's level of independence in the home and community environment.     Patient's spiritual, cultural and educational needs considered and pt agreeable to plan of care and goals.     Anticipated barriers to physical therapy: imbalance    Goals:     Short Term Goals: 3 weeks   1. Pt will report compliance with HEP >/= 3x/week to improve carry over. MET  2. Pt will improve BLE strength by 1/2 MMT score to improve independence with gait. MET  3. Pt will perform 5x sit<> </=12 seconds to improve functional strength and endurance. (Ongoing)  4. Pt will perform 6 min walk test with cane or LRAD for distance of 250 feet to demonstrate improved gait and decrease fall risk. (Ongoing)  5. Pt will perform MCTSIB categories 4 for 15 seconds to improve static balance in dim lit environments and on uneven surfaces. MET     Long Term Goals: 6 weeks   1. Pt will report compliance with HEP >/= 3x/week to improve carry over.  (Ongoing)  2. Pt will balance on single leg for 5 seconds to improve gait pattern. (Ongoing)  3. Pt will perform 5x sit<> </=11 seconds to improve functional strength and endurance. (Ongoing)  4. Pt will perform 6 min walk test for 350 ft using SPC or LRAD to demonstrate improved endurance and decrease fall risk. (Ongoing)  5. Pt will perform MCTSIB categories 4 for 30 seconds to improve static balance in dim lit environments and on uneven surfaces. MET  6. Pt will score 48/56 on Doe Balance Assessment to decrease risk of falls and improve independence with gait. (Ongoing)      Plan     Continue to progress strength/balance/mobility training to patient's tolerance.      Jose Miguel Gonsales, PT

## 2024-07-17 ENCOUNTER — CLINICAL SUPPORT (OUTPATIENT)
Dept: REHABILITATION | Facility: HOSPITAL | Age: 84
End: 2024-07-17
Payer: MEDICARE

## 2024-07-17 DIAGNOSIS — Z74.09 IMPAIRED FUNCTIONAL MOBILITY, BALANCE, GAIT, AND ENDURANCE: ICD-10-CM

## 2024-07-17 DIAGNOSIS — R53.1 DECREASED STRENGTH: Primary | ICD-10-CM

## 2024-07-17 PROCEDURE — 97110 THERAPEUTIC EXERCISES: CPT | Mod: HCNC,PO,CQ

## 2024-07-17 PROCEDURE — 97530 THERAPEUTIC ACTIVITIES: CPT | Mod: HCNC,PO,CQ

## 2024-07-17 NOTE — PROGRESS NOTES
Physical Therapy Progress Note      Name: Claudia Elmore  Clinic Number: [id number]     Therapy Diagnosis:        Encounter Diagnoses   Name Primary?    Decreased strength      Hypotonia      Developmental delay        Physician: Kulwinder Barton MD     Visit Date: 3/22/2022     Physician Orders: Continuation of Therapy   Medical Diagnosis: Spinal Muscular Atrophy   Evaluation Date: 05/06/2019  Authorization Period Expiration: 1/3/2023  Plan of Care Certification Period: 12/7/2021 to 6/7/2022  Visit #/Visits authorized: 10/20 (91 prior authorized visits)      Time In: 1517   Time Out: 1600  Total Billable Time: 43 minutes     Precautions: Standard     Subjective      Claudia was brought to therapy by her father. Pt's father was present throughout the session with appropriate PPE donned. Gisel arrived sleepy again and just waking up.    Parent/Caregiver reports: Patient's father reports she is upset with him again for waking him up.     Response to previous treatment: NA      Pain: Patient scored 0/10 on the FLACC scale for assessment of non-verbal signs of Pain using the following criteria.   Location of pain: N/A      Criteria Score: 0 Score: 1 Score: 2   Face No particular expression or smile Occasional grimace or frown, withdrawn, uninterested Frequent to constant quivering chin, clenched jaw   Legs Normal position or relaxed Uneasy, restless, tense Kicking, or legs drawn up   Activity Lying quietly, normal position moves easily Squirming, shifting, back and forth, tense Arched, rigid, or jerking   Cry No cry (awake or asleep) Moans or whimpers; occasional complaint Crying steadily, screams or sobs, frequent complaints   Consolability Content, relaxed Reassured by occasional touching, hugging or being talked to, disractible Difficult to console or comfort      [Magda D, Dash Mohamud T, Malik S. Pain assessment in infants and young children: the FLACC scale. Am J Nurse. 2002;[id number])55-8.]    "[facility]   Physical Therapy Treatment Note      Name: Brandi Flynn  Clinic Number: [id number]    Therapy Diagnosis:   Encounter Diagnoses   Name Primary?    Decreased strength Yes    Impaired functional mobility, balance, gait, and endurance      Physician: Iona Bueno PA-C    Visit Date: 7/17/2024    Physician Orders: PT Eval and Treat   Medical Diagnosis from Referral:   I62.00 (ICD-10-CM) - Nontraumatic subdural hemorrhage, unspecified   S12.501S (ICD-10-CM) - Closed nondisplaced fracture of sixth cervical vertebra, unspecified fracture morphology, sequela   Evaluation Date: 5/6/2024  Authorization Period Expiration: 12/31/2024  Plan of Care Expiration: 7/10/2024-8/10/2024  Visit # / Visits authorized: 14/20(16)                Time In: 1100  Time Out: 1143  Total Billable Time: 43 minutes     Precautions: Fall    Subjective     Patient reports: doing better overall, reports "some knee soreness at times", reports "not bothering me today".  She was compliant with home exercise program.  Response to previous treatment: no problems stated  Functional change: ongoing    Pain: 0/10  Location: Not Applicable      Objective      Objective Measures updated at progress report unless specified.     Treatment     Brandi received the treatments listed below:      therapeutic exercises to develop strength and endurance for 20 minutes including:    SciFit Level 1 10 minutes with Bilateral Upper Extremities     Seated:  Long arc quads 3 sets of 10 3# Right Left   Hip flexion 3 sets of 10 3# Right Left     therapeutic activities to improve functional performance for 23  minutes, including:    Stand at counter with 3# Right Left lower extremity:  Bilateral heel raises 15 times  Hip abduction 10 times Right Left alternating  Hip extension 10 times Right Left alternating  Hamstring curls 10 times Right Left alternating    Ambulation with 4 wheel walker 120 feet with verbal cues x1 to increase "   Objective   Session focused on: exercises to develop LE strength and muscular endurance, LE range of motion and flexibility, sitting balance, standing balance, coordination, posture, kinesthetic sense and proprioception, desensitization techniques, facilitation of gait, stair negotiation, enhancement of sensory processing, promotion of adaptive responses to environmental demands, gross motor stimulation, cardiovascular endurance training, parent education and training, initiation/progression of HEP eye-hand coordination, core muscle activation.     Claudia received therapeutic exercises to develop strength, endurance, ROM, posture and core stabilization for 31 minutes including:   · Sit ups from the mat surfeace x 2 reps with maximum assistance behind shoulders to achieve full range of motion   · Tall kneeling with UE support 2 x 40-60 seconds with maximum to minimal assistance at hips for stability   · Sit to stands from a child size bench with upper extremity support on therapist x 8 reps; maximum assistance at hips   · Standing with no UE support for ~10 seconds x 8 reps; max A at hips     Claudia participated in gait training to improve functional mobility and safety for 12 minutes, including:  · Ambulating 70' in small PRW with pelvic stabilizer; with minimal assistance for forward propulsion of the walker      Home Exercises Provided and Patient Education Provided   Education provided:   - Patient's mother was educated on patient's current functional status and progress.  Patient's mother was educated on updated HEP.  Patient's mother verbalized understanding.     Written Home Exercises Provided: yes.  Exercises were reviewed and Claudia was able to demonstrate them prior to the end of the session.  Claudia demonstrated good  understanding of the education provided.         Assessment   Claudia was seen for a follow up visit and participated poor with exercises to address her impairments in strength, balance, and  upright    Ambulation 446 feet with own straight cane even/uneven surface, up/down 30 foot ramp standby assistance     Ambulation with standard cane 150 feet even/uneven surface, down/up curb standby assistance     (Activity time includes skilled set-up and positioning as well as therapeutic rest)    Patient Education and Home Exercises       Education provided:   - Patient provided with verbal and demonstrative instruction for all activities performed in today's session.  - continue home exercise program twice a day     Written Home Exercises Provided: Patient instructed to cont prior HEP.     Assessment     Brandi provided good participation and effort during today's session with treatment focused on lower extremity strengthening and functional mobility. Brandi tolerated activities with seated rest breaks, did well over uneven terrain as well as ramp and curbs without difficulty using standard cane.    Brandi Is progressing fairly well towards her goals.   Patient prognosis is Fair.     Patient will continue to benefit from skilled outpatient physical therapy to address the deficits listed in the problem list box on initial evaluation, provide pt/family education and to maximize pt's level of independence in the home and community environment.     Patient's spiritual, cultural and educational needs considered and pt agreeable to plan of care and goals.     Anticipated barriers to physical therapy: imbalance    Goals:     Short Term Goals: 3 weeks   1. Pt will report compliance with HEP >/= 3x/week to improve carry over. MET  2. Pt will improve BLE strength by 1/2 MMT score to improve independence with gait. MET  3. Pt will perform 5x sit<> </=12 seconds to improve functional strength and endurance. (Ongoing)  4. Pt will perform 6 min walk test with cane or LRAD for distance of 250 feet to demonstrate improved gait and decrease fall risk. (Ongoing)  5. Pt will perform MCTSIB categories 4 for 15 seconds to improve  functional mobility. Claudia demonstrates significant limitations with participation today increasing the duration of all activities. Patient required maximum motivation from the therapist and father to complete any of her activities today.     Pt prognosis is Good.      Pt will continue to benefit from skilled outpatient physical therapy to address the deficits listed in the problem list box on initial evaluation, provide pt/family education and to maximize pt's level of independence in the home and community environment.      Pt's spiritual, cultural and educational needs considered and pt agreeable to plan of care and goals.     Anticipated barriers to physical therapy: none at this time         Goals:   Goal: Patient/Caregivers will verbalize understanding of HEP and report ongoing adherence.   Date Initiated: 12/7/2021  Duration: Ongoing through discharge   Status:  continue    Comments: Pt's family continues to verbalize understanding of HEP and demonstrates compliance.    Goal: Claudia with demonstrate the ability to stand a child size bench with an upright posture, 1 UE support, and SBA for 90 seconds while performing a dynamic task with other UE to show improvements in LE strength and balance for age appropriate functional positions.   Date Initiated: 12/7/2021  Duration: 6 months  Status: Initiated    Comments:   12/7/2021:  Pt progressed to 60 seconds with B UE support and SBA.   1/11/2022: Pt completed 60 seconds again on this date progressing to 1-2 UE support with SBA.   3/15/2022: Patient requires 1-2 upper extremity support.    Goal: Claudia will demonstrate the ability to tall kneel with 0 UE support for 5 seconds with SBA to show improvements in core and hip strength for gross motor skills.   Date Initiated: 12/7/2021  Duration: 6 months  Status: continue     Comments:   12/7/2021: Pt is able to complete with no UE support and min A at hips for 5 seconds.   1/11/2022: Pt continues to require at least  static balance in dim lit environments and on uneven surfaces. MET     Long Term Goals: 6 weeks   1. Pt will report compliance with HEP >/= 3x/week to improve carry over. (Ongoing)  2. Pt will balance on single leg for 5 seconds to improve gait pattern. (Ongoing)  3. Pt will perform 5x sit<> </=11 seconds to improve functional strength and endurance. (Ongoing)  4. Pt will perform 6 min walk test for 350 ft using SPC or LRAD to demonstrate improved endurance and decrease fall risk. (Ongoing)  5. Pt will perform MCTSIB categories 4 for 30 seconds to improve static balance in dim lit environments and on uneven surfaces. MET  6. Pt will score 48/56 on Doe Balance Assessment to decrease risk of falls and improve independence with gait. (Ongoing)      Plan     Continue to progress strength/balance/mobility training to patient's tolerance.      Corina Antonio, PTA         min A at this time.   2/15/2022: Pt requires at least min A at hips for 5 seconds.   3/15/2022: Patient requires at least minimal assistance at hips with no upper extremity support.       Goal: Claudia with demonstrate the ability complete a sit to stand from a child size bench with mod A at hips to show improvements in LE strength for standing.   Date Initiated: 12/7/2021  Duration: 6 months  Status: continue     Comments:   12/7/2021: Pt requires max A at this time.   1/11/2022: Pt requires max A to complete.   2/15/2022: Pt requires max A at hips.   3/15/2022: Patient requires maximum to moderate assistance from a standard chair height.    Goal: Claudia will demonstrate the ability to ambulate 70' with PRW and SBA to show improvement in strength and endurance for functional mobility.   Date Initiated: 12/7/2021  Duration: 6 months  Status: initiated      Comments:   12/7/2021: Pt is able to complete with min A for forward advancement of PRW and close supervision for fatigue.   1/11/2022: Pt is able to complete with min A for forward advancement and close supervision x 70'.  2/15/2022: Pt requires min A for forward advancement and close supervision for safety.  3/15/2022: Patient requires minimal assistance for forward propulsion.     Goal: Claudia will progress her raw scores in 2/3 sections on the PDMS by at least 2 points to show significant improvements in her gross motor skills.    Date Initiated: 12/7/2021  Duration: 6 months  Status: Initiate   Comments:   12/7/2021: Pt demonstrates total scores of a 33 in stationary skills, a 32 locomotor skills, and a 4 in objection manipulation at this time.             Plan   Continue PT treatment for ROM and stretching, strengthening, balance activities, gross motor developmental activities, gait training, transfer training, cardiovascular/endurance training, patient education, family training, progression of home exercise program. Pt will be progressed to transitions to  get in and out of sitting next week.      Certification Period: 12/7/2021 to 6/7/2022    Melody Rock, PT, DPT   3/22/2022

## 2024-07-22 DIAGNOSIS — I10 ESSENTIAL HYPERTENSION: ICD-10-CM

## 2024-07-22 NOTE — TELEPHONE ENCOUNTER
No care due was identified.  [facility] Embedded Care Due Messages. Reference number: [id number].   7/22/2024 4:39:10 PM CDT

## 2024-07-23 RX ORDER — AMLODIPINE BESYLATE 10 MG/1
10 TABLET ORAL DAILY
Qty: 90 TABLET | Refills: 0 | Status: SHIPPED | OUTPATIENT
Start: 2024-07-23

## 2024-07-23 NOTE — TELEPHONE ENCOUNTER
Refill Routing Note   Medication(s) are not appropriate for processing by [facility] for the following reason(s):        ED/Hospital Visit since last OV with provider    [facility] action(s):  Defer               Appointments  past 12m or future 3m with PCP    Date Provider   Last Visit   3/13/2024 Marcial Kan MD   Next Visit   Visit date not found Marcial Kan MD   ED visits in past 90 days: 0        Note composed:4:01 AM 07/23/2024

## 2024-07-24 ENCOUNTER — CLINICAL SUPPORT (OUTPATIENT)
Dept: REHABILITATION | Facility: HOSPITAL | Age: 84
End: 2024-07-24
Payer: MEDICARE

## 2024-07-24 DIAGNOSIS — I62.00 NONTRAUMATIC SUBDURAL HEMORRHAGE, UNSPECIFIED: Primary | ICD-10-CM

## 2024-07-24 DIAGNOSIS — E03.9 HYPOTHYROIDISM, UNSPECIFIED TYPE: ICD-10-CM

## 2024-07-24 DIAGNOSIS — Z74.09 IMPAIRED FUNCTIONAL MOBILITY, BALANCE, GAIT, AND ENDURANCE: ICD-10-CM

## 2024-07-24 DIAGNOSIS — R53.1 DECREASED STRENGTH: ICD-10-CM

## 2024-07-24 PROCEDURE — 97110 THERAPEUTIC EXERCISES: CPT | Mod: HCNC,PO

## 2024-07-24 PROCEDURE — 97112 NEUROMUSCULAR REEDUCATION: CPT | Mod: HCNC,PO

## 2024-07-24 RX ORDER — LEVOTHYROXINE SODIUM 75 UG/1
TABLET ORAL
Qty: 90 TABLET | Refills: 2 | Status: SHIPPED | OUTPATIENT
Start: 2024-07-24

## 2024-07-24 NOTE — TELEPHONE ENCOUNTER
Refill Decision Note   Brandi Flynn  is requesting a refill authorization.  Brief Assessment and Rationale for Refill:  Approve     Medication Therapy Plan:  ED visit 3/15/24 for stroke like symptoms. No changes to therapy & patient has followed up with neurosurgery. TSH WNL 3/15/2024.      Extended chart review required: Yes   Comments:     Note composed:5:24 PM 07/24/2024

## 2024-07-24 NOTE — PROGRESS NOTES
[facility]   Physical Therapy Treatment Note      Name: Brandi Flynn  Clinic Number: [id number]    Therapy Diagnosis:   Encounter Diagnoses   Name Primary?    Nontraumatic subdural hemorrhage, unspecified Yes    Decreased strength     Impaired functional mobility, balance, gait, and endurance      Physician: Iona Bueno PA-C    Visit Date: 7/24/2024    Physician Orders: PT Eval and Treat   Medical Diagnosis from Referral:   I62.00 (ICD-10-CM) - Nontraumatic subdural hemorrhage, unspecified   S12.501S (ICD-10-CM) - Closed nondisplaced fracture of sixth cervical vertebra, unspecified fracture morphology, sequela   Evaluation Date: 5/6/2024  Authorization Period Expiration: 12/31/2024  Plan of Care Expiration: 8/10/2024  Visit # / Visits authorized: 15/ 20                       Time In: 1255  Time Out: 1345  Total Billable Time: 50 minutes     Precautions: Fall       Subjective     Patient reports: no lower extremity discomfort today.    She was compliant with home exercise program.  Response to previous treatment: decreased pain  Functional change: none    Pain: 0/10  Location: bilateral legs       Objective      Objective Measures updated at progress report unless specified.     Treatment     Brandi received the treatments listed below:      therapeutic exercises to develop strength and endurance for 33 minutes including:    X 20 each seated bilateral lower extremity therapeutic exercise (3#) = marching, long arc quad, ball squeeze   X 10 sit to stands with hands on thighs  Functional ambulation x 250 feet with grocery cart cart assist and 20# of resistance  X 20 each shuttle mini squats and heel raises (62#)   X 10 minutes NuStep (level 3) as an adjunct to strength/balance/mobility training      neuromuscular re-education activities to improve: Balance for 17 minutes. The following activities were included:    X 25 feet each balance gait = side stepping, backwards gait  X 15 each balance  therapeutic exercise = heel raises/toe raises*, mini squats  X 15 alternating toe taps on 3-inch stool  Functional ambulation 2 x 120 feet with rollator and stand by assist       *minimal difficulty      Patient Education and Home Exercises       Education provided:   - proper therapeutic exercise technique  - continue home exercise program twice a day     Written Home Exercises Provided: Patient instructed to cont prior HEP.       Assessment     Patient was able to tolerate increased repetitions during seated bilateral lower extremity therapeutic exercise; hands placed on thighs during sit to stands; no sway noted during staggered stance training; no upper extremity support needed during balance gait, alternating toe taps on stool and balance therapeutic exercise; loss of balance x 2 demonstrated during heel raise/toe raise exercise - none noted during alternating toe taps on stool.    Brandi Is progressing fairly well towards her goals.   Patient prognosis is Fair.     Patient will continue to benefit from skilled outpatient physical therapy to address the deficits listed in the problem list box on initial evaluation, provide pt/family education and to maximize pt's level of independence in the home and community environment.     Patient's spiritual, cultural and educational needs considered and pt agreeable to plan of care and goals.     Anticipated barriers to physical therapy: imbalance    Goals:     Short Term Goals: 3 weeks   1. Pt will report compliance with HEP >/= 3x/week to improve carry over. MET  2. Pt will improve BLE strength by 1/2 MMT score to improve independence with gait. MET  3. Pt will perform 5x sit<> </=12 seconds to improve functional strength and endurance. (Ongoing)  4. Pt will perform 6 min walk test with cane or LRAD for distance of 250 feet to demonstrate improved gait and decrease fall risk. (Ongoing)  5. Pt will perform MCTSIB categories 4 for 15 seconds to improve static balance  in dim lit environments and on uneven surfaces. MET     Long Term Goals: 6 weeks   1. Pt will report compliance with HEP >/= 3x/week to improve carry over. (Ongoing)  2. Pt will balance on single leg for 5 seconds to improve gait pattern. (Ongoing)  3. Pt will perform 5x sit<> </=11 seconds to improve functional strength and endurance. (Ongoing)  4. Pt will perform 6 min walk test for 350 ft using SPC or LRAD to demonstrate improved endurance and decrease fall risk. (Ongoing)  5. Pt will perform MCTSIB categories 4 for 30 seconds to improve static balance in dim lit environments and on uneven surfaces. MET  6. Pt will score 48/56 on Doe Balance Assessment to decrease risk of falls and improve independence with gait. (Ongoing)      Plan     Continue to progress strength/balance/mobility training to patient's tolerance.      Jose Miguel Gonsales, PT

## 2024-07-24 NOTE — TELEPHONE ENCOUNTER
No care due was identified.  [facility] Embedded Care Due Messages. Reference number: [id number].   7/24/2024 12:32:54 PM CDT

## 2024-07-29 ENCOUNTER — TELEPHONE (OUTPATIENT)
Dept: FAMILY MEDICINE | Facility: CLINIC | Age: 84
End: 2024-07-29
Payer: MEDICARE

## 2024-07-29 NOTE — TELEPHONE ENCOUNTER
Patient states that on 06/05/2024 she had seen Neuro Mrs. Bueno and she had told her it was ok for her to resume the Plavix, patient is out of the medication and wanting to know if you would fill the prescription? She is also wanting to know if it is safe for her to take mobic while she is on plavix?

## 2024-07-31 ENCOUNTER — CLINICAL SUPPORT (OUTPATIENT)
Dept: REHABILITATION | Facility: HOSPITAL | Age: 84
End: 2024-07-31
Payer: MEDICARE

## 2024-07-31 DIAGNOSIS — I62.00 NONTRAUMATIC SUBDURAL HEMORRHAGE, UNSPECIFIED: Primary | ICD-10-CM

## 2024-07-31 DIAGNOSIS — Z74.09 IMPAIRED FUNCTIONAL MOBILITY, BALANCE, GAIT, AND ENDURANCE: ICD-10-CM

## 2024-07-31 DIAGNOSIS — R53.1 DECREASED STRENGTH: ICD-10-CM

## 2024-07-31 PROCEDURE — 97112 NEUROMUSCULAR REEDUCATION: CPT | Mod: HCNC,PO

## 2024-07-31 PROCEDURE — 97110 THERAPEUTIC EXERCISES: CPT | Mod: HCNC,PO

## 2024-07-31 NOTE — TELEPHONE ENCOUNTER
There will be an increased risk of bleeding taking meloxicam and Plavix together.  Her blood count looks good, her kidney function is normal and her blood pressure looks okay so I do not have any definite reason to stop the meloxicam at this time but there is a higher risk.  If she is going to continue both of them together she needs to watch for changes in bowel movements and particularly bloody or black tarry bowel movements

## 2024-07-31 NOTE — PROGRESS NOTES
[facility]   Physical Therapy Treatment Note      Name: Brandi Flynn  Clinic Number: [id number]    Therapy Diagnosis:   Encounter Diagnoses   Name Primary?    Nontraumatic subdural hemorrhage, unspecified Yes    Decreased strength     Impaired functional mobility, balance, gait, and endurance      Physician: Iona Bueno PA-C    Visit Date: 7/31/2024    Physician Orders: PT Eval and Treat   Medical Diagnosis from Referral:   I62.00 (ICD-10-CM) - Nontraumatic subdural hemorrhage, unspecified   S12.501S (ICD-10-CM) - Closed nondisplaced fracture of sixth cervical vertebra, unspecified fracture morphology, sequela   Evaluation Date: 5/6/2024  Authorization Period Expiration: 12/31/2024  Plan of Care Expiration: 8/10/2024  Visit # / Visits authorized: 16/ 20                       Time In: 1251  Time Out: 1338  Total Billable Time: 47 minutes     Precautions: Fall       Subjective     Patient reports: no lower extremity discomfort today.    She was compliant with home exercise program.  Response to previous treatment: no changes  Functional change: none    Pain: 0/10  Location: bilateral legs       Objective      Objective Measures updated at progress report unless specified.     Treatment     Brandi received the treatments listed below:      therapeutic exercises to develop strength and endurance for 18 minutes including:    X 15 each seated bilateral lower extremity therapeutic exercise (4#) = marching, long arc quad, ball squeeze   X 10 sit to stands with hands out front  Functional ambulation 2 x 120 feet with rollator and stand by assist   Functional ambulation additional 250 feet with straight cane and stand by assist      neuromuscular re-education activities to improve: Balance for 29 minutes. The following activities were included:    X 20 each dynamic leans = forwards/backwards, side to side   X 45 seconds each bilateral staggered stance training  X 75 seconds stand on AirEx*   X 30  feet each balance gait = side stepping, backwards gait  X 15 each balance therapeutic exercise on AirEx = heel raises/toe raises*, mini squats*  X 20 alternating toe taps on 5-inch stool       *minimal difficulty      Patient Education and Home Exercises       Education provided:   - proper therapeutic exercise technique  - continue home exercise program twice a day     Written Home Exercises Provided: Patient instructed to cont prior HEP.       Assessment     Patient was able to tolerate increased resistance during seated bilateral lower extremity therapeutic exercise; sit to stands performed with hands out front; increased repetitions demonstrated during dynamic leans; no loss of balance noted during gait with straight cane assist; longer stance time demonstrated during bilateral staggered stance training; longer stance time also demonstrated on AirEx - minimal sway noted; balance therapeutic exercise progressed to use of AirEx - intermittent single upper extremity support needed to prevent loss of balance; longer distance performed during balance gait; increased repetitions performed during alternating toe taps on stool.    Brandi Is progressing fairly well towards her goals.   Patient prognosis is Fair.     Patient will continue to benefit from skilled outpatient physical therapy to address the deficits listed in the problem list box on initial evaluation, provide pt/family education and to maximize pt's level of independence in the home and community environment.     Patient's spiritual, cultural and educational needs considered and pt agreeable to plan of care and goals.     Anticipated barriers to physical therapy: imbalance    Goals:     Short Term Goals: 3 weeks   1. Pt will report compliance with HEP >/= 3x/week to improve carry over. MET  2. Pt will improve BLE strength by 1/2 MMT score to improve independence with gait. MET  3. Pt will perform 5x sit<> </=12 seconds to improve functional strength  and endurance. (Ongoing)  4. Pt will perform 6 min walk test with cane or LRAD for distance of 250 feet to demonstrate improved gait and decrease fall risk. (Ongoing)  5. Pt will perform MCTSIB categories 4 for 15 seconds to improve static balance in dim lit environments and on uneven surfaces. MET     Long Term Goals: 6 weeks   1. Pt will report compliance with HEP >/= 3x/week to improve carry over. (Ongoing)  2. Pt will balance on single leg for 5 seconds to improve gait pattern. (Ongoing)  3. Pt will perform 5x sit<> </=11 seconds to improve functional strength and endurance. (Ongoing)  4. Pt will perform 6 min walk test for 350 ft using SPC or LRAD to demonstrate improved endurance and decrease fall risk. (Ongoing)  5. Pt will perform MCTSIB categories 4 for 30 seconds to improve static balance in dim lit environments and on uneven surfaces. MET  6. Pt will score 48/56 on Doe Balance Assessment to decrease risk of falls and improve independence with gait. (Ongoing)      Plan     Continue to progress strength/balance/mobility training to patient's tolerance.      Jose Miguel Gonsales, PT

## 2024-08-02 ENCOUNTER — CLINICAL SUPPORT (OUTPATIENT)
Dept: REHABILITATION | Facility: HOSPITAL | Age: 84
End: 2024-08-02
Payer: MEDICARE

## 2024-08-02 DIAGNOSIS — I62.00 NONTRAUMATIC SUBDURAL HEMORRHAGE, UNSPECIFIED: Primary | ICD-10-CM

## 2024-08-02 DIAGNOSIS — R53.1 DECREASED STRENGTH: ICD-10-CM

## 2024-08-02 DIAGNOSIS — Z74.09 IMPAIRED FUNCTIONAL MOBILITY, BALANCE, GAIT, AND ENDURANCE: ICD-10-CM

## 2024-08-02 PROCEDURE — 97112 NEUROMUSCULAR REEDUCATION: CPT | Mod: HCNC,PO

## 2024-08-02 NOTE — PROGRESS NOTES
[facility]   Physical Therapy Discharge Note      Name: Brandi Flynn  Clinic Number: [id number]    Therapy Diagnosis:   Encounter Diagnoses   Name Primary?    Nontraumatic subdural hemorrhage, unspecified Yes    Decreased strength     Impaired functional mobility, balance, gait, and endurance      Physician: Iona Bueno PA-C    Visit Date: 8/2/2024    Physician Orders: PT Eval and Treat   Medical Diagnosis from Referral:   I62.00 (ICD-10-CM) - Nontraumatic subdural hemorrhage, unspecified   S12.501S (ICD-10-CM) - Closed nondisplaced fracture of sixth cervical vertebra, unspecified fracture morphology, sequela   Evaluation Date: 5/6/2024    Date of Last visit: 08/02/2024  Total Visits Received: 19                   Time In: 1303  Time Out: 1341  Total Billable Time: 38 minutes     Precautions: Fall       Subjective     Patient reports: no lower extremity discomfort today.    She was compliant with home exercise program.  Response to previous treatment: no changes  Functional change: none    Pain: 0/10  Location: bilateral legs       Objective      5x sit to stand = 15 seconds       Functional Gait Assessment   1. Gait on level surface =  1  2. Change in Gait Speed = 3  3. Gait with horizontal head turns  = 2  4. Gait with vertical head turns = 3  5. Gait with pivot turns = 3  6. Step over obstacle = 2  7. Gait with Narrow LIANA = 0  8. Gait with eyes closed = 2  9. Ambulating Backwards = 2  10. Steps = 1     Score 19/30   Score:   <22/30 fall risk   <20/30 fall risk in older adults   <18/30 fall risk in Parkinsons      Treatment     Brandi received the treatments listed below:      neuromuscular re-education activities to improve: Balance for 38 minutes. The following activities were included:    X 5 sit to stand  Functional ambulation 2 x 20 feet at normal speed (avg 7.98 seconds)  2 X 20 feet ambulation with changing speeds (slow and fast)  X 20 feet ambulation with head turns*  X 20 feet  ambulation with head nods  X 20 feet ambulation with pivot turns  X 20 feet each ambulation with stepping over obstacles (4 1/2 inch and then 9 inch*)  X 20 feet ambulation heel-toe with minimal/moderate assistance**  X 20 feet ambulation with eyes closed*  X 20 feet ambulation backwards*  2 x 4 up/down 6-inch stairs - step to gait and with use of hand rails**   Reviewed balance home exercise program - instructed to continue twice a day       *minimal difficulty    **moderate difficulty      Patient Education and Home Exercises       Education provided:   - proper therapeutic exercise technique  - continue home exercise program twice a day  - use rollator with community ambulation      Written Home Exercises Provided: Patient instructed to cont prior HEP.       Assessment     Patient demonstrated minimal dysequilibrium during gait with head turns; Brandi needed to slow down before stepping over taller obstacle; unable to perform heel-toe gait without physical assistance; minimal path deviation noted during gait with eyes closed; decreased velocity of gait demonstrated during backwards gait; railed needed and step-to gait performed during stair training; significant fall risk as evidenced by score of 19/30 on the Functional Gait Assessment (<20/30 fall risk in older adults).    Brandi Is progressing fairly well towards her goals.   Patient prognosis is Fair.     Patient will continue to benefit from skilled outpatient physical therapy to address the deficits listed in the problem list box on initial evaluation, provide pt/family education and to maximize pt's level of independence in the home and community environment.     Patient's spiritual, cultural and educational needs considered and pt agreeable to plan of care and goals.     Anticipated barriers to physical therapy: imbalance    Goals:     Short Term Goals: 3 weeks   1. Pt will report compliance with HEP >/= 3x/week to improve carry over. (MET)  2. Pt will improve  BLE strength by 1/2 MMT score to improve independence with gait. (MET)  3. Pt will perform 5x sit<> </=12 seconds to improve functional strength and endurance. (NOT MET)  4. Pt will perform 6 min walk test with cane or LRAD for distance of 250 feet to demonstrate improved gait and decrease fall risk. (NOT MET)  5. Pt will perform MCTSIB categories 4 for 15 seconds to improve static balance in dim lit environments and on uneven surfaces. (MET)     Long Term Goals: 6 weeks   1. Pt will report compliance with HEP >/= 3x/week to improve carry over. (MET)  2. Pt will balance on single leg for 5 seconds to improve gait pattern. (NOT MET)  3. Pt will perform 5x sit<> </=11 seconds to improve functional strength and endurance. (NOT MET)  4. Pt will perform 6 min walk test for 350 ft using SPC or LRAD to demonstrate improved endurance and decrease fall risk. (NOT MET)  5. Pt will perform MCTSIB categories 4 for 30 seconds to improve static balance in dim lit environments and on uneven surfaces. (MET)  6. Pt will score 48/56 on Doe Balance Assessment to decrease risk of falls and improve independence with gait. (NOT MET)    Discharge reason: Patient has reached the maximum rehab potential for the present time.      PLAN     This patient is discharged from Physical Therapy.      Jose Miguel Gonsales, PT

## 2024-08-07 NOTE — PT/OT/SLP PROGRESS
Occupational Therapy      Patient Name:  Brandi Flynn   MRN:  [id number]    Patient not seen today secondary to Other (Comment) (Pt unavailable x 2 attempts). Will follow-up next service date.    3/16/2024   Oral Chemotherapy Monitoring Program    Subjective/Objective:  Jacki Smith is a 59 year old female contacted by phone for a follow-up visit for oral chemotherapy. Taking as directed, with 1 missed dose. No complaints of nausea, diarrhea, constipation, or decreased appetite. However, patient has noticed that everything she drinks tastes funny after taking each dose but then it goes away after 4 hours. C/O upset stomach once in awhile.     No new meds. No hospitalizations. All questions answered.        7/3/2024     9:00 AM 7/5/2024    11:00 AM 7/10/2024    11:00 AM 7/17/2024     5:00 PM 8/1/2024     2:00 PM 8/7/2024     3:00 PM 8/7/2024     3:24 PM   ORAL CHEMOTHERAPY   Assessment Type Refill Lab Monitoring Monthly Follow up Chart Review Lab Monitoring;Refill Left Voicemail Monthly Follow up   Diagnosis Code Ovarian Cancer Ovarian Cancer Ovarian Cancer Ovarian Cancer Ovarian Cancer Ovarian Cancer Ovarian Cancer   Providers Teoh Teoh Teoh Teoh Teoh Teoh Teoh   [facility]   Is this patient followed by the [facility] OC team? No No No No No No No   Drug Name Lynparza (olaparib) Lynparza (olaparib) Lynparza (olaparib) Lynparza (olaparib) Lynparza (olaparib) Lynparza (olaparib) Lynparza (olaparib)   Dose 200 mg 200 mg 200 mg 200 mg 200 mg 200 mg 200 mg   Current Schedule BID BID BID BID BID BID BID   Cycle Details Continuous Continuous Continuous Continuous Continuous Continuous Continuous   Doses missed in last 2 weeks   0    1   Adherence Assessment   Adherent    Adherent   Adverse Effects  Increased Creatinine   Increased Creatinine  No AE identified during assessment   Fatigue   Grade 2       Increased Creatinine  Grade 1   Grade 1     Pharmacist intervention(Increased creatinine)  No   No     Any new drug interactions?       No   Is the dose as ordered appropriate for the patient?       Yes   Has the patient missed any days of school, work, or other  "routine activity?       No   Since the last time we talked, have you been hospitalized or used the emergency room?       No       Last PHQ-2 Score on record:        No data to display                Vitals:  BP:   BP Readings from Last 1 Encounters:   07/16/24 104/67     Wt Readings from Last 1 Encounters:   07/16/24 67.5 kg (148 lb 12.8 oz)     Estimated body surface area is 1.72 meters squared as calculated from the following:    Height as of 4/12/24: 1.575 m (5' 2.01\").    Weight as of 7/16/24: 67.5 kg (148 lb 12.8 oz).    Labs:  _  Result Component Current Result Ref Range   Sodium 136 (8/1/2024) 135 - 145 mmol/L     _  Result Component Current Result Ref Range   Potassium 4.5 (8/1/2024) 3.4 - 5.3 mmol/L     _  Result Component Current Result Ref Range   Calcium 9.8 (8/1/2024) 8.8 - 10.4 mg/dL     No results found for Mag within last 30 days.     No results found for Phos within last 30 days.     _  Result Component Current Result Ref Range   Albumin 4.1 (8/1/2024) 3.5 - 5.2 g/dL     _  Result Component Current Result Ref Range   Urea Nitrogen 11.8 (8/1/2024) 8.0 - 23.0 mg/dL     _  Result Component Current Result Ref Range   Creatinine 1.20 (H) (8/1/2024) 0.51 - 0.95 mg/dL     _  Result Component Current Result Ref Range   AST 38 (8/1/2024) 0 - 45 U/L     _  Result Component Current Result Ref Range   ALT 25 (8/1/2024) 0 - 50 U/L     _  Result Component Current Result Ref Range   Bilirubin Total 0.5 (8/1/2024) <=1.2 mg/dL     _  Result Component Current Result Ref Range   WBC Count 4.2 (8/1/2024) 4.0 - 11.0 10e3/uL     _  Result Component Current Result Ref Range   Hemoglobin 11.7 (8/1/2024) 11.7 - 15.7 g/dL     _  Result Component Current Result Ref Range   Platelet Count 135 (L) (8/1/2024) 150 - 450 10e3/uL     _  Result Component Current Result Ref Range   Absolute Neutrophils 2.7 (8/1/2024) 1.6 - 8.3 10e3/uL     Assessment/Plan:  Continue current regimen. Tolerating well.    Follow-Up:  Due for monthly labs " 8/28 - will reach out to  to have them schedule labs    Refill Due:  9/2    Pawan GarciaD  Oral Chemotherapy Monitoring Program  [facility]  [phone]

## 2024-10-16 ENCOUNTER — OFFICE VISIT (OUTPATIENT)
Dept: PODIATRY | Facility: CLINIC | Age: 84
End: 2024-10-16
Payer: MEDICARE

## 2024-10-16 VITALS — WEIGHT: 201.5 LBS | BODY MASS INDEX: 35.69 KG/M2

## 2024-10-16 DIAGNOSIS — M79.671 RIGHT FOOT PAIN: ICD-10-CM

## 2024-10-16 DIAGNOSIS — B35.1 ONYCHOMYCOSIS DUE TO DERMATOPHYTE: ICD-10-CM

## 2024-10-16 DIAGNOSIS — M72.2 PLANTAR FASCIITIS: Primary | ICD-10-CM

## 2024-10-16 PROCEDURE — 99203 OFFICE O/P NEW LOW 30 MIN: CPT | Mod: HCNC,S$GLB,, | Performed by: PODIATRIST

## 2024-10-16 PROCEDURE — 1125F AMNT PAIN NOTED PAIN PRSNT: CPT | Mod: HCNC,CPTII,S$GLB, | Performed by: PODIATRIST

## 2024-10-16 PROCEDURE — 3288F FALL RISK ASSESSMENT DOCD: CPT | Mod: HCNC,CPTII,S$GLB, | Performed by: PODIATRIST

## 2024-10-16 PROCEDURE — 1160F RVW MEDS BY RX/DR IN RCRD: CPT | Mod: HCNC,CPTII,S$GLB, | Performed by: PODIATRIST

## 2024-10-16 PROCEDURE — 99999 PR PBB SHADOW E&M-EST. PATIENT-LVL III: CPT | Mod: PBBFAC,HCNC,, | Performed by: PODIATRIST

## 2024-10-16 PROCEDURE — 1159F MED LIST DOCD IN RCRD: CPT | Mod: HCNC,CPTII,S$GLB, | Performed by: PODIATRIST

## 2024-10-16 PROCEDURE — 1101F PT FALLS ASSESS-DOCD LE1/YR: CPT | Mod: HCNC,CPTII,S$GLB, | Performed by: PODIATRIST

## 2024-10-16 RX ORDER — FLUCONAZOLE 200 MG/1
200 TABLET ORAL WEEKLY
Qty: 28 TABLET | Refills: 0 | Status: SHIPPED | OUTPATIENT
Start: 2024-10-16 | End: 2025-04-24

## 2024-10-16 NOTE — PATIENT INSTRUCTIONS

## 2024-10-16 NOTE — PROGRESS NOTES
[address]  [place] [address]  Phone: [phone]   [phone]    Patient's PCP:Marcial Kan MD  Referring Provider: Aaareferral Self    Subjective:      Chief Complaint:: Heel Pain (RT)    HPI  Brandi Flynn is a 84 y.o. female who presents today with a complaint of RT foot heel pain. The current episode started 3-4 weeks ago.  The symptoms include deep aching pain. Probable cause of complaint unknown.  The symptoms are aggravated by walking, pressure. The problem has improved. Treatment to date have included pads in the shoes, OTC pain medication which provided some relief.         Vitals:    10/16/24 1425   Weight: 91.4 kg (201 lb 8 oz)   PainSc:   5      Shoe Size: 9    Past Surgical History:   Procedure Laterality Date    ABDOMINAL SURGERY      SBO from lap band wraping around small bowel, lap untwisted    ADENOIDECTOMY      BREAST BIOPSY      needle biopsy     CATARACT EXTRACTION, BILATERAL Bilateral      SECTION      CHOLECYSTECTOMY      COLONOSCOPY  2011    Dr. Alvares, 5 year recheck    EYE SURGERY  2014    bilateral cataract Dr Carrillo     EYE SURGERY  10/2020    bottom lid    HEMORRHOID SURGERY      HYSTERECTOMY  1989    INCONTINENCE SURGERY  2008    sling    LAPAROSCOPIC GASTRIC BANDING  2004    LAPAROSCOPIC NISSEN FUNDOPLICATION      Dr. Rutherford    TONSILLECTOMY       Past Medical History:   Diagnosis Date    Blood transfusion     GERD (gastroesophageal reflux disease)     Hepatitis C     treated six months by Dr. Gaspar with interferon    History of hepatitis C     Hyperlipidemia     Hypertension     Hypothyroidism     Obesity     Primary osteoarthritis of left knee 2015    Sleep apnea     Stroke     TIA     TIA (transient ischemic attack)     Vision loss of right eye     Dr Eldridge Right eye blood clot      Family History   Problem Relation Name Age of Onset    Diabetes Mother      Arthritis Mother          RA    Heart disease  Mother          MI at 79     Diabetes Sister 1     Diverticulitis Sister 1     No Known Problems Daughter Fidelina     Emphysema Maternal Aunt      Heart disease Maternal Aunt      Cancer Maternal Uncle      Early death Maternal Grandfather  47    Heart disease Maternal Grandfather      Arthritis Paternal Grandmother      Heart disease Paternal Grandfather      Cancer Paternal Grandfather          lung heavy smoker     No Known Problems Father      No Known Problems Son      No Known Problems Paternal Uncle      No Known Problems Maternal Grandmother      No Known Problems Brother      Arthritis Daughter Argelia         Social History:   Marital Status:   Alcohol History:  reports no history of alcohol use.  Tobacco History:  reports that she has quit smoking. Her smoking use included cigarettes. She has a 2.5 pack-year smoking history. She has never used smokeless tobacco.  Drug History:  reports no history of drug use.    Review of patient's allergies indicates:   Allergen Reactions    Ace inhibitors Other (See Comments)     cough    Morphine Itching    Keflex [cephalexin] Itching and Rash       Current Outpatient Medications   Medication Sig Dispense Refill    amLODIPine (NORVASC) 10 MG tablet Take 1 tablet (10 mg total) by mouth once daily. 90 tablet 0    atorvastatin (LIPITOR) 40 MG tablet TAKE 2 TABLETS BY MOUTH IN THE EVENING 180 tablet 0    fluconazole (DIFLUCAN) 200 MG Tab Take 1 tablet (200 mg total) by mouth once a week. for 28 doses 28 tablet 0    ibuprofen (ADVIL,MOTRIN) 200 MG tablet Take 200 mg by mouth every 6 (six) hours as needed for Pain (headaches).      levETIRAcetam (KEPPRA) 500 MG Tab Take 1 tablet (500 mg total) by mouth 2 (two) times daily. (Patient not taking: Reported on 6/5/2024) 60 tablet 0    levothyroxine (SYNTHROID) 75 MCG tablet TAKE 1 TABLET BY MOUTH ONCE A DAY 90 tablet 2    losartan (COZAAR) 25 MG tablet Take 1 tablet (25 mg total) by mouth once daily. 90 tablet 3    mag  hydrox/aluminum hyd/simeth (MAALOX ORAL) Take 1 Dose by mouth as needed.      meloxicam (MOBIC) 7.5 MG tablet Take 1 tablet (7.5 mg total) by mouth once daily. 90 tablet 1    solifenacin (VESICARE) 10 MG tablet Take 10 mg by mouth nightly.      vitamins A,C,E-zinc-copper (ICAPS AREDS) 4,296 mcg-226 mg-90 mg Cap Take 1 capsule by mouth once daily.       No current facility-administered medications for this visit.       Review of Systems   Constitutional:  Negative for chills, fatigue, fever and unexpected weight change.   HENT:  Negative for hearing loss and trouble swallowing.    Eyes:  Negative for photophobia and visual disturbance.   Respiratory:  Negative for cough, shortness of breath and wheezing.    Cardiovascular:  Positive for leg swelling. Negative for chest pain and palpitations.   Gastrointestinal:  Negative for abdominal pain and nausea.   Genitourinary:  Negative for dysuria and frequency.   Musculoskeletal:  Negative for arthralgias, back pain, gait problem, joint swelling, myalgias and neck pain.   Skin:  Negative for rash and wound.   Neurological:  Negative for tremors, seizures, weakness, numbness and headaches.   Hematological:  Does not bruise/bleed easily.   Psychiatric/Behavioral:  Negative for hallucinations.          Objective:        Physical Exam:   Foot Exam    General  General Appearance: appears stated age and healthy   Orientation: alert and oriented to person, place, and time   Affect: appropriate   Gait: antalgic       Right Foot/Ankle     Inspection and Palpation  Ecchymosis: none  Tenderness: plantar fascia   Swelling: (mild lower extremity edema)  Arch: normal  Skin Exam: skin intact; no drainage, no ulcer and no erythema   Fungus Toenails: present    Neurovascular  Dorsalis pedis: 2+  Posterior tibial: 2+  Capillary Refill: 2+  Varicose veins: not present  Saphenous nerve sensation: normal  Tibial nerve sensation: normal  Superficial peroneal nerve sensation: normal  Deep  peroneal nerve sensation: normal  Sural nerve sensation: normal    Edema  Type of edema: non-pitting    Muscle Strength  Ankle dorsiflexion: 5  Ankle plantar flexion: 5  Ankle inversion: 5  Ankle eversion: 5  Great toe extension: 5  Great toe flexion: 5    Range of Motion    Normal right ankle ROM    Tests  Anterior drawer: negative   Talar tilt: negative   PT Tinel's sign: negative    Paresthesia: negative  Comments  Pain on palpation of the infeior medial heel and central plantar heel. No pain present  with side to side compression of the calcaneus. Negative tinnel's sign  at the tarsal tunnel. Negative Rivero's nerve pain. Negative Calcaneal nerve pain. No soft tissue masses. Pain absent  with dorsiflexion of the ankle. No edema, erythema, or ecchymosis noted.           Physical Exam  Cardiovascular:      Pulses:           Dorsalis pedis pulses are 2+ on the right side.        Posterior tibial pulses are 2+ on the right side.   Feet:      Right foot:      Skin integrity: No ulcer or erythema.      Toenail Condition: Fungal disease present.              Right Ankle/Foot Exam     Range of Motion   The patient has normal right ankle ROM.    Comments:  Pain on palpation of the infeior medial heel and central plantar heel. No pain present  with side to side compression of the calcaneus. Negative tinnel's sign  at the tarsal tunnel. Negative Rivero's nerve pain. Negative Calcaneal nerve pain. No soft tissue masses. Pain absent  with dorsiflexion of the ankle. No edema, erythema, or ecchymosis noted.           Muscle Strength   Right Lower Extremity   Ankle Dorsiflexion:  5   Plantar flexion:  5/5    Vascular Exam     Right Pulses  Dorsalis Pedis:      2+  Posterior Tibial:      2+           Imaging: none            Assessment:       1. Plantar fasciitis    2. Right foot pain    3. Onychomycosis due to dermatophyte      Plan:   Plantar fasciitis    Right foot pain    Onychomycosis due to dermatophyte  -     fluconazole  (DIFLUCAN) 200 MG Tab; Take 1 tablet (200 mg total) by mouth once a week. for 28 doses  Dispense: 28 tablet; Refill: 0      Follow up if symptoms worsen or fail to improve.    Procedures        Discussed different treatment options for heel pain. I gave written and verbal instructions on heel cord stretching and this was demonstrated for the patient. Patient expressed understanding. Discussed wearing appropriate shoe gear and avoiding flats, slippers, sandals, barefoot, and sockfeet. Recommended arch supports. My recommendation for OTC supports is Spenco polysorb replacement insoles or patient may elect more aggressive treatment with prescription arch supports. We also discussed cortisone injections and NSAID therapy.     Fungal infection of toenails explained. Treatment options including no treatment, periodic debridement, topical medications, oral medications, and removal of the nail were discussed, as well as success rates and risks of recurrence.     Counseling:     I provided patient education verbally regarding:   Patient diagnosis, treatment options, as well as alternatives, risks, and benefits.     This note was created using Dragon voice recognition software that occasionally misinterpreted phrases or words.

## 2024-10-17 ENCOUNTER — TELEPHONE (OUTPATIENT)
Dept: PODIATRY | Facility: CLINIC | Age: 84
End: 2024-10-17
Payer: MEDICARE

## 2024-10-17 NOTE — TELEPHONE ENCOUNTER
Called patient to let her know we did send the prescription to the pharmacy yesterday 10/16/2024. PT states there was a mixup at the pharmacy and they have her prescription ready for  and she will get it today.

## 2024-10-17 NOTE — TELEPHONE ENCOUNTER
----- Message from Saadia sent at 10/17/2024  9:45 AM CDT -----  Type: Needs Medical Advice  Who Called:  pt     Best Call Back Number: [phone] (home)     Additional Information: pt requesting call back in regards to her prescription , says her pharmacy never received please advise       [facility] - [place], [place] - [address]  [address] [address]  Phone: [phone] Fax: [phone]    \

## 2024-10-21 DIAGNOSIS — M25.562 LEFT KNEE PAIN, UNSPECIFIED CHRONICITY: Primary | ICD-10-CM

## 2024-10-24 ENCOUNTER — HOSPITAL ENCOUNTER (OUTPATIENT)
Dept: RADIOLOGY | Facility: HOSPITAL | Age: 84
Discharge: HOME | End: 2024-10-24
Attending: ORTHOPAEDIC SURGERY
Payer: MEDICARE

## 2024-10-24 ENCOUNTER — OFFICE VISIT (OUTPATIENT)
Dept: ORTHOPEDICS | Facility: CLINIC | Age: 84
End: 2024-10-24
Payer: MEDICARE

## 2024-10-24 VITALS — HEIGHT: 63 IN | WEIGHT: 201.5 LBS | RESPIRATION RATE: 18 BRPM | BODY MASS INDEX: 35.7 KG/M2

## 2024-10-24 DIAGNOSIS — M25.562 LEFT KNEE PAIN, UNSPECIFIED CHRONICITY: ICD-10-CM

## 2024-10-24 DIAGNOSIS — M17.10 ARTHRITIS OF KNEE: ICD-10-CM

## 2024-10-24 DIAGNOSIS — M25.562 LEFT KNEE PAIN, UNSPECIFIED CHRONICITY: Primary | ICD-10-CM

## 2024-10-24 PROCEDURE — 73562 X-RAY EXAM OF KNEE 3: CPT | Mod: 26,59,RT, | Performed by: RADIOLOGY

## 2024-10-24 PROCEDURE — 73564 X-RAY EXAM KNEE 4 OR MORE: CPT | Mod: TC,PO,LT

## 2024-10-24 PROCEDURE — 99999 PR PBB SHADOW E&M-EST. PATIENT-LVL III: CPT | Mod: PBBFAC,,, | Performed by: ORTHOPAEDIC SURGERY

## 2024-10-24 PROCEDURE — 73564 X-RAY EXAM KNEE 4 OR MORE: CPT | Mod: 26,LT,, | Performed by: RADIOLOGY

## 2024-10-24 RX ORDER — TRIAMCINOLONE ACETONIDE 40 MG/ML
40 INJECTION, SUSPENSION INTRA-ARTICULAR; INTRAMUSCULAR
Status: DISCONTINUED | OUTPATIENT
Start: 2024-10-24 | End: 2024-10-24 | Stop reason: HOSPADM

## 2024-10-24 RX ADMIN — TRIAMCINOLONE ACETONIDE 40 MG: 40 INJECTION, SUSPENSION INTRA-ARTICULAR; INTRAMUSCULAR at 03:10

## 2024-10-24 NOTE — PROGRESS NOTES
Past Medical History:   Diagnosis Date    Blood transfusion     GERD (gastroesophageal reflux disease)     Hepatitis C     treated six months by Dr. Gaspar with interferon    History of hepatitis C     Hyperlipidemia     Hypertension     Hypothyroidism     Obesity     Primary osteoarthritis of left knee 2015    Sleep apnea     Stroke     TIA     TIA (transient ischemic attack)     Vision loss of right eye     Dr Eldridge Right eye blood clot        Past Surgical History:   Procedure Laterality Date    ABDOMINAL SURGERY      SBO from lap band wraping around small bowel, lap untwisted    ADENOIDECTOMY      BREAST BIOPSY  2014    needle biopsy     CATARACT EXTRACTION, BILATERAL Bilateral      SECTION      CHOLECYSTECTOMY      COLONOSCOPY  2011    Dr. Alvares, 5 year recheck    EYE SURGERY      bilateral cataract Dr Carrillo     EYE SURGERY  10/2020    bottom lid    HEMORRHOID SURGERY      HYSTERECTOMY      INCONTINENCE SURGERY      sling    LAPAROSCOPIC GASTRIC BANDING      LAPAROSCOPIC NISSEN FUNDOPLICATION      Dr. Rutherford    TONSILLECTOMY         Current Outpatient Medications   Medication Sig    amLODIPine (NORVASC) 10 MG tablet Take 1 tablet (10 mg total) by mouth once daily.    atorvastatin (LIPITOR) 40 MG tablet TAKE 2 TABLETS BY MOUTH IN THE EVENING    fluconazole (DIFLUCAN) 200 MG Tab Take 1 tablet (200 mg total) by mouth once a week. for 28 doses    ibuprofen (ADVIL,MOTRIN) 200 MG tablet Take 200 mg by mouth every 6 (six) hours as needed for Pain (headaches).    levothyroxine (SYNTHROID) 75 MCG tablet TAKE 1 TABLET BY MOUTH ONCE A DAY    losartan (COZAAR) 25 MG tablet Take 1 tablet (25 mg total) by mouth once daily.    mag hydrox/aluminum hyd/simeth (MAALOX ORAL) Take 1 Dose by mouth as needed.    meloxicam (MOBIC) 7.5 MG tablet Take 1 tablet (7.5 mg total) by mouth once daily.    solifenacin (VESICARE) 10 MG tablet Take 10 mg by mouth nightly.    vitamins  A,C,E-zinc-copper (ICAPS AREDS) 4,296 mcg-226 mg-90 mg Cap Take 1 capsule by mouth once daily.     No current facility-administered medications for this visit.       Review of patient's allergies indicates:   Allergen Reactions    Ace inhibitors Other (See Comments)     cough    Morphine Itching    Keflex [cephalexin] Itching and Rash       Family History   Problem Relation Name Age of Onset    Diabetes Mother      Arthritis Mother          RA    Heart disease Mother          MI at 79     Diabetes Sister 1     Diverticulitis Sister 1     No Known Problems Daughter Fidelina     Emphysema Maternal Aunt      Heart disease Maternal Aunt      Cancer Maternal Uncle      Early death Maternal Grandfather  47    Heart disease Maternal Grandfather      Arthritis Paternal Grandmother      Heart disease Paternal Grandfather      Cancer Paternal Grandfather          lung heavy smoker     No Known Problems Father      No Known Problems Son      No Known Problems Paternal Uncle      No Known Problems Maternal Grandmother      No Known Problems Brother      Arthritis Daughter Argelia        Social History     Socioeconomic History    Marital status:    Tobacco Use    Smoking status: Former     Current packs/day: 0.50     Average packs/day: 0.5 packs/day for 5.0 years (2.5 ttl pk-yrs)     Types: Cigarettes    Smokeless tobacco: Never   Substance and Sexual Activity    Alcohol use: No    Drug use: No    Sexual activity: Yes     Partners: Male     Social Drivers of Health     Financial Resource Strain: Low Risk  (3/4/2024)    Overall Financial Resource Strain (CARDIA)     Difficulty of Paying Living Expenses: Not hard at all   Food Insecurity: No Food Insecurity (3/4/2024)    Hunger Vital Sign     Worried About Running Out of Food in the Last Year: Never true     Ran Out of Food in the Last Year: Never true   Transportation Needs: No Transportation Needs (3/4/2024)    PRAPARE - Transportation     Lack of Transportation (Medical):  No     Lack of Transportation (Non-Medical): No   Physical Activity: Inactive (3/4/2024)    Exercise Vital Sign     Days of Exercise per Week: 0 days     Minutes of Exercise per Session: 0 min   Stress: No Stress Concern Present (3/4/2024)    [employer] - Occupational Stress Questionnaire     Feeling of Stress : Not at all   Housing Stability: Low Risk  (3/4/2024)    Housing Stability Vital Sign     Unable to Pay for Housing in the Last Year: No     Number of Places Lived in the Last Year: 1     Unstable Housing in the Last Year: No       Chief Complaint:   No chief complaint on file.      History of present illness : This is a 84-year-old female seen for left knee pain.  Patient states that started at the beginning of 2015 when she fell out of a truck.  She's had pain since then.  Pain is with walking and bending.  Pain is located above and below the kneecap as well as posteriorly in the knee.  Also has pain along the medial joint line.  She has done injections before but none recently.      Review of Systems:    Constitution: Negative for chills, fever, and sweats.  Negative for unexplained weight loss.    HENT:  Negative for headaches and blurry vision.    Cardiovascular:Negative for chest pain or irregular heart beat. Negative for hypertension.    Respiratory:  Negative for cough and shortness of breath.    Gastrointestinal: Negative for abdominal pain, heartburn, melena, nausea, and vomitting.    Genitourinary:  Negative bladder incontinence and dysuria.  Positive for frequency and urgency.    Musculoskeletal:  See HPI    Neurological: Negative for numbness.    Psychiatric/Behavioral: Negative for depression.  The patient is not nervous/anxious.      Endocrine: Negative for polyuria    Hematologic/Lymphatic: Negative for bleeding problem.  Does not bruise/bleed easily.    Skin: Negative for poor would healing and rash      Physical Examination:    Vital Signs:    There were no  vitals filed for this visit.      There is no height or weight on file to calculate BMI.    This a well-developed, well nourished patient in no acute distress.  They are alert and oriented and cooperative to examination.  Pt. walks without an antalgic gait.      Examination of the left knee shows no rashes or erythema. There are no masses ecchymosis or effusion. Patient has full range of motion from 0-130°. Patient is nontender to palpation over lateral joint line and mildly tender to palpation over the medial joint line. Knee is stable to varus and valgus stress. 5 out of 5 motor strength. Palpable distal pulses. Intact light touch sensation. Negative Patellofemoral crepitus      X-rays: X-rays left knee are ordered and reviewed which show severe medial joint space narrowing of both knees with the left leg slightly worse than the right.     Assessment:: Left varus knee arthritis    Plan:  I reviewed the findings with the patient today.  We discussed treatment options.  Patient elected for another cortisone injection.  Patient will follow up if the cortisone injections stop working as well.

## 2024-10-24 NOTE — PROCEDURES
Large Joint Aspiration/Injection: L knee    Date/Time: 10/24/2024 3:45 PM    Performed by: Chetan Man MD  Authorized by: Chetan Man MD    Consent Done?:  Yes (Verbal)  Indications:  Pain  Site marked: the procedure site was marked    Timeout: prior to procedure the correct patient, procedure, and site was verified    Local anesthetic:  Lidocaine 1% without epinephrine and bupivacaine 0.25% without epinephrine  Anesthetic total (ml):  6      Details:  Needle Size:  20 G  Approach:  Anterolateral  Location:  Knee  Site:  L knee  Medications:  40 mg triamcinolone acetonide 40 mg/mL  Patient tolerance:  Patient tolerated the procedure well with no immediate complications

## 2024-10-30 DIAGNOSIS — E78.5 HYPERLIPIDEMIA, UNSPECIFIED HYPERLIPIDEMIA TYPE: ICD-10-CM

## 2024-10-30 RX ORDER — ATORVASTATIN CALCIUM 40 MG/1
80 TABLET, FILM COATED ORAL NIGHTLY
Qty: 180 TABLET | Refills: 1 | Status: SHIPPED | OUTPATIENT
Start: 2024-10-30 | End: 2024-11-08 | Stop reason: SDUPTHER

## 2024-10-30 NOTE — TELEPHONE ENCOUNTER
Refill Routing Note   Medication(s) are not appropriate for processing by [facility] for the following reason(s):        Drug-disease interaction    [facility] action(s):  Defer        Medication Therapy Plan: ED visit 3/15/24 for stroke like symptoms. No changes to therapy & patient has followed up with neurosurgery. Drug-Disease: atorvastatin and SAH (subarachnoid hemorrhage)    Pharmacist review requested: Yes   Extended chart review required: Yes     Appointments  past 12m or future 3m with PCP    Date Provider   Last Visit   3/13/2024 Marcial Kan MD   Next Visit   Visit date not found Marcial Kan MD   ED visits in past 90 days: 0        Note composed:5:46 PM 10/30/2024

## 2024-10-30 NOTE — TELEPHONE ENCOUNTER
Provider Staff:  Action required. This patient has received emergency care.     Please schedule patient for a follow up appointment with PCP within next 90 days.     Thanks!  [facility]     Appointments      Date Provider   Last Visit   3/13/2024 Marcial Kan MD   Next Visit   Visit date not found Marcial Kan MD      Refill Decision Note   Brandi Flynn  is requesting a refill authorization.  Brief Assessment and Rationale for Refill:  Approve     Medication Therapy Plan: ED visit 3/15/24 for stroke like symptoms. No changes to therapy & patient has followed up with neurosurgery.      Pharmacist review requested: Yes   Extended chart review required: Yes   Comments:     Note composed:5:53 PM 10/30/2024

## 2024-10-30 NOTE — TELEPHONE ENCOUNTER
No care due was identified.  [facility] Embedded Care Due Messages. Reference number: [id number].   10/30/2024 3:12:38 PM CDT

## 2024-11-05 DIAGNOSIS — I10 ESSENTIAL HYPERTENSION: ICD-10-CM

## 2024-11-05 RX ORDER — AMLODIPINE BESYLATE 10 MG/1
10 TABLET ORAL
Qty: 90 TABLET | Refills: 1 | Status: SHIPPED | OUTPATIENT
Start: 2024-11-05 | End: 2024-11-08 | Stop reason: SDUPTHER

## 2024-11-05 NOTE — TELEPHONE ENCOUNTER
Refill Routing Note   Medication(s) are not appropriate for processing by [facility] for the following reason(s):        ED/Hospital Visit since last OV with provider    [facility] action(s):  Defer        Medication Therapy Plan: ED documentation reviewed. No changes to therapy note; prev ED ENCOUNTER FOR STROKE LIKE SYMPTOMS    Extended chart review required: Yes     Appointments  past 12m or future 3m with PCP    Date Provider   Last Visit   3/13/2024 Marcial Kan MD   Next Visit   Visit date not found Marcial Kan MD   ED visits in past 90 days: 0        Note composed:2:30 PM 11/05/2024

## 2024-11-05 NOTE — TELEPHONE ENCOUNTER
No care due was identified.  [facility] Embedded Care Due Messages. Reference number: [id number].   11/05/2024 10:37:06 AM CST

## 2024-11-08 DIAGNOSIS — I10 ESSENTIAL HYPERTENSION: ICD-10-CM

## 2024-11-08 DIAGNOSIS — Z28.21 INFLUENZA VACCINATION DECLINED: Primary | ICD-10-CM

## 2024-11-08 DIAGNOSIS — E78.5 HYPERLIPIDEMIA, UNSPECIFIED HYPERLIPIDEMIA TYPE: ICD-10-CM

## 2024-11-08 DIAGNOSIS — E03.9 HYPOTHYROIDISM, UNSPECIFIED TYPE: ICD-10-CM

## 2024-11-08 RX ORDER — AMLODIPINE BESYLATE 10 MG/1
10 TABLET ORAL DAILY
Qty: 90 TABLET | Refills: 3 | Status: SHIPPED | OUTPATIENT
Start: 2024-11-08

## 2024-11-08 RX ORDER — LEVOTHYROXINE SODIUM 75 UG/1
75 TABLET ORAL DAILY
Qty: 90 TABLET | Refills: 1 | Status: SHIPPED | OUTPATIENT
Start: 2024-11-08

## 2024-11-08 RX ORDER — LOSARTAN POTASSIUM 25 MG/1
25 TABLET ORAL DAILY
Qty: 90 TABLET | Refills: 3 | Status: SHIPPED | OUTPATIENT
Start: 2024-11-08 | End: 2025-11-08

## 2024-11-08 RX ORDER — ATORVASTATIN CALCIUM 40 MG/1
80 TABLET, FILM COATED ORAL NIGHTLY
Qty: 180 TABLET | Refills: 1 | Status: SHIPPED | OUTPATIENT
Start: 2024-11-08

## 2024-11-08 RX ORDER — SOLIFENACIN SUCCINATE 10 MG/1
10 TABLET, FILM COATED ORAL NIGHTLY
Qty: 90 TABLET | Refills: 3 | Status: SHIPPED | OUTPATIENT
Start: 2024-11-08

## 2024-11-08 NOTE — TELEPHONE ENCOUNTER
Patient here with  for appointment.  Needs refill on medications until she can get in with a new provider as I will be retiring in two months.      Blood pressure today 116/70, left arm, seated and relaxed, large manual cuff  No complaints    Due for TSH and lipid panel in March, refill sent in on Lipitor and Synthroid for six months  Amlodipine, losartan, and VESIcare refilled for 12 months.    Patient declined flu vaccine

## 2025-03-21 ENCOUNTER — TELEPHONE (OUTPATIENT)
Dept: FAMILY MEDICINE | Facility: CLINIC | Age: 85
End: 2025-03-21
Payer: MEDICARE

## 2025-03-21 NOTE — TELEPHONE ENCOUNTER
----- Message from Eva sent at 3/21/2025 11:25 AM CDT -----  Type:  Sooner Appointment RequestCaller is requesting a sooner appointment.  Caller declined first available appointment listed below.  Caller will not accept being placed on the waitlist and is requesting a message be sent to doctor.Name of Caller:  Miguel is the first available appointment?  No solutions foundSymptoms:  ECAWould the patient rather a call back or a response via [facility]? Call backBest Call Back Number:  [phone] Information: stated to pls leave a message if no answer thank you  ----- Message -----  From: Eva Fernandez  Sent: 3/21/2025  11:26 AM CDT  To: Leon Carrington Staff    Type:  Sooner Appointment RequestCaller is requesting a sooner appointment.  Caller declined first available appointment listed below.  Caller will not accept being placed on the waitlist and is requesting a message be sent to doctor.Name of Caller:  Miguel is the first available appointment?  No solutions foundSymptoms:  ECAWould the patient rather a call back or a response via [facility]? Call backBest Call Back Number:  [phone] Information:

## 2025-03-21 NOTE — TELEPHONE ENCOUNTER
Spoke to pt who states she is a previous pt of Dr. Kan and is wanting to schedule an appt with Dr. Quintero for her and her  to establish care. Pt states she can not wait until July next available appt. Message was sent to zbigniew Alonzo for further assistance

## 2025-03-21 NOTE — TELEPHONE ENCOUNTER
Copied from CRM #[id number]. Topic: Appointments - Appointment Scheduling  >> Mar 21, 2025  1:59 PM Stacy wrote:  States need to schedule a sooner appt for her and her .      Call- [phone]

## 2025-04-07 NOTE — PROGRESS NOTES
[facility]  Office Visit Note     SUBJECTIVE:   HPI: Brandi Flynn  is a 85 y.o. 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 Since then, she reports balance and equilibrium difficulties. She uses a walker at home and a cane when outside. Prior to the fall, she was ambulatory without assistive devices.    MEDICAL CONDITIONS:  She has hyperlipidemia, hypertension, overactive bladder, hypothyroidism, and osteoporosis. She has a history of treated and resolved Hepatitis C. 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    GENITOURINARY:  She reports frequent urination and denies hematuria.    SURGICAL HISTORY:  Her surgical history includes lap band surgery in  and a .    FAMILY HISTORY:  Family history notable for maternal arthritis and heart disease, and paternal grandmother with arthritis.         No visits with results within 6 Month(s) from this visit.   Latest known visit with results is:   Admission on 03/15/2024, Discharged on 2024   Component Date Value Ref Range Status    POC Glucose 03/15/2024 133 (H)  70 - 110 Final    POC Creatinine 03/15/2024 0.5  0.5 - 1.4 mg/dL Final    Sample 03/15/2024 VENOUS   Final    WBC 03/15/2024 8.09  3.90 - 12.70 K/uL Final    RBC 03/15/2024 4.56  4.00 - 5.40 M/uL Final    Hemoglobin 03/15/2024 14.1  12.0 - 16.0  g/dL Final    Hematocrit 03/15/2024 42.1  37.0 - 48.5 % Final    MCV 03/15/2024 92  82 - 98 fL Final    MCH 03/15/2024 30.9  27.0 - 31.0 pg Final    MCHC 03/15/2024 33.5  32.0 - 36.0 g/dL Final    RDW 03/15/2024 14.0  11.5 - 14.5 % Final    Platelets 03/15/2024 271  150 - 450 K/uL Final    MPV 03/15/2024 9.6  9.2 - 12.9 fL Final    Immature Granulocytes 03/15/2024 0.4  0.0 - 0.5 % Final    Gran # (ANC) 03/15/2024 4.9  1.8 - 7.7 K/uL Final    Immature Grans (Abs) 03/15/2024 0.03  0.00 - 0.04 K/uL Final    Lymph # 03/15/2024 2.0  1.0 - 4.8 K/uL Final    Mono # 03/15/2024 0.9  0.3 - 1.0 K/uL Final    Eos # 03/15/2024 0.1  0.0 - 0.5 K/uL Final    Baso # 03/15/2024 0.07  0.00 - 0.20 K/uL Final    nRBC 03/15/2024 0  0 /100 WBC Final    Gran % 03/15/2024 60.7  38.0 - 73.0 % Final    Lymph % 03/15/2024 24.8  18.0 - 48.0 % Final    Mono % 03/15/2024 11.6  4.0 - 15.0 % Final    Eosinophil % 03/15/2024 1.6  0.0 - 8.0 % Final    Basophil % 03/15/2024 0.9  0.0 - 1.9 % Final    Differential Method 03/15/2024 Automated   Final    Sodium 03/15/2024 137  136 - 145 mmol/L Final    Potassium 03/15/2024 4.1  3.5 - 5.1 mmol/L Final    Chloride 03/15/2024 103  95 - 110 mmol/L Final    CO2 03/15/2024 25  23 - 29 mmol/L Final    Glucose 03/15/2024 128 (H)  70 - 110 mg/dL Final    BUN 03/15/2024 15  8 - 23 mg/dL Final    Creatinine 03/15/2024 0.5  0.5 - 1.4 mg/dL Final    Calcium 03/15/2024 9.0  8.7 - 10.5 mg/dL Final    Total Protein 03/15/2024 7.4  6.0 - 8.4 g/dL Final    Albumin 03/15/2024 4.1  3.5 - 5.2 g/dL Final    Total Bilirubin 03/15/2024 0.6  0.1 - 1.0 mg/dL Final    Alkaline Phosphatase 03/15/2024 106  55 - 135 U/L Final    AST 03/15/2024 19  10 - 40 U/L Final    ALT 03/15/2024 15  10 - 44 U/L Final    eGFR 03/15/2024 >60.0  >60 mL/min/1.73 m^2 Final    Anion Gap 03/15/2024 9  8 - 16 mmol/L Final    Prothrombin Time 03/15/2024 10.9  9.0 - 12.5 sec Final    INR 03/15/2024 1.0  0.8 - 1.2 Final    TSH 03/15/2024 2.541  0.340 - 5.600  uIU/mL Final    QRS Duration 03/15/2024 134  ms Final    [facility] QTC Calculation 03/15/2024 497  ms Final    Cholesterol 03/15/2024 101 (L)  120 - 199 mg/dL Final    Triglycerides 03/15/2024 72  30 - 150 mg/dL Final    HDL 03/15/2024 45  40 - 75 mg/dL Final    LDL Cholesterol 03/15/2024 41.6 (L)  63.0 - 159.0 mg/dL Final    HDL/Cholesterol Ratio 03/15/2024 44.6  20.0 - 50.0 % Final    Total Cholesterol/HDL Ratio 03/15/2024 2.2  2.0 - 5.0 Final    Non-HDL Cholesterol 03/15/2024 56  mg/dL Final    Sodium 03/16/2024 140  136 - 145 mmol/L Final    Potassium 03/16/2024 3.9  3.5 - 5.1 mmol/L Final    Chloride 03/16/2024 105  95 - 110 mmol/L Final    CO2 03/16/2024 25  23 - 29 mmol/L Final    Glucose 03/16/2024 102  70 - 110 mg/dL Final    BUN 03/16/2024 9  8 - 23 mg/dL Final    Creatinine 03/16/2024 0.5  0.5 - 1.4 mg/dL Final    Calcium 03/16/2024 9.1  8.7 - 10.5 mg/dL Final    Total Protein 03/16/2024 6.4  6.0 - 8.4 g/dL Final    Albumin 03/16/2024 3.7  3.5 - 5.2 g/dL Final    Total Bilirubin 03/16/2024 0.6  0.1 - 1.0 mg/dL Final    Alkaline Phosphatase 03/16/2024 86  55 - 135 U/L Final    AST 03/16/2024 18  10 - 40 U/L Final    ALT 03/16/2024 12  10 - 44 U/L Final    eGFR 03/16/2024 >60.0  >60 mL/min/1.73 m^2 Final    Anion Gap 03/16/2024 10  8 - 16 mmol/L Final    Magnesium 03/16/2024 2.0  1.6 - 2.6 mg/dL Final    WBC 03/16/2024 6.47  3.90 - 12.70 K/uL Final    RBC 03/16/2024 4.37  4.00 - 5.40 M/uL Final    Hemoglobin 03/16/2024 13.3  12.0 - 16.0 g/dL Final    Hematocrit 03/16/2024 40.2  37.0 - 48.5 % Final    MCV 03/16/2024 92  82 - 98 fL Final    MCH 03/16/2024 30.4  27.0 - 31.0 pg Final    MCHC 03/16/2024 33.1  32.0 - 36.0 g/dL Final    RDW 03/16/2024 14.0  11.5 - 14.5 % Final    Platelets 03/16/2024 241  150 - 450 K/uL Final    MPV 03/16/2024 9.3  9.2 - 12.9 fL Final    Immature Granulocytes 03/16/2024 0.3  0.0 - 0.5 % Final    Gran # (ANC) 03/16/2024 3.6  1.8 - 7.7 K/uL Final    Immature Grans (Abs) 03/16/2024 0.02   0.00 - 0.04 K/uL Final    Lymph # 2024 1.8  1.0 - 4.8 K/uL Final    Mono # 2024 0.9  0.3 - 1.0 K/uL Final    Eos # 2024 0.1  0.0 - 0.5 K/uL Final    Baso # 2024 0.07  0.00 - 0.20 K/uL Final    nRBC 2024 0  0 /100 WBC Final    Gran % 2024 55.2  38.0 - 73.0 % Final    Lymph % 2024 27.4  18.0 - 48.0 % Final    Mono % 2024 14.1  4.0 - 15.0 % Final    Eosinophil % 2024 1.9  0.0 - 8.0 % Final    Basophil % 2024 1.1  0.0 - 1.9 % Final    Differential Method 2024 Automated   Final         Medications Ordered Prior to Encounter[1]  Past Medical History:   Diagnosis Date    Blood transfusion     GERD (gastroesophageal reflux disease)     Hepatitis C     treated six months by Dr. Gaspar with interferon    History of hepatitis C     Hyperlipidemia     Hypertension     Hypothyroidism     Obesity     Primary osteoarthritis of left knee 2015    Sleep apnea     Stroke     TIA     TIA (transient ischemic attack)     Vision loss of right eye     Dr Eldridge Right eye blood clot      Past Surgical History:   Procedure Laterality Date    ABDOMINAL SURGERY      SBO from lap band wraping around small bowel, lap untwisted    ADENOIDECTOMY      BREAST BIOPSY      needle biopsy     CATARACT EXTRACTION, BILATERAL Bilateral      SECTION      CHOLECYSTECTOMY  2001    COLONOSCOPY  2011    Dr. Alvares, 5 year recheck    EYE SURGERY  2014    bilateral cataract Dr Carrillo     EYE SURGERY  10/2020    bottom lid    HEMORRHOID SURGERY      HYSTERECTOMY  1989    INCONTINENCE SURGERY  2008    sling    LAPAROSCOPIC GASTRIC BANDING  2004    LAPAROSCOPIC NISSEN FUNDOPLICATION      Dr. Rutherford    TONSILLECTOMY       Past Surgical History:   Procedure Laterality Date    ABDOMINAL SURGERY      SBO from lap band wraping around small bowel, lap untwisted    ADENOIDECTOMY      BREAST BIOPSY  2014    needle biopsy     CATARACT EXTRACTION, BILATERAL Bilateral       SECTION      CHOLECYSTECTOMY  2001    COLONOSCOPY  2011    Dr. Alvares, 5 year recheck    EYE SURGERY  2014    bilateral cataract Dr Carrillo     EYE SURGERY  10/2020    bottom lid    HEMORRHOID SURGERY      HYSTERECTOMY  1989    INCONTINENCE SURGERY  2008    sling    LAPAROSCOPIC GASTRIC BANDING  2004    LAPAROSCOPIC NISSEN FUNDOPLICATION      Dr. Rutherford    TONSILLECTOMY       Family History   Problem Relation Name Age of Onset    Diabetes Mother      Arthritis Mother          RA    Heart disease Mother          MI at 79     Diabetes Sister 1     Diverticulitis Sister 1     No Known Problems Daughter Fidelina     Emphysema Maternal Aunt      Heart disease Maternal Aunt      Cancer Maternal Uncle      Early death Maternal Grandfather  47    Heart disease Maternal Grandfather      Arthritis Paternal Grandmother      Heart disease Paternal Grandfather      Cancer Paternal Grandfather          lung heavy smoker     No Known Problems Father      No Known Problems Son      No Known Problems Paternal Uncle      No Known Problems Maternal Grandmother      No Known Problems Brother      Arthritis Daughter Argelia        Marital Status:   Alcohol History:  reports no history of alcohol use.  Tobacco History:  reports that she has quit smoking. Her smoking use included cigarettes. She has a 2.5 pack-year smoking history. She has never used smokeless tobacco.  Drug History:  reports no history of drug use.    Health Maintenance Topics with due status: Not Due       Topic Last Completion Date    DEXA Scan 2022       There is no immunization history on file for this patient.    Review of patient's allergies indicates:   Allergen Reactions    Ace inhibitors Other (See Comments)     cough    Morphine Itching    Keflex [cephalexin] Itching and Rash     Current Medications[2]    Review of Systems   Constitutional:  Negative for chills and fever.   Respiratory:  Negative for shortness of breath.    Cardiovascular:   "Negative for chest pain.   Gastrointestinal:  Negative for nausea and vomiting.   Genitourinary:  Positive for frequency.   Musculoskeletal:  Positive for gait problem and leg pain.       OBJECTIVE:      Vitals:    04/08/25 0832   BP: 118/72   BP Location: Right forearm   Patient Position: Sitting   Pulse: 70   SpO2: 98%   Weight: 94.8 kg (208 lb 15.9 oz)   Height: 5' 3" (1.6 m)     Physical Exam  Constitutional:       General: She is not in acute distress.     Appearance: She is obese. She is not ill-appearing or toxic-appearing.   HENT:      Head: Normocephalic and atraumatic.      Mouth/Throat:      Mouth: Mucous membranes are moist.      Pharynx: Uvula midline. No pharyngeal swelling.   Cardiovascular:      Rate and Rhythm: Normal rate and regular rhythm.   Pulmonary:      Effort: Pulmonary effort is normal. No tachypnea, bradypnea, accessory muscle usage, prolonged expiration or respiratory distress.      Breath sounds: Normal breath sounds. No stridor. No wheezing, rhonchi or rales.   Abdominal:      General: Bowel sounds are normal. There is no distension.      Palpations: Abdomen is soft.      Tenderness: There is no abdominal tenderness. There is no guarding or rebound.       Neurological:      General: No focal deficit present.      Mental Status: She is alert.   Psychiatric:         Mood and Affect: Mood normal.         Behavior: Behavior normal.        Assessment:       1. Routine general medical examination at a health care facility    2. Essential hypertension    3. Mixed hyperlipidemia    4. Acquired hypothyroidism    5. OAB (overactive bladder)-followed by urology Dr Greg Little     6. Severe obesity (BMI 35.0-39.9) with comorbidity    7. Primary osteoarthritis of left knee        Plan:       Routine general medical examination at a health care facility  -     Hemoglobin A1C; Future; Expected date: 04/08/2025  - Assessed need for ongoing mammogram screening given age (>75 years).  - Explained " standard mammogram screening guidelines typically end at age 75, barring extensive cancer history or other risk factors.  - Contact the office when medication refills are needed.    Essential hypertension  -     CBC Auto Differential; Future; Expected date: 04/08/2025  -     Comprehensive Metabolic Panel; Future; Expected date: 04/08/2025  - BP at goal  - Continue with losartan and amlodipine     Mixed hyperlipidemia  -     Lipid Panel; Future; Expected date: 04/08/2025  - Stable  - Continue with atorvastatin     Acquired hypothyroidism  -     TSH; Future; Expected date: 04/08/2025  -     T4, Free; Future; Expected date: 04/08/2025  - Noted patient's history of low functioning thyroid.  - Ordered thyroid levels.  - Considered thyroid medication adjustment pending lab results.    OAB (overactive bladder)-followed by urology Dr Greg Little   - Noted patient's history of overactive bladder and reports of frequent urination.  - Continue with vesicare    Severe obesity (BMI 35.0-39.9) with comorbidity  - Reviewed patient's history of lap band surgery for weight management.  - Continue to work on lifestyle changes     Primary osteoarthritis of left knee  - Noted balance issues following the fall 1.5 years ago.  - Uses a four-wheel walker outside and a regular walker inside the house.    RETINAL ARTERY OCCLUSION AND VISION ISSUES:  - Documented history of retinal artery branch occlusion in right eye, resulting in partial vision loss.  - Continued Plavix for history of retinal artery branch occlusion.  - Discussed importance of regular eye exams.  - Noted patient had an eye exam about 1 month ago     HEPATITIS C HISTORY:  - Evaluated history of hepatitis C, confirming successful treatment and resolution.    Counseled on age and gender appropriate medical preventative services, including cancer screenings, immunizations, overall nutritional health, need for a consistent exercise regimen and an overall push towards  maintaining a vigorous and active lifestyle.        Vielka Quintero M.D.  4/8/2025    This note was created using [email or web address] voice recognition software that occasionally misinterprets phrases or words            [1]   Current Outpatient Medications on File Prior to Visit   Medication Sig Dispense Refill    amLODIPine (NORVASC) 10 MG tablet Take 1 tablet (10 mg total) by mouth once daily. 90 tablet 3    atorvastatin (LIPITOR) 40 MG tablet Take 2 tablets (80 mg total) by mouth every evening. 180 tablet 1    clopidogreL (PLAVIX) 75 mg tablet Take 75 mg by mouth.      fluconazole (DIFLUCAN) 200 MG Tab Take 1 tablet (200 mg total) by mouth once a week. for 28 doses 28 tablet 0    ibuprofen (ADVIL,MOTRIN) 200 MG tablet Take 200 mg by mouth every 6 (six) hours as needed for Pain (headaches).      levothyroxine (SYNTHROID) 75 MCG tablet Take 1 tablet (75 mcg total) by mouth once daily. 90 tablet 1    losartan (COZAAR) 25 MG tablet Take 1 tablet (25 mg total) by mouth once daily. 90 tablet 3    mag hydrox/aluminum hyd/simeth (MAALOX ORAL) Take 1 Dose by mouth as needed.      solifenacin (VESICARE) 10 MG tablet Take 1 tablet (10 mg total) by mouth nightly. 90 tablet 3    vitamins A,C,E-zinc-copper (ICAPS AREDS) 4,296 mcg-226 mg-90 mg Cap Take 1 capsule by mouth once daily.      [DISCONTINUED] meloxicam (MOBIC) 7.5 MG tablet Take 1 tablet (7.5 mg total) by mouth once daily. (Patient not taking: Reported on 4/8/2025) 90 tablet 1     No current facility-administered medications on file prior to visit.   [2]   Current Outpatient Medications:     amLODIPine (NORVASC) 10 MG tablet, Take 1 tablet (10 mg total) by mouth once daily., Disp: 90 tablet, Rfl: 3    atorvastatin (LIPITOR) 40 MG tablet, Take 2 tablets (80 mg total) by mouth every evening., Disp: 180 tablet, Rfl: 1    clopidogreL (PLAVIX) 75 mg tablet, Take 75 mg by mouth., Disp: , Rfl:     fluconazole (DIFLUCAN) 200 MG Tab, Take 1 tablet (200 mg total) by mouth once a week. for 28  doses, Disp: 28 tablet, Rfl: 0    ibuprofen (ADVIL,MOTRIN) 200 MG tablet, Take 200 mg by mouth every 6 (six) hours as needed for Pain (headaches)., Disp: , Rfl:     levothyroxine (SYNTHROID) 75 MCG tablet, Take 1 tablet (75 mcg total) by mouth once daily., Disp: 90 tablet, Rfl: 1    losartan (COZAAR) 25 MG tablet, Take 1 tablet (25 mg total) by mouth once daily., Disp: 90 tablet, Rfl: 3    mag hydrox/aluminum hyd/simeth (MAALOX ORAL), Take 1 Dose by mouth as needed., Disp: , Rfl:     solifenacin (VESICARE) 10 MG tablet, Take 1 tablet (10 mg total) by mouth nightly., Disp: 90 tablet, Rfl: 3    vitamins A,C,E-zinc-copper (ICAPS AREDS) 4,296 mcg-226 mg-90 mg Cap, Take 1 capsule by mouth once daily., Disp: , Rfl:

## 2025-04-08 ENCOUNTER — OFFICE VISIT (OUTPATIENT)
Dept: FAMILY MEDICINE | Facility: CLINIC | Age: 85
End: 2025-04-08
Payer: MEDICARE

## 2025-04-08 VITALS
WEIGHT: 209 LBS | DIASTOLIC BLOOD PRESSURE: 72 MMHG | OXYGEN SATURATION: 98 % | SYSTOLIC BLOOD PRESSURE: 118 MMHG | HEIGHT: 63 IN | BODY MASS INDEX: 37.03 KG/M2 | HEART RATE: 70 BPM

## 2025-04-08 DIAGNOSIS — I10 ESSENTIAL HYPERTENSION: ICD-10-CM

## 2025-04-08 DIAGNOSIS — E66.01 SEVERE OBESITY (BMI 35.0-39.9) WITH COMORBIDITY: ICD-10-CM

## 2025-04-08 DIAGNOSIS — M17.12 PRIMARY OSTEOARTHRITIS OF LEFT KNEE: ICD-10-CM

## 2025-04-08 DIAGNOSIS — E78.2 MIXED HYPERLIPIDEMIA: ICD-10-CM

## 2025-04-08 DIAGNOSIS — E03.9 ACQUIRED HYPOTHYROIDISM: ICD-10-CM

## 2025-04-08 DIAGNOSIS — Z00.00 ROUTINE GENERAL MEDICAL EXAMINATION AT A HEALTH CARE FACILITY: Primary | ICD-10-CM

## 2025-04-08 DIAGNOSIS — R79.9 ABNORMAL FINDING OF BLOOD CHEMISTRY, UNSPECIFIED: ICD-10-CM

## 2025-04-08 DIAGNOSIS — N32.81 OAB (OVERACTIVE BLADDER): ICD-10-CM

## 2025-04-08 PROCEDURE — 99999 PR PBB SHADOW E&M-EST. PATIENT-LVL III: CPT | Mod: PBBFAC,,, | Performed by: STUDENT IN AN ORGANIZED HEALTH CARE EDUCATION/TRAINING PROGRAM

## 2025-04-08 RX ORDER — CLOPIDOGREL BISULFATE 75 MG/1
75 TABLET ORAL
COMMUNITY
Start: 2025-02-27

## 2025-04-09 ENCOUNTER — LAB VISIT (OUTPATIENT)
Dept: LAB | Facility: HOSPITAL | Age: 85
End: 2025-04-09
Attending: STUDENT IN AN ORGANIZED HEALTH CARE EDUCATION/TRAINING PROGRAM
Payer: MEDICARE

## 2025-04-09 ENCOUNTER — RESULTS FOLLOW-UP (OUTPATIENT)
Dept: FAMILY MEDICINE | Facility: CLINIC | Age: 85
End: 2025-04-09

## 2025-04-09 DIAGNOSIS — R79.9 ABNORMAL FINDING OF BLOOD CHEMISTRY, UNSPECIFIED: ICD-10-CM

## 2025-04-09 DIAGNOSIS — Z00.00 ROUTINE GENERAL MEDICAL EXAMINATION AT A HEALTH CARE FACILITY: ICD-10-CM

## 2025-04-09 DIAGNOSIS — I10 ESSENTIAL HYPERTENSION: ICD-10-CM

## 2025-04-09 DIAGNOSIS — E78.2 MIXED HYPERLIPIDEMIA: ICD-10-CM

## 2025-04-09 DIAGNOSIS — E03.9 ACQUIRED HYPOTHYROIDISM: ICD-10-CM

## 2025-04-09 LAB
ABSOLUTE EOSINOPHIL (OHS): 0.04 K/UL
ABSOLUTE MONOCYTE (OHS): 0.56 K/UL (ref 0.3–1)
ABSOLUTE NEUTROPHIL COUNT (OHS): 3.3 K/UL (ref 1.8–7.7)
ALBUMIN SERPL BCP-MCNC: 3.5 G/DL (ref 3.5–5.2)
ALP SERPL-CCNC: 104 UNIT/L (ref 40–150)
ALT SERPL W/O P-5'-P-CCNC: 10 UNIT/L (ref 10–44)
ANION GAP (OHS): 11 MMOL/L (ref 8–16)
AST SERPL-CCNC: 20 UNIT/L (ref 11–45)
BASOPHILS # BLD AUTO: 0.04 K/UL
BASOPHILS NFR BLD AUTO: 0.7 %
BILIRUB SERPL-MCNC: 0.6 MG/DL (ref 0.1–1)
BUN SERPL-MCNC: 12 MG/DL (ref 8–23)
CALCIUM SERPL-MCNC: 8.8 MG/DL (ref 8.7–10.5)
CHLORIDE SERPL-SCNC: 109 MMOL/L (ref 95–110)
CHOLEST SERPL-MCNC: 103 MG/DL (ref 120–199)
CHOLEST/HDLC SERPL: 2.5 {RATIO} (ref 2–5)
CO2 SERPL-SCNC: 24 MMOL/L (ref 23–29)
CREAT SERPL-MCNC: 0.6 MG/DL (ref 0.5–1.4)
EAG (OHS): 111 MG/DL (ref 68–131)
ERYTHROCYTE [DISTWIDTH] IN BLOOD BY AUTOMATED COUNT: 14 % (ref 11.5–14.5)
GFR SERPLBLD CREATININE-BSD FMLA CKD-EPI: >60 ML/MIN/1.73/M2
GLUCOSE SERPL-MCNC: 101 MG/DL (ref 70–110)
HBA1C MFR BLD: 5.5 % (ref 4–5.6)
HCT VFR BLD AUTO: 40.7 % (ref 37–48.5)
HDLC SERPL-MCNC: 42 MG/DL (ref 40–75)
HDLC SERPL: 40.8 % (ref 20–50)
HGB BLD-MCNC: 13.1 GM/DL (ref 12–16)
IMM GRANULOCYTES # BLD AUTO: 0.01 K/UL (ref 0–0.04)
IMM GRANULOCYTES NFR BLD AUTO: 0.2 % (ref 0–0.5)
LDLC SERPL CALC-MCNC: 51 MG/DL (ref 63–159)
LYMPHOCYTES # BLD AUTO: 1.51 K/UL (ref 1–4.8)
MCH RBC QN AUTO: 31 PG (ref 27–31)
MCHC RBC AUTO-ENTMCNC: 32.2 G/DL (ref 32–36)
MCV RBC AUTO: 96 FL (ref 82–98)
NONHDLC SERPL-MCNC: 61 MG/DL
NUCLEATED RBC (/100WBC) (OHS): 0 /100 WBC
PLATELET # BLD AUTO: 194 K/UL (ref 150–450)
PMV BLD AUTO: 10.6 FL (ref 9.2–12.9)
POTASSIUM SERPL-SCNC: 3.9 MMOL/L (ref 3.5–5.1)
PROT SERPL-MCNC: 6.7 GM/DL (ref 6–8.4)
RBC # BLD AUTO: 4.23 M/UL (ref 4–5.4)
RELATIVE EOSINOPHIL (OHS): 0.7 %
RELATIVE LYMPHOCYTE (OHS): 27.7 % (ref 18–48)
RELATIVE MONOCYTE (OHS): 10.3 % (ref 4–15)
RELATIVE NEUTROPHIL (OHS): 60.4 % (ref 38–73)
SODIUM SERPL-SCNC: 144 MMOL/L (ref 136–145)
T4 FREE SERPL-MCNC: 1.02 NG/DL (ref 0.71–1.51)
TRIGL SERPL-MCNC: 50 MG/DL (ref 30–150)
TSH SERPL-ACNC: 2.18 UIU/ML (ref 0.4–4)
WBC # BLD AUTO: 5.46 K/UL (ref 3.9–12.7)

## 2025-04-09 PROCEDURE — 80061 LIPID PANEL: CPT

## 2025-04-09 PROCEDURE — 36415 COLL VENOUS BLD VENIPUNCTURE: CPT | Mod: PO

## 2025-04-09 PROCEDURE — 80053 COMPREHEN METABOLIC PANEL: CPT

## 2025-04-09 PROCEDURE — 84443 ASSAY THYROID STIM HORMONE: CPT

## 2025-04-09 PROCEDURE — 84439 ASSAY OF FREE THYROXINE: CPT

## 2025-04-09 PROCEDURE — 85025 COMPLETE CBC W/AUTO DIFF WBC: CPT

## 2025-04-09 PROCEDURE — 83036 HEMOGLOBIN GLYCOSYLATED A1C: CPT

## 2025-04-28 ENCOUNTER — TELEPHONE (OUTPATIENT)
Dept: ORTHOPEDICS | Facility: CLINIC | Age: 85
End: 2025-04-28
Payer: MEDICARE

## 2025-04-28 NOTE — TELEPHONE ENCOUNTER
----- Message from Teresa sent at 4/28/2025  9:04 AM CDT -----  Name of Who is Calling:PT What is the request in detail:Pt would like a callback from the office in regards to discussing getting Rustacone knee injection IF available not Cortizone shot. Please advise thank you Can the clinic reply by [facility]:NO What Number to Call Back if not in [facility]: [phone]

## 2025-05-01 ENCOUNTER — TELEPHONE (OUTPATIENT)
Dept: ORTHOPEDICS | Facility: CLINIC | Age: 85
End: 2025-05-01
Payer: MEDICARE

## 2025-05-01 DIAGNOSIS — E03.9 HYPOTHYROIDISM, UNSPECIFIED TYPE: ICD-10-CM

## 2025-05-01 RX ORDER — LEVOTHYROXINE SODIUM 75 UG/1
75 TABLET ORAL DAILY
Qty: 90 TABLET | Refills: 1 | Status: SHIPPED | OUTPATIENT
Start: 2025-05-01

## 2025-05-01 NOTE — TELEPHONE ENCOUNTER
"Spoke w/pt per call back msg received; pt inquiring about "Rustacone" gel injection -- asking if MD administers.  Advised pt that Rustacone is a type of gel injection, which he does do in-office.  Did advise pt that she will need to see provider first before gel injection is ordered.  Pt acknowledged; pt already has appt scheduled on 05/08 -- will keep appt.  Pt has no further questions/concerns at this time.      "

## 2025-05-01 NOTE — TELEPHONE ENCOUNTER
"----- Message from Med Assistant Kumar sent at 5/1/2025  9:57 AM CDT -----  Contact: patient  Does Dr. Man do the "rooster comb" injection?Call back number is [phone]  "

## 2025-06-23 DIAGNOSIS — Z00.00 ENCOUNTER FOR MEDICARE ANNUAL WELLNESS EXAM: ICD-10-CM

## 2025-07-02 DIAGNOSIS — Z78.0 MENOPAUSE: ICD-10-CM

## 2025-07-03 ENCOUNTER — PATIENT OUTREACH (OUTPATIENT)
Dept: ADMINISTRATIVE | Facility: HOSPITAL | Age: 85
End: 2025-07-03
Payer: MEDICARE

## 2025-07-03 NOTE — PROGRESS NOTES
Population Health Chart Review & Patient Outreach Details    Outreach Performed: YES Portal Active and/or Letter inactive    Additional [facility] Notes:      Osteoporosis screening (DEXA SCAN)     Non-compliant report chart audits for Osteoporosis screening (DEXA SCAN)     Outreach to patient in reference to SCHEDULING A Dexa Scan           Updates Requested / Reviewed:               Health Maintenance Topics Overdue:      VB Score: 1     Osteoporosis Screening    Pneumonia Vaccine  Tetanus Vaccine  Shingles/Zoster Vaccine  RSV Vaccine

## 2025-07-23 ENCOUNTER — OFFICE VISIT (OUTPATIENT)
Dept: PODIATRY | Facility: CLINIC | Age: 85
End: 2025-07-23
Payer: MEDICARE

## 2025-07-23 VITALS — RESPIRATION RATE: 16 BRPM | BODY MASS INDEX: 37.5 KG/M2 | HEIGHT: 63 IN | WEIGHT: 211.63 LBS

## 2025-07-23 DIAGNOSIS — M79.675 PAIN OF TOE OF LEFT FOOT: ICD-10-CM

## 2025-07-23 DIAGNOSIS — L60.0 INGROWN NAIL: Primary | ICD-10-CM

## 2025-07-23 DIAGNOSIS — B35.1 ONYCHOMYCOSIS DUE TO DERMATOPHYTE: ICD-10-CM

## 2025-07-23 PROCEDURE — 99999 PR PBB SHADOW E&M-EST. PATIENT-LVL III: CPT | Mod: PBBFAC,HCNC,, | Performed by: PODIATRIST

## 2025-07-23 NOTE — PROGRESS NOTES
"  [address] [facility] [address]  [place] [address]  Phone: [phone]   [phone]    Patient's PCP:Vielka Quintero MD  Referring Provider: No ref. provider found    Subjective:      Chief Complaint:: Ingrown Toenail (Left great toe pain)    HPI  Brandi Flynn is a 85 y.o. female who presents today with a complaint of possible ingrown toenail left great toe. The current episode started over a week.  The symptoms include thickened nails, pain and inflammation.  The symptoms are aggravated by pressure and shoe gear. The problem has worsened. Treatment to date have included monthly pedicure and diflucan to treat nail fungus which provided some relief.       Vitals:    25 1351   Resp: 16   Weight: 96 kg (211 lb 10.3 oz)   Height: 5' 3" (1.6 m)   PainSc: 10-Worst pain ever      Shoe Size:   9-9.5  Past Surgical History:   Procedure Laterality Date    ABDOMINAL SURGERY      SBO from lap band wraping around small bowel, lap untwisted    ADENOIDECTOMY      BREAST BIOPSY  2014    needle biopsy     CATARACT EXTRACTION, BILATERAL Bilateral      SECTION      CHOLECYSTECTOMY  2001    COLONOSCOPY  2011    Dr. Alvares, 5 year recheck    EYE SURGERY      bilateral cataract Dr Carrillo     EYE SURGERY  10/2020    bottom lid    HEMORRHOID SURGERY      HYSTERECTOMY  1989    INCONTINENCE SURGERY  2008    sling    LAPAROSCOPIC GASTRIC BANDING  2004    LAPAROSCOPIC NISSEN FUNDOPLICATION      Dr. Rutherford    TONSILLECTOMY       Past Medical History:   Diagnosis Date    Blood transfusion     GERD (gastroesophageal reflux disease)     Hepatitis C     treated six months by Dr. Gaspar with interferon    History of hepatitis C     Hyperlipidemia     Hypertension     Hypothyroidism     Obesity     Primary osteoarthritis of left knee 2015    Sleep apnea     Stroke     TIA     TIA (transient ischemic attack)     Vision loss of right eye     Dr Eldridge Right eye blood clot      Family History   Problem " Relation Name Age of Onset    Diabetes Mother      Arthritis Mother          RA    Heart disease Mother          MI at 79     Diabetes Sister 1     Diverticulitis Sister 1     No Known Problems Daughter Fidelina     Emphysema Maternal Aunt      Heart disease Maternal Aunt      Cancer Maternal Uncle      Early death Maternal Grandfather  47    Heart disease Maternal Grandfather      Arthritis Paternal Grandmother      Heart disease Paternal Grandfather      Cancer Paternal Grandfather          lung heavy smoker     No Known Problems Father      No Known Problems Son      No Known Problems Paternal Uncle      No Known Problems Maternal Grandmother      No Known Problems Brother      Arthritis Daughter Argelia         Social History:   Marital Status:   Alcohol History:  reports no history of alcohol use.  Tobacco History:  reports that she has quit smoking. Her smoking use included cigarettes. She has a 2.5 pack-year smoking history. She has never used smokeless tobacco.  Drug History:  reports no history of drug use.    Review of patient's allergies indicates:   Allergen Reactions    Ace inhibitors Other (See Comments)     cough    Morphine Itching    Keflex [cephalexin] Itching and Rash       Current Medications[1]    Review of Systems   Constitutional:  Negative for chills, fatigue, fever and unexpected weight change.   HENT:  Negative for hearing loss and trouble swallowing.    Eyes:  Negative for photophobia and visual disturbance.   Respiratory:  Negative for cough, shortness of breath and wheezing.    Cardiovascular:  Positive for leg swelling. Negative for chest pain and palpitations.   Gastrointestinal:  Negative for abdominal pain and nausea.   Genitourinary:  Negative for dysuria and frequency.   Musculoskeletal:  Negative for arthralgias, back pain, gait problem, joint swelling, myalgias and neck pain.   Skin:  Negative for rash and wound.   Neurological:  Negative for tremors, seizures, weakness,  numbness and headaches.   Hematological:  Does not bruise/bleed easily.   Psychiatric/Behavioral:  Negative for hallucinations.          Objective:        Physical Exam:   Foot Exam    General  General Appearance: appears stated age and healthy   Orientation: alert and oriented to person, place, and time   Affect: appropriate   Gait: unimpaired       Left Foot/Ankle      Inspection and Palpation  Ecchymosis: none  Tenderness: (Medial and lateral border great toenail)  Swelling: (Mild lower extremity edema and medial border great toenail)  Arch: normal  Hammertoes: absent  Claw toes: absent  Hallux valgus: no  Hallux limitus: no  Skin Exam: skin intact;   Fungus Toenails: present    Neurovascular  Dorsalis pedis: 2+  Posterior tibial: 2+  Capillary refill: 2+  Varicose veins: present  Saphenous nerve sensation: normal  Tibial nerve sensation: normal  Superficial peroneal nerve sensation: normal  Deep peroneal nerve sensation: normal  Sural nerve sensation: normal    Muscle Strength  Ankle dorsiflexion: 5  Ankle plantar flexion: 5  Ankle inversion: 5  Ankle eversion: 5  Great toe extension: 5  Great toe flexion: 5    Comments  Great toenail is thickened and dystrophic.  There is ingrowing of the medial lateral borders.  Tender to palpation.  Mild edema is present.  Minimal erythema.  No purulence.    Physical Exam  Cardiovascular:      Pulses:           Dorsalis pedis pulses are 2+ on the left side.        Posterior tibial pulses are 2+ on the left side.   Musculoskeletal:      Left foot: No bunion.   Feet:      Left foot:      Toenail Condition: Fungal disease present.              Left Ankle/Foot Exam     Comments:  Great toenail is thickened and dystrophic.  There is ingrowing of the medial lateral borders.  Tender to palpation.  Mild edema is present.  Minimal erythema.  No purulence.      Muscle Strength   Left Lower Extremity   Ankle Dorsiflexion:  5   Plantar flexion:  5/5     Vascular Exam       Left  Pulses  Dorsalis Pedis:      2+  Posterior Tibial:      2+           Imaging: none            Assessment:       1. Ingrown nail    2. Onychomycosis due to dermatophyte    3. Pain of toe of left foot      Plan:   Ingrown nail  -     Nail Removal    Onychomycosis due to dermatophyte  -     Nail Removal    Pain of toe of left foot  -     Nail Removal      Follow up if symptoms worsen or fail to improve.    We discussed ingrown toenail treatment options of no treatment, avulsion of nail border under local with regrowth of nail, chemical matrixectomy for attempted permanent correction of the problem. Patient was educated about daily dressing changes, soaks, and medications following removal of the nail.     Fungal infection of toenails explained. Treatment options including no treatment, periodic debridement, topical medications, oral medications, and removal of the nail were discussed, as well as success rates and risks of recurrence.  I discussed there that it does appear other nails improved however the left great toenail does still have fungal changes which are contributing to the ingrown toenail.    Nail Removal    Date/Time: 7/23/2025 1:50 PM    Performed by: Dane Zafar DPM  Authorized by: Dane Zafar DPM    Consent Done?:  Yes (Written)  Time out: Immediately prior to the procedure a time out was called    Location:     Location:  Left foot    Location detail:  Left big toe  Anesthesia:     Anesthesia:  Local infiltration    Local anesthetic:  Lidocaine 2% without epinephrine  Procedure Details:     Preparation:  Skin prepped with alcohol    Amount removed:  Partial    Side:  Bilateral    Wedge excision of skin of nail fold: No      Nail bed sutured?: No      Nail matrix removed:  Partial    Removal method:  Phenol and alcohol    Dressing applied:  Dressing applied    Patient tolerance:  Patient tolerated the procedure well with no immediate complications     4 applications of Phenol EZ swabs 89% was  applied.               Counseling:     I provided patient education verbally regarding:   Patient diagnosis, treatment options, as well as alternatives, risks, and benefits.     This note was created using Dragon voice recognition software that occasionally misinterpreted phrases or words.                      [1]   Current Outpatient Medications   Medication Sig Dispense Refill    amLODIPine (NORVASC) 10 MG tablet Take 1 tablet (10 mg total) by mouth once daily. 90 tablet 3    atorvastatin (LIPITOR) 40 MG tablet Take 2 tablets (80 mg total) by mouth every evening. 180 tablet 1    clopidogreL (PLAVIX) 75 mg tablet Take 75 mg by mouth.      ibuprofen (ADVIL,MOTRIN) 200 MG tablet Take 200 mg by mouth every 6 (six) hours as needed for Pain (headaches).      levothyroxine (SYNTHROID) 75 MCG tablet Take 1 tablet (75 mcg total) by mouth once daily. 90 tablet 1    losartan (COZAAR) 25 MG tablet Take 1 tablet (25 mg total) by mouth once daily. 90 tablet 3    mag hydrox/aluminum hyd/simeth (MAALOX ORAL) Take 1 Dose by mouth as needed.      solifenacin (VESICARE) 10 MG tablet Take 1 tablet (10 mg total) by mouth nightly. 90 tablet 3    vitamins A,C,E-zinc-copper (ICAPS AREDS) 4,296 mcg-226 mg-90 mg Cap Take 1 capsule by mouth once daily.       No current facility-administered medications for this visit.

## 2025-07-24 NOTE — PATIENT INSTRUCTIONS

## 2025-07-31 ENCOUNTER — PATIENT MESSAGE (OUTPATIENT)
Dept: FAMILY MEDICINE | Facility: CLINIC | Age: 85
End: 2025-07-31
Payer: MEDICARE

## 2025-08-05 DIAGNOSIS — E03.9 HYPOTHYROIDISM, UNSPECIFIED TYPE: ICD-10-CM

## 2025-08-05 RX ORDER — LEVOTHYROXINE SODIUM 75 UG/1
75 TABLET ORAL DAILY
Qty: 90 TABLET | Refills: 1 | Status: SHIPPED | OUTPATIENT
Start: 2025-08-05

## 2025-08-18 ENCOUNTER — OFFICE VISIT (OUTPATIENT)
Dept: PODIATRY | Facility: CLINIC | Age: 85
End: 2025-08-18
Payer: MEDICARE

## 2025-08-18 VITALS — HEIGHT: 63 IN | BODY MASS INDEX: 37.5 KG/M2 | RESPIRATION RATE: 16 BRPM | WEIGHT: 211.63 LBS

## 2025-08-18 DIAGNOSIS — L60.0 INGROWN NAIL: Primary | ICD-10-CM

## 2025-08-18 DIAGNOSIS — M79.675 PAIN OF TOE OF LEFT FOOT: ICD-10-CM

## 2025-08-18 DIAGNOSIS — M79.674 PAIN OF TOE OF RIGHT FOOT: ICD-10-CM

## 2025-08-18 PROCEDURE — 1159F MED LIST DOCD IN RCRD: CPT | Mod: CPTII,HCNC,S$GLB, | Performed by: PODIATRIST

## 2025-08-18 PROCEDURE — 1101F PT FALLS ASSESS-DOCD LE1/YR: CPT | Mod: CPTII,HCNC,S$GLB, | Performed by: PODIATRIST

## 2025-08-18 PROCEDURE — 11750 EXCISION NAIL&NAIL MATRIX: CPT | Mod: HCNC,T5,S$GLB, | Performed by: PODIATRIST

## 2025-08-18 PROCEDURE — 99213 OFFICE O/P EST LOW 20 MIN: CPT | Mod: HCNC,25,S$GLB, | Performed by: PODIATRIST

## 2025-08-18 PROCEDURE — 1125F AMNT PAIN NOTED PAIN PRSNT: CPT | Mod: CPTII,HCNC,S$GLB, | Performed by: PODIATRIST

## 2025-08-18 PROCEDURE — 3288F FALL RISK ASSESSMENT DOCD: CPT | Mod: CPTII,HCNC,S$GLB, | Performed by: PODIATRIST

## 2025-08-18 PROCEDURE — 99999 PR PBB SHADOW E&M-EST. PATIENT-LVL III: CPT | Mod: PBBFAC,HCNC,, | Performed by: PODIATRIST

## 2025-08-18 PROCEDURE — 1160F RVW MEDS BY RX/DR IN RCRD: CPT | Mod: CPTII,HCNC,S$GLB, | Performed by: PODIATRIST

## 2025-08-27 ENCOUNTER — OFFICE VISIT (OUTPATIENT)
Dept: FAMILY MEDICINE | Facility: CLINIC | Age: 85
End: 2025-08-27
Payer: MEDICARE

## 2025-08-27 ENCOUNTER — HOSPITAL ENCOUNTER (OUTPATIENT)
Dept: RADIOLOGY | Facility: HOSPITAL | Age: 85
Discharge: HOME | End: 2025-08-27
Attending: PHYSICIAN ASSISTANT
Payer: MEDICARE

## 2025-08-27 VITALS
RESPIRATION RATE: 16 BRPM | HEART RATE: 80 BPM | BODY MASS INDEX: 37.31 KG/M2 | SYSTOLIC BLOOD PRESSURE: 122 MMHG | HEIGHT: 63 IN | TEMPERATURE: 98 F | WEIGHT: 210.56 LBS | OXYGEN SATURATION: 97 % | DIASTOLIC BLOOD PRESSURE: 60 MMHG

## 2025-08-27 DIAGNOSIS — E03.9 HYPOTHYROIDISM, UNSPECIFIED TYPE: Chronic | ICD-10-CM

## 2025-08-27 DIAGNOSIS — L60.0 INGROWN TOENAIL OF RIGHT FOOT: ICD-10-CM

## 2025-08-27 DIAGNOSIS — I77.9 CAROTID ARTERY DISEASE, UNSPECIFIED LATERALITY, UNSPECIFIED TYPE: ICD-10-CM

## 2025-08-27 DIAGNOSIS — I10 ESSENTIAL HYPERTENSION: ICD-10-CM

## 2025-08-27 DIAGNOSIS — N32.81 OAB (OVERACTIVE BLADDER): ICD-10-CM

## 2025-08-27 DIAGNOSIS — I65.22 STENOSIS OF LEFT CAROTID ARTERY: ICD-10-CM

## 2025-08-27 DIAGNOSIS — E78.2 MIXED HYPERLIPIDEMIA: Chronic | ICD-10-CM

## 2025-08-27 DIAGNOSIS — R60.0 BILATERAL LOWER EXTREMITY EDEMA: ICD-10-CM

## 2025-08-27 DIAGNOSIS — R60.0 BILATERAL LOWER EXTREMITY EDEMA: Primary | ICD-10-CM

## 2025-08-27 LAB — NT-PROBNP SERPL-MCNC: 2568 PG/ML

## 2025-08-27 PROCEDURE — 3078F DIAST BP <80 MM HG: CPT | Mod: CPTII,HCNC,S$GLB, | Performed by: PHYSICIAN ASSISTANT

## 2025-08-27 PROCEDURE — 3288F FALL RISK ASSESSMENT DOCD: CPT | Mod: CPTII,HCNC,S$GLB, | Performed by: PHYSICIAN ASSISTANT

## 2025-08-27 PROCEDURE — 99999 PR PBB SHADOW E&M-EST. PATIENT-LVL IV: CPT | Mod: PBBFAC,HCNC,, | Performed by: PHYSICIAN ASSISTANT

## 2025-08-27 PROCEDURE — 1159F MED LIST DOCD IN RCRD: CPT | Mod: CPTII,HCNC,S$GLB, | Performed by: PHYSICIAN ASSISTANT

## 2025-08-27 PROCEDURE — 93970 EXTREMITY STUDY: CPT | Mod: 26,,, | Performed by: RADIOLOGY

## 2025-08-27 PROCEDURE — 93970 EXTREMITY STUDY: CPT | Mod: TC

## 2025-08-27 PROCEDURE — 1126F AMNT PAIN NOTED NONE PRSNT: CPT | Mod: CPTII,HCNC,S$GLB, | Performed by: PHYSICIAN ASSISTANT

## 2025-08-27 PROCEDURE — 3074F SYST BP LT 130 MM HG: CPT | Mod: CPTII,HCNC,S$GLB, | Performed by: PHYSICIAN ASSISTANT

## 2025-08-27 PROCEDURE — 1160F RVW MEDS BY RX/DR IN RCRD: CPT | Mod: CPTII,HCNC,S$GLB, | Performed by: PHYSICIAN ASSISTANT

## 2025-08-27 PROCEDURE — 1101F PT FALLS ASSESS-DOCD LE1/YR: CPT | Mod: CPTII,HCNC,S$GLB, | Performed by: PHYSICIAN ASSISTANT

## 2025-08-27 PROCEDURE — 99214 OFFICE O/P EST MOD 30 MIN: CPT | Mod: HCNC,S$GLB,, | Performed by: PHYSICIAN ASSISTANT

## 2025-08-27 RX ORDER — MUPIROCIN 20 MG/G
OINTMENT TOPICAL 3 TIMES DAILY
Qty: 30 G | Refills: 0 | Status: SHIPPED | OUTPATIENT
Start: 2025-08-27

## 2025-08-28 ENCOUNTER — TELEPHONE (OUTPATIENT)
Dept: FAMILY MEDICINE | Facility: CLINIC | Age: 85
End: 2025-08-28
Payer: MEDICARE

## 2025-08-28 DIAGNOSIS — R06.00 DYSPNEA, UNSPECIFIED TYPE: Primary | ICD-10-CM
